# Patient Record
Sex: MALE | Race: WHITE | NOT HISPANIC OR LATINO | Employment: OTHER | URBAN - METROPOLITAN AREA
[De-identification: names, ages, dates, MRNs, and addresses within clinical notes are randomized per-mention and may not be internally consistent; named-entity substitution may affect disease eponyms.]

---

## 2017-06-02 ENCOUNTER — RECORDS - HEALTHEAST (OUTPATIENT)
Dept: LAB | Facility: CLINIC | Age: 82
End: 2017-06-02

## 2017-06-05 LAB
CHOLEST SERPL-MCNC: 136 MG/DL
FASTING STATUS PATIENT QL REPORTED: ABNORMAL
HDLC SERPL-MCNC: 36 MG/DL
LDLC SERPL CALC-MCNC: 63 MG/DL
TRIGL SERPL-MCNC: 185 MG/DL

## 2018-02-26 ENCOUNTER — TRANSFERRED RECORDS (OUTPATIENT)
Dept: HEALTH INFORMATION MANAGEMENT | Facility: CLINIC | Age: 83
End: 2018-02-26

## 2018-04-14 ENCOUNTER — RECORDS - HEALTHEAST (OUTPATIENT)
Dept: LAB | Facility: CLINIC | Age: 83
End: 2018-04-14

## 2018-04-14 LAB
ALBUMIN SERPL-MCNC: 3.5 G/DL (ref 3.5–5)
ALP SERPL-CCNC: 96 U/L (ref 45–120)
ALT SERPL W P-5'-P-CCNC: 10 U/L (ref 0–45)
ALT SERPL W P-5'-P-CCNC: 10 U/L (ref 0–45)
ANION GAP SERPL CALCULATED.3IONS-SCNC: 11 MMOL/L (ref 5–18)
AST SERPL W P-5'-P-CCNC: 17 U/L (ref 0–40)
BILIRUB DIRECT SERPL-MCNC: 0.2 MG/DL
BILIRUB SERPL-MCNC: 0.4 MG/DL (ref 0–1)
BUN SERPL-MCNC: 22 MG/DL (ref 8–28)
CALCIUM SERPL-MCNC: 9.5 MG/DL (ref 8.5–10.5)
CHLORIDE BLD-SCNC: 109 MMOL/L (ref 98–107)
CHOLEST SERPL-MCNC: 125 MG/DL
CO2 SERPL-SCNC: 22 MMOL/L (ref 22–31)
CREAT SERPL-MCNC: 0.78 MG/DL (ref 0.7–1.3)
FASTING STATUS PATIENT QL REPORTED: ABNORMAL
GFR SERPL CREATININE-BSD FRML MDRD: >60 ML/MIN/1.73M2
GLUCOSE BLD-MCNC: 104 MG/DL (ref 70–125)
HDLC SERPL-MCNC: 28 MG/DL
LDLC SERPL CALC-MCNC: 50 MG/DL
POTASSIUM BLD-SCNC: 4.3 MMOL/L (ref 3.5–5)
PROT SERPL-MCNC: 7.4 G/DL (ref 6–8)
SODIUM SERPL-SCNC: 142 MMOL/L (ref 136–145)
TRIGL SERPL-MCNC: 236 MG/DL

## 2018-04-16 ENCOUNTER — ALLIED HEALTH/NURSE VISIT (OUTPATIENT)
Dept: CARDIOLOGY | Facility: CLINIC | Age: 83
End: 2018-04-16
Attending: INTERNAL MEDICINE
Payer: MEDICAID

## 2018-04-16 ENCOUNTER — OFFICE VISIT (OUTPATIENT)
Dept: CARDIOLOGY | Facility: CLINIC | Age: 83
End: 2018-04-16
Payer: MEDICAID

## 2018-04-16 ENCOUNTER — DOCUMENTATION ONLY (OUTPATIENT)
Dept: CARDIOLOGY | Facility: CLINIC | Age: 83
End: 2018-04-16

## 2018-04-16 VITALS
SYSTOLIC BLOOD PRESSURE: 85 MMHG | HEART RATE: 71 BPM | DIASTOLIC BLOOD PRESSURE: 59 MMHG | WEIGHT: 146.1 LBS | HEIGHT: 73 IN | BODY MASS INDEX: 19.36 KG/M2

## 2018-04-16 DIAGNOSIS — I44.2 ATRIOVENTRICULAR BLOCK, COMPLETE (H): ICD-10-CM

## 2018-04-16 DIAGNOSIS — I20.89 OTHER FORMS OF ANGINA PECTORIS (H): ICD-10-CM

## 2018-04-16 DIAGNOSIS — I71.40 ABDOMINAL AORTIC ANEURYSM (AAA) WITHOUT RUPTURE (H): ICD-10-CM

## 2018-04-16 DIAGNOSIS — I25.5 ISCHEMIC CARDIOMYOPATHY: Primary | ICD-10-CM

## 2018-04-16 DIAGNOSIS — R09.89 BRUIT OF RIGHT CAROTID ARTERY: ICD-10-CM

## 2018-04-16 DIAGNOSIS — Z95.0 HISTORY OF PERMANENT CARDIAC PACEMAKER PLACEMENT: ICD-10-CM

## 2018-04-16 DIAGNOSIS — I10 BENIGN ESSENTIAL HYPERTENSION: ICD-10-CM

## 2018-04-16 DIAGNOSIS — I25.10 CORONARY ARTERY DISEASE INVOLVING NATIVE CORONARY ARTERY OF NATIVE HEART WITHOUT ANGINA PECTORIS: ICD-10-CM

## 2018-04-16 DIAGNOSIS — R06.02 SOB (SHORTNESS OF BREATH): ICD-10-CM

## 2018-04-16 DIAGNOSIS — Z91.199 NON-COMPLIANCE: ICD-10-CM

## 2018-04-16 PROCEDURE — 93280 PM DEVICE PROGR EVAL DUAL: CPT | Performed by: INTERNAL MEDICINE

## 2018-04-16 PROCEDURE — 99204 OFFICE O/P NEW MOD 45 MIN: CPT | Performed by: INTERNAL MEDICINE

## 2018-04-16 RX ORDER — LISINOPRIL 2.5 MG/1
2.5 TABLET ORAL DAILY
Qty: 90 TABLET | Refills: 3 | Status: ON HOLD | OUTPATIENT
Start: 2018-04-16 | End: 2018-04-21

## 2018-04-16 RX ORDER — METOPROLOL SUCCINATE 100 MG/1
100 TABLET, EXTENDED RELEASE ORAL DAILY
Qty: 90 TABLET | Refills: 3 | Status: ON HOLD | OUTPATIENT
Start: 2018-04-16 | End: 2018-04-21

## 2018-04-16 RX ORDER — LIDOCAINE 40 MG/G
CREAM TOPICAL
Status: CANCELLED | OUTPATIENT
Start: 2018-04-16

## 2018-04-16 RX ORDER — LISINOPRIL 10 MG/1
10 TABLET ORAL DAILY
COMMUNITY
End: 2018-04-16

## 2018-04-16 RX ORDER — POTASSIUM CHLORIDE 1500 MG/1
20 TABLET, EXTENDED RELEASE ORAL
Status: CANCELLED | OUTPATIENT
Start: 2018-04-16

## 2018-04-16 RX ORDER — ISOSORBIDE MONONITRATE 30 MG/1
30 TABLET, EXTENDED RELEASE ORAL DAILY
Qty: 90 TABLET | Refills: 3 | Status: ON HOLD | OUTPATIENT
Start: 2018-04-16 | End: 2018-05-18

## 2018-04-16 RX ORDER — SODIUM CHLORIDE 9 MG/ML
INJECTION, SOLUTION INTRAVENOUS CONTINUOUS
Status: CANCELLED | OUTPATIENT
Start: 2018-04-16

## 2018-04-16 NOTE — MR AVS SNAPSHOT
After Visit Summary   4/16/2018    Nacho Joseph    MRN: [id number]           Patient Information     Date Of Birth          1935        Visit Information        Provider Department      4/16/2018 11:00 AM [facility] DCR2 [facility]   [place]        Today's Diagnoses     History of permanent cardiac pacemaker placement           Follow-ups after your visit        Your next 10 appointments already scheduled     Apr 18, 2018 10:00 AM CDT   Ech Complete with [id number]   [facility] CV Echocardiography (Cardiovascular Imaging at [facility])    [address]  [address]  [facility] [address]   [phone]           1. Please bring or wear a comfortable two-piece outfit. 2. You may eat, drink and take your normal medicines. 3. For any questions that cannot be answered, please contact the ordering physician            Apr 19, 2018 10:00 AM CDT   Cath 90 Minute with SHCVR1   [facility] Cardiac Catheterization Lab ([facility])    [address]  [facility] [address]   [phone]            Apr 24, 2018  9:00 AM CDT   US AORTA/IVC/ILIAC DUPLEX COMPLETE with SHVUS4   [facility] MVI Ultrasound ([facility] at [facility])    [address]  [address]  [facility] [address]   [phone]           Please bring a list of your medicines (including vitamins, minerals and over-the-counter drugs). Also, tell your doctor about any allergies you may have. Wear comfortable clothes and leave your valuables at home.  Adults: No eating or drinking for 8 hours before the exam. You may take medicine with a small sip of water.  Children: - Children 6+ years: No food or drink for 6 hours before exam. - Children 1-5 years: No food or drink for 4 hours before exam. - Infants, breast-fed: may have breast milk up to 2 hours before exam. - Infants, formula: may have bottle until 4 hours before exam.  Please call  the Imaging Department at your exam site with any questions.            Apr 24, 2018 10:00 AM CDT   US CAROTID BILATERAL with SHVUS4   [facility] MVI Ultrasound ([facility])    [address]  [address]  [place] [address]   [phone]           Please bring a list of your medicines (including vitamins, minerals and over-the-counter drugs). Also, tell your doctor about any allergies you may have. Wear comfortable clothes and leave your valuables at home.  You do not need to do anything special to prepare for your exam.  Please call the Imaging Department at your exam site with any questions.            Apr 27, 2018  1:10 PM CDT   Return Visit with NICHOLAS Gutierrez CNP   [facility] ([facility])    [address]  [facility] [address] [phone] OPT 2            Jul 05, 2018  9:15 AM CDT   Return Visit with Ronaldo Son MD   [facility] ([facility])    [address]  [facility] [address] [phone] OPT 2            Jul 23, 2018  2:30 PM CDT   Remote PPM Check with WILCOX TECH1   [facility] ([facility])    [address]  [facility] [facility] [address] [phone] OPT 2           This appointment is for a remote check of your pacemaker.  This is not an appointment at the office.              Future tests that were ordered for you today     Open Future Orders        Priority Expected Expires Ordered    Follow-Up with Cardiologist Routine 7/15/2018 4/16/2019 4/16/2018    Follow-Up with Cardiac Advanced Practice Provider Routine 4/30/2018 4/16/2019 4/16/2018    Cardiac Cath: Coronary Angiography Adult Order Routine 4/18/2018 4/16/2019 4/16/2018    Echocardiogram Routine 4/18/2018 4/16/2019 4/16/2018    US Carotid Bilateral Routine 4/23/2018 4/16/2019 4/16/2018  "   US Aorta/Ivc/Iliac Duplex Complete Routine 2018            Who to contact     If you have questions or need follow up information about today's clinic visit or your schedule please contact [facility] directly at [phone].  Normal or non-critical lab and imaging results will be communicated to you by MyChart, letter or phone within 4 business days after the clinic has received the results. If you do not hear from us within 7 days, please contact the clinic through MyChart or phone. If you have a critical or abnormal lab result, we will notify you by phone as soon as possible.  Submit refill requests through [employer] or call your pharmacy and they will forward the refill request to us. Please allow 3 business days for your refill to be completed.          Additional Information About Your Visit        MyChart Information     [employer] lets you send messages to your doctor, view your test results, renew your prescriptions, schedule appointments and more. To sign up, go to [email or web address] . Click on \"Log in\" on the left side of the screen, which will take you to the Welcome page. Then click on \"Sign up Now\" on the right side of the page.     You will be asked to enter the access code listed below, as well as some personal information. Please follow the directions to create your username and password.     Your access code is: [email or web address]  Expires: 7/15/2018  9:37 AM     Your access code will  in 90 days. If you need help or a new code, please call your [facility] clinic or [phone].        Care EveryWhere ID     This is your Care EveryWhere ID. This could be used by other organizations to access your [facility] medical records  [id number]         Blood Pressure from Last 3 Encounters:   18 (!) 85/59   10/20/15 140/84   03/24/15 122/70    Weight from Last 3 Encounters:   18 66.3 kg (146 lb 1.6 oz)   10/20/15 71.2 kg (157 lb) "   03/24/15 65.3 kg (144 lb)              We Performed the Following     Follow-Up with Device Clinic     PM DEVICE PROGRAMMING EVAL, DUAL LEAD PACER (25426)          Today's Medication Changes          These changes are accurate as of 4/16/18 11:25 AM.  If you have any questions, ask your nurse or doctor.               Start taking these medicines.        Dose/Directions    isosorbide mononitrate 30 MG 24 hr tablet   Commonly known as:  IMDUR   Used for:  Ischemic cardiomyopathy   Started by:  Ronaldo Son MD        Dose:  30 mg   Take 1 tablet (30 mg) by mouth daily   Quantity:  90 tablet   Refills:  3         These medicines have changed or have updated prescriptions.        Dose/Directions    lisinopril 2.5 MG tablet   Commonly known as:  PRINIVIL/Zestril   This may have changed:    - medication strength  - how much to take  - Another medication with the same name was removed. Continue taking this medication, and follow the directions you see here.   Used for:  Ischemic cardiomyopathy   Changed by:  Ronaldo Son MD        Dose:  2.5 mg   Take 1 tablet (2.5 mg) by mouth daily   Quantity:  90 tablet   Refills:  3         Stop taking these medicines if you haven't already. Please contact your care team if you have questions.     isosorbide dinitrate 20 MG tablet   Commonly known as:  ISORDIL   Stopped by:  Ronaldo Son MD                Where to get your medicines      Some of these will need a paper prescription and others can be bought over the counter.  Ask your nurse if you have questions.     Bring a paper prescription for each of these medications     isosorbide mononitrate 30 MG 24 hr tablet    lisinopril 2.5 MG tablet    metoprolol succinate 100 MG 24 hr tablet                Primary Care Provider    Paulino Lynch       [id number]        Equal Access to Services     [facility] AH: Kaity Evangelista, xochilt jarrell, qaadams gilbert  cristina luis eduardohenri nnekaeverardo lalizettesofiya ah. So [facility] [phone].    ATENCIÓN: Si alyxla corrie, tiene a varela disposición servicios gratuitos de asistencia lingüística. Dorothy priest [phone].    We comply with applicable federal civil rights laws and [place] laws. We do not discriminate on the basis of race, color, national origin, age, disability, sex, sexual orientation, or gender identity.            Thank you!     Thank you for choosing [facility]  for your care. Our goal is always to provide you with excellent care. Hearing back from our patients is one way we can continue to improve our services. Please take a few minutes to complete the written survey that you may receive in the mail after your visit with us. Thank you!             Your Updated Medication List - Protect others around you: Learn how to safely use, store and throw away your medicines at www.disposemymeds.org.          This list is accurate as of 4/16/18 11:25 AM.  Always use your most recent med list.                   Brand Name Dispense Instructions for use Diagnosis    albuterol 1.25 MG/3ML nebulizer solution    ACCUNEB     Take 1 vial by nebulization every 6 hours as needed for shortness of breath / dyspnea or wheezing        amLODIPine 5 MG tablet    NORVASC    30 tablet    Take 1 tablet (5 mg) by mouth daily    HTN (hypertension)       aspirin 81 MG tablet      Take 1 tablet (81 mg) by mouth daily    Chest pain       atorvastatin 40 MG tablet    LIPITOR     Take 1 tablet (40 mg) by mouth daily    CAD (coronary artery disease), Hyperlipidemia       doxycycline 100 MG capsule    VIBRAMYCIN     Take 100 mg by mouth 2 times daily        isosorbide mononitrate 30 MG 24 hr tablet    IMDUR    90 tablet    Take 1 tablet (30 mg) by mouth daily    Ischemic cardiomyopathy       lisinopril 2.5 MG tablet    PRINIVIL/Zestril    90 tablet    Take 1 tablet (2.5 mg) by mouth daily    Ischemic cardiomyopathy       metoprolol  succinate 100 MG 24 hr tablet    TOPROL-XL    90 tablet    Take 1 tablet (100 mg) by mouth daily    Coronary artery disease involving native coronary artery of native heart without angina pectoris       MILK OF MAGNESIA 400 MG/5ML suspension   Generic drug:  magnesium hydroxide      Take 30 mLs by mouth daily as needed for constipation or heartburn        MUCINEX D MAX STRENGTH PO      Take 800 mg by mouth daily as needed        nitroGLYcerin 0.4 MG sublingual tablet    NITROSTAT    25 tablet    Place 1 tablet (0.4 mg) under the tongue every 5 minutes as needed for chest pain    Chest pain       TYLENOL PO      Take 1,000 mg by mouth 3 times daily

## 2018-04-16 NOTE — PROGRESS NOTES
HPI and Plan:   See dictation    Orders Placed This Encounter   Procedures     US Aorta/Ivc/Iliac Duplex Complete     US Carotid Bilateral     Follow-Up with Device Clinic     Follow-Up with Cardiac Advanced Practice Provider     Follow-Up with Cardiologist     Echocardiogram       Orders Placed This Encounter   Medications     DISCONTD: lisinopril (PRINIVIL/ZESTRIL) 10 MG tablet     Sig: Take 10 mg by mouth daily     Pseudoephedrine-Guaifenesin (MUCINEX D MAX STRENGTH PO)     Sig: Take 800 mg by mouth daily as needed     magnesium hydroxide (MILK OF MAGNESIA) 400 MG/5ML suspension     Sig: Take 30 mLs by mouth daily as needed for constipation or heartburn     lisinopril (PRINIVIL/ZESTRIL) 2.5 MG tablet     Sig: Take 1 tablet (2.5 mg) by mouth daily     Dispense:  90 tablet     Refill:  3     isosorbide mononitrate (IMDUR) 30 MG 24 hr tablet     Sig: Take 1 tablet (30 mg) by mouth daily     Dispense:  90 tablet     Refill:  3     metoprolol succinate (TOPROL-XL) 100 MG 24 hr tablet     Sig: Take 1 tablet (100 mg) by mouth daily     Dispense:  90 tablet     Refill:  3       Medications Discontinued During This Encounter   Medication Reason     lisinopril (PRINIVIL,ZESTRIL) 10 MG tablet      lisinopril (PRINIVIL/ZESTRIL) 10 MG tablet Reorder     isosorbide dinitrate (ISORDIL) 20 MG tablet      metoprolol (TOPROL-XL) 100 MG 24 hr tablet Reorder         Encounter Diagnoses   Name Primary?     Ischemic cardiomyopathy Yes     Atrioventricular block, complete (H)      History of permanent cardiac pacemaker placement      Other forms of angina pectoris (H)      Coronary artery disease involving native coronary artery of native heart without angina pectoris      Abdominal aortic aneurysm (AAA) without rupture (H)      Benign essential hypertension      Non-compliance      Bruit of right carotid artery      SOB (shortness of breath)        CURRENT MEDICATIONS:  Current Outpatient Prescriptions   Medication Sig Dispense Refill      Pseudoephedrine-Guaifenesin (MUCINEX D MAX STRENGTH PO) Take 800 mg by mouth daily as needed       magnesium hydroxide (MILK OF MAGNESIA) 400 MG/5ML suspension Take 30 mLs by mouth daily as needed for constipation or heartburn       lisinopril (PRINIVIL/ZESTRIL) 2.5 MG tablet Take 1 tablet (2.5 mg) by mouth daily 90 tablet 3     isosorbide mononitrate (IMDUR) 30 MG 24 hr tablet Take 1 tablet (30 mg) by mouth daily 90 tablet 3     metoprolol succinate (TOPROL-XL) 100 MG 24 hr tablet Take 1 tablet (100 mg) by mouth daily 90 tablet 3     doxycycline (VIBRAMYCIN) 100 MG capsule Take 100 mg by mouth 2 times daily       atorvastatin (LIPITOR) 40 MG tablet Take 1 tablet (40 mg) by mouth daily       albuterol (ACCUNEB) 1.25 MG/3ML nebulizer solution Take 1 vial by nebulization every 6 hours as needed for shortness of breath / dyspnea or wheezing       amLODIPine (NORVASC) 5 MG tablet Take 1 tablet (5 mg) by mouth daily 30 tablet 5     aspirin 81 MG tablet Take 1 tablet (81 mg) by mouth daily       nitroglycerin (NITROSTAT) 0.4 MG SL tablet Place 1 tablet (0.4 mg) under the tongue every 5 minutes as needed for chest pain 25 tablet 4     Acetaminophen (TYLENOL PO) Take 1,000 mg by mouth 3 times daily       [DISCONTINUED] lisinopril (PRINIVIL/ZESTRIL) 10 MG tablet Take 10 mg by mouth daily       [DISCONTINUED] metoprolol (TOPROL-XL) 100 MG 24 hr tablet Take 100 mg by mouth daily 30 tablet      [DISCONTINUED] lisinopril (PRINIVIL,ZESTRIL) 10 MG tablet Take 2 tablets (20 mg) by mouth 2 times daily (Patient not taking: Reported on 4/16/2018) 120 tablet 5       ALLERGIES   No Known Allergies    PAST MEDICAL HISTORY:  Past Medical History:   Diagnosis Date     AAA (abdominal aortic aneurysm) (H) 7/8/2014    4.1 cm     Anemia      Atrioventricular block, complete (HEART) 7/8/2014    S/p PPM 7/2014     Bradycardia      CAD (coronary artery disease) 2/9/2015     Chest pain 1/20/2015     COPD (chronic obstructive pulmonary disease)  "(H)      Depression 7/21/2014     Depressive disorder      HTN (hypertension) 7/8/2014     Hyperlipidemia 7/8/2014     Hypertension      Ischemic cardiomyopathy 2/9/2015     Lung abscess (H)      Syncope        PAST SURGICAL HISTORY:  Past Surgical History:   Procedure Laterality Date     CORONARY ANGIOGRAPHY ADULT ORDER  2/20/15    medical managed     IMPLANT PACEMAKER  7/7/14    dual chamber       FAMILY HISTORY:  Family History   Problem Relation Age of Onset     Other - See Comments Mother      old age     HEART DISEASE Father      unknown       SOCIAL HISTORY:  Social History     Social History     Marital status:      Spouse name: N/A     Number of children: N/A     Years of education: N/A     Social History Main Topics     Smoking status: Former Smoker     Quit date: 7/7/2012     Smokeless tobacco: Never Used     Alcohol use No     Drug use: No     Sexual activity: Not Asked     Other Topics Concern     Caffeine Concern No     2 cups of green tea      Special Diet No     Exercise Yes     walking     Seat Belt Yes     Social History Narrative       Review of Systems:  Skin:  Negative       Eyes:  Negative      ENT:  Negative      Respiratory:  Positive for cough;dyspnea on exertion (pt is getting over the flu) COPD   Cardiovascular:    Positive for;lightheadedness cp when coughing  Gastroenterology: Negative for      Genitourinary:  Negative      Musculoskeletal:  Negative      Neurologic:  Negative      Psychiatric:  Negative      Heme/Lymph/Imm:  Negative      Endocrine:  Negative        Physical Exam:  Vitals: BP (!) 85/59  Pulse 71  Ht 1.854 m (6' 1\")  Wt 66.3 kg (146 lb 1.6 oz)  BMI 19.28 kg/m2    Constitutional:  cooperative, alert and oriented, well developed, well nourished, in no acute distress thin      Skin:  warm and dry to the touch   pacemaker incision in the left infraclavicular area was well-healed  (actinic keratoses)    Head:  normocephalic        Eyes:  pupils equal and round    "     Lymph:      ENT:  no pallor or cyanosis        Neck:  JVP normal right carotid bruit      Respiratory:  normal symmetry prolonged expiration       Cardiac: regular rhythm;normal S1 and S2       grade 1;LLSB;systolic murmur grade 2;RUSB;systolic ejection murmur   distant heart sounds  pulses full and equal                               right femoral bruit (+) left femoral bruit (+)      GI:  abdomen soft        Extremities and Muscular Skeletal:  no deformities, clubbing, cyanosis, erythema observed;no edema              Neurological:  no gross motor deficits;affect appropriate        Psych:  Alert and Oriented x 3        CC  No referring provider defined for this encounter.

## 2018-04-16 NOTE — PROGRESS NOTES
Saw pt in person with  to do pre cath education.  Pt is scheduled for Coronary angiogram Thursday April 19, 2018  Pt to arrive @ 0800- facility where pt lives is notified and will arrange transportation here and back  Plan for coronary angio @ 1000 am -flexible if emergencies arise.  Pt needs to not eat or drink after midnight  Pt is not on Anticoagulation except ASA-  Will takes ASA and all meds as usual with a small sip of water that morning  Pt is not on diuretic, Pt is not diabetic.  No Dye Allergy-  Pt had coronary angio in 2015 without incident.  Pt will be back at facility overnight with wife.  Requesting son comes to procedure but pt uncertain if he will be able to come.  No further questions per pt through .  Orders placed in EPIC

## 2018-04-16 NOTE — LETTER
4/16/2018      Paulino Lynch  [id number]      RE: Nacho Joseph       Dear Colleague,    I had the pleasure of seeing Nacho Joseph in the [facility].    Service Date: 04/16/2018      REFERRING PHYSICIAN:  Dr. Paulino Lynch.        HISTORY OF PRESENT ILLNESS:  It is my pleasure to see your patient, Nacho Joseph, who is a pleasant 82-year-old gentleman who was seen approximately 3 years ago by Dr. Carlo Llanes.  This is a patient who was complaining of chest discomfort.  Coronary angiography which was performed in 2015 showed that he had a chronically occluded proximal right coronary artery.  He had a high-grade stenosis in his proximal circumflex and moderate though no flow-limiting disease in the left anterior descending artery, especially towards the distal end of the left anterior descending artery.  The right coronary artery is well collateralized from the left.  I reviewed the entire coronary angiogram myself this morning.  It was decided by Dr. Auguste that possibly medical treatment might be sufficient to take care of the patient.  The patient has continued to have chest discomfort.  It appears that he if walks maybe 500 yards that he will get chest pain, but he has also noticed increasing shortness of breath on exertion over the last few weeks.  He did have influenza just after Anthony and really has not recovered well from that.  Also, hypertension was a significant problem for the patient requiring 3 agents, but now his blood pressure is on the low side and this morning is at 85/59.  The patient in the past also had a history of complete heart block for which he had a permanent pacemaker implanted.  He has been seen by the electrophysiologist, Dr. Chavarria, in the past.  He is having no episodes of rest chest discomfort.  He has been having quite a lot of cough.      This patient also has a history of abdominal aortic aneurysm measuring 4.1 cm in 2015.   This has not been assessed since 2015.  I failed to mention that his pacemaker has not been interrogated since 2015 either.  The patient has not been complaining of increasing ankle edema.  He has not been complaining of orthopnea or PND.      IMPRESSION:   1.  Angina pectoris.  The patient has had a relatively stable pattern of angina pectoris for the last year, maybe, although he is somewhat of a difficult historian as there is a language barrier, but his shortness of breath has clearly worsened and this is limiting him.   2.  Hypotension.  His blood pressure is running on the low side ever since he had influenza at the beginning of this year.   3.  Right carotid bruit which is a new diagnosis.   4.  Infrarenal abdominal aortic aneurysm which has not been checked in 3 years.   5.  Complete heart block with permanent pacemaker in situ.  The pacemaker has not been interrogated in 3 years.   6.  Eleven pound weight loss since 2015.      PLAN:  Coronary angiography is indicated in this patient.  I explained the risks and benefits of the procedure to the patient including the risk of stroke, heart attack, death, bleeding, bruising, hematoma formation, vascular damage, limb loss, dye allergy, dye nephropathy, the risks associated with conscious sedation including aspiration, intubation, and the risks associated with blood transfusions.  I also explained the special risks associated with coronary interventions including increased risk of death, myocardial infarction and emergency bypass surgery.  The patient told me he understood how we did the procedure.  He told me he understood the risks associated with the procedure and he told me that he wished to proceed.      1.  I will obtain an abdominal ultrasound to check his infrarenal abdominal aortic aneurysm.     2.  I will obtain a carotid ultrasound to assess the right carotid bruit.   3.  I will have the patient enrolled in Device Clinic for interrogation of his  permanent pacemaker.   4.  We will obtain an echocardiogram to assess the shortness of breath to look at valvular function, left ventricular systolic function and intracardiac pressures.   5.  I will halve the dose of metoprolol succinate from 100 mg twice a day to 100 mg once a day.  Also, I will reduce the dose of lisinopril from 10 mg per day to 2.5 mg per day.   6.  I will stop the isosorbide dinitrate and start him on isosorbide mononitrate 30 mg per day.  I will continue the aspirin, the amlodipine 5 mg.     7.  I will have him follow up in a week to 2 weeks after the coronary angiogram is done with the nurse practitioner or physician assistant and will follow up with me in 3 months' time.      It has been a pleasure to be involved in the care of this very nice, but very complicated patient.      cc:   Paulino Lynch MD   [facility]    [address] [place] [address]         DANGELO GATICA MD, [facility]             D: 2018   T: 2018   MT: CYNTHIA      Name:     LATOSHA VIZCAINO   MRN:      [id number]        Account:      [id number]   :      1935           Service Date: 2018      Document: [id number]           No facility-administered encounter medications on file as of 2018.      Outpatient Encounter Prescriptions as of 2018   Medication Sig Dispense Refill     Acetaminophen (TYLENOL PO) Take 1,000 mg by mouth 3 times daily       amLODIPine (NORVASC) 5 MG tablet Take 1 tablet (5 mg) by mouth daily 30 tablet 5     aspirin 81 MG tablet Take 1 tablet (81 mg) by mouth daily       atorvastatin (LIPITOR) 40 MG tablet Take 1 tablet (40 mg) by mouth daily       doxycycline (VIBRAMYCIN) 100 MG capsule Take 100 mg by mouth 2 times daily       isosorbide mononitrate (IMDUR) 30 MG 24 hr tablet Take 1 tablet (30 mg) by mouth daily 90 tablet 3     lisinopril (PRINIVIL/ZESTRIL) 2.5 MG tablet Take 1 tablet (2.5 mg) by mouth daily 90  tablet 3     magnesium hydroxide (MILK OF MAGNESIA) 400 MG/5ML suspension Take 30 mLs by mouth daily as needed for constipation or heartburn       metoprolol succinate (TOPROL-XL) 100 MG 24 hr tablet Take 1 tablet (100 mg) by mouth daily 90 tablet 3     nitroglycerin (NITROSTAT) 0.4 MG SL tablet Place 1 tablet (0.4 mg) under the tongue every 5 minutes as needed for chest pain 25 tablet 4     Pseudoephedrine-Guaifenesin (MUCINEX D MAX STRENGTH PO) Take 800 mg by mouth daily        [DISCONTINUED] albuterol (ACCUNEB) 1.25 MG/3ML nebulizer solution Take 1 vial by nebulization every 6 hours as needed for shortness of breath / dyspnea or wheezing       [DISCONTINUED] isosorbide dinitrate (ISORDIL) 20 MG tablet Take 30 mg by mouth 2 times daily (before meals) Hold if Systolic Blood Pressure is less than 85.       [DISCONTINUED] lisinopril (PRINIVIL,ZESTRIL) 10 MG tablet Take 2 tablets (20 mg) by mouth 2 times daily (Patient not taking: Reported on 4/16/2018) 120 tablet 5     [DISCONTINUED] lisinopril (PRINIVIL/ZESTRIL) 10 MG tablet Take 10 mg by mouth daily       [DISCONTINUED] metoprolol (TOPROL-XL) 100 MG 24 hr tablet Take 100 mg by mouth daily 30 tablet        Again, thank you for allowing me to participate in the care of your patient.      Sincerely,    Ronaldo Son MD, MD     [facility]

## 2018-04-16 NOTE — LETTER
4/16/2018    Paulino MCCLELLANKendal Lynch  [id number]    RE: Nacho Joseph       Dear Colleague,    I had the pleasure of seeing Nacho Joseph in the [facility].    HPI and Plan:   See dictation    Orders Placed This Encounter   Procedures     US Aorta/Ivc/Iliac Duplex Complete     US Carotid Bilateral     Follow-Up with Device Clinic     Follow-Up with Cardiac Advanced Practice Provider     Follow-Up with Cardiologist     Echocardiogram       Orders Placed This Encounter   Medications     DISCONTD: lisinopril (PRINIVIL/ZESTRIL) 10 MG tablet     Sig: Take 10 mg by mouth daily     Pseudoephedrine-Guaifenesin (MUCINEX D MAX STRENGTH PO)     Sig: Take 800 mg by mouth daily as needed     magnesium hydroxide (MILK OF MAGNESIA) 400 MG/5ML suspension     Sig: Take 30 mLs by mouth daily as needed for constipation or heartburn     lisinopril (PRINIVIL/ZESTRIL) 2.5 MG tablet     Sig: Take 1 tablet (2.5 mg) by mouth daily     Dispense:  90 tablet     Refill:  3     isosorbide mononitrate (IMDUR) 30 MG 24 hr tablet     Sig: Take 1 tablet (30 mg) by mouth daily     Dispense:  90 tablet     Refill:  3     metoprolol succinate (TOPROL-XL) 100 MG 24 hr tablet     Sig: Take 1 tablet (100 mg) by mouth daily     Dispense:  90 tablet     Refill:  3       Medications Discontinued During This Encounter   Medication Reason     lisinopril (PRINIVIL,ZESTRIL) 10 MG tablet      lisinopril (PRINIVIL/ZESTRIL) 10 MG tablet Reorder     isosorbide dinitrate (ISORDIL) 20 MG tablet      metoprolol (TOPROL-XL) 100 MG 24 hr tablet Reorder         Encounter Diagnoses   Name Primary?     Ischemic cardiomyopathy Yes     Atrioventricular block, complete (H)      History of permanent cardiac pacemaker placement      Other forms of angina pectoris (H)      Coronary artery disease involving native coronary artery of native heart without angina pectoris      Abdominal aortic aneurysm (AAA) without rupture (H)      Benign  essential hypertension      Non-compliance      Bruit of right carotid artery      SOB (shortness of breath)        CURRENT MEDICATIONS:  Current Outpatient Prescriptions   Medication Sig Dispense Refill     Pseudoephedrine-Guaifenesin (MUCINEX D MAX STRENGTH PO) Take 800 mg by mouth daily as needed       magnesium hydroxide (MILK OF MAGNESIA) 400 MG/5ML suspension Take 30 mLs by mouth daily as needed for constipation or heartburn       lisinopril (PRINIVIL/ZESTRIL) 2.5 MG tablet Take 1 tablet (2.5 mg) by mouth daily 90 tablet 3     isosorbide mononitrate (IMDUR) 30 MG 24 hr tablet Take 1 tablet (30 mg) by mouth daily 90 tablet 3     metoprolol succinate (TOPROL-XL) 100 MG 24 hr tablet Take 1 tablet (100 mg) by mouth daily 90 tablet 3     doxycycline (VIBRAMYCIN) 100 MG capsule Take 100 mg by mouth 2 times daily       atorvastatin (LIPITOR) 40 MG tablet Take 1 tablet (40 mg) by mouth daily       albuterol (ACCUNEB) 1.25 MG/3ML nebulizer solution Take 1 vial by nebulization every 6 hours as needed for shortness of breath / dyspnea or wheezing       amLODIPine (NORVASC) 5 MG tablet Take 1 tablet (5 mg) by mouth daily 30 tablet 5     aspirin 81 MG tablet Take 1 tablet (81 mg) by mouth daily       nitroglycerin (NITROSTAT) 0.4 MG SL tablet Place 1 tablet (0.4 mg) under the tongue every 5 minutes as needed for chest pain 25 tablet 4     Acetaminophen (TYLENOL PO) Take 1,000 mg by mouth 3 times daily       [DISCONTINUED] lisinopril (PRINIVIL/ZESTRIL) 10 MG tablet Take 10 mg by mouth daily       [DISCONTINUED] metoprolol (TOPROL-XL) 100 MG 24 hr tablet Take 100 mg by mouth daily 30 tablet      [DISCONTINUED] lisinopril (PRINIVIL,ZESTRIL) 10 MG tablet Take 2 tablets (20 mg) by mouth 2 times daily (Patient not taking: Reported on 4/16/2018) 120 tablet 5       ALLERGIES   No Known Allergies    PAST MEDICAL HISTORY:  Past Medical History:   Diagnosis Date     AAA (abdominal aortic aneurysm) (H) 7/8/2014    4.1 cm     Anemia   "    Atrioventricular block, complete (HEART) 7/8/2014    S/p PPM 7/2014     Bradycardia      CAD (coronary artery disease) 2/9/2015     Chest pain 1/20/2015     COPD (chronic obstructive pulmonary disease) (H)      Depression 7/21/2014     Depressive disorder      HTN (hypertension) 7/8/2014     Hyperlipidemia 7/8/2014     Hypertension      Ischemic cardiomyopathy 2/9/2015     Lung abscess (H)      Syncope        PAST SURGICAL HISTORY:  Past Surgical History:   Procedure Laterality Date     CORONARY ANGIOGRAPHY ADULT ORDER  2/20/15    medical managed     IMPLANT PACEMAKER  7/7/14    dual chamber       FAMILY HISTORY:  Family History   Problem Relation Age of Onset     Other - See Comments Mother      old age     HEART DISEASE Father      unknown       SOCIAL HISTORY:  Social History     Social History     Marital status:      Spouse name: N/A     Number of children: N/A     Years of education: N/A     Social History Main Topics     Smoking status: Former Smoker     Quit date: 7/7/2012     Smokeless tobacco: Never Used     Alcohol use No     Drug use: No     Sexual activity: Not Asked     Other Topics Concern     Caffeine Concern No     2 cups of green tea      Special Diet No     Exercise Yes     walking     Seat Belt Yes     Social History Narrative       Review of Systems:  Skin:  Negative       Eyes:  Negative      ENT:  Negative      Respiratory:  Positive for cough;dyspnea on exertion (pt is getting over the flu) COPD   Cardiovascular:    Positive for;lightheadedness cp when coughing  Gastroenterology: Negative for      Genitourinary:  Negative      Musculoskeletal:  Negative      Neurologic:  Negative      Psychiatric:  Negative      Heme/Lymph/Imm:  Negative      Endocrine:  Negative        Physical Exam:  Vitals: BP (!) 85/59  Pulse 71  Ht 1.854 m (6' 1\")  Wt 66.3 kg (146 lb 1.6 oz)  BMI 19.28 kg/m2    Constitutional:  cooperative, alert and oriented, well developed, well nourished, in no acute " distress thin      Skin:  warm and dry to the touch   pacemaker incision in the left infraclavicular area was well-healed  (actinic keratoses)    Head:  normocephalic        Eyes:  pupils equal and round        Lymph:      ENT:  no pallor or cyanosis        Neck:  JVP normal right carotid bruit      Respiratory:  normal symmetry prolonged expiration       Cardiac: regular rhythm;normal S1 and S2       grade 1;LLSB;systolic murmur grade 2;RUSB;systolic ejection murmur   distant heart sounds  pulses full and equal                               right femoral bruit (+) left femoral bruit (+)      GI:  abdomen soft        Extremities and Muscular Skeletal:  no deformities, clubbing, cyanosis, erythema observed;no edema              Neurological:  no gross motor deficits;affect appropriate        Psych:  Alert and Oriented x 3        CC  No referring provider defined for this encounter.                Thank you for allowing me to participate in the care of your patient.      Sincerely,     Ronaldo Son MD, MD     [facility]    cc:   No referring provider defined for this encounter.

## 2018-04-16 NOTE — PROGRESS NOTES
[employer]io Pacemaker Device Check  AP: 87 % : 100 %  Mode: DDD        Underlying Rhythm: CHB with vent rate < 30  Heart Rate: Minimal variation per histogram but no c/o activity intolerance. Even without rate response. He walks with a walker.   Sensing: stable    Pacing Threshold: stable   Impedance: stable  Battery Status: 4 years  Device Site: Healed  Atrial Arrhythmia: 1 mode switch but no EGM to confirm afib  Ventricular Arrhythmia: 5 NSVT since 2015. 2 with EGMS appear VT lasting 5 beats to 5 sec at rates 160 and 165.   Setting Change: A outputs adj to conserve battery status.    Care Plan: f/u 3 months remote PPM check. New monitor was ordered along with adaptor. YONI

## 2018-04-16 NOTE — MR AVS SNAPSHOT
After Visit Summary   4/16/2018    Nacho Joseph    MRN: [id number]           Patient Information     Date Of Birth          1935        Visit Information        Provider Department      4/16/2018 8:30 AM Ronaldo Son MD; [employer] [facility]        Today's Diagnoses     Ischemic cardiomyopathy    -  1    Atrioventricular block, complete (H)        History of permanent cardiac pacemaker placement        Other forms of angina pectoris (H)        Coronary artery disease involving native coronary artery of native heart without angina pectoris        Abdominal aortic aneurysm (AAA) without rupture (H)        Benign essential hypertension        Non-compliance        Bruit of right carotid artery        SOB (shortness of breath)           Follow-ups after your visit        Additional Services     Follow-Up with Device Clinic           Follow-Up with Cardiac Advanced Practice Provider           Follow-Up with Cardiologist                 Future tests that were ordered for you today     Open Future Orders        Priority Expected Expires Ordered    Follow-Up with Cardiologist Routine 7/15/2018 4/16/2019 4/16/2018    Follow-Up with Cardiac Advanced Practice Provider Routine 4/30/2018 4/16/2019 4/16/2018    Cardiac Cath: Coronary Angiography Adult Order Routine 4/18/2018 4/16/2019 4/16/2018    Echocardiogram Routine 4/18/2018 4/16/2019 4/16/2018    US Carotid Bilateral Routine 4/23/2018 4/16/2019 4/16/2018    US Aorta/Ivc/Iliac Duplex Complete Routine 4/23/2018 4/16/2019 4/16/2018    Follow-Up with Device Clinic Routine 4/16/2018 4/16/2019 4/16/2018            Who to contact     If you have questions or need follow up information about today's clinic visit or your schedule please contact [facility] directly at [phone].  Normal or non-critical lab and imaging results will be  "communicated to you by [employer], letter or phone within 4 business days after the clinic has received the results. If you do not hear from us within 7 days, please contact the clinic through [employer] or phone. If you have a critical or abnormal lab result, we will notify you by phone as soon as possible.  Submit refill requests through [employer] or call your pharmacy and they will forward the refill request to us. Please allow 3 business days for your refill to be completed.          Additional Information About Your Visit        LiveRail Information     LiveRail lets you send messages to your doctor, view your test results, renew your prescriptions, schedule appointments and more. To sign up, go to [email or web address] . Click on \"Log in\" on the left side of the screen, which will take you to the Welcome page. Then click on \"Sign up Now\" on the right side of the page.     You will be asked to enter the access code listed below, as well as some personal information. Please follow the directions to create your username and password.     Your access code is: [email or web address]  Expires: 7/15/2018  9:37 AM     Your access code will  in 90 days. If you need help or a new code, please call your [place] clinic or [phone].        Care EveryWhere ID     This is your Care EveryWhere ID. This could be used by other organizations to access your [place] medical records  [id number]        Your Vitals Were     Pulse Height BMI (Body Mass Index)             71 1.854 m (6' 1\") 19.28 kg/m2          Blood Pressure from Last 3 Encounters:   18 (!) 85/59   10/20/15 140/84   03/24/15 122/70    Weight from Last 3 Encounters:   18 66.3 kg (146 lb 1.6 oz)   10/20/15 71.2 kg (157 lb)   03/24/15 65.3 kg (144 lb)                 Today's Medication Changes          These changes are accurate as of 18  9:37 AM.  If you have any questions, ask your nurse or doctor.               Start taking these medicines.        " Dose/Directions    isosorbide mononitrate 30 MG 24 hr tablet   Commonly known as:  IMDUR   Used for:  Ischemic cardiomyopathy   Started by:  Ronaldo Son MD        Dose:  30 mg   Take 1 tablet (30 mg) by mouth daily   Quantity:  90 tablet   Refills:  3         These medicines have changed or have updated prescriptions.        Dose/Directions    lisinopril 2.5 MG tablet   Commonly known as:  PRINIVIL/Zestril   This may have changed:    - medication strength  - how much to take  - Another medication with the same name was removed. Continue taking this medication, and follow the directions you see here.   Used for:  Ischemic cardiomyopathy   Changed by:  Ronaldo Son MD        Dose:  2.5 mg   Take 1 tablet (2.5 mg) by mouth daily   Quantity:  90 tablet   Refills:  3         Stop taking these medicines if you haven't already. Please contact your care team if you have questions.     isosorbide dinitrate 20 MG tablet   Commonly known as:  ISORDIL   Stopped by:  Ronaldo Son MD                Where to get your medicines      Some of these will need a paper prescription and others can be bought over the counter.  Ask your nurse if you have questions.     Bring a paper prescription for each of these medications     isosorbide mononitrate 30 MG 24 hr tablet    lisinopril 2.5 MG tablet    metoprolol succinate 100 MG 24 hr tablet                Primary Care Provider    Paulino Lynch       [id number]        Equal Access to Services     [facility]: Hadii pedro [place] hadasho Soomaali, waaxda luqadaha, qaybta kaalmada adeegyada, adams nielson . So [facility] [phone].    ATENCIÓN: Si habla español, tiene a varela disposición servicios gratuitos de asistencia lingüística. Llame al [phone].    We comply with applicable federal civil rights laws and [place] laws. We do not discriminate on the basis of race, color, national origin, age, disability, sex, sexual  orientation, or gender identity.            Thank you!     Thank you for choosing [facility]  for your care. Our goal is always to provide you with excellent care. Hearing back from our patients is one way we can continue to improve our services. Please take a few minutes to complete the written survey that you may receive in the mail after your visit with us. Thank you!             Your Updated Medication List - Protect others around you: Learn how to safely use, store and throw away your medicines at www.disposemymeds.org.          This list is accurate as of 4/16/18  9:37 AM.  Always use your most recent med list.                   Brand Name Dispense Instructions for use Diagnosis    albuterol 1.25 MG/3ML nebulizer solution    ACCUNEB     Take 1 vial by nebulization every 6 hours as needed for shortness of breath / dyspnea or wheezing        amLODIPine 5 MG tablet    NORVASC    30 tablet    Take 1 tablet (5 mg) by mouth daily    HTN (hypertension)       aspirin 81 MG tablet      Take 1 tablet (81 mg) by mouth daily    Chest pain       atorvastatin 40 MG tablet    LIPITOR     Take 1 tablet (40 mg) by mouth daily    CAD (coronary artery disease), Hyperlipidemia       doxycycline 100 MG capsule    VIBRAMYCIN     Take 100 mg by mouth 2 times daily        isosorbide mononitrate 30 MG 24 hr tablet    IMDUR    90 tablet    Take 1 tablet (30 mg) by mouth daily    Ischemic cardiomyopathy       lisinopril 2.5 MG tablet    PRINIVIL/Zestril    90 tablet    Take 1 tablet (2.5 mg) by mouth daily    Ischemic cardiomyopathy       metoprolol succinate 100 MG 24 hr tablet    TOPROL-XL    90 tablet    Take 1 tablet (100 mg) by mouth daily    Coronary artery disease involving native coronary artery of native heart without angina pectoris       MILK OF MAGNESIA 400 MG/5ML suspension   Generic drug:  magnesium hydroxide      Take 30 mLs by mouth daily as needed for constipation or heartburn         MUCINEX D MAX STRENGTH PO      Take 800 mg by mouth daily as needed        nitroGLYcerin 0.4 MG sublingual tablet    NITROSTAT    25 tablet    Place 1 tablet (0.4 mg) under the tongue every 5 minutes as needed for chest pain    Chest pain       TYLENOL PO      Take 1,000 mg by mouth 3 times daily

## 2018-04-16 NOTE — PROGRESS NOTES
Service Date: 04/16/2018      REFERRING PHYSICIAN:  Dr. Paulino Lynch.        HISTORY OF PRESENT ILLNESS:  It is my pleasure to see your patient, Nacho Joseph, who is a pleasant 82-year-old gentleman who was seen approximately 3 years ago by Dr. Carlo Llanes.  This is a patient who was complaining of chest discomfort.  Coronary angiography which was performed in 2015 showed that he had a chronically occluded proximal right coronary artery.  He had a high-grade stenosis in his proximal circumflex and moderate though no flow-limiting disease in the left anterior descending artery, especially towards the distal end of the left anterior descending artery.  The right coronary artery is well collateralized from the left.  I reviewed the entire coronary angiogram myself this morning.  It was decided by Dr. Auguste that possibly medical treatment might be sufficient to take care of the patient.  The patient has continued to have chest discomfort.  It appears that he if walks maybe 500 yards that he will get chest pain, but he has also noticed increasing shortness of breath on exertion over the last few weeks.  He did have influenza just after [place] and really has not recovered well from that.  Also, hypertension was a significant problem for the patient requiring 3 agents, but now his blood pressure is on the low side and this morning is at 85/59.  The patient in the past also had a history of complete heart block for which he had a permanent pacemaker implanted.  He has been seen by the electrophysiologist, Dr. Chavarria, in the past.  He is having no episodes of rest chest discomfort.  He has been having quite a lot of cough.      This patient also has a history of abdominal aortic aneurysm measuring 4.1 cm in 2015.  This has not been assessed since 2015.  I failed to mention that his pacemaker has not been interrogated since 2015 either.  The patient has not been complaining of increasing ankle edema.  He has not  been complaining of orthopnea or PND.      IMPRESSION:   1.  Angina pectoris.  The patient has had a relatively stable pattern of angina pectoris for the last year, maybe, although he is somewhat of a difficult historian as there is a language barrier, but his shortness of breath has clearly worsened and this is limiting him.   2.  Hypotension.  His blood pressure is running on the low side ever since he had influenza at the beginning of this year.   3.  Right carotid bruit which is a new diagnosis.   4.  Infrarenal abdominal aortic aneurysm which has not been checked in 3 years.   5.  Complete heart block with permanent pacemaker in situ.  The pacemaker has not been interrogated in 3 years.   6.  Eleven pound weight loss since 2015.      PLAN:  Coronary angiography is indicated in this patient.  I explained the risks and benefits of the procedure to the patient including the risk of stroke, heart attack, death, bleeding, bruising, hematoma formation, vascular damage, limb loss, dye allergy, dye nephropathy, the risks associated with conscious sedation including aspiration, intubation, and the risks associated with blood transfusions.  I also explained the special risks associated with coronary interventions including increased risk of death, myocardial infarction and emergency bypass surgery.  The patient told me he understood how we did the procedure.  He told me he understood the risks associated with the procedure and he told me that he wished to proceed.      1.  I will obtain an abdominal ultrasound to check his infrarenal abdominal aortic aneurysm.     2.  I will obtain a carotid ultrasound to assess the right carotid bruit.   3.  I will have the patient enrolled in [facility] for interrogation of his permanent pacemaker.   4.  We will obtain an echocardiogram to assess the shortness of breath to look at valvular function, left ventricular systolic function and intracardiac pressures.   5.  I will halve  the dose of metoprolol succinate from 100 mg twice a day to 100 mg once a day.  Also, I will reduce the dose of lisinopril from 10 mg per day to 2.5 mg per day.   6.  I will stop the isosorbide dinitrate and start him on isosorbide mononitrate 30 mg per day.  I will continue the aspirin, the amlodipine 5 mg.     7.  I will have him follow up in a week to 2 weeks after the coronary angiogram is done with the nurse practitioner or physician assistant and will follow up with me in 3 months' time.      It has been a pleasure to be involved in the care of this very nice, but very complicated patient.      cc:   Paulino Lynch MD   [facility]    [address] [place] [address]         DANGELO GATICA MD, [facility]             D: 2018   T: 2018   MT: CYNTHIA      Name:     LATOSHA VIZCAINO   MRN:      [id number]        Account:      [id number]   :      1935           Service Date: 2018      Document: [id number]

## 2018-04-18 ENCOUNTER — HOSPITAL ENCOUNTER (OUTPATIENT)
Dept: CARDIOLOGY | Facility: CLINIC | Age: 83
Discharge: HOME | End: 2018-04-18
Attending: INTERNAL MEDICINE | Admitting: INTERNAL MEDICINE
Payer: MEDICAID

## 2018-04-18 DIAGNOSIS — R06.02 SOB (SHORTNESS OF BREATH): ICD-10-CM

## 2018-04-18 PROCEDURE — 40000264 ECHO COMPLETE WITH OPTISON

## 2018-04-18 PROCEDURE — 25500064 ZZH RX 255 OP 636: Performed by: INTERNAL MEDICINE

## 2018-04-18 PROCEDURE — 93306 TTE W/DOPPLER COMPLETE: CPT | Mod: 26 | Performed by: INTERNAL MEDICINE

## 2018-04-18 RX ADMIN — HUMAN ALBUMIN MICROSPHERES AND PERFLUTREN 6 ML: 10; .22 INJECTION, SOLUTION INTRAVENOUS at 11:30

## 2018-04-19 ENCOUNTER — HOSPITAL ENCOUNTER (INPATIENT)
Facility: CLINIC | Age: 83
LOS: 2 days | Discharge: NURSING, REHAB OR LONG-TERM CARE | End: 2018-04-21
Attending: EMERGENCY MEDICINE | Admitting: INTERNAL MEDICINE
Payer: MEDICAID

## 2018-04-19 ENCOUNTER — HOSPITAL ENCOUNTER (OUTPATIENT)
Facility: CLINIC | Age: 83
Discharge: OTHER INSTITUTION | End: 2018-04-19
Attending: INTERNAL MEDICINE | Admitting: INTERNAL MEDICINE
Payer: MEDICAID

## 2018-04-19 ENCOUNTER — RECORDS - HEALTHEAST (OUTPATIENT)
Dept: LAB | Facility: CLINIC | Age: 83
End: 2018-04-19

## 2018-04-19 ENCOUNTER — APPOINTMENT (OUTPATIENT)
Dept: CT IMAGING | Facility: CLINIC | Age: 83
End: 2018-04-19
Attending: EMERGENCY MEDICINE
Payer: MEDICAID

## 2018-04-19 DIAGNOSIS — I25.10 CORONARY ARTERY DISEASE INVOLVING NATIVE CORONARY ARTERY OF NATIVE HEART WITHOUT ANGINA PECTORIS: ICD-10-CM

## 2018-04-19 DIAGNOSIS — N17.9 ACUTE RENAL FAILURE, UNSPECIFIED ACUTE RENAL FAILURE TYPE (H): ICD-10-CM

## 2018-04-19 DIAGNOSIS — I25.5 ISCHEMIC CARDIOMYOPATHY: ICD-10-CM

## 2018-04-19 DIAGNOSIS — N20.1 CALCULUS OF URETER: ICD-10-CM

## 2018-04-19 DIAGNOSIS — I20.89 OTHER FORMS OF ANGINA PECTORIS (H): ICD-10-CM

## 2018-04-19 DIAGNOSIS — I71.40 ABDOMINAL AORTIC ANEURYSM (AAA) WITHOUT RUPTURE (H): ICD-10-CM

## 2018-04-19 DIAGNOSIS — I95.9 HYPOTENSION, UNSPECIFIED HYPOTENSION TYPE: ICD-10-CM

## 2018-04-19 DIAGNOSIS — D72.829 LEUKOCYTOSIS, UNSPECIFIED TYPE: ICD-10-CM

## 2018-04-19 DIAGNOSIS — R06.02 SOB (SHORTNESS OF BREATH): ICD-10-CM

## 2018-04-19 PROBLEM — R10.9 RIGHT FLANK PAIN: Status: ACTIVE | Noted: 2018-04-19

## 2018-04-19 PROBLEM — N13.9 OBSTRUCTIVE UROPATHY: Status: ACTIVE | Noted: 2018-04-19

## 2018-04-19 LAB
ALBUMIN UR-MCNC: 30 MG/DL
ALBUMIN UR-MCNC: ABNORMAL MG/DL
ANION GAP SERPL CALCULATED.3IONS-SCNC: 10 MMOL/L (ref 3–14)
APPEARANCE UR: CLEAR
APPEARANCE UR: CLEAR
BACTERIA #/AREA URNS HPF: ABNORMAL HPF
BASOPHILS # BLD AUTO: 0 10E9/L (ref 0–0.2)
BASOPHILS NFR BLD AUTO: 0.1 %
BILIRUB UR QL STRIP: NEGATIVE
BILIRUB UR QL STRIP: NEGATIVE
BUN SERPL-MCNC: 31 MG/DL (ref 7–30)
CALCIUM SERPL-MCNC: 9.6 MG/DL (ref 8.5–10.1)
CHLORIDE SERPL-SCNC: 109 MMOL/L (ref 94–109)
CO2 SERPL-SCNC: 23 MMOL/L (ref 20–32)
COLOR UR AUTO: YELLOW
COLOR UR AUTO: YELLOW
CREAT SERPL-MCNC: 1.51 MG/DL (ref 0.66–1.25)
DIFFERENTIAL METHOD BLD: ABNORMAL
EOSINOPHIL # BLD AUTO: 0 10E9/L (ref 0–0.7)
EOSINOPHIL NFR BLD AUTO: 0 %
ERYTHROCYTE [DISTWIDTH] IN BLOOD BY AUTOMATED COUNT: 15.5 % (ref 10–15)
GFR SERPL CREATININE-BSD FRML MDRD: 44 ML/MIN/1.7M2
GLUCOSE SERPL-MCNC: 136 MG/DL (ref 70–99)
GLUCOSE UR STRIP-MCNC: NEGATIVE MG/DL
GLUCOSE UR STRIP-MCNC: NEGATIVE MG/DL
HCT VFR BLD AUTO: 39 % (ref 40–53)
HGB BLD-MCNC: 12.7 G/DL (ref 13.3–17.7)
HGB UR QL STRIP: ABNORMAL
HGB UR QL STRIP: NEGATIVE
IMM GRANULOCYTES # BLD: 0.1 10E9/L (ref 0–0.4)
IMM GRANULOCYTES NFR BLD: 0.4 %
KETONES UR STRIP-MCNC: 10 MG/DL
KETONES UR STRIP-MCNC: ABNORMAL MG/DL
LACTATE BLD-SCNC: 1.1 MMOL/L (ref 0.7–2)
LACTATE BLD-SCNC: 1.4 MMOL/L (ref 0.7–2)
LEUKOCYTE ESTERASE UR QL STRIP: NEGATIVE
LEUKOCYTE ESTERASE UR QL STRIP: NEGATIVE
LYMPHOCYTES # BLD AUTO: 1.5 10E9/L (ref 0.8–5.3)
LYMPHOCYTES NFR BLD AUTO: 8.8 %
MCH RBC QN AUTO: 31.1 PG (ref 26.5–33)
MCHC RBC AUTO-ENTMCNC: 32.6 G/DL (ref 31.5–36.5)
MCV RBC AUTO: 95 FL (ref 78–100)
MONOCYTES # BLD AUTO: 1.4 10E9/L (ref 0–1.3)
MONOCYTES NFR BLD AUTO: 8.1 %
NEUTROPHILS # BLD AUTO: 14.2 10E9/L (ref 1.6–8.3)
NEUTROPHILS NFR BLD AUTO: 82.6 %
NITRATE UR QL: NEGATIVE
NITRATE UR QL: NEGATIVE
NRBC # BLD AUTO: 0 10*3/UL
NRBC BLD AUTO-RTO: 0 /100
PH UR STRIP: 5 PH (ref 5–7)
PH UR STRIP: 6.5 [PH] (ref 4.5–8)
PLATELET # BLD AUTO: 221 10E9/L (ref 150–450)
POTASSIUM SERPL-SCNC: 4.2 MMOL/L (ref 3.4–5.3)
PROCALCITONIN SERPL-MCNC: 0.17 NG/ML
RBC # BLD AUTO: 4.09 10E12/L (ref 4.4–5.9)
RBC #/AREA URNS AUTO: ABNORMAL HPF
SODIUM SERPL-SCNC: 142 MMOL/L (ref 133–144)
SOURCE: ABNORMAL
SP GR UR STRIP: 1.02 (ref 1–1.03)
SP GR UR STRIP: 1.02 (ref 1–1.03)
SQUAMOUS #/AREA URNS AUTO: ABNORMAL LPF
UROBILINOGEN UR STRIP-ACNC: ABNORMAL
UROBILINOGEN UR STRIP-MCNC: NORMAL MG/DL (ref 0–2)
WBC # BLD AUTO: 17.2 10E9/L (ref 4–11)
WBC #/AREA URNS AUTO: ABNORMAL HPF

## 2018-04-19 PROCEDURE — 80048 BASIC METABOLIC PNL TOTAL CA: CPT | Performed by: EMERGENCY MEDICINE

## 2018-04-19 PROCEDURE — 40000852 ZZH STATISTIC HEART CATH LAB OR EP LAB

## 2018-04-19 PROCEDURE — 96375 TX/PRO/DX INJ NEW DRUG ADDON: CPT

## 2018-04-19 PROCEDURE — 25000128 H RX IP 250 OP 636: Performed by: INTERNAL MEDICINE

## 2018-04-19 PROCEDURE — 99285 EMERGENCY DEPT VISIT HI MDM: CPT | Mod: 25

## 2018-04-19 PROCEDURE — 84145 PROCALCITONIN (PCT): CPT | Performed by: EMERGENCY MEDICINE

## 2018-04-19 PROCEDURE — 25000132 ZZH RX MED GY IP 250 OP 250 PS 637

## 2018-04-19 PROCEDURE — 81003 URINALYSIS AUTO W/O SCOPE: CPT | Performed by: EMERGENCY MEDICINE

## 2018-04-19 PROCEDURE — 96361 HYDRATE IV INFUSION ADD-ON: CPT

## 2018-04-19 PROCEDURE — 40000235 ZZH STATISTIC TELEMETRY

## 2018-04-19 PROCEDURE — 25000132 ZZH RX MED GY IP 250 OP 250 PS 637: Performed by: INTERNAL MEDICINE

## 2018-04-19 PROCEDURE — 96365 THER/PROPH/DIAG IV INF INIT: CPT

## 2018-04-19 PROCEDURE — 25000128 H RX IP 250 OP 636: Performed by: EMERGENCY MEDICINE

## 2018-04-19 PROCEDURE — 83605 ASSAY OF LACTIC ACID: CPT | Performed by: INTERNAL MEDICINE

## 2018-04-19 PROCEDURE — 21000001 ZZH R&B HEART CARE

## 2018-04-19 PROCEDURE — 36415 COLL VENOUS BLD VENIPUNCTURE: CPT | Performed by: INTERNAL MEDICINE

## 2018-04-19 PROCEDURE — 85025 COMPLETE CBC W/AUTO DIFF WBC: CPT | Performed by: EMERGENCY MEDICINE

## 2018-04-19 PROCEDURE — 99223 1ST HOSP IP/OBS HIGH 75: CPT | Mod: AI | Performed by: INTERNAL MEDICINE

## 2018-04-19 PROCEDURE — 25000132 ZZH RX MED GY IP 250 OP 250 PS 637: Performed by: EMERGENCY MEDICINE

## 2018-04-19 PROCEDURE — 99222 1ST HOSP IP/OBS MODERATE 55: CPT | Performed by: INTERNAL MEDICINE

## 2018-04-19 PROCEDURE — 25000128 H RX IP 250 OP 636

## 2018-04-19 PROCEDURE — 83605 ASSAY OF LACTIC ACID: CPT | Performed by: EMERGENCY MEDICINE

## 2018-04-19 PROCEDURE — 74176 CT ABD & PELVIS W/O CONTRAST: CPT

## 2018-04-19 RX ORDER — LISINOPRIL 2.5 MG/1
2.5 TABLET ORAL DAILY
Status: DISCONTINUED | OUTPATIENT
Start: 2018-04-19 | End: 2018-04-21 | Stop reason: HOSPADM

## 2018-04-19 RX ORDER — ACETAMINOPHEN 325 MG/1
650 TABLET ORAL EVERY 4 HOURS PRN
Status: DISCONTINUED | OUTPATIENT
Start: 2018-04-19 | End: 2018-04-21 | Stop reason: HOSPADM

## 2018-04-19 RX ORDER — ISOSORBIDE MONONITRATE 30 MG/1
30 TABLET, EXTENDED RELEASE ORAL DAILY
Status: DISCONTINUED | OUTPATIENT
Start: 2018-04-19 | End: 2018-04-21 | Stop reason: HOSPADM

## 2018-04-19 RX ORDER — METOPROLOL SUCCINATE 100 MG/1
100 TABLET, EXTENDED RELEASE ORAL DAILY
Status: DISCONTINUED | OUTPATIENT
Start: 2018-04-19 | End: 2018-04-19

## 2018-04-19 RX ORDER — CEFTRIAXONE 1 G/1
1 INJECTION, POWDER, FOR SOLUTION INTRAMUSCULAR; INTRAVENOUS ONCE
Status: COMPLETED | OUTPATIENT
Start: 2018-04-19 | End: 2018-04-19

## 2018-04-19 RX ORDER — POTASSIUM CHLORIDE 7.45 MG/ML
10 INJECTION INTRAVENOUS
Status: DISCONTINUED | OUTPATIENT
Start: 2018-04-19 | End: 2018-04-21 | Stop reason: HOSPADM

## 2018-04-19 RX ORDER — AMLODIPINE BESYLATE 5 MG/1
5 TABLET ORAL DAILY
Status: DISCONTINUED | OUTPATIENT
Start: 2018-04-19 | End: 2018-04-20

## 2018-04-19 RX ORDER — HYDROMORPHONE HYDROCHLORIDE 1 MG/ML
0.2 INJECTION, SOLUTION INTRAMUSCULAR; INTRAVENOUS; SUBCUTANEOUS
Status: DISCONTINUED | OUTPATIENT
Start: 2018-04-19 | End: 2018-04-21 | Stop reason: HOSPADM

## 2018-04-19 RX ORDER — TAMSULOSIN HYDROCHLORIDE 0.4 MG/1
0.4 CAPSULE ORAL ONCE
Status: COMPLETED | OUTPATIENT
Start: 2018-04-19 | End: 2018-04-19

## 2018-04-19 RX ORDER — FENTANYL CITRATE 50 UG/ML
25 INJECTION, SOLUTION INTRAMUSCULAR; INTRAVENOUS ONCE
Status: COMPLETED | OUTPATIENT
Start: 2018-04-19 | End: 2018-04-19

## 2018-04-19 RX ORDER — POLYETHYLENE GLYCOL 3350 17 G/17G
17 POWDER, FOR SOLUTION ORAL DAILY PRN
Status: DISCONTINUED | OUTPATIENT
Start: 2018-04-19 | End: 2018-04-21 | Stop reason: HOSPADM

## 2018-04-19 RX ORDER — POTASSIUM CHLORIDE 1.5 G/1.58G
20-40 POWDER, FOR SOLUTION ORAL
Status: DISCONTINUED | OUTPATIENT
Start: 2018-04-19 | End: 2018-04-21 | Stop reason: HOSPADM

## 2018-04-19 RX ORDER — NALOXONE HYDROCHLORIDE 0.4 MG/ML
.1-.4 INJECTION, SOLUTION INTRAMUSCULAR; INTRAVENOUS; SUBCUTANEOUS
Status: DISCONTINUED | OUTPATIENT
Start: 2018-04-19 | End: 2018-04-21 | Stop reason: HOSPADM

## 2018-04-19 RX ORDER — ACETAMINOPHEN 500 MG
1000 TABLET ORAL ONCE
Status: COMPLETED | OUTPATIENT
Start: 2018-04-19 | End: 2018-04-19

## 2018-04-19 RX ORDER — SODIUM CHLORIDE 9 MG/ML
INJECTION, SOLUTION INTRAVENOUS ONCE
Status: DISCONTINUED | OUTPATIENT
Start: 2018-04-19 | End: 2018-04-21 | Stop reason: HOSPADM

## 2018-04-19 RX ORDER — MAGNESIUM SULFATE HEPTAHYDRATE 40 MG/ML
4 INJECTION, SOLUTION INTRAVENOUS EVERY 4 HOURS PRN
Status: DISCONTINUED | OUTPATIENT
Start: 2018-04-19 | End: 2018-04-21 | Stop reason: HOSPADM

## 2018-04-19 RX ORDER — BISACODYL 10 MG
10 SUPPOSITORY, RECTAL RECTAL DAILY PRN
Status: DISCONTINUED | OUTPATIENT
Start: 2018-04-19 | End: 2018-04-21 | Stop reason: HOSPADM

## 2018-04-19 RX ORDER — OXYCODONE HYDROCHLORIDE 5 MG/1
5 TABLET ORAL EVERY 4 HOURS PRN
Status: DISCONTINUED | OUTPATIENT
Start: 2018-04-19 | End: 2018-04-21 | Stop reason: HOSPADM

## 2018-04-19 RX ORDER — ONDANSETRON 4 MG/1
4 TABLET, ORALLY DISINTEGRATING ORAL EVERY 6 HOURS PRN
Status: DISCONTINUED | OUTPATIENT
Start: 2018-04-19 | End: 2018-04-21 | Stop reason: HOSPADM

## 2018-04-19 RX ORDER — CEFTRIAXONE 1 G/1
1 INJECTION, POWDER, FOR SOLUTION INTRAMUSCULAR; INTRAVENOUS EVERY 24 HOURS
Status: DISCONTINUED | OUTPATIENT
Start: 2018-04-20 | End: 2018-04-21

## 2018-04-19 RX ORDER — AMOXICILLIN 250 MG
1 CAPSULE ORAL 2 TIMES DAILY
Status: DISCONTINUED | OUTPATIENT
Start: 2018-04-19 | End: 2018-04-21 | Stop reason: HOSPADM

## 2018-04-19 RX ORDER — POTASSIUM CHLORIDE 29.8 MG/ML
20 INJECTION INTRAVENOUS
Status: DISCONTINUED | OUTPATIENT
Start: 2018-04-19 | End: 2018-04-21 | Stop reason: HOSPADM

## 2018-04-19 RX ORDER — AMOXICILLIN 250 MG
2 CAPSULE ORAL 2 TIMES DAILY
Status: DISCONTINUED | OUTPATIENT
Start: 2018-04-19 | End: 2018-04-21 | Stop reason: HOSPADM

## 2018-04-19 RX ORDER — SODIUM CHLORIDE 9 MG/ML
INJECTION, SOLUTION INTRAVENOUS CONTINUOUS
Status: ACTIVE | OUTPATIENT
Start: 2018-04-19 | End: 2018-04-20

## 2018-04-19 RX ORDER — NITROGLYCERIN 0.4 MG/1
0.4 TABLET SUBLINGUAL EVERY 5 MIN PRN
Status: DISCONTINUED | OUTPATIENT
Start: 2018-04-19 | End: 2018-04-21 | Stop reason: HOSPADM

## 2018-04-19 RX ORDER — POTASSIUM CHLORIDE 1500 MG/1
20-40 TABLET, EXTENDED RELEASE ORAL
Status: DISCONTINUED | OUTPATIENT
Start: 2018-04-19 | End: 2018-04-21 | Stop reason: HOSPADM

## 2018-04-19 RX ORDER — LIDOCAINE 40 MG/G
CREAM TOPICAL
Status: DISCONTINUED | OUTPATIENT
Start: 2018-04-19 | End: 2018-04-21 | Stop reason: HOSPADM

## 2018-04-19 RX ORDER — ATORVASTATIN CALCIUM 40 MG/1
40 TABLET, FILM COATED ORAL DAILY
Status: DISCONTINUED | OUTPATIENT
Start: 2018-04-19 | End: 2018-04-21 | Stop reason: HOSPADM

## 2018-04-19 RX ORDER — POTASSIUM CL/LIDO/0.9 % NACL 10MEQ/0.1L
10 INTRAVENOUS SOLUTION, PIGGYBACK (ML) INTRAVENOUS
Status: DISCONTINUED | OUTPATIENT
Start: 2018-04-19 | End: 2018-04-21 | Stop reason: HOSPADM

## 2018-04-19 RX ORDER — ACETAMINOPHEN 325 MG/1
TABLET ORAL
Status: COMPLETED
Start: 2018-04-19 | End: 2018-04-19

## 2018-04-19 RX ORDER — METOPROLOL SUCCINATE 25 MG/1
25 TABLET, EXTENDED RELEASE ORAL DAILY
Status: DISCONTINUED | OUTPATIENT
Start: 2018-04-19 | End: 2018-04-20

## 2018-04-19 RX ORDER — ACETAMINOPHEN 500 MG
1000 TABLET ORAL 3 TIMES DAILY
Status: DISCONTINUED | OUTPATIENT
Start: 2018-04-19 | End: 2018-04-21 | Stop reason: HOSPADM

## 2018-04-19 RX ORDER — ONDANSETRON 2 MG/ML
4 INJECTION INTRAMUSCULAR; INTRAVENOUS EVERY 6 HOURS PRN
Status: DISCONTINUED | OUTPATIENT
Start: 2018-04-19 | End: 2018-04-21 | Stop reason: HOSPADM

## 2018-04-19 RX ORDER — FENTANYL CITRATE 50 UG/ML
INJECTION, SOLUTION INTRAMUSCULAR; INTRAVENOUS
Status: COMPLETED
Start: 2018-04-19 | End: 2018-04-19

## 2018-04-19 RX ORDER — ASPIRIN 81 MG/1
81 TABLET ORAL DAILY
Status: DISCONTINUED | OUTPATIENT
Start: 2018-04-19 | End: 2018-04-21 | Stop reason: HOSPADM

## 2018-04-19 RX ADMIN — Medication 975 MG: at 10:52

## 2018-04-19 RX ADMIN — TAMSULOSIN HYDROCHLORIDE 0.4 MG: 0.4 CAPSULE ORAL at 13:21

## 2018-04-19 RX ADMIN — ACETAMINOPHEN 975 MG: 325 TABLET, FILM COATED ORAL at 10:52

## 2018-04-19 RX ADMIN — ASPIRIN 81 MG: 81 TABLET, COATED ORAL at 17:13

## 2018-04-19 RX ADMIN — CEFTRIAXONE 1 G: 1 INJECTION, POWDER, FOR SOLUTION INTRAMUSCULAR; INTRAVENOUS at 13:23

## 2018-04-19 RX ADMIN — ACETAMINOPHEN 650 MG: 325 TABLET, FILM COATED ORAL at 15:05

## 2018-04-19 RX ADMIN — ACETAMINOPHEN 500 MG: 500 TABLET, FILM COATED ORAL at 18:03

## 2018-04-19 RX ADMIN — SODIUM CHLORIDE 1000 ML: 9 INJECTION, SOLUTION INTRAVENOUS at 10:19

## 2018-04-19 RX ADMIN — FENTANYL CITRATE 25 MCG: 50 INJECTION INTRAMUSCULAR; INTRAVENOUS at 13:21

## 2018-04-19 RX ADMIN — FENTANYL CITRATE 25 MCG: 50 INJECTION, SOLUTION INTRAMUSCULAR; INTRAVENOUS at 13:21

## 2018-04-19 RX ADMIN — SODIUM CHLORIDE: 9 INJECTION, SOLUTION INTRAVENOUS at 17:05

## 2018-04-19 RX ADMIN — ISOSORBIDE MONONITRATE 30 MG: 30 TABLET, EXTENDED RELEASE ORAL at 17:13

## 2018-04-19 RX ADMIN — OXYCODONE HYDROCHLORIDE 5 MG: 5 TABLET ORAL at 20:58

## 2018-04-19 ASSESSMENT — ENCOUNTER SYMPTOMS
CONSTIPATION: 1
DYSURIA: 1
FEVER: 0
ABDOMINAL PAIN: 1
DIARRHEA: 0
NAUSEA: 1
HEMATURIA: 0
VOMITING: 0

## 2018-04-19 NOTE — H&P
[facility]    History and Physical  Hospitalist       Date of Admission:  4/19/2018    Cumulative Summary:  Nacho Joseph is a 82 year pleasant Malaysian-speaking gentleman with past medical history significant for essential hypertension, hyperlipidemia, complete AV block is status post pacemaker placement, COPD, coronary artery disease last angiography was in 2015 and was actually scheduled to get cardiac cath today who presented to the emergency room with sudden development of right-sided flank pain associated with nausea and possible fever since yesterday.  Patient was evaluated in the emergency room where he was found to have possible obstructing 2 x 3 mm stone at right ureterovesical junction associated with right hydronephrosis and hydroureter.  He also has known history of AAA but on the current CT scan it has progressed to 5.2 cm in maximal dimension as compared to 3.7 in 2014. patient was admitted for further evaluation and management.    Assessment & Plan     Active Problems:  Obstructive uropathy (4/19/2018): Possibly causing his right-sided flank pain and CT scan of the abdomen showed 2 x 3 mm obstructing stone at the right ureterovesical junction with mild to moderate associated right hydronephrosis and hydroureter.  Patient also have associated leukocytosis although his pro-calcitonin is negative at this point.  He also have associated increase in creatinine.  Acute renal failure: Most likely secondary to obstructive uropathy as above.    --Admit patient to general medical floor with telemetry.  --Diet and activity orders.  --We will start patient only on a very gentle hydration as patient has a very poor oral intake but would not worsen his hydronephrosis by giving him too much fluids  --At this time considering her obstructive uropathy and leukocytosis, will start patient on IV ceftriaxone until she is evaluated by urology.  --We will consult urology will start patient on diet  right now probably will make patient n.p.o. after midnight.  -- continue patient on his home dose of Flomax.    AAA (abdominal aortic aneurysm) (H) (7/8/2014): Patient has history of abdominal aortic aneurysm measuring about 4.1 cm in 2015 it has not been assessed since then as today patient presented with right-sided flank pain CT scan of the abdomen and pelvis was done which is showing infrarenal abdominal aortic aneurysm measuring 5.2 into 4.5 cm in diameter that has increased from 3.7 cm on ultrasound dated July 2014.  There are also seen, and iliac artery aneurysms measuring 2.1 cm on the right and 1.9 cm on the left.    -- will consult vascular surgery , considering patient was also planned to go for angiogram.      HTN (hypertension) (7/8/2014): Patient was initially hypotensive on presentation with blood pressure of 75/59 but now his blood pressure has improved to 121/88 he is on beta-blocker , Lisinopril and Norvasc at home.    Hyperlipidemia (7/8/2014): on Lipitor at home.    Atrioventricular block, complete (HEART) (7/8/2014): Status post pacemaker placement    CAD (coronary artery disease) (2/9/2015): Patient was  recently evaluated by Dr. Marco Burnett from cardiology on April 16 for his history of coronary artery disease, his last coronary angiography was in 2015 which showed chronically occluded proximal right coronary artery.  He also had high-grade stenosis and proximal circumflex and moderate though no flow-limiting disease in the left anterior descending artery.  As this patient continues to have chest discomfort on minimal exertion and also have associated shortness of breath on exertion over the last few weeks cardiology was planning patient to take for cardiac cath today.    Ischemic cardiomyopathy (2/9/2015): Improved, echo done yesterday showed left ventricular systolic function estimated at more than 70%.    -- continue to monitor patient on telemetry  --Continue patient on home dose of  aspirin 81 mg p.o. daily, Norvasc 5 mg p.o. daily, Imdur 30 mg p.o. daily, lisinopril 2.5 mg p.o. daily but will decrease his dose of Toprol-XL to 25 mg p.o. daily from 100 mg due to borderline blood pressure.  Will place holding parameters on the blood pressure medication.  --We will consult cardiology as patient was planned to go for cardiac angiogram, we might need to coordinate plans of angiogram with vascular surgery considering his AAA.      Depression (7/21/2014)  -- currently stable , not taking any medications       COPD (chronic obstructive pulmonary disease) (H) (7/21/2014)  -- ex smoker , quit 5 years ago, no active issues , not on any home INH or oxygen.    # Pain Assessment:  Current Pain Score 4/19/2018   Pain score (0-10) 7   Pain location -   - aNcho is experiencing pain due to renal stone. Pain management was discussed and the plan was created in a collaborative fashion.  Nacho's response to the current recommendations: engaged  - Opioid regimen: Roxicodone and low dose IV Dilaudid   - Response to opioid medications: Reduction of symptoms   - Bowel regimen: senna  - Pharmacologic adjuvants: Acetaminophen    DVT Prophylaxis: Pneumatic Compression Devices and Anti-embolisim stockings (TEDs)  Code Status: Full Code    Disposition: Expecting patient to stay for the next couple of days here for stabilization of his obstructive uropathy in the presence of active workup for his coronary artery disease and his AAA.  1 of my hospitalist colleague will assume the care from tomorrow morning.   is posted for discharge planning. Currently patient lives in [facility] with her wife.    Sylvia Carney MD,FACP    Primary Care Physician   Paulino Lynch    Chief Complaint   Right flank pain, he was scheduled to get cardiac catheterization today.    History is obtained from the patient    Patient Active Problem List   Diagnosis     Bradycardia     AAA (abdominal aortic  aneurysm) (H)     HTN (hypertension)     Hyperlipidemia     Atrioventricular block, complete (HEART)     Depression     COPD (chronic obstructive pulmonary disease) (H)     Lung abscess (H)     Chest pain     CAD (coronary artery disease)     Ischemic cardiomyopathy     Obstructive uropathy       History of Present Illness   Nacho Joseph is a 82 year old male With past medical history significant for essential hypertension, hyperlipidemia, coronary artery disease, COPD, ischemic cardiomyopathy, AV block completed status post pacemaker placement and previous history of AAA who was admitted from the emergency room where he presented with right flank pain.    Patient was  recently evaluated by Dr. Marco Burnett from cardiology on April 16 for his history of coronary artery disease, his last coronary angiography was in 2015 which showed chronically occluded proximal right coronary artery.  He also had high-grade stenosis and proximal circumflex and moderate though no flow-limiting disease in the left anterior descending artery.  As this patient continues to have chest discomfort on minimal exertion and also have associated shortness of breath on exertion over the last few weeks cardiology was planning patient to take for cardiac cath today.  Of note patient also has history of complete heart block in the past for which he has undergone permanent pacemaker placement.    Patient also have history of abdominal aortic aneurysm measuring about 4.1 cm in 2015 it has not been assessed since then as today patient presented with right-sided flank pain CT scan of the abdomen and pelvis was done which is showing infrarenal abdominal aortic aneurysm measuring 5.2 into 4.5 cm in diameter that has increased from 3.7 cm on ultrasound dated July 2014.  There are also seen, and iliac artery aneurysms measuring 2.1 cm on the right and 1.9 cm on the left.    CT scan of the abdomen and pelvis also showed 2 x 3 mm obstructing  stone at the right ureterovesical junction with mild to moderate associated right hydronephrosis and hydroureter.  Patient is complaining of significant right-sided lower abdominal pain radiating towards his right flank which is started yesterday and he is also have associated nausea and not feeling generalized well, he did not eat anything yesterday and this morning.  He felt that he has some chills but not too much he does not know if he had any fever.  Most of the history was obtained with the help of Mauritian .  Patient is denying any similar pains in the past or any similar history of renal or gallbladder stones in the past.  I did review his CT scan findings of his worsening AAA with the patient.  Patient was admitted for further evaluation and management.    Past Medical History    I have reviewed this patient's medical history and updated it with pertinent information if needed.   Past Medical History:   Diagnosis Date     AAA (abdominal aortic aneurysm) (H) 7/8/2014    4.1 cm     Anemia      Atrioventricular block, complete (HEART) 7/8/2014    S/p PPM 7/2014     Bradycardia      CAD (coronary artery disease) 2/9/2015     Chest pain 1/20/2015     COPD (chronic obstructive pulmonary disease) (H)      Depression 7/21/2014     Depressive disorder      HTN (hypertension) 7/8/2014     Hyperlipidemia 7/8/2014     Hypertension      Ischemic cardiomyopathy 2/9/2015     Lung abscess (H)      Syncope        Past Surgical History   I have reviewed this patient's surgical history and updated it with pertinent information if needed.  Past Surgical History:   Procedure Laterality Date     CORONARY ANGIOGRAPHY ADULT ORDER  2/20/15    medical managed     IMPLANT PACEMAKER  7/7/14    dual chamber       Prior to Admission Medications   Prior to Admission Medications   Prescriptions Last Dose Informant Patient Reported? Taking?   Acetaminophen (TYLENOL PO) 4/17/2018 Nursing Home Yes Yes   Sig: Take 1,000 mg by mouth  3 times daily   Pseudoephedrine-Guaifenesin (MUCINEX D MAX STRENGTH PO) 4/17/2018 Nursing Home Yes Yes   Sig: Take 800 mg by mouth daily    amLODIPine (NORVASC) 5 MG tablet 4/16/2018 senior living No Yes   Sig: Take 1 tablet (5 mg) by mouth daily   aspirin 81 MG tablet 4/17/2018 senior living No Yes   Sig: Take 1 tablet (81 mg) by mouth daily   atorvastatin (LIPITOR) 40 MG tablet 4/16/2018 senior living No Yes   Sig: Take 1 tablet (40 mg) by mouth daily   doxycycline (VIBRAMYCIN) 100 MG capsule 4/17/2018 senior living Yes Yes   Sig: Take 100 mg by mouth 2 times daily   isosorbide mononitrate (IMDUR) 30 MG 24 hr tablet 4/17/2018 senior living No Yes   Sig: Take 1 tablet (30 mg) by mouth daily   lisinopril (PRINIVIL/ZESTRIL) 2.5 MG tablet 4/17/2018 senior living No Yes   Sig: Take 1 tablet (2.5 mg) by mouth daily   magnesium hydroxide (MILK OF MAGNESIA) 400 MG/5ML suspension  Nursing Home Yes Yes   Sig: Take 30 mLs by mouth daily as needed for constipation or heartburn   metoprolol succinate (TOPROL-XL) 100 MG 24 hr tablet 4/17/2018 senior living No Yes   Sig: Take 1 tablet (100 mg) by mouth daily   nitroglycerin (NITROSTAT) 0.4 MG SL tablet  Nursing Home No Yes   Sig: Place 1 tablet (0.4 mg) under the tongue every 5 minutes as needed for chest pain      Facility-Administered Medications: None     Allergies   No Known Allergies    Social History   I have reviewed this patient's social history and updated it with pertinent information if needed. Nacho Joseph  reports that he quit smoking about 5 years ago. He has never used smokeless tobacco. He reports that he does not drink alcohol or use illicit drugs.    Family History   I have reviewed this patient's family history and updated it with pertinent information if needed.   Family History   Problem Relation Age of Onset     Other - See Comments Mother      old age     HEART DISEASE Father      unknown       Review of Systems   CONSTITUTIONAL:  positive for  fatigue  and generalized weakness.  EYES:  negative for blurred vision and visual disturbance  HEENT:  negative for hoarseness and voice change  RESPIRATORY:  negative for cough with sputum, dyspnea, wheezing and chest pain  CARDIOVASCULAR:  negative for chest pain, palpitations, orthopnea, exertional chest pressure/discomfort at this point but he has been   GASTROINTESTINAL: Negative for abd pain, nausea , vomiting ,constipation and abdominal pain  GENITOURINARY: Negative for burning /urgency and frequency.  HEMATOLOGIC/LYMPHATIC:  negative  ALLERGIC/IMMUNOLOGIC:  negative for drug reactions  ENDOCRINE:  negative for diabetic symptoms including polyuria, polydipsia and weight loss  MUSCULOSKELETAL:  positive for arthralgias  NEUROLOGICAL:  negative  BEHAVIOR/PSYCH: negative    Physical Exam   Temp: 98.6  F (37  C)   BP: 122/89 Pulse: 85 Heart Rate: 85 Resp: 16 SpO2: 97 % O2 Device: None (Room air)    Vital Signs with Ranges  Temp:  [98.6  F (37  C)] 98.6  F (37  C)  Pulse:  [85] 85  Heart Rate:  [85] 85  Resp:  [16] 16  BP: ()/(59-92) 122/89  SpO2:  [95 %-99 %] 97 %  138 lbs 14.24 oz    Constitutional: Awake, alert,oriented to time, place and person , cooperative, no apparent distress.Pleasent and cooperative , family present at the bedside   Eyes: Conjunctiva and pupils examined and normal.  HEENT: Moist mucous membranes, normal dentition.  Respiratory: Clear to auscultation bilaterally, no crackles or wheezing.  Cardiovascular: Regular rate and rhythm, normal S1 and S2, and no murmur noted.  GI: Soft, non-distended, non-tender, normal bowel sounds.  Lymph/Hematologic: No anterior cervical or supraclavicular adenopathy.  Skin: No rashes, no cyanosis, no edema.  Musculoskeletal: No joint swelling, erythema or tenderness.  Neurologic: Cranial nerves 2-12 intact, normal strength and sensation.  Psychiatric: Alert, oriented to person, place and time, no obvious anxiety or depression.    Data   Data reviewed today:  I  personally reviewed the EKG tracing showing NSR.    Recent Labs  Lab 04/19/18  0955   WBC 17.2*   HGB 12.7*   MCV 95         POTASSIUM 4.2   CHLORIDE 109   CO2 23   BUN 31*   CR 1.51*   ANIONGAP 10   ARELIS 9.6   *       Imaging:  Recent Results (from the past 24 hour(s))   Abd/pelvis CT no contrast - Stone Protocol    Narrative    Exam: CT ABDOMEN PELVIS W/O CONTRAST  4/19/2018 10:45 AM    History: Right flank pain.    Comparison: Aortic aneurysm ultrasound dated 7/8/2014.    Technique: Volumetric acquisition with reconstruction in the axial,  coronal planes through the abdomen and pelvis without contrast.  Radiation dose for this scan was reduced using automated exposure  control, adjustment of the mA and/or kV according to patient size, or  iterative reconstruction technique.    Contrast: None    Findings:   Lung Bases: Bronchiectasis involving the left lower lobe and  atelectasis in both bases. No pleural or pericardial effusion.    Abdomen: Unenhanced liver, spleen, adrenal glands and gallbladder are  normal. Atrophic pancreas. Multiple vascular calcifications in both  kidneys. Additionally, there is a small group of stones in the distal  right ureter, the largest measures 2 x 3 mm (series 2 image 72). There  is mild to moderate associated right hydronephrosis and hydroureter.  No definite left renal or ureteral calculi are seen.    Infrarenal abdominal aortic aneurysm measures 5.2 x 4.5 cm in diameter  (series 2 image 36), this is increased from 3.7 cm on ultrasound dated  7/8/2014. 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    No areas of bowel wall thickening or bowel dilatation. Normal  appendix. No free fluid. No abdominal or pelvic lymphadenopathy.    Bones: No concerning lytic or sclerotic lesions.      Impression    Impression:   1. 2 x 3 mm obstructing stone at the right ureterovesical  junction  with mild to moderate associated right hydronephrosis and hydroureter.  2. Infrarenal abdominal aortic aneurysm measures up to 5.2 cm in  maximal dimension, increased from 3.7 cm on 7/8/2014. Aneurysm extends  into both the right and left common iliac arteries.  3. Bronchiectasis in the left lower lobe.    JIMI FLANAGAN MD

## 2018-04-19 NOTE — ED NOTES
"[facility]  ED Nurse Handoff Report    ED Chief complaint: Flank Pain (right flank pain 9/10 since yesterday here today for heart cath and needs pain checked first, speaks Botswanan  to arrive at 0945)      ED Diagnosis:   Final diagnoses:   Calculus of ureter - 3 mm right UVJ   Acute renal failure, unspecified acute renal failure type (H)   Abdominal aortic aneurysm (AAA) without rupture (H) - 5.2 cm   Leukocytosis, unspecified type   Hypotension, unspecified hypotension type - chronic       Code Status: Full Code    Allergies: No Known Allergies    Activity level - Baseline/Home:  Stand with Assist    Activity Level - Current:   Stand with Assist     Needed?: Yes, Norwegian  herre until 2 p and another one ordered for 2 p    Isolation: No  Infection: Not Applicable    Bariatric?: No    Vital Signs:   Vitals:    04/19/18 1215 04/19/18 1230 04/19/18 1245 04/19/18 1300   BP: 120/82 109/72 (!) 109/92 122/89   Pulse:       Resp:       Temp:       SpO2: 98% 97% 95% 97%   Weight:       Height:           Cardiac Rhythm: ,        Pain level: 0-10 Pain Scale: 7    Is this patient confused?: No     Patient Report: Initial Complaint:Here for cardiac angio, told staff intense right side flank pain around 1300 yesterday . The pain is intermittent and usually resolves with Tylenol. However when the Tylenol wears off the pain returns. The pain was very bad this morning, which he rates as 8-9/10 and describes as \"stabbing\".  Sent to ER for eval  Focused Assessment: R flank pain  Tests Performed: labs, xray, ct  Abnormal Results: obstructing R sided stone, elevated wbc, bronchiolectasis, AAA (known), elevated creat  Treatments provided: 1 liter NS, tylenol 1 gm, fentanyl 25 mcg, flomax, rocephin    Family Comments: none, has family phone numbers with him, lives in assisted living    OBS brochure/video discussed/provided to patient: N/A    ED Medications:   Medications   sodium chloride " 0.9% infusion (not administered)   0.9% sodium chloride BOLUS (0 mLs Intravenous Stopped 4/19/18 1130)   acetaminophen (TYLENOL) tablet 1,000 mg (975 mg Oral Given 4/19/18 1052)   tamsulosin (FLOMAX) capsule 0.4 mg (0.4 mg Oral Given 4/19/18 1321)   cefTRIAXone (ROCEPHIN) 1 g vial to attach to  mL bag for ADULTS or NS 50 mL bag for PEDS (1 g Intravenous New Bag 4/19/18 1323)   fentaNYL (PF) (SUBLIMAZE) injection 25 mcg (25 mcg Intravenous Given 4/19/18 1321)       Drips infusing?:  No    For the majority of the shift this patient was Green.   Interventions performed were none  .    Severe Sepsis OR Septic Shock Diagnosis Present: No      ED NURSE PHONE NUMBER: *[phone]

## 2018-04-19 NOTE — ED PROVIDER NOTES
"  History     Chief Complaint:  Flank Pain (right flank pain 9/10 since yesterday here today for heart cath and needs pain checked first, speaks Nauruan  to arrive at 0945)      The history is limited by a language barrier. A  was used (Ghanaian).      Nacho Joseph is a 82 year old male who presents with flank pain. The patient developed intense right side flank pain around 1300 yesterday . The pain is intermittent and usually resolves with Tylenol. However when the Tylenol wears off the pain returns. The pain was very bad this morning, which he rates as 8-9/10 and describes as \"stabbing\". He does have nausea but no vomiting. The patient doesn't have diarrhea but hasn't had a bowel movement in 3 days. He has had dysuria but not hematuria, and no fever, testicular, or penile pain. The patient doesn't have a history of kidney stones.  History obtained through a .      Allergies:  No Known Allergies     Medications:    Acetaminophen  Albuterol  Amlodipine   Aspirin 81 MG   Atorvastatin   Doxycycline   Isosorbide mononitrate   Lisinopril   Metoprolol succinate  Nitroglycerin     Past Medical History:    AAA (abdominal aortic aneurysm) (H)  Anemia   Atrioventricular block, complete (HEART)  Bradycardia   CAD   Chest pain   COPD   Depression   Depressive disorder   HTN   Hyperlipidemia  Hypertension   Ischemic cardiomyopathy  Lung abscess (H)   Syncope     Past Surgical History:    Implant Pacemaker    Family History:    Heart Disease    Social History:  Marital Status:   [2]  Smoking status: Former Smoker  Alcohol use: No       Review of Systems   Constitutional: Negative for fever.   Gastrointestinal: Positive for abdominal pain, constipation and nausea. Negative for diarrhea and vomiting.   Genitourinary: Positive for dysuria. Negative for hematuria, penile pain and testicular pain.   All other systems reviewed and are negative.        Physical Exam   First " "Vitals:  BP: 99/77  Pulse: 85  Heart Rate: 85  Temp: 98.6  F (37  C)  Resp: 16  Height: 180 cm (5' 10.87\")  Weight: 63 kg (138 lb 14.2 oz)  SpO2: 96 %      Physical Exam  Nursing note and vitals reviewed.    Constitutional:  Appears well-developed and well-nourished, uncomfortable.    HENT:    No evidence of facial or scalp trauma.      Nose normal.  No discharge.      Oropharynx is clear and moist.  Eyes:    Conjunctivae are normal without injection. No lid droop.     Pupils are equal, round, and reactive to light.      Right eye exhibits no discharge. Left eye exhibits no discharge.      No scleral icterus.  Lymph:  No enlarged or tender cervical or submandibular lymph nodes.   Cardiovascular:  Pacemaker left chest.     Normal rate, regular rhythm with normal S1 and S2.      Normal heart sounds and peripheral pulses 2+ and equal.       No murmur or sahara.  Pulmonary:  Effort normal and breath sounds clear to auscultation bilaterally. No respiratory distress.  No stridor.     No wheezes. No rales. No chest wall tenderness.    GI:    Soft. No distension and no mass. Right flank tenderness.      No rebound and no guarding.  No HSM.  Musculoskeletal:  Normal range of motion. No extremity deformity.     No edema and no tenderness.  No cyanosis.   Neurological:   Alert and oriented. No cranial nerve deficit, no facial droop.     Exhibits good muscle tone. Coordination normal. Gait is steady.     GCS eye subscore is 4. GCS verbal subscore is 5.      GCS motor subscore is 6.   Skin:    Skin is warm and dry. No rash noted. No diaphoresis.      No erythema. No pallor.  No lesions.  Psychiatric:   Behavior is normal. Appropriate mood and affect.     Judgment and thought content normal.         Emergency Department Course     Imaging:  Radiographic findings were communicated with the patient who voiced understanding of the findings.  CT Abdomen/Pelvis w/o IV contrast-stone protocol:   1. 2 x 3 mm obstructing stone at the " right ureterovesical junction  with mild to moderate associated right hydronephrosis and hydroureter.  2. Infrarenal abdominal aortic aneurysm measures up to 5.2 cm in  maximal dimension, increased from 3.7 cm on 7/8/2014. Aneurysm extends  into both the right and left common iliac arteries.  3. Bronchiectasis in the left lower lobe As per radiology.       Laboratory:  BMP: Glucose 136, BUN 31, GFR Estimate 44, Creatinine: 1.15, o/w WNL    CBC: WBC: 17.2, HGB: 12.7, PLT: 221    UA reflex to microscopic: Protein Albumin 30, [place] 10, o/w negative    1120 Lactic Acid: 1.4    Procalcitonin: 0.17    Interventions:  1019 NS 1L IV Bolus   1052 Tylenol 975 mg PO  1321 Flomax 0.4 mg PO  1323 Rocephin 1 g in  mL IV infusion  1321 Fentanyl 25 mcg IV    Emergency Department Course:  Nursing notes and vitals reviewed.     0955 I performed an exam of the patient as documented above.     IV inserted. Medicine administered as documented above.     Blood drawn. This was sent to the lab for further testing, results above.    The patient provided a urine sample here in the emergency department. This was sent for laboratory testing, findings above.     The patient was sent for a CT of the abdomen and pelvis while in the emergency department, findings above.     1203 I rechecked the patient and discussed the results of his workup thus far.     1213 I consulted with Dr. Carney, Hospitalist, regarding the patient's history and presentation here in the emergency department.    1218 I rechecked the patient and discussed the results of his workup thus far.     Findings and plan explained to the patient who consents to admission. Discussed the patient with Dr. Carney, who will admit the patient to an inpatient [facility] bed for further monitoring, evaluation, and treatment.      Impression & Plan         Medical Decision Making:  Patient comes in with right flank pain. His blood pressure was noted to be in the upper 80s, but here was  in the low 90s. In looking at his chart, that has been his normal since he had influenza this winter. He was scheduled to have a cardiac catheterization today to evaluate his heart to find out why his blood pressure is running low. His urine came back essentially normal. He has acute new renal failure with a GFR reduced at 44 and his CBC shows have a white count of 17.2. Otherwise labs are normal. He has not had a fever. He has been nauseated and has vomited a few times with this pain. I was suspicious of a kidney stone. He was given IV fluids and a CT was obtained, which confirmed a obstructing right 3 mm distal UVJ stone. This may be the cause for his acute renal failure. The CT scan also showed a 5.2 cm known abdominal aneurysm, larger than the last CT when it was 3.7 cm in 2014. It is possible that the aneurysm could be affecting his renal function as well. His blood pressure has improved with IV fluids. Because of his low blood pressure, however, I could not give him narcotic pain medicine. Tylenol seemed to help him a lot and he was given 1000 mg of Tylenol, with good relief of his pain. The pain started coming back and now his blood pressure is 122. Flomax has been ordered, 0.4 mg orally, and 25 mcg of fentanyl. I talked with Dr. Carney who will be admitting him to a [facility] telemetry bed. Vascular surgery will be consulted along with cardiology. I did postpone his catheterization today so this stone can be managed. Urology can be consulted as well. We will have to follow his renal function closely and make sure it improves with fluids and with getting the stone out. Because of his elevated white count, which may be secondary to the pain and vomiting, he was given a gram of Rocephin IV.      Diagnosis:    ICD-10-CM   1. Calculus of ureter N20.1      2. Acute renal failure, unspecified acute renal failure type (H) N17.9   3. Abdominal aortic aneurysm (AAA) without rupture (H) I71.4      4. Leukocytosis, unspecified  type D72.829   5. Hypotension, unspecified hypotension type I95.9          Disposition:  Admitted to an inpatient [facility] bed.    Vascular surgery consultation, cardiology consultation, urology consultation. Monitor renal function and IV fluids. Caution with pain medication with his low blood pressures.    Discharge Medications:  Fernando MOSHER, am serving as a scribe on 4/19/2018 at 9:55 AM to personally document services performed by Alisson Lazar MD based on my observations and the provider's statements to me.       Fernando Gee  4/19/2018    EMERGENCY DEPARTMENT       Alisson Lazar MD  04/19/18 [id number]

## 2018-04-19 NOTE — IP AVS SNAPSHOT
` ` Patient Information     Patient Name Sex Nacho Thomas ([id number]) Male 1935       Room Bed    03 8803-01      Patient Demographics     Address Phone    [address] [place] [address] [phone] (Home)  none (Work)  [phone] (Mobile) *Preferred*      Patient Ethnicity & Race     Ethnic Group Patient Race    Somali White      Emergency Contact(s)     Name Relation Home Work Mobile    RAHEEL VIZCAINO Son [phone] none [phone]      Documents on File        Status Date Received Description       Documents for the Patient    Consent for Services - Hospital/Clinic  ()      Consent for EHR Access Sent 14     Privacy Notice - [place] Received 10/27/14     External Medication Information Consent Accepted 10/27/14     [facility] Specified Other       Consent for Services - Hospital/Clinic Received () 10/27/14     Consent for EHR Access Received 10/27/14     Insurance Card Received 18 MA-not on pt.     Patient ID Received 01/21/15     Business/Insurance/Care Coordination/Health Form - Patient  04/13/15 [facility] EMERGENCY MEDICAL ASSISTANCE CARE PLAN CERTIFICATION REQUEST    Consent for Services/Privacy Notice - Hospital/Clinic Received 18     Care Everywhere Prospective Auth Received 18     Consent for Services - Hospital and Clinic Received 18     HIE Auth Received 18     Insurance Card Received 18 ID/INSURANCE-NOTHING ON PT    Insurance Card Received (Deleted) 10/27/14     Patient ID Received (Deleted) 10/27/14     Patient ID  (Deleted)         Documents for the Encounter    CMS IM for Patient Signature       CE Point of Care Auth Received        Admission Information     Attending Provider Admitting Provider Admission Type Admission Date/Time    Sylvia Carney MD Iqbal, Saima, MD Emergency 18  0928    Discharge Date Hospital Service Auth/Cert Status Service Area     Hospitalist [facility]     Unit Room/Bed Admission Status       [facility] ONCOLOGY 8803/8803-01 Admission (Confirmed)       Admission     Complaint    Right flank pain, RIGHT URETRAL STONE, Pyelonephritis      Hospital Account     Name Acct ID Class Status Primary Coverage    Nacho Joseph [id number] [place]            Guarantor Account (for Hospital Account #[id number])     Name Relation to Pt Service Area Active? Acct Type    Nacho Joseph Self FCS Yes Personal/Family    Address Phone          [address] [place] [address] [phone](H)              Coverage Information (for Hospital Account #[id number])     F/O Payor/Plan Precert #    [place]     Subscriber Subscriber #    Nacho Joseph [id number]    Address Phone    [address] [address]  [place] [address] [phone]

## 2018-04-19 NOTE — IP AVS SNAPSHOT
"[place] [address] ONCOLOGY: [phone]                                              INTERAGENCY TRANSFER FORM - PHYSICIAN ORDERS   2018                    Hospital Admission Date: 2018  LATOSHA VIZCAINO   : 1935  Sex: Male        Attending Provider: Sylvia Carney MD     Allergies:  No Known Allergies    Infection:  None   Service:  HOSPITALIST    Ht:  1.8 m (5' 10.87\")   Wt:  63.3 kg (139 lb 9.6 oz)   Admission Wt:  63 kg (138 lb 14.2 oz)    BMI:  19.54 kg/m 2   BSA:  1.78 m 2            Patient PCP Information     Provider PCP Type    Paulino Lynch [facility]      ED Clinical Impression     Diagnosis Description Comment Added By Time Added    Calculus of ureter [N20.1] Calculus of ureter [N20.1] 3 mm right UVJ Alisson Lazar MD 2018 12:21 PM    Acute renal failure, unspecified acute renal failure type (H) [N17.9] Acute renal failure, unspecified acute renal failure type (H) [N17.9]  Alisson Lazar MD 2018 12:22 PM    Abdominal aortic aneurysm (AAA) without rupture (H) [I71.4] Abdominal aortic aneurysm (AAA) without rupture (H) [I71.4] 5.2 cm Alisson Lazar MD 2018 12:22 PM    Leukocytosis, unspecified type [D72.829] Leukocytosis, unspecified type [D72.829]  Alisson Lazar MD 2018 12:22 PM    Hypotension, unspecified hypotension type [I95.9] Hypotension, unspecified hypotension type [I95.9] chronic Alisson Lazar MD 2018 12:22 PM      Hospital Problems as of 2018              Priority Class Noted POA    AAA (abdominal aortic aneurysm) (H) Medium  2014 Yes    HTN (hypertension) Medium  2014 Yes    Hyperlipidemia Medium  2014 Yes    Atrioventricular block, complete (HEART) Medium  2014 Yes    Depression Medium  2014 Yes    COPD (chronic obstructive pulmonary disease) (H) Medium  2014 Yes    CAD (coronary artery disease) Medium  2015 Yes    Ischemic cardiomyopathy Medium  2015 Yes    Obstructive " uropathy High Acute 4/19/2018 Yes    Right flank pain Medium  4/19/2018 Yes    Pyelonephritis Medium  4/20/2018 Yes      Non-Hospital Problems as of 4/21/2018              Priority Class Noted    Bradycardia Medium  7/7/2014    Lung abscess (H) Medium  7/21/2014    Chest pain Medium  1/20/2015      Code Status History     Date Active Date Inactive Code Status Order ID Comments User Context    4/21/2018  8:59 AM  Full Code [id number]  Olivia Escobar MD Outpatient    4/21/2018  8:54 AM 4/21/2018  8:59 AM Full Code [id number]  Olivia Escobar MD Outpatient    4/20/2018  9:36 AM 4/21/2018  8:54 AM Full Code [id number]  Gurvinder Sanz MD Inpatient    4/19/2018  2:39 PM 4/20/2018  9:36 AM Full Code [id number]  Sylvia Carney MD Inpatient    7/7/2014  2:19 AM 7/8/2014  8:57 PM Full Code [id number]  Harinder Noyola MD Inpatient         Medication Review      CONTINUE these medications which may have CHANGED, or have new prescriptions. If we are uncertain of the size of tablets/capsules you have at home, strength may be listed as something that might have changed.        Dose / Directions Comments    metoprolol succinate 50 MG 24 hr tablet   Commonly known as:  TOPROL-XL   This may have changed:    - medication strength  - how much to take   Used for:  Abdominal aortic aneurysm (AAA) without rupture (H)        Dose:  50 mg   Take 1 tablet (50 mg) by mouth daily   Quantity:  30 tablet   Refills:  0          CONTINUE these medications which have NOT CHANGED        Dose / Directions Comments    aspirin 81 MG tablet   Used for:  Chest pain        Dose:  81 mg   Take 1 tablet (81 mg) by mouth daily   Refills:  0        atorvastatin 40 MG tablet   Commonly known as:  LIPITOR   Used for:  CAD (coronary artery disease), Hyperlipidemia        Dose:  40 mg   Take 1 tablet (40 mg) by mouth daily   Refills:  0        isosorbide mononitrate 30 MG 24 hr tablet   Commonly known as:  IMDUR   Used for:  Ischemic cardiomyopathy        Dose:   30 mg   Take 1 tablet (30 mg) by mouth daily   Quantity:  90 tablet   Refills:  3        MILK OF MAGNESIA 400 MG/5ML suspension   Generic drug:  magnesium hydroxide        Dose:  30 mL   Take 30 mLs by mouth daily as needed for constipation or heartburn   Refills:  0        MUCINEX D MAX STRENGTH PO        Dose:  800 mg   Take 800 mg by mouth daily   Refills:  0        nitroGLYcerin 0.4 MG sublingual tablet   Commonly known as:  NITROSTAT   Used for:  Chest pain        Dose:  0.4 mg   Place 1 tablet (0.4 mg) under the tongue every 5 minutes as needed for chest pain   Quantity:  25 tablet   Refills:  4        TYLENOL PO        Dose:  1000 mg   Take 1,000 mg by mouth 3 times daily   Refills:  0          STOP taking     amLODIPine 5 MG tablet   Commonly known as:  NORVASC           doxycycline 100 MG capsule   Commonly known as:  VIBRAMYCIN           lisinopril 2.5 MG tablet   Commonly known as:  PRINIVIL/Zestril                   Summary of Visit     Reason for your hospital stay       Kidney stone s/p right ureteral stent placement             After Care     Activity       Your activity upon discharge: activity as tolerated       Diet       Follow this diet upon discharge: regular       General info for SNF       Length of Stay Estimate: Long Term Care  Condition at Discharge: Stable  Level of care:board and care  Rehabilitation Potential: Good  Admission H&P remains valid and up-to-date: Yes  Recent Chemotherapy: N/A  Use Nursing Home Standing Orders: Yes       Mantoux instructions       Give two-step Mantoux (PPD) Per Facility Policy No . Pt is from care facility             Your next 10 appointments already scheduled     Apr 24, 2018  8:45 AM CDT   US AORTA/IVC/ILIAC DUPLEX COMPLETE with SHVUS4   [facility]    [address]  [address]  [place] [address]   [phone]           Please bring a list of your medicines (including vitamins,  minerals and over-the-counter drugs). Also, tell your doctor about any allergies you may have. Wear comfortable clothes and leave your valuables at home.  Adults: No eating or drinking for 8 hours before the exam. You may take medicine with a small sip of water.  Children: - Children 6+ years: No food or drink for 6 hours before exam. - Children 1-5 years: No food or drink for 4 hours before exam. - Infants, breast-fed: may have breast milk up to 2 hours before exam. - Infants, formula: may have bottle until 4 hours before exam.  Please call the Imaging Department at your exam site with any questions.            Apr 24, 2018 10:00 AM CDT   US CAROTID BILATERAL with SHVUS4   [facility]    [address]  [place] [address]   [phone]           Please bring a list of your medicines (including vitamins, minerals and over-the-counter drugs). Also, tell your doctor about any allergies you may have. Wear comfortable clothes and leave your valuables at home.  You do not need to do anything special to prepare for your exam.  Please call the Imaging Department at your exam site with any questions.            Apr 27, 2018  1:10 PM CDT   Return Visit with NICHOLAS Gutierrez CNP   [facility] ([facility])    [address] [place] [address] [phone] OPT 2            Jul 05, 2018  9:15 AM CDT   Return Visit with Ronaldo Son MD   [facility] ([facility])    [address]  [facility] [address] [phone] OPT 2            Jul 23, 2018  2:30 PM CDT   Remote PPM Check with WILCOX TECH1   [facility] ([facility])    [address] [address] [phone] OPT 2           This appointment is for a remote check of  your pacemaker.  This is not an appointment at the office.              Follow-Up Appointment Instructions     Future Labs/Procedures    Follow-up and recommended labs and tests      Comments:    F/u with cardiology in 1week on 4/27  F/u with urology in 2weeks  CT chest abdomen with contrast in 6months  F/u with vascular medicine in 6months for AAA      Follow-Up Appointment Instructions     Follow-up and recommended labs and tests        F/u with cardiology in 1week on 4/27  F/u with urology in 2weeks  CT chest abdomen with contrast in 6months  F/u with vascular medicine in 6months for AAA             Statement of Approval     Ordered          04/21/18 1256  I have reviewed and agree with all the recommendations and orders detailed in this document.  EFFECTIVE NOW     Approved and electronically signed by:  Olivia Escobar MD           04/21/18 0900  I have reviewed and agree with all the recommendations and orders detailed in this document.  EFFECTIVE NOW     Approved and electronically signed by:  Olivia Escobar MD

## 2018-04-19 NOTE — PHARMACY-ADMISSION MEDICATION HISTORY
Admission medication history interview status for the 4/19/2018  admission is complete. See EPIC admission navigator for prior to admission medications     Medication history source reliability:Good    Actions taken by pharmacist (provider contacted, etc):None     Additional medication history information not noted on PTA med list :None    Medication reconciliation/reorder completed by provider prior to medication history? No    Time spent in this activity: 30    Prior to Admission medications    Medication Sig Last Dose Taking? Auth Provider   Acetaminophen (TYLENOL PO) Take 1,000 mg by mouth 3 times daily 4/17/2018 Yes Reported, Patient   amLODIPine (NORVASC) 5 MG tablet Take 1 tablet (5 mg) by mouth daily 4/16/2018 Yes Iliana Ignacio PA-C   aspirin 81 MG tablet Take 1 tablet (81 mg) by mouth daily 4/17/2018 Yes Iliana Ignacio PA-C   atorvastatin (LIPITOR) 40 MG tablet Take 1 tablet (40 mg) by mouth daily 4/16/2018 Yes Iliana Ignacio PA-C   doxycycline (VIBRAMYCIN) 100 MG capsule Take 100 mg by mouth 2 times daily 4/17/2018 Yes Reported, Patient   isosorbide mononitrate (IMDUR) 30 MG 24 hr tablet Take 1 tablet (30 mg) by mouth daily 4/17/2018 Yes Ronaldo Son MD   lisinopril (PRINIVIL/ZESTRIL) 2.5 MG tablet Take 1 tablet (2.5 mg) by mouth daily 4/17/2018 Yes Ronaldo Son MD   magnesium hydroxide (MILK OF MAGNESIA) 400 MG/5ML suspension Take 30 mLs by mouth daily as needed for constipation or heartburn  Yes Reported, Patient   metoprolol succinate (TOPROL-XL) 100 MG 24 hr tablet Take 1 tablet (100 mg) by mouth daily 4/17/2018 Yes Ronaldo Son MD   nitroglycerin (NITROSTAT) 0.4 MG SL tablet Place 1 tablet (0.4 mg) under the tongue every 5 minutes as needed for chest pain  Yes Iliana Ignacio PA-C   Pseudoephedrine-Guaifenesin (MUCINEX D MAX STRENGTH PO) Take 800 mg by mouth daily  4/17/2018 Yes Reported, Patient

## 2018-04-19 NOTE — IP AVS SNAPSHOT
MRN:[id number]                      After Visit Summary   4/19/2018    Nacho Joseph    MRN: [id number]           Thank you!     Thank you for choosing [facility] for your care. Our goal is always to provide you with excellent care. Hearing back from our patients is one way we can continue to improve our services. Please take a few minutes to complete the written survey that you may receive in the mail after you visit with us. Thank you!        Patient Information     Date Of Birth          1935        Designated Caregiver       Most Recent Value    Caregiver    Will someone help with your care after discharge? yes    Name of designated caregiver [facility] staff     Phone number of caregiver [phone]    Caregiver address [address] [place] [address]       About your hospital stay     You were admitted on:  April 19, 2018 You last received care in the:  [facility] [address] [facility]    You were discharged on:  April 21, 2018        Reason for your hospital stay       Kidney stone s/p right ureteral stent placement                  Who to Call     For medical emergencies, please call 911.  For non-urgent questions about your medical care, please call your primary care provider or clinic, None  For questions related to your surgery, please call your surgery clinic        Attending Provider     Provider Specialty    Alisson Lazar MD Emergency Medicine    Sylvia Carney MD Internal Medicine       Primary Care Provider    Paulino Lynch      After Care Instructions     Activity       Your activity upon discharge: activity as tolerated            Diet       Follow this diet upon discharge: regular            General info for SNF       Length of Stay Estimate: Long Term Care  Condition at Discharge: Stable  Level of care:board and care  Rehabilitation Potential: Good  Admission H&P remains valid and up-to-date: Yes  Recent Chemotherapy: N/A  Use Nursing Home Standing  Orders: Yes            Mantoux instructions       Give two-step Mantoux (PPD) Per Facility Policy No . Pt is from care facility                  Follow-up Appointments     Follow-up and recommended labs and tests        F/u with cardiology in 1week on 4/27  F/u with urology in 2weeks  CT chest abdomen with contrast in 6months  F/u with vascular medicine in 6months for AAA                  Your next 10 appointments already scheduled     Apr 24, 2018  8:45 AM CDT   US AORTA/IVC/ILIAC DUPLEX COMPLETE with SHVUS4   [facility] ([facility])    [address]  [facility] [address]   [phone]           Please bring a list of your medicines (including vitamins, minerals and over-the-counter drugs). Also, tell your doctor about any allergies you may have. Wear comfortable clothes and leave your valuables at home.  Adults: No eating or drinking for 8 hours before the exam. You may take medicine with a small sip of water.  Children: - Children 6+ years: No food or drink for 6 hours before exam. - Children 1-5 years: No food or drink for 4 hours before exam. - Infants, breast-fed: may have breast milk up to 2 hours before exam. - Infants, formula: may have bottle until 4 hours before exam.  Please call the Imaging Department at your exam site with any questions.            Apr 24, 2018 10:00 AM CDT   US CAROTID BILATERAL with SHVUS4   [facility] ([facility])    [address]  [facility] [address]   [phone]           Please bring a list of your medicines (including vitamins, minerals and over-the-counter drugs). Also, tell your doctor about any allergies you may have. Wear comfortable clothes and leave your valuables at home.  You do not need to do anything special to prepare for your exam.  Please call the Imaging Department at your exam site with any questions.            Apr  "27, 2018  1:10 PM CDT   Return Visit with NICHOLAS Gutierrez CNP   [facility] ([facility])    [address]  [facility] [address] [phone] OPT 2            Jul 05, 2018  9:15 AM CDT   Return Visit with Ronaldo Son MD   [facility] ([facility])    [address]  [facility] [address] [phone] OPT 2            Jul 23, 2018  2:30 PM CDT   Remote PPM Check with JERI TECH1   [facility] ([facility])    [address]  [facility] [address] [phone] OPT 2           This appointment is for a remote check of your pacemaker.  This is not an appointment at the office.              Pending Results     Date and Time Order Name Status Description    4/20/2018 0631 EKG 12-lead, tracing only Preliminary             Statement of Approval     Ordered          04/21/18 1256  I have reviewed and agree with all the recommendations and orders detailed in this document.  EFFECTIVE NOW     Approved and electronically signed by:  Olivia Escobar MD           04/21/18 0900  I have reviewed and agree with all the recommendations and orders detailed in this document.  EFFECTIVE NOW     Approved and electronically signed by:  Olivia Escobar MD             Admission Information     Date & Time Provider Department Dept. Phone    4/19/2018 Sylvia Carney MD [address] [phone]      Your Vitals Were     Blood Pressure Pulse Temperature Respirations Height Weight    118/78 (BP Location: Right arm) 83 97.5  F (36.4  C) (Oral) 16 1.8 m (5' 10.87\") 63.3 kg (139 lb 9.6 oz)    Pulse Oximetry BMI (Body Mass Index)                97% 19.54 kg/m2          MyChart Information     [employer] lets you send messages to your doctor, view your test results, renew your prescriptions, schedule appointments and more. To " "sign up, go to [email or web address] . Click on \"Log in\" on the left side of the screen, which will take you to the Welcome page. Then click on \"Sign up Now\" on the right side of the page.     You will be asked to enter the access code listed below, as well as some personal information. Please follow the directions to create your username and password.     Your access code is: [email or web address]  Expires: 7/15/2018  9:37 AM     Your access code will  in 90 days. If you need help or a new code, please call your [place] clinic or [phone].        Care EveryWhere ID     This is your Care EveryWhere ID. This could be used by other organizations to access your [place] medical records  [id number]        Equal Access to Services     TERRY PENA : Kaity Evangelista, xochilt jarrell, eloisa mcintosh, adams flood. So [facility] [phone].    ATENCIÓN: Si habla español, tiene a varela disposición servicios gratuitos de asistencia lingüística. Dorothy al [phone].    We comply with applicable federal civil rights laws and [place] laws. We do not discriminate on the basis of race, color, national origin, age, disability, sex, sexual orientation, or gender identity.               Review of your medicines      CONTINUE these medicines which may have CHANGED, or have new prescriptions. If we are uncertain of the size of tablets/capsules you have at home, strength may be listed as something that might have changed.        Dose / Directions    metoprolol succinate 50 MG 24 hr tablet   Commonly known as:  TOPROL-XL   This may have changed:    - medication strength  - how much to take   Used for:  Abdominal aortic aneurysm (AAA) without rupture (H)        Dose:  50 mg   Take 1 tablet (50 mg) by mouth daily   Quantity:  30 tablet   Refills:  0         CONTINUE these medicines which have NOT CHANGED        Dose / Directions    aspirin 81 MG tablet   Used for:  Chest pain        " Dose:  81 mg   Take 1 tablet (81 mg) by mouth daily   Refills:  0       atorvastatin 40 MG tablet   Commonly known as:  LIPITOR   Used for:  CAD (coronary artery disease), Hyperlipidemia        Dose:  40 mg   Take 1 tablet (40 mg) by mouth daily   Refills:  0       isosorbide mononitrate 30 MG 24 hr tablet   Commonly known as:  IMDUR   Used for:  Ischemic cardiomyopathy        Dose:  30 mg   Take 1 tablet (30 mg) by mouth daily   Quantity:  90 tablet   Refills:  3       MILK OF MAGNESIA 400 MG/5ML suspension   Generic drug:  magnesium hydroxide        Dose:  30 mL   Take 30 mLs by mouth daily as needed for constipation or heartburn   Refills:  0       MUCINEX D MAX STRENGTH PO        Dose:  800 mg   Take 800 mg by mouth daily   Refills:  0       nitroGLYcerin 0.4 MG sublingual tablet   Commonly known as:  NITROSTAT   Used for:  Chest pain        Dose:  0.4 mg   Place 1 tablet (0.4 mg) under the tongue every 5 minutes as needed for chest pain   Quantity:  25 tablet   Refills:  4       TYLENOL PO        Dose:  1000 mg   Take 1,000 mg by mouth 3 times daily   Refills:  0         STOP taking     amLODIPine 5 MG tablet   Commonly known as:  NORVASC           doxycycline 100 MG capsule   Commonly known as:  VIBRAMYCIN           lisinopril 2.5 MG tablet   Commonly known as:  PRINIVIL/Zestril                Where to get your medicines      These medications were sent to [facility] - [address]  [address] [facility] [address], [place] [address]     Phone:  [phone]     metoprolol succinate 50 MG 24 hr tablet                Protect others around you: Learn how to safely use, store and throw away your medicines at www.disposemymeds.org.             Medication List: This is a list of all your medications and when to take them. Check marks below indicate your daily home schedule. Keep this list as a reference.      Medications           Morning Afternoon Evening Bedtime As Needed    aspirin 81 MG  tablet   Take 1 tablet (81 mg) by mouth daily                                atorvastatin 40 MG tablet   Commonly known as:  LIPITOR   Take 1 tablet (40 mg) by mouth daily   Last time this was given:  40 mg on 4/20/2018  9:03 PM                                isosorbide mononitrate 30 MG 24 hr tablet   Commonly known as:  IMDUR   Take 1 tablet (30 mg) by mouth daily   Last time this was given:  30 mg on 4/21/2018  9:12 AM                                metoprolol succinate 50 MG 24 hr tablet   Commonly known as:  TOPROL-XL   Take 1 tablet (50 mg) by mouth daily   Last time this was given:  50 mg on 4/21/2018  9:12 AM                                MILK OF MAGNESIA 400 MG/5ML suspension   Take 30 mLs by mouth daily as needed for constipation or heartburn   Generic drug:  magnesium hydroxide                                MUCINEX D MAX STRENGTH PO   Take 800 mg by mouth daily                                nitroGLYcerin 0.4 MG sublingual tablet   Commonly known as:  NITROSTAT   Place 1 tablet (0.4 mg) under the tongue every 5 minutes as needed for chest pain                                TYLENOL PO   Take 1,000 mg by mouth 3 times daily   Last time this was given:  1,000 mg on 4/21/2018  9:13 AM

## 2018-04-19 NOTE — CONSULTS
Patient is currently pain-free; do not anticipate any medical changes at present.  Will plan to reschedule outpatient coronary angiogram when current problem resolved.  Thank you.

## 2018-04-19 NOTE — PROGRESS NOTES
" called and stated they could not be here until 0945--had been requested for 0800.  Welcome desk registered pt by  phone and welcome desk called [facility] Suit[facility] to say pt c/o severe right back pain.  I took an  phone with interpterer #[id number].  Pt stated he has had right back pain at 9/10 since 1PM yesterday.  States he has not had pain like this before and it has been consistent since yesterday and only had brief minimal pain relief with Tylenol.  Asked pt if he thought it was something that should be taken care of before his heart cath and gave a strong \"Yes\"!  Pt taken to ED to address his pain at 0920.  Pt very appreciative.  "

## 2018-04-19 NOTE — IP AVS SNAPSHOT
"` `           [place] [address] [facility]: [phone]                                              INTERAGENCY TRANSFER FORM - NURSING   2018                    Hospital Admission Date: 2018  LATOSHA VIZCAINO   : 1935  Sex: Male        Attending Provider: Sylvia Carney MD     Allergies:  No Known Allergies    Infection:  None   Service:  HOSPITALIST    Ht:  1.8 m (5' 10.87\")   Wt:  63.3 kg (139 lb 9.6 oz)   Admission Wt:  63 kg (138 lb 14.2 oz)    BMI:  19.54 kg/m 2   BSA:  1.78 m 2            Patient PCP Information     Provider PCP Type    Paulino Lynch [facility]      Current Code Status     Date Active Code Status Order ID Comments User Context       Prior      Code Status History     Date Active Date Inactive Code Status Order ID Comments User Context    2018  8:59 AM  Full Code [id number]  Olivia Escobar MD Outpatient    2018  8:54 AM 2018  8:59 AM Full Code [id number]  Olivia Escobar MD Outpatient    2018  9:36 AM 2018  8:54 AM Full Code [id number]  Gurvinder Sanz MD Inpatient    2018  2:39 PM 2018  9:36 AM Full Code [id number]  Sylvia Carney MD Inpatient    2014  2:19 AM 2014  8:57 PM Full Code [id number]  Harinder Noyola MD Inpatient      Advance Directives        Scanned docmt in ACP Activity?           No scanned doc        Hospital Problems as of 2018              Priority Class Noted POA    AAA (abdominal aortic aneurysm) (H) Medium  2014 Yes    HTN (hypertension) Medium  2014 Yes    Hyperlipidemia Medium  2014 Yes    Atrioventricular block, complete (HEART) Medium  2014 Yes    Depression Medium  2014 Yes    COPD (chronic obstructive pulmonary disease) (H) Medium  2014 Yes    CAD (coronary artery disease) Medium  2015 Yes    Ischemic cardiomyopathy Medium  2015 Yes    Obstructive uropathy High Acute 2018 Yes    Right flank pain Medium  2018 Yes    Pyelonephritis Medium  2018 Yes " "     Non-Hospital Problems as of 4/21/2018              Priority Class Noted    Bradycardia Medium  7/7/2014    Lung abscess (H) Medium  7/21/2014    Chest pain Medium  1/20/2015      Immunizations     None         END      ASSESSMENT     Discharge Profile Flowsheet     EXPECTED DISCHARGE     Inspection of bony prominences  Full 04/21/18 1509    Expected Discharge Date  04/22/18 04/20/18 1255   Inspection under devices  Full 04/20/18 0423    GASTROINTESTINAL (ADULT,PEDIATRIC,OB)     Skin WDL  ex;characteristics 04/21/18 1509    GI WDL  WDL 04/21/18 1509   Skin Temperature  warm 04/21/18 1509    Last Bowel Movement  04/17/18 04/20/18 1729   Skin Moisture  dry 04/21/18 1509    Passing flatus  yes 04/21/18 1509   Skin Integrity  bruise(s);scab(s);scar(s) 04/21/18 1509    COMMUNICATION ASSESSMENT     Full except areas not inspected   Hip, left;Hip, right;Buttock, left;Buttock, right;Sacrum;Coccyx 04/21/18 0021    Patient's communication style  spoken language (non-English) 04/20/18 1009   SAFETY      Patient's primary language  Syrian 04/20/18 1035   Safety WDL  WDL 04/21/18 1509     services offered to the patient? (Install  services phone or TTY, if applicable)  yes 04/20/18 1035   Safety Factors  bed in low position;wheels locked;call light in reach;upper side rails raised x 2;ID band on 04/21/18 1509    Did the patient decline [place]  and sign paper waiver form? (Place signed waiver form on chart)  no 04/20/18 1035   All Alarms  alarm(s) activated and audible 04/21/18 1509    SKIN                        Assessment WDL (Within Defined Limits) Definitions           Safety WDL     Effective: 09/28/15    Row Information: <b>WDL Definition:</b> Bed in low position, wheels locked; call light in reach; upper side rails up x 2; ID band on<br> <font color=\"gray\"><i>Item=AS safety wdl>>List=AS safety wdl>>Version=F14</i></font>      Skin WDL     Effective: 09/28/15    Row Information: " "<b>WDL Definition:</b> Warm; dry; intact; elastic; without discoloration; pressure points without redness<br> <font color=\"gray\"><i>Item=AS skin wdl>>List=AS skin wdl>>Version=F14</i></font>      Vitals     Vital Signs Flowsheet     VITAL SIGNS     Pain Intervention(s)  Medication (See eMAR) 04/21/18 1503    Temp  97.5  F (36.4  C) 04/21/18 0756   Response to Interventions  Decrease in pain 04/21/18 1503    Temp src  Oral 04/21/18 0756   ANALGESIA SIDE EFFECTS MONITORING      Resp  16 04/21/18 0756   Side Effects Monitoring: Respiratory Quality  R 04/21/18 1503    Pulse  83 04/20/18 1426   Side Effects Monitoring: Respiratory Depth  N 04/21/18 1503    Heart Rate  74 04/21/18 0756   Side Effects Monitoring: Sedation Level  1 04/21/18 1503    Pulse/Heart Rate Source  Monitor 04/21/18 0756   HEIGHT AND WEIGHT      BP  118/78 04/21/18 0756   Height  1.8 m (5' 10.87\") 04/19/18 1029    BP Location  Right arm 04/21/18 0756   Weight  63.3 kg (139 lb 9.6 oz) (Standing scale.) 04/20/18 0601    Patient Position  Lying 04/20/18 0824   BSA (Calculated - sq m)  1.77 04/19/18 1029    OXYGEN THERAPY     BMI (Calculated)  19.49 04/19/18 1029    SpO2  97 % 04/21/18 0756   POSITIONING      O2 Device  None (Room air) 04/21/18 0756   Body Position  independently positioning 04/21/18 1509    Oxygen Delivery  6 LPM 04/20/18 0824   Head of Bed (HOB)  HOB at 45 degrees 04/21/18 1509    PACEMAKER     Positioning/Transfer Devices  pillows;in use 04/21/18 1509    Pacemaker  Permanent 04/21/18 1509   DAILY CARE      PAIN/COMFORT     Activity Management  activity adjusted per tolerance 04/21/18 1509    Patient Currently in Pain  yes 04/21/18 1503   Activity Assistance Provided  assistance, stand-by 04/21/18 1509    Preferred Pain Scale  number (Numeric Rating Pain Scale) 04/21/18 1503   SARA COMA SCALE      Patient's Stated Pain Goal  2 04/20/18 0633   Best Eye Response  4-->(E4) spontaneous 04/19/18 5971    0-10 Pain Scale  2 04/21/18 1503   " Best Motor Response  6-->(M6) obeys commands 04/19/18 1808    FACES Pain Rating  8-->hurts whole lot 04/19/18 2100   Best Verbal Response  5-->(V5) oriented 04/19/18 1808    Pain Location  Back 04/21/18 1503   [place] Coma Scale Score  15 04/19/18 1808    Pain Orientation  Right;Lower 04/21/18 1503   ECG      Pain Descriptors  Aching 04/21/18 1503   ECG Rhythm  Paced rhythm 04/20/18 0824    Pain Management Interventions  analgesia administered 04/21/18 1503                 Patient Lines/Drains/Airways Status    Active LINES/DRAINS/AIRWAYS     Name: Placement date: Placement time: Site: Days: Last dressing change:    Arterial Sheath  02/20/15   1053      1156             Patient Lines/Drains/Airways Status    Active PICC/CVC     None            Intake/Output Detail Report     Date Intake     Output Net    Shift P.O. I.V. IV Piggyback Total Urine Total       Day 04/20/18 0700 - 04/20/18 1459 720 400 -- 1120 495 495 625    Any 04/20/18 1500 - 04/20/18 2259 480 -- -- 480 475 475 5    Noc 04/20/18 2300 - 04/21/18 0659 -- -- -- -- 250 250 -250    Day 04/21/18 0700 - 04/21/18 1459 480 -- -- 480 600 600 -120    Any 04/21/18 1500 - 04/21/18 2259 -- -- -- -- -- -- 0      Last Void/BM       Most Recent Value    Urine Occurrence 1 at 04/21/2018 0353    Stool Occurrence       Case Management/Discharge Planning     Case Management/Discharge Planning Flowsheet     LIVING ENVIRONMENT     QUESTION TO PATIENT:  Has a member of your family or a partner(now or in the past) intimidated, hurt, manipulated, or controlled you in any way?  no 04/19/18 0943    Lives With  facility resident 04/20/18 1046   QUESTION TO PATIENT: Do you feel safe going back to the place where you are living?  yes 04/19/18 0943    Provides Primary Care For  no one, unable/limited ability to care for self 04/20/18 1046   OBSERVATION: Is there reason to believe there has been maltreatment of a vulnerable adult (ie. Physical/Sexual/Emotional abuse, self neglect,  "lack of adequate food, shelter, medical care, or financial exploitation)?  no 04/19/18 0943    COPING/STRESS     OTHER      Major Change/Loss/Stressor  none (except a little anxiety, but \"c'est la vie\" ) 04/20/18 1052   Are you depressed or being treated for depression?  No 04/20/18 1052    EXPECTED DISCHARGE     HOMICIDE RISK      Expected Discharge Date  04/22/18 04/20/18 1255   Feels Like Hurting Others  no 04/19/18 0943    ABUSE RISK SCREEN                   "

## 2018-04-19 NOTE — IP AVS SNAPSHOT
` [place] [address] ONCOLOGY: [phone]            Medication Administration Report for Nacho Joseph as of 04/21/18 1656   Legend:    Given Hold Not Given Due Canceled Entry Other Actions    Time Time (Time) Time  Time-Action       Inactive    Active    Linked        Medications 04/15/18 04/16/18 04/17/18 04/18/18 04/19/18 04/20/18 04/21/18    acetaminophen (TYLENOL) tablet 1,000 mg  Dose: 1,000 mg  Freq: 3 TIMES DAILY Route: PO  Start: 04/19/18 1600   Admin Instructions: Maximum acetaminophen dose from all sources = 75 mg/kg/day not to exceed 4 gram    Admin. Amount: 2 tablet (2 × 500 mg tablet)  Last Admin: 04/21/18 0913  Dispense Loc: [facility] 88B         1803 (500 mg)-Given [C]        (0103)-Not Given       0627-Auto Hold       0900-Automatically Held       0926-Unhold       1258 (1,000 mg)-Given       1720 (1,000 mg)-Given       2103 (1,000 mg)-Given        0913 (1,000 mg)-Given       [ ] 1600       [ ] 2200           acetaminophen (TYLENOL) tablet 650 mg  Dose: 650 mg  Freq: EVERY 4 HOURS PRN Route: PO  PRN Reason: mild pain  Start: 04/19/18 1439   Admin Instructions: Alternate ibuprofen (if ordered) with acetaminophen.  Maximum acetaminophen dose from all sources = 75 mg/kg/day not to exceed 4 grams/day.    Admin. Amount: 2 tablet (2 × 325 mg tablet)  Last Admin: 04/21/18 0354  Dispense Loc: [place] [id number]         1505 (650 mg)-Given        0627-Auto Hold       0926-Unhold        0354 (650 mg)-Given           aspirin EC EC tablet 81 mg  Dose: 81 mg  Freq: DAILY Route: PO  Start: 04/19/18 1445   Admin. Amount: 1 tablet (1 × 81 mg tablet)  Last Admin: 04/21/18 0912  Dispense Loc: [facility] 88B         1713 (81 mg)-Given        0627-Auto Hold       0900-Automatically Held       0926-Unhold       1259 (81 mg)-Given        0912 (81 mg)-Given           atorvastatin (LIPITOR) tablet 40 mg  Dose: 40 mg  Freq: DAILY Route: PO  Start: 04/19/18 2200   Admin. Amount: 1 tablet (1 × 40 mg tablet)  Last  "Admin: 04/20/18 2103  Dispense Loc: [address]          0112 (40 mg)-Given       0627-Auto Hold       0926-Unhold       2103 (40 mg)-Given        [ ] 2200           bisacodyl (DULCOLAX) Suppository 10 mg  Dose: 10 mg  Freq: DAILY PRN Route: RE  PRN Reason: constipation  Start: 04/19/18 1439   Admin Instructions: Hold for loose stools.  This is the third step of a three step constipation treatment.    Admin. Amount: 1 suppository (1 × 10 mg suppository)  Dispense Loc: [address]          0627-Auto Hold       0926-Unhold            HYDROmorphone (PF) (DILAUDID) injection 0.2 mg  Dose: 0.2 mg  Freq: EVERY 2 HOURS PRN Route: IV  PRN Reason: other  PRN Comment: pain control or improvement in physical function. Hold dose for analgesic side effects.  Start: 04/19/18 1439   Admin Instructions: Notify provider to assess for uncontrolled pain or analgesic side effects. Hold while on PCA or with regular IV opioid dosing  For ordered doses up to 4 mg give IV Push undiluted. Administer each 2mg over 2-5 minutes.    Admin. Amount: 0.2 mg  Dispense Loc: [address]          0627-Auto Hold       0926-Unhold            isosorbide mononitrate (IMDUR) 24 hr tablet 30 mg  Dose: 30 mg  Freq: DAILY Route: PO  Start: 04/19/18 1445   Admin Instructions: DO NOT CRUSH. Can split tablet in half along score al.    Admin. Amount: 1 tablet (1 × 30 mg tablet)  Last Admin: 04/21/18 0912  Dispense Loc: [address]         1713 (30 mg)-Given        0627-Auto Hold       0900-Automatically Held       0926-Unhold       1300 (30 mg)-Given        0912 (30 mg)-Given           lidocaine (LMX4) cream  Freq: EVERY 1 HOUR PRN Route: Top  PRN Reason: pain  PRN Comment: with VAD insertion or accessing implanted port.  Start: 04/20/18 0936   Admin Instructions: Do NOT give if patient has a history of allergy to any local anesthetic or any \"cody\" product.   Apply 30 minutes prior to VAD insertion or port access.  MAX Dose:  2.5 g (  of 5 g tube)    Dispense Loc: " "[facility] 88[facility]  POC: Post-procedure               lidocaine (LMX4) cream  Freq: EVERY 1 HOUR PRN Route: Top  PRN Reason: pain  PRN Comment: with VAD insertion or accessing implanted port.  Start: 04/19/18 1439   Admin Instructions: Do NOT give if patient has a history of allergy to any local anesthetic or any \"cody\" product.   Apply 30 minutes prior to VAD insertion or port access.  MAX Dose:  2.5 g (  of 5 g tube)    Dispense Loc: [place] [id number]          0627-Auto Hold       0926-Unhold            lidocaine 1 % 1 mL  Dose: 1 mL  Freq: EVERY 1 HOUR PRN Route: OTHER  PRN Comment: mild pain with VAD insertion or accessing implanted port  Start: 04/20/18 0936   Admin Instructions: Do NOT give if patient has a history of allergy to any local anesthetic or any \"cody\" product. MAX dose 1 mL subcutaneous OR intradermal in divided doses.    Admin. Amount: 1 mL  Dispense Loc: [facility] FLOOR STOCK  Volume: 2 mL  POC: Post-procedure               lidocaine 1 % 1 mL  Dose: 1 mL  Freq: EVERY 1 HOUR PRN Route: OTHER  PRN Comment: mild pain with VAD insertion or accessing implanted port  Start: 04/19/18 1439   Admin Instructions: Do NOT give if patient has a history of allergy to any local anesthetic or any \"cody\" product. MAX dose 1 mL subcutaneous OR intradermal in divided doses.    Admin. Amount: 1 mL  Dispense Loc: [facility] FLOOR STOCK  Volume: 2 mL          0627-Auto Hold       0926-Unhold            lisinopril (PRINIVIL/Zestril) tablet 2.5 mg  Dose: 2.5 mg  Freq: DAILY Route: PO  Start: 04/19/18 1445   Admin Instructions: Hold for systolic BP less than 105    Admin. Amount: 1 tablet (1 × 2.5 mg tablet)  Last Admin: 04/21/18 0912  Dispense Loc: [place] [address]         1658-Hold        0627-Auto Hold       0900-Automatically Held       0926-Unhold       1303 (2.5 mg)-Given        0912 (2.5 mg)-Given           magnesium sulfate 2 g in NS intermittent infusion ([employer] or [facility])  Dose: 2 g  Freq: DAILY PRN Route: IV  PRN Reason: " magnesium supplementation  Start: 04/19/18 1439   Admin Instructions: For Serum Mg++ 1.6 - 2 mg/dL  Give 2 g and recheck magnesium level next AM.    Admin. Amount: 2 g = 50 mL Conc: 2 g/50 mL  Dispense Loc: Contact Rx for dose  Infused Over: 60 Minutes  Volume: 50 mL          0627-Auto Hold       0926-Unhold            magnesium sulfate 4 g in 100 mL sterile water (premade)  Dose: 4 g  Freq: EVERY 4 HOURS PRN Route: IV  PRN Reason: magnesium supplementation  Start: 04/19/18 1439   Admin Instructions: For serum Mg++ less than 1.6 mg/dL  Give 4 g and recheck magnesium level 2 hours after dose, and next AM.    Admin. Amount: 4 g = 100 mL Conc: 4 g/100 mL  Dispense Loc: Contact Rx for dose  Infused Over: 120 Minutes  Volume: 100 mL          0627-Auto Hold       0926-Unhold            melatonin tablet 1 mg  Dose: 1 mg  Freq: AT BEDTIME PRN Route: PO  PRN Reason: sleep  Start: 04/19/18 1439   Admin Instructions: Do not give unless at least 6 hours of uninterrupted sleep is expected.    Admin. Amount: 1 tablet (1 × 1 mg tablet)  Dispense Loc: [facility] 88B          0627-Auto Hold       0926-Unhold            metoprolol succinate (TOPROL-XL) 24 hr tablet 50 mg  Dose: 50 mg  Freq: DAILY Route: PO  Start: 04/21/18 0900   Admin Instructions: DO NOT CRUSH. Tablet may be split in half along score line  Hold for systolic BP less than 100    Admin. Amount: 1 tablet (1 × 50 mg tablet)  Last Admin: 04/21/18 0912  Dispense Loc: [facility] 88B           0912 (50 mg)-Given           naloxone (NARCAN) injection 0.1-0.4 mg  Dose: 0.1-0.4 mg  Freq: EVERY 2 MIN PRN Route: IV  PRN Reason: opioid reversal  Start: 04/20/18 0936   Admin Instructions: For respiratory rate LESS than or EQUAL to 8.  Partial reversal dose:  0.1 mg titrated q 2 minutes for Analgesia Side Effects Monitoring Sedation Level of 3 (frequently drowsy, arousable, drifts to sleep during conversation).Full reversal dose:  0.4 mg bolus for Analgesia Side Effects Monitoring Sedation  Level of 4 (somnolent, minimal or no response to stimulation).  For ordered doses up to 2mg give IVP. Give each 0.4mg over 15 seconds in emergency situations. For non-emergent situations further dilute in 9mL of NS to facilitate titration of response.    Admin. Amount: 0.1-0.4 mg = 0.25-1 mL Conc: 0.4 mg/mL  Dispense Loc:  [facility]  Volume: 1 mL  POC: Post-procedure               naloxone (NARCAN) injection 0.1-0.4 mg  Dose: 0.1-0.4 mg  Freq: EVERY 2 MIN PRN Route: IV  PRN Reason: opioid reversal  Start: 04/19/18 1439   Admin Instructions: For respiratory rate LESS than or EQUAL to 8.  Partial reversal dose:  0.1 mg titrated q 2 minutes for Analgesia Side Effects Monitoring Sedation Level of 3 (frequently drowsy, arousable, drifts to sleep during conversation).Full reversal dose:  0.4 mg bolus for Analgesia Side Effects Monitoring Sedation Level of 4 (somnolent, minimal or no response to stimulation).  For ordered doses up to 2mg give IVP. Give each 0.4mg over 15 seconds in emergency situations. For non-emergent situations further dilute in 9mL of NS to facilitate titration of response.    Admin. Amount: 0.1-0.4 mg = 0.25-1 mL Conc: 0.4 mg/mL  Dispense Loc:  [facility]  Volume: 1 mL          0627-Auto Hold       0926-Unhold            nitroGLYcerin (NITROSTAT) sublingual tablet 0.4 mg  Dose: 0.4 mg  Freq: EVERY 5 MIN PRN Route: SL  PRN Reason: chest pain  Start: 04/19/18 1439   Admin. Amount: 1 tablet (1 × 0.4 mg tablet)  Dispense Loc:  [facility]          0627-Auto Hold       0926-Unhold            ondansetron (ZOFRAN-ODT) ODT tab 4 mg  Dose: 4 mg  Freq: EVERY 6 HOURS PRN Route: PO  PRN Reasons: nausea,vomiting  Start: 04/19/18 1439   Admin Instructions: This is Step 1 of nausea and vomiting management.  If nausea not resolved in 15 minutes, go to Step 2 prochlorperazine (COMPAZINE). Do not push through foil backing. Peel back foil and gently remove. Place on tongue immediately. Administration with liquid  unnecessary  With dry hands, peel back foil backing and gently remove tablet; do not push oral disintegrating tablet through foil backing; administer immediately on tongue and oral disintegrating tablet dissolves in seconds; then swallow with saliva; liquid not required.    Admin. Amount: 1 tablet (1 × 4 mg tablet)  Dispense Loc: [facility] 88B          0627-Auto Hold       0926-Unhold           Or  ondansetron (ZOFRAN) injection 4 mg  Dose: 4 mg  Freq: EVERY 6 HOURS PRN Route: IV  PRN Reasons: nausea,vomiting  Start: 04/19/18 1439   Admin Instructions: This is Step 1 of nausea and vomiting management.  If nausea not resolved in 15 minutes, go to Step 2 prochlorperazine (COMPAZINE).  Irritant. For ordered doses up to 4 mg, give IV Push undiluted over 2-5 minutes.    Admin. Amount: 4 mg = 2 mL Conc: 4 mg/2 mL  Dispense Loc:  [facility]  Infused Over: 2-5 Minutes  Volume: 2 mL          0627-Auto Hold       0926-Unhold            oxyCODONE IR (ROXICODONE) tablet 5 mg  Dose: 5 mg  Freq: EVERY 4 HOURS PRN Route: PO  PRN Reasons: moderate to severe pain,other  PRN Comment: pain control or improvement in physical function. Hold dose for analgesic side effects.  Start: 04/19/18 1439   Admin Instructions: Start with the lowest dose.  May adjust dose by 5 mg every 3 hours as needed. Notify provider to assess for uncontrolled pain or analgesic side effects. Hold while on PCA or with regular IV opioid dosing.    Admin. Amount: 1 tablet (1 × 5 mg tablet)  Last Admin: 04/20/18 0605  Dispense Loc: [facility] 88B         2058 (5 mg)-Given        0112 (5 mg)-Given       0605 (5 mg)-Given       0627-Auto Hold       0926-Unhold            polyethylene glycol (MIRALAX/GLYCOLAX) Packet 17 g  Dose: 17 g  Freq: DAILY PRN Route: PO  PRN Reason: constipation  Start: 04/19/18 1439   Admin Instructions: Give in 8oz of  water, juice, or soda. Hold for loose stools.  This is the second step of a three step constipation treatment.  1 Packet = 17 grams.  Mixed prescribed dose in 8 ounces of water. Follow with 8 oz. of water.    Admin. Amount: 17 g  Dispense Loc: [facility] 88B          0627-Auto Hold       0926-Unhold            potassium chloride (KLOR-CON) Packet 20-40 mEq  Dose: 20-40 mEq  Freq: EVERY 2 HOURS PRN Route: ORAL OR FEED  PRN Reason: potassium supplementation  Start: 04/19/18 1439   Admin Instructions: Use if unable to tolerate tablets.  If Serum K+ 3.0-3.3, dose = 60 mEq po total dose (40 mEq x1 followed in 2 hours by 20 mEq x1). Recheck K+ level 4 hours after dose and the next AM.  If Serum K+ 2.5-2.9, dose = 80 mEq po total dose (40 mEq Q2H x2). Recheck K+ level 4 hours after dose and the next AM.  If Serum K+ less than 2.5, See IV order.  Dissolve packet contents in 4-8 ounces of cold water or juice.    Admin. Amount: 20-40 mEq  Dispense Loc: [facility] 88B          0627-Auto Hold       0926-Unhold            potassium chloride 10 mEq in 100 mL intermittent infusion with 10 mg lidocaine  Dose: 10 mEq  Freq: EVERY 1 HOUR PRN Route: IV  PRN Reason: potassium supplementation  Start: 04/19/18 1439   Admin Instructions: Infuse via PERIPHERAL LINE. Use potassium with lidocaine for pain with peripheral administration.  If Serum K+ 3.0-3.3, dose = 10 mEq/hr x4 doses (40 mEq IV total dose). Recheck K+ level 2 hours after dose and the next AM.  If Serum K+ less than 3.0, dose = 10 mEq/hr x6 doses (60 mEq IV total dose). Recheck K+ level 2 hours after dose and the next AM.    Admin. Amount: 10 mEq = 100 mL Conc: 10 mEq/100 mL  Dispense Loc: Contact Rx for dose  Infused Over: 1 Hours  Volume: 100 mL          0627-Auto Hold       0926-Unhold            potassium chloride 10 mEq in 100 mL sterile water intermittent infusion (premix)  Dose: 10 mEq  Freq: EVERY 1 HOUR PRN Route: IV  PRN Reason: potassium supplementation  Start: 04/19/18 1439   Admin Instructions: Infuse via PERIPHERAL LINE or CENTRAL LINE. Use for central line replacement if patient weight less than 65  kg, if patient is on TPN with high potassium content or if unit does not stock 20 mEq bags.   If Serum K+ 3.0-3.3, dose = 10 mEq/hr x4 doses (40 mEq IV total dose). Recheck K+ level 2 hours after dose and the next AM.   If Serum K+ less than 3.0, dose = 10 mEq/hr x6 doses (60 mEq IV total dose). Recheck K+ level 2 hours after dose and the next AM.    Admin. Amount: 10 mEq = 100 mL Conc: 10 mEq/100 mL  Dispense Loc: Contact Rx for dose  Infused Over: 60 Minutes  Volume: 100 mL          0627-Auto Hold       0926-Unhold            potassium chloride 20 mEq in 50 mL intermittent infusion  Dose: 20 mEq  Freq: EVERY 1 HOUR PRN Route: IV  PRN Reason: potassium supplementation  Start: 04/19/18 1439   Admin Instructions: Infuse via CENTRAL LINE Only. May need EKG if less than 65 kg or on TPN - Max rate is 0.3 mEq/kg/hr for patients not on EKG monitoring.   If Serum K+ 3.0-3.3, dose = 20 mEq/hr x2 doses (40 mEq IV total dose). Recheck K+ level 2 hours after dose and the next AM.  If Serum K+ less than 3.0, dose = 20 mEq/hr x3 doses (60 mEq IV total dose). Recheck K+ level 2 hours after dose and the next AM.    Admin. Amount: 20 mEq = 50 mL Conc: 20 mEq/50 mL  Dispense Loc: Contact Rx for dose  Infused Over: 90 Minutes  Volume: 50 mL          0627-Auto Hold       0926-Unhold            potassium chloride SA (K-DUR/KLOR-CON M) CR tablet 20-40 mEq  Dose: 20-40 mEq  Freq: EVERY 2 HOURS PRN Route: PO  PRN Reason: potassium supplementation  Start: 04/19/18 1439   Admin Instructions: Use if able to take PO.   If Serum K+ 3.0-3.3, dose = 60 mEq po total dose (40 mEq x1 followed in 2 hours by 20 mEq x1). Recheck K+ level 4 hours after dose and the next AM.  If Serum K+ 2.5-2.9, dose = 80 mEq po total dose (40 mEq Q2H x2). Recheck K+ level 4 hours after dose and the next AM.  If Serum K+ less than 2.5, See IV order.  DO NOT CRUSH    Admin. Amount: 1-2 tablet (1-2 × 20 mEq tablet)  Dispense Loc:  [facility]          0627-Auto Hold        0926-Unhold            senna-docusate (SENOKOT-S;PERICOLACE) 8.6-50 MG per tablet 1 tablet  Dose: 1 tablet  Freq: 2 TIMES DAILY Route: PO  Start: 04/19/18 2100   Admin Instructions: If no bowel movement in 24 hours, increase to 2 tablets PO.  Hold for loose stools.    Admin. Amount: 1 tablet  Last Admin: 04/21/18 0912  Dispense Loc: [facility] 88B          (0232)-Not Given       0627-Auto Hold       0900-Automatically Held       0926-Unhold       1259 (1 tablet)-Given               0912 (1 tablet)-Given       [ ] 2100          Or  senna-docusate (SENOKOT-S;PERICOLACE) 8.6-50 MG per tablet 2 tablet  Dose: 2 tablet  Freq: 2 TIMES DAILY Route: PO  Start: 04/19/18 2100   Admin Instructions: Hold for loose stools.    Admin. Amount: 2 tablet  Last Admin: 04/20/18 2103  Dispense Loc: [facility] 88B                 0627-Auto Hold       0900-Automatically Held       0926-Unhold              2103 (2 tablet)-Given               [ ] 2100           sodium chloride (PF) 0.9% PF flush 3 mL  Dose: 3 mL  Freq: EVERY 8 HOURS Route: IK  Start: 04/20/18 1400   Admin Instructions: And Q1H PRN, to lock peripheral IV dormant line.    Admin. Amount: 3 mL  Last Admin: 04/21/18 0911  Dispense Loc: [facility] Floor Stock  Volume: 3 mL  POC: Post-procedure          (1559)-Not Given       2107 (3 mL)-Given        0911 (3 mL)-Given       (1535)-Not Given           sodium chloride (PF) 0.9% PF flush 3 mL  Dose: 3 mL  Freq: EVERY 1 HOUR PRN Route: IK  PRN Reason: line flush  PRN Comment: for peripheral IV flush post IV meds  Start: 04/20/18 0936   Admin. Amount: 3 mL  Dispense Loc: [facility] Floor Stock  Volume: 3 mL  POC: Post-procedure               sodium chloride (PF) 0.9% PF flush 3 mL  Dose: 3 mL  Freq: EVERY 8 HOURS Route: IK  Start: 04/19/18 1445   Admin Instructions: And Q1H PRN, to lock peripheral IV dormant line.    Admin. Amount: 3 mL  Last Admin: 04/19/18 1705  Dispense Loc: [facility] Floor Stock  Volume: 3 mL         1705 (3 mL)-Given        (0233)-Not  Given       (0551)-Not Given       627-Auto Hold       0926-Unhold       (1604)-Not Given               (0911)-Not Given [C]       (1535)-Not Given       [ ] 2245           sodium chloride (PF) 0.9% PF flush 3 mL  Dose: 3 mL  Freq: EVERY 1 HOUR PRN Route: IK  PRN Reason: line flush  PRN Comment: for peripheral IV flush post IV meds  Start: 18 1439   Admin. Amount: 3 mL  Dispense Loc: [facility] Floor Stock  Volume: 3 mL          627-Auto Hold       0926-Unhold            sodium chloride 0.9% infusion  Freq: ONCE Route: IV  Start: 18 1217   Dispense Loc: [facility] Floor Stock  Administrations Remainin  Volume: 1,000 mL                 627-Auto Hold       0926-Unhold           Completed Medications  Medications 04/15/18 04/16/18 04/17/18 04/18/18 04/19/18 04/20/18 04/21/18         Dose: 1,000 mL  Freq: ONCE Route: IV  Last Dose: Stopped (18 1130)  Start: 18 1010   End: 18 1130   Admin. Amount: 1,000 mL  Last Admin: 18 1019  Dispense Loc: [facility] Floor Stock  Administrations Remainin  Volume: 1,000 mL         1019 (1,000 mL)-New Bag       1130-Stopped               Dose: 1,000 mg  Freq: ONCE Route: PO  Start: 18 1051   End: 18 1052   Admin Instructions: Maximum acetaminophen dose from all sources = 75 mg/kg/day not to exceed 4 gram    Admin. Amount: 2 tablet (2 × 500 mg tablet)  Last Admin: 18 1052  Dispense Loc:  [facility] ED COR2  Administrations Remainin         1052 (975 mg)-Given               Dose: 2 g  Freq: PRE-OP/PRE-PROCEDURE Route: IV  Indications of Use: PERIOPERATIVE PHARMACOPROPHYLAXIS  Start: 18 0631   End: 18 0743   Admin Instructions: Give first dose within 1 hour PRIOR to incision. If patient weight is greater than or equal to 120 kg increase dose to 3 g.    Admin. Amount: 2 g = 100 mL Conc: 2 g/100 mL  Last Admin: 18 0743  Dispense Loc: [facility] PACU2  Infused Over: 30 Minutes  Administrations Remainin  Volume: 100 mL  POC:  Pre-procedure          0730 (2 g)-Handoff       0743 (2 g)-Given              Dose: 1 g  Freq: ONCE Route: IV  Indications Comment: elevated WBC and kidney stone  Last Dose: Stopped (18 142)  Start: 18 1216   End: 18 1425   Admin. Amount: 1 g  Last Admin: 18 1323  Dispense Loc: [facility] ED COR2  Infused Over: 15-30 Minutes  Administrations Remainin  Volume: 10 mL         1323 (1 g)-New Bag       1425-Stopped               Dose: 25 mcg  Freq: ONCE Route: IV  Start: 18 1317   End: 18 1321   Admin Instructions: For ordered doses up to 100 mcg give IV Push undiluted over a minimum of 3-5 minutes.    Admin. Amount: 25 mcg = 0.5 mL Conc: 50 mcg/mL  Last Admin: 18 1321  Dispense Loc: [facility] ED COR2  Administrations Remainin  Volume: 2 mL         1321 (25 mcg)-Given               Dose: 0.4 mg  Freq: ONCE Route: PO  Start: 18 1202   End: 18 1321   Admin Instructions: Administer 30 minutes after the same meal each day.  Capsules should be swallowed whole; do not crush chew or open.    Admin. Amount: 1 capsule (1 × 0.4 mg capsule)  Last Admin: 18 1321  Dispense Loc: [facility] ED COR2  Administrations Remainin         1321 (0.4 mg)-Given            Discontinued Medications  Medications 04/15/18 04/16/18 04/17/18 04/18/18 04/19/18 04/20/18 04/21/18         Dose: 5 mg  Freq: DAILY Route: PO  Start: 18 1445   End: 18 1324   Admin Instructions: Hold for systolic BP less than 120    Admin. Amount: 1 tablet (1 × 5 mg tablet)  Last Admin: 18 1300  Dispense Loc: [facility] 88B         1656-Hold        0627-Auto Hold       0900-Automatically Held       0926-Unhold       1300 (5 mg)-Given       1324-Med Discontinued          Dose: 1 g  Freq: SEE ADMIN INSTRUCTIONS Route: IV  Indications of Use: PERIOPERATIVE PHARMACOPROPHYLAXIS  Start: 18   End: 18   Admin Instructions: Intra-Op Dose.  Give every 2 hours while patient in surgery,  starting 2 hours after pre-op dose.  DO NOT GIVE intra-op dose if CrCl less than 10 mL/min (on dialysis).  If CrCL less than 50 mL/min, double the time interval between doses.    Admin. Amount: 1 g  Dispense Loc:  [facility]  Infused Over: 30 Minutes  POC: Pre-procedure          0809-Med Discontinued          Dose: 1 g  Freq: EVERY 24 HOURS Route: IV  Indications of Use: PYELONEPHRITIS,URINARY TRACT INFECTION  Indications Comment: obstructive uropathy  Start: 04/20/18 1400   End: 04/21/18 1457   Admin. Amount: 1 g  Last Admin: 04/20/18 1516  Dispense Loc: [facility] 88B  Infused Over: 15-30 Minutes  Volume: 10 mL          0627-Auto Hold       0926-Unhold       1516 (1 g)-New Bag        1457-Med Discontinued  (1521)-Not Given             Dose: 25-50 mcg  Freq: EVERY 2 MIN PRN Route: IV  PRN Reason: other  PRN Comment: acute pain  Start: 04/20/18 0847   End: 04/20/18 0926   Admin Instructions: MAX cumulative dose = 250 mcg.   Use fentaNYL (SUBLIMAZE) initially, as a short acting agent for acute pain control. If insufficient, or a longer acting agent is needed, begin morphine or HYDROmorphone (DILAUDID) if ordered.  For ordered doses up to 100 mcg give IV Push undiluted over a minimum of 3-5 minutes.    Admin. Amount: 25-50 mcg = 0.5-1 mL Conc: 50 mcg/mL  Dispense Loc: [facility] PACU2  Volume: 2 mL  POC: PACU          0926-Med Discontinued          Dose: 0.3-0.5 mg  Freq: EVERY 5 MIN PRN Route: IV  PRN Reason: other  PRN Comment: acute pain.  May administer if Respiratory Rate is greater than 10  Start: 04/20/18 0847   End: 04/20/18 0926   Admin Instructions: Max cumulative dose = 2 mg  If fentaNYL (SUBLIMAZE) is also ordered, use HYDROmorphone (DILAUDID) if pain control insufficient with fentaNYL (SUBLIMAZE) or a longer acting agent is needed.  For ordered doses up to 4 mg give IV Push undiluted. Administer each 2mg over 2-5 minutes.    Admin. Amount: 0.3-0.5 mg  Dispense Loc: [facility] PACU2  POC: PACU          09-Med  "Discontinued          Rate: 100 mL/hr   Freq: CONTINUOUS Route: IV  Start: 18 09   End: 18   Admin Instructions: Continue until IV catheter is weaned    Dispense Loc: [facility] Floor Stock  Volume: 1,000 mL  POC: PACU                 0926-Med Discontinued          Rate: 25 mL/hr   Freq: CONTINUOUS Route: IV  Start: 18 0645   End: 18   Admin Instructions: IF patient NOT on dialysis.    Last Admin: 18  Dispense Loc: [facility] Floor Stock  Volume: 1,000 mL  POC: Pre-procedure          07 ( )-New Bag       0809-Med Discontinued          Dose: 1 mL  Freq: EVERY 1 HOUR PRN Route: OTHER  PRN Comment: mild pain with VAD insertion or accessing implanted port  Start: 18   End: 18   Admin Instructions: Do NOT give if patient has a history of allergy to any local anesthetic or any \"cody\" product. MAX dose 1 mL subcutaneous OR intradermal in divided doses.    Admin. Amount: 1 mL  Dispense Loc:  [facility] PACU  Volume: 20 mL  POC: Pre-procedure          0809-Med Discontinued          Freq: PRN  Start: 18 08   End: 18   Last Admin: 18  POC: Intra-procedure          08 (10 mL)-Given       0926-Med Discontinued          Dose: 12.5 mg  Freq: EVERY 5 MIN PRN Route: IV  PRN Comment: post anesthesia shivering if Respiratory Rate greater than 10  Start: 18   End: 18   Admin Instructions: Give IV Push undiluted. 10-40 mg over 2-3 minutes, up to 125 mg over 3-15 minutes    Admin. Amount: 12.5 mg = 0.5 mL Conc: 25 mg/mL  Dispense Loc:  [facility] PACU2  Administrations Remainin  Volume: 1 mL  POC: PACU          0926-Med Discontinued          Dose: 100 mg  Freq: DAILY Route: PO  Start: 18 1500   End: 18   Admin Instructions: DO NOT CRUSH. Tablet may be split in half along score line.    Admin. Amount: 1 tablet (1 × 100 mg tablet)  Dispense Loc:  [facility] CSC A         1447-Med Discontinued           Dose: 25 mg  Freq: " DAILY Route: PO  Start: 18 1500   End: 18 140   Admin Instructions: DO NOT CRUSH. Tablet may be split in half along score line.hold for systolic BP less than 100    Admin. Amount: 1 tablet (1 × 25 mg tablet)  Last Admin: 18  Dispense Loc: [facility] 88B         1657-Hold        0605 (25 mg)-Given       0627-Auto Hold              0926-Unhold       1404-Med Discontinued          Dose: 4 mg  Freq: EVERY 30 MIN PRN Route: PO  PRN Reason: nausea  Start: 18   End: 18   Admin Instructions: MAX total dose = 8 mg, including OR dosing. If not resolved in 15 minutes, then go to step 2 [prochlorperazine (COMPAZINE), if ordered].  With dry hands, peel back foil backing and gently remove tablet; do not push oral disintegrating tablet through foil backing; administer immediately on tongue and oral disintegrating tablet dissolves in seconds; then swallow with saliva; liquid not required.    Admin. Amount: 1 tablet (1 × 4 mg tablet)  Dispense Loc: [facility] PACU  Administrations Remainin  POC: PACU          0926-Med Discontinued       Or    Dose: 4 mg  Freq: EVERY 30 MIN PRN Route: IV  PRN Reason: nausea  Start: 18   End: 18   Admin Instructions: MAX total dose = 8 mg, including OR dosing. If not resolved in 15 minutes, then go to step 2 [prochlorperazine (COMPAZINE), if ordered].  Irritant. For ordered doses up to 4 mg, give IV Push undiluted over 2-5 minutes.    Admin. Amount: 4 mg = 2 mL Conc: 4 mg/2 mL  Dispense Loc:  [facility] PACU2  Infused Over: 2-5 Minutes  Administrations Remainin  Volume: 2 mL  POC: PACU          0926-Med Discontinued          Dose: 5 mg  Freq: EVERY 6 HOURS PRN Route: IV  PRN Reasons: nausea,vomiting  Start: 18   End: 18   Admin Instructions: This is Step 2 of the nausea and vomiting protocol.   If nausea not resolved in 15 minutes, give metoclopramide (REGLAN) if ordered (step 3 of nausea and vomiting protocol)  For  ordered doses up to 10 mg, give IV Push undiluted. Each 5mg over 1 minute.    Admin. Amount: 5 mg = 1 mL Conc: 5 mg/mL  Dispense Loc: [facility] PACU  Infused Over: 1-2 Minutes  Volume: 1 mL  POC: PACU          0926-Med Discontinued          Rate: 50 mL/hr   Freq: CONTINUOUS Route: IV  Start: 04/19/18 1445   End: 04/20/18 1044   Last Admin: 04/19/18 1705  Dispense Loc: [facility] Stock  Volume: 1,000 mL         1705 ( )-New Bag        1044-Med Discontinued     Medications 04/15/18 04/16/18 04/17/18 04/18/18 04/19/18 04/20/18 04/21/18

## 2018-04-19 NOTE — PROGRESS NOTES
RECEIVING UNIT ED HANDOFF REVIEW    ED Nurse Handoff Report was reviewed by: Cinthya Wilson on April 19, 2018 at 1:54 PM

## 2018-04-19 NOTE — IP AVS SNAPSHOT
"    [place] [address] ONCOLOGY: [phone]                                              INTERAGENCY TRANSFER FORM - LAB / IMAGING / EKG / EMG RESULTS   2018                    Hospital Admission Date: 2018  LATOSHA VIZCAINO   : 1935  Sex: Male        Attending Provider: Sylvia Carney MD     Allergies:  No Known Allergies    Infection:  None   Service:  HOSPITALIST    Ht:  1.8 m (5' 10.87\")   Wt:  63.3 kg (139 lb 9.6 oz)   Admission Wt:  63 kg (138 lb 14.2 oz)    BMI:  19.54 kg/m 2   BSA:  1.78 m 2            Patient PCP Information     Provider PCP Type    [facility]         Lab Results - 3 Days      Basic metabolic panel [[id number]]  Resulted: 18, Result status: Final result    Ordering provider: Olivia Escobar MD  18 0001 Resulting lab: [facility]    Specimen Information    Type Source Collected On   Blood  18 0646          Components       Value Reference Range Flag Lab   Sodium 141 133 - 144 mmol/L  FrStHsLb   Potassium 3.8 3.4 - 5.3 mmol/L  FrStHsLb   Chloride 109 94 - 109 mmol/L  FrStHsLb   Carbon Dioxide 24 20 - 32 mmol/L  FrStHsLb   Anion Gap 8 3 - 14 mmol/L  FrStHsLb   Glucose 96 70 - 99 mg/dL  FrStHsLb   Urea Nitrogen 19 7 - 30 mg/dL  FrStHsLb   Creatinine 0.86 0.66 - 1.25 mg/dL  FrStHsLb   GFR Estimate 85 >60 mL/min/1.7m2  FrStHsLb   Comment:  Non  GFR Calc   GFR Estimate If Black >90 >60 mL/min/1.7m2  FrStHsLb   Comment:  African American GFR Calc   Calcium 8.7 8.5 - 10.1 mg/dL  FrStHsLb            Magnesium [[id number]]  Resulted: 18, Result status: Final result    Ordering provider: Sylvia Carney MD  18 0001 Resulting lab: [facility]    Specimen Information    Type Source Collected On   Blood  18 0646          Components       Value Reference Range Flag Lab   Magnesium 2.1 1.6 - 2.3 mg/dL  FrStHsLb            CBC with platelets [[id number]] (Abnormal)  Resulted: " 04/21/18 0704, Result status: Final result    Ordering provider: Olivia Escobar MD  04/21/18 0001 Resulting lab: [facility]    Specimen Information    Type Source Collected On   Blood  04/21/18 0646          Components       Value Reference Range Flag Lab   WBC 8.9 4.0 - 11.0 10e9/L  FrStHsLb   RBC Count 3.84 4.4 - 5.9 10e12/L L FrStHsLb   Hemoglobin 12.0 13.3 - 17.7 g/dL L FrStHsLb   Hematocrit 36.3 40.0 - 53.0 % L FrStHsLb   MCV 95 78 - 100 fl  FrStHsLb   MCH 31.3 26.5 - 33.0 pg  FrStHsLb   MCHC 33.1 31.5 - 36.5 g/dL  FrStHsLb   RDW 15.4 10.0 - 15.0 % H FrStHsLb   Platelet Count 164 150 - 450 10e9/L  FrStHsLb            Comprehensive metabolic panel [[id number]] (Abnormal)  Resulted: 04/20/18 0444, Result status: Final result    Ordering provider: Irma Lopez  04/20/18 0259 Resulting lab: [facility]    Specimen Information    Type Source Collected On   Blood  04/20/18 0415          Components       Value Reference Range Flag Lab   Sodium 145 133 - 144 mmol/L H FrStHsLb   Potassium 4.3 3.4 - 5.3 mmol/L  FrStHsLb   Chloride 115 94 - 109 mmol/L H FrStHsLb   Carbon Dioxide 25 20 - 32 mmol/L  FrStHsLb   Anion Gap 5 3 - 14 mmol/L  FrStHsLb   Glucose 103 70 - 99 mg/dL H FrStHsLb   Urea Nitrogen 32 7 - 30 mg/dL H FrStHsLb   Creatinine 1.40 0.66 - 1.25 mg/dL H FrStHsLb   GFR Estimate 48 >60 mL/min/1.7m2 L FrStHsLb   Comment:  Non  GFR Calc   GFR Estimate If Black 59 >60 mL/min/1.7m2 L FrStHsLb   Comment:  African American GFR Calc   Calcium 8.8 8.5 - 10.1 mg/dL  FrStHsLb   Bilirubin Total 0.5 0.2 - 1.3 mg/dL  FrStHsLb   Albumin 3.0 3.4 - 5.0 g/dL L FrStHsLb   Protein Total 6.5 6.8 - 8.8 g/dL L FrStHsLb   Alkaline Phosphatase 75 40 - 150 U/L  FrStHsLb   ALT 11 0 - 70 U/L  FrStHsLb   AST 12 0 - 45 U/L  FrStHsLb            CBC with platelets [[id number]] (Abnormal)  Resulted: 04/20/18 0426, Result status: Final result    Ordering provider: Irma Lopez  04/20/18 0259 Resulting  lab: [facility]    Specimen Information    Type Source Collected On   Blood  04/20/18 0415          Components       Value Reference Range Flag Lab   WBC 12.5 4.0 - 11.0 10e9/L H FrStHsLb   RBC Count 3.49 4.4 - 5.9 10e12/L L FrStHsLb   Hemoglobin 11.0 13.3 - 17.7 g/dL L FrStHsLb   Hematocrit 33.6 40.0 - 53.0 % L FrStHsLb   MCV 96 78 - 100 fl  FrStHsLb   MCH 31.5 26.5 - 33.0 pg  FrStHsLb   MCHC 32.7 31.5 - 36.5 g/dL  FrStHsLb   RDW 15.7 10.0 - 15.0 % H FrStHsLb   Platelet Count 167 150 - 450 10e9/L  FrStHsLb            Lactic acid level STAT [[id number]]  Resulted: 04/19/18 2140, Result status: Final result    Ordering provider: Lupe Lantigua MD  04/19/18 2053 Resulting lab: [facility]    Specimen Information    Type Source Collected On   Blood  04/19/18 2100          Components       Value Reference Range Flag Lab   Lactic Acid 1.1 0.7 - 2.0 mmol/L  FrStHsLb            Procalcitonin [[id number]]  Resulted: 04/19/18 1318, Result status: Final result    Ordering provider: Alisson Lazar MD  04/19/18 0955 Resulting lab: [facility]    Specimen Information    Type Source Collected On     04/19/18 0955          Components       Value Reference Range Flag Lab   Procalcitonin 0.17 ng/ml  FrStHsLb   Comment:         0.05-0.24 ng/ml Low risk of systemic bacterial infection. Local bacterial   infection possible.  Recommendation: Assess other clinical features of   infection. Discourage antibiotics unless strong clinical suspicion for serious   infection.              Lactic acid [[id number]]  Resulted: 04/19/18 1132, Result status: Final result    Ordering provider: Alisson Lazar MD  04/19/18 1109 Resulting lab: [facility]    Specimen Information    Type Source Collected On   Blood  04/19/18 1120          Components       Value Reference Range Flag Lab   Lactic Acid 1.4 0.7 - 2.0 mmol/L  FrStHsLb            UA reflex to Microscopic [[id number]]  (Abnormal)  Resulted: 04/19/18 1115, Result status: Final result    Ordering provider: Alisson Lazar MD  04/19/18 1054 Resulting lab: [facility]    Specimen Information    Type Source Collected On   Midstream Urine  04/19/18 1054          Components       Value Reference Range Flag Lab   Color Urine Yellow   FrStHsLb   Appearance Urine Clear   FrStHsLb   Glucose Urine Negative NEG^Negative mg/dL  FrStHsLb   Bilirubin Urine Negative NEG^Negative  FrStHsLb   Ketones Urine 10 NEG^Negative mg/dL A FrStHsLb   Specific [place] Urine 1.022 1.003 - 1.035  FrStHsLb   Blood Urine Negative NEG^Negative  FrStHsLb   pH Urine 5.0 5.0 - 7.0 pH  FrStHsLb   Protein Albumin Urine 30 NEG^Negative mg/dL A FrStHsLb   Urobilinogen mg/dL Normal 0.0 - 2.0 mg/dL  FrStHsLb   Nitrite Urine Negative NEG^Negative  FrStHsLb   Leukocyte Esterase Urine Negative NEG^Negative  FrStHsLb   Source Midstream Urine   FrStHsLb            Basic metabolic panel [[id number]] (Abnormal)  Resulted: 04/19/18 1028, Result status: Final result    Ordering provider: Alisson Lazar MD  04/19/18 0936 Resulting lab: [facility]    Specimen Information    Type Source Collected On   Blood  04/19/18 0955          Components       Value Reference Range Flag Lab   Sodium 142 133 - 144 mmol/L  FrStHsLb   Potassium 4.2 3.4 - 5.3 mmol/L  FrStHsLb   Chloride 109 94 - 109 mmol/L  FrStHsLb   Carbon Dioxide 23 20 - 32 mmol/L  FrStHsLb   Anion Gap 10 3 - 14 mmol/L  FrStHsLb   Glucose 136 70 - 99 mg/dL H FrStHsLb   Urea Nitrogen 31 7 - 30 mg/dL H FrStHsLb   Creatinine 1.51 0.66 - 1.25 mg/dL H FrStHsLb   GFR Estimate 44 >60 mL/min/1.7m2 L FrStHsLb   Comment:  Non  GFR Calc   GFR Estimate If Black 54 >60 mL/min/1.7m2 L FrStHsLb   Comment:  African American GFR Calc   Calcium 9.6 8.5 - 10.1 mg/dL  FrStHsLb            CBC with platelets + differential [[id number]] (Abnormal)  Resulted: 04/19/18 1009, Result status: Final result  "   Ordering provider: Alisson Lazar MD  04/19/18 0936 Resulting lab: [facility]    Specimen Information    Type Source Collected On   Blood  04/19/18 0955          Components       Value Reference Range Flag Lab   WBC 17.2 4.0 - 11.0 10e9/L H FrStHsLb   RBC Count 4.09 4.4 - 5.9 10e12/L L FrStHsLb   Hemoglobin 12.7 13.3 - 17.7 g/dL L FrStHsLb   Hematocrit 39.0 40.0 - 53.0 % L FrStHsLb   MCV 95 78 - 100 fl  FrStHsLb   MCH 31.1 26.5 - 33.0 pg  FrStHsLb   MCHC 32.6 31.5 - 36.5 g/dL  FrStHsLb   RDW 15.5 10.0 - 15.0 % H FrStHsLb   Platelet Count 221 150 - 450 10e9/L  FrStHsLb   Diff Method Automated Method   FrStHsLb   % Neutrophils 82.6 %  FrStHsLb   % Lymphocytes 8.8 %  FrStHsLb   % Monocytes 8.1 %  FrStHsLb   % Eosinophils 0.0 %  FrStHsLb   % Basophils 0.1 %  FrStHsLb   % Immature Granulocytes 0.4 %  FrStHsLb   Nucleated RBCs 0 0 /100  FrStHsLb   Absolute Neutrophil 14.2 1.6 - 8.3 10e9/L H FrStHsLb   Absolute Lymphocytes 1.5 0.8 - 5.3 10e9/L  FrStHsLb   Absolute Monocytes 1.4 0.0 - 1.3 10e9/L H FrStHsLb   Absolute Eosinophils 0.0 0.0 - 0.7 10e9/L  FrStHsLb   Absolute Basophils 0.0 0.0 - 0.2 10e9/L  FrStHsLb   Abs Immature Granulocytes 0.1 0 - 0.4 10e9/L  FrStHsLb   Absolute Nucleated RBC 0.0   FrStHsLb            Testing Performed By     Lab - Abbreviation Name Director Address Valid Date Range    14 - FrStHsLb [facility] Unknown [address] [place] [address] 05/08/15 1057 - Present            Unresulted Labs (24h ago through future)    Start       Ordered    Unscheduled  Potassium  (Potassium Replacement - \"Standard\" - For K levels less than 3.4 mmol/L - UU,UR,UA,RH,SH,PH,WY )  CONDITIONAL (SPECIFY),   Routine     Comments:  Obtain Potassium Level for these conditions:  *IF no potassium result within 24 hours before initiation of order set, draw potassium level with next lab collect.    *2 HOURS AFTER last IV potassium replacement dose and 4 hours after an oral replacement " "dose.  *Next morning after potassium dose.     Repeat Potassium Replacement if necessary.    04/19/18 1439    Unscheduled  Magnesium  (Magnesium Replacement - Adult - \"High\" - Replacement for all levels less than or equal to 2 mg/dL)  CONDITIONAL (SPECIFY),   Routine     Comments:  Obtain Magnesium Level for these conditions:  *IF no magnesium result within 24 hrs before initiation of order set, draw magnesium level with next lab collect.    *2 HOURS AFTER last magnesium replacement dose when magnesium replacement given for level less than 1.6  *Next morning after magnesium dose.     Repeat Magnesium Replacement if necessary.    04/19/18 1439         Imaging Results - 3 Days      XR Surgery VASQUEZ L/T 5 Min Fluoro w Stills [[id number]]  Resulted: 04/20/18 1306, Result status: Final result    Ordering provider: Sylvia Carney MD  04/20/18 0802 Resulted by: Sebastian Olson MD    Performed: 04/20/18 0740 - 04/20/18 0755 Resulting lab: RADI[facility]Y RESULTS    Narrative:       SURGERY C-ARM FLUOROSCOPY LESS THAN 5 MINUTES WITH STILLS  4/20/2018  7:55 AM     COMPARISON: None.    HISTORY: Cysto, right retrograde, right stent.    NUMBER OF IMAGES ACQUIRED: 3    VIEWS: 1    FLUOROSCOPY TIME: .3 minutes.      Impression:       IMPRESSION: Double-J stent was placed from the right renal pelvis to  the bladder. The catheter is somewhat coiled in the renal pelvis on  the final image.    SEBASTIAN OLSON MD      Abd/pelvis CT no contrast - Stone Protocol [[id number]]  Resulted: 04/19/18 1057, Result status: Final result    Ordering provider: Alisson Lazar MD  04/19/18 1009 Resulted by: Ronan Vincent MD    Performed: 04/19/18 1028 - 04/19/18 1045 Resulting lab: RADIOLOGY RESULTS    Narrative:       Exam: CT ABDOMEN PELVIS W/O CONTRAST  4/19/2018 10:45 AM    History: Right flank pain.    Comparison: Aortic aneurysm ultrasound dated 7/8/2014.    Technique: Volumetric acquisition with reconstruction in the axial,  coronal " planes through the abdomen and pelvis without contrast.  Radiation dose for this scan was reduced using automated exposure  control, adjustment of the mA and/or kV according to patient size, or  iterative reconstruction technique.    Contrast: None    Findings:   Lung Bases: Bronchiectasis involving the left lower lobe and  atelectasis in both bases. No pleural or pericardial effusion.    Abdomen: Unenhanced liver, spleen, adrenal glands and gallbladder are  normal. Atrophic pancreas. Multiple vascular calcifications in both  kidneys. Additionally, there is a small group of stones in the distal  right ureter, the largest measures 2 x 3 mm (series 2 image 72). There  is mild to moderate associated right hydronephrosis and hydroureter.  No definite left renal or ureteral calculi are seen.    Infrarenal abdominal aortic aneurysm measures 5.2 x 4.5 cm in diameter  (series 2 image 36), this is increased from 3.7 cm on ultrasound dated  7/8/2014. 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    No areas of bowel wall thickening or bowel dilatation. Normal  appendix. No free fluid. No abdominal or pelvic lymphadenopathy.    Bones: No concerning lytic or sclerotic lesions.      Impression:       Impression:   1. 2 x 3 mm obstructing stone at the right ureterovesical junction  with mild to moderate associated right hydronephrosis and hydroureter.  2. Infrarenal abdominal aortic aneurysm measures up to 5.2 cm in  maximal dimension, increased from 3.7 cm on 7/8/2014. Aneurysm extends  into both the right and left common iliac arteries.  3. Bronchiectasis in the left lower lobe.    JIMI FLANAGAN MD      Testing Performed By     Lab - Abbreviation Name Director Address Valid Date Range    104 - Rad Rslts RADIOLOGY RESULTS Unknown Unknown 02/16/05 1553 - Present               ECG/EMG Results      ECHO COMPLETE WITH OPTISON [[id number]]   Resulted: 18, Result status: Edited Result - FINAL    Ordering provider: Ronaldo Son MD  1824 Resulted by: Feliciano Auguste MD    Performed: 18 1122 - 18 1123 Resulting lab: RADIOLOGY RESULTS    Narrative:       [id number]  [id number]  [id number]  [id number]^PARRIS^RONALDO^JAE           [facility]  Echocardiography Laboratory  [address] [place] [address]        Name: LATOSHA VIZCAINO  MRN: [id number]  : 1935  Study Date: 2018 09:11 AM  Age: 82 yrs  Gender: Male  [place] [facility] [place]  Reason For Study: SOB (shortness of breath)  Ordering Physician: RONALDO SON  Referring Physician: LEA VALDES  Performed By: Jonas North RDCS     BSA: 1.9 m2  Height: 73 in  Weight: 146 lb  HR: 94  BP: 99/40 mmHg  _____________________________________________________________________________  __        Procedure  Complete Echo Adult. Contrast Optison.  _____________________________________________________________________________  __        Interpretation Summary     Techcnically difficult imaging  Left ventricular hypertrophy: asymmetric with no LVOT obstruction  The left ventricle is normal in size.  Hyperdynamic left ventricular function  The right ventricle is normal in structure, function and size.  Right ventricular systolic pressure is elevated, consistent with mild  pulmonary hypertension.  Moderate aortic root dilatation.  The ascending aorta is Mildly dilated.     Doppler interrogation does not demonstrate significant stenosis or  insufficiency involvng cardiac valsves. Although the patient's heart rate has  increased since the last study ( may be in atrial flutter), there has been no  other significant change.  _____________________________________________________________________________  __        Left Ventricle  The left ventricle is normal in size. Left ventricular hypertrophy: asymmetric  with no LVOT  obstruction. Hyperdynamic left ventricular function. The visual  ejection fraction is estimated at >70%. Left ventricular diastolic function is  indeterminate. No regional wall motion abnormalities noted. There is no  thrombus seen in the left ventricle.     Right Ventricle  The right ventricle is normal in structure, function and size. There is no  mass or thrombus in the right ventricle.     Atria  Normal left atrial size. Right atrial size is normal. There is no atrial shunt  seen.     Mitral Valve  The mitral valve leaflets appear normal. There is no evidence of stenosis,  fluttering, or prolapse. There is no mitral regurgitation noted. There is no  mitral valve stenosis.        Tricuspid Valve  Normal tricuspid valve. The right ventricular systolic pressure is  approximated at 31.3 mmHg plus the right atrial pressure. Right ventricular  systolic pressure is elevated, consistent with mild pulmonary hypertension.  There is no tricuspid stenosis.     Aortic Valve  The aortic valve is trileaflet. No aortic regurgitation is present. No aortic  stenosis is present.     Pulmonic Valve  Normal pulmonic valve. There is no pulmonic valvular regurgitation. There is  no pulmonic valvular stenosis.     Vessels  Moderate aortic root dilatation. The ascending aorta is Mildly dilated. The  IVC is normal in size and reactivity with respiration, suggesting normal  central venous pressure. The pulmonary artery is normal size.     Pericardium  The pericardium appears normal. There is no pleural effusion.        Rhythm  The rhythm was supraventricular tachycardia.  _____________________________________________________________________________  __  MMode/2D Measurements & Calculations  IVSd: 2.4 cm     LVIDd: 3.0 cm  LVIDs: 1.4 cm  LVPWd: 1.2 cm  IVC diam: 1.3 cm  FS: 53.6 %  LV mass(C)d: 214.3 grams  LV mass(C)dI: 113.9 grams/m2  Ao root diam: 4.6 cm  asc Aorta Diam: 4.1 cm  RWT: 0.77        Doppler Measurements & Calculations  TV  max P.3 mmHg  TR max catarino: 279.9 cm/sec  TR max P.3 mmHg  Peak E' Catarino: 5.3 cm/sec              _____________________________________________________________________________  __        Report approved by: Dr. Feliciano Marroquin 2018 04:18 PM       1    Type Source Collected On     18 0911          View Image (below)        Echocardiogram [[id number]]  Resulted: 18, Result status: In process    Performed: 18 - 18 Resulting lab: [facility]            Encounter-Level Documents:     There are no encounter-level documents.      Order-Level Documents:     There are no order-level documents.

## 2018-04-19 NOTE — CONSULTS
[facility]    Urology Consultation     Date of Admission:  4/19/2018    Assessment & Plan   Nacho Joseph is a 82 year old male who was admitted on 4/19/2018. I was asked to see the patient for right distal ureteral stones with questionable UTI.    Plan: Flomax and IV fluids, strain urine. Will have RN obtain a bladder scan.   NPO at midnight for cystoscopy, right ureteral stent placement tomorrow AM.   Continue pain control and abx for now.   Page urology asap if spikes fever or shaking chills     Katelin Escobar PA-C  [facility]  [address] [place] [address]  [phone]  [email or web address]  Text Page (7am to 5pm)    Code Status    Full Code    Reason for Consult   Reason for consult: I was asked by Dr. Carney to evaluate this patient for right distal obstructing stones with hydroureteronephrosis, questionable UTI. .    Primary Care Physician   Paulino Lynch    Chief Complaint   Right flank pain     History is obtained from the patient    History of Present Illness   Nacho Joseph is a 82 year old male who presents with worsening right renal colic since Monday. On CT he is found to have a collection of stones within his right distal ureter, the largest of which measuring 3mm, and associated left hydroureteronephrosis. The patient is somewhat of a poor historian and requires an  so it is difficult to obtain a through and comprehensive history.     Reports pain began earlier this week and has gradually become more severe. Denies hx of stones in the past, hematuria, hx of UTI, does not follow with a urologist. Most recent temp is 99.6F.     UA negative for signs of infection  WBC 17  LA 1.4  Creat 1.5  Hypotensive     Past Medical History   I have reviewed this patient's medical history and updated it with pertinent information if needed.   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  Past Surgical History:   Procedure Laterality Date     CORONARY ANGIOGRAPHY ADULT ORDER  2/20/15    medical managed     IMPLANT PACEMAKER  7/7/14    dual chamber       Prior to Admission Medications   Prior to Admission Medications   Prescriptions Last Dose Informant Patient Reported? Taking?   Acetaminophen (TYLENOL PO) 4/17/2018 Nursing Home Yes Yes   Sig: Take 1,000 mg by mouth 3 times daily   Pseudoephedrine-Guaifenesin (MUCINEX D MAX STRENGTH PO) 4/17/2018 Nursing Home Yes Yes   Sig: Take 800 mg by mouth daily    amLODIPine (NORVASC) 5 MG tablet 4/16/2018 halfway No Yes   Sig: Take 1 tablet (5 mg) by mouth daily   aspirin 81 MG tablet 4/17/2018 halfway No Yes   Sig: Take 1 tablet (81 mg) by mouth daily   atorvastatin (LIPITOR) 40 MG tablet 4/16/2018 halfway No Yes   Sig: Take 1 tablet (40 mg) by mouth daily   doxycycline (VIBRAMYCIN) 100 MG capsule 4/17/2018 halfway Yes Yes   Sig: Take 100 mg by mouth 2 times daily   isosorbide mononitrate (IMDUR) 30 MG 24 hr tablet 4/17/2018 halfway No Yes   Sig: Take 1 tablet (30 mg) by mouth daily   lisinopril (PRINIVIL/ZESTRIL) 2.5 MG tablet 4/17/2018 halfway No Yes   Sig: Take 1 tablet (2.5 mg) by mouth daily   magnesium hydroxide (MILK OF MAGNESIA) 400 MG/5ML suspension  Nursing Home Yes Yes   Sig: Take 30 mLs by mouth daily as needed for constipation or heartburn    metoprolol succinate (TOPROL-XL) 100 MG 24 hr tablet 4/17/2018 [facility] No Yes   Sig: Take 1 tablet (100 mg) by mouth daily   nitroglycerin (NITROSTAT) 0.4 MG SL tablet  Nursing Home No Yes   Sig: Place 1 tablet (0.4 mg) under the tongue every 5 minutes as needed for chest pain      Facility-Administered Medications: None     Allergies   No Known Allergies    Social History   I have reviewed this patient's social history and updated it with pertinent information if needed. Nacho Joseph  reports that he quit smoking about 5 years ago. He has never used smokeless tobacco. He reports that he does not drink alcohol or use illicit drugs.    Family History   I have reviewed this patient's family history and updated it with pertinent information if needed.   Family History   Problem Relation Age of Onset     Other - See Comments Mother      old age     HEART DISEASE Father      unknown       Review of Systems   The 10 point Review of Systems is negative other than noted in the HPI or here.     Physical Exam   Temp: 98  F (36.7  C) Temp src: Oral BP: 96/63 Pulse: 86 Heart Rate: 86 Resp: 16 SpO2: 93 % O2 Device: None (Room air)    Vital Signs with Ranges  Temp:  [98  F (36.7  C)-99.6  F (37.6  C)] 98  F (36.7  C)  Pulse:  [68-86] 86  Heart Rate:  [83-86] 86  Resp:  [16-17] 16  BP: ()/(59-92) 96/63  SpO2:  [93 %-99 %] 93 %  138 lbs 14.24 oz    Constitutional: Lying in bed, NAD.  at bedside  Eyes: no icterus  ENT: normocephalic, atraumatic   Respiratory: breathing unlabored   Cardiovascular: chest wall symmetric   GI: soft, ND, no tenderness over suprapubic region. Left CVAT   Lymph/Hematologic: no pedal edema   Genitourinary: circ penis. No penoscrotal edema.   Skin: well perfused   Neurologic: no focal deficits  Neuropsychiatric: A&Ox3    Data   Results for orders placed or performed during the hospital encounter of 04/19/18 (from the past 24 hour(s))   Basic metabolic panel   Result Value  Ref Range    Sodium 142 133 - 144 mmol/L    Potassium 4.2 3.4 - 5.3 mmol/L    Chloride 109 94 - 109 mmol/L    Carbon Dioxide 23 20 - 32 mmol/L    Anion Gap 10 3 - 14 mmol/L    Glucose 136 (H) 70 - 99 mg/dL    Urea Nitrogen 31 (H) 7 - 30 mg/dL    Creatinine 1.51 (H) 0.66 - 1.25 mg/dL    GFR Estimate 44 (L) >60 mL/min/1.7m2    GFR Estimate If Black 54 (L) >60 mL/min/1.7m2    Calcium 9.6 8.5 - 10.1 mg/dL   CBC with platelets + differential   Result Value Ref Range    WBC 17.2 (H) 4.0 - 11.0 10e9/L    RBC Count 4.09 (L) 4.4 - 5.9 10e12/L    Hemoglobin 12.7 (L) 13.3 - 17.7 g/dL    Hematocrit 39.0 (L) 40.0 - 53.0 %    MCV 95 78 - 100 fl    MCH 31.1 26.5 - 33.0 pg    MCHC 32.6 31.5 - 36.5 g/dL    RDW 15.5 (H) 10.0 - 15.0 %    Platelet Count 221 150 - 450 10e9/L    Diff Method Automated Method     % Neutrophils 82.6 %    % Lymphocytes 8.8 %    % Monocytes 8.1 %    % Eosinophils 0.0 %    % Basophils 0.1 %    % Immature Granulocytes 0.4 %    Nucleated RBCs 0 0 /100    Absolute Neutrophil 14.2 (H) 1.6 - 8.3 10e9/L    Absolute Lymphocytes 1.5 0.8 - 5.3 10e9/L    Absolute Monocytes 1.4 (H) 0.0 - 1.3 10e9/L    Absolute Eosinophils 0.0 0.0 - 0.7 10e9/L    Absolute Basophils 0.0 0.0 - 0.2 10e9/L    Abs Immature Granulocytes 0.1 0 - 0.4 10e9/L    Absolute Nucleated RBC 0.0    Procalcitonin   Result Value Ref Range    Procalcitonin 0.17 ng/ml   Abd/pelvis CT no contrast - Stone Protocol    Narrative    Exam: CT ABDOMEN PELVIS W/O CONTRAST  4/19/2018 10:45 AM    History: Right flank pain.    Comparison: Aortic aneurysm ultrasound dated 7/8/2014.    Technique: Volumetric acquisition with reconstruction in the axial,  coronal planes through the abdomen and pelvis without contrast.  Radiation dose for this scan was reduced using automated exposure  control, adjustment of the mA and/or kV according to patient size, or  iterative reconstruction technique.    Contrast: None    Findings:   Lung Bases: Bronchiectasis involving the left lower  lobe and  atelectasis in both bases. No pleural or pericardial effusion.    Abdomen: Unenhanced liver, spleen, adrenal glands and gallbladder are  normal. Atrophic pancreas. Multiple vascular calcifications in both  kidneys. Additionally, there is a small group of stones in the distal  right ureter, the largest measures 2 x 3 mm (series 2 image 72). There  is mild to moderate associated right hydronephrosis and hydroureter.  No definite left renal or ureteral calculi are seen.    Infrarenal abdominal aortic aneurysm measures 5.2 x 4.5 cm in diameter  (series 2 image 36), this is increased from 3.7 cm on ultrasound dated  7/8/2014. 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    No areas of bowel wall thickening or bowel dilatation. Normal  appendix. No free fluid. No abdominal or pelvic lymphadenopathy.    Bones: No concerning lytic or sclerotic lesions.      Impression    Impression:   1. 2 x 3 mm obstructing stone at the right ureterovesical junction  with mild to moderate associated right hydronephrosis and hydroureter.  2. Infrarenal abdominal aortic aneurysm measures up to 5.2 cm in  maximal dimension, increased from 3.7 cm on 7/8/2014. Aneurysm extends  into both the right and left common iliac arteries.  3. Bronchiectasis in the left lower lobe.    JIMI FLANAGAN MD   UA reflex to Microscopic   Result Value Ref Range    Color Urine Yellow     Appearance Urine Clear     Glucose Urine Negative NEG^Negative mg/dL    Bilirubin Urine Negative NEG^Negative    Ketones Urine 10 (A) NEG^Negative mg/dL    Specific Gravity Urine 1.022 1.003 - 1.035    Blood Urine Negative NEG^Negative    pH Urine 5.0 5.0 - 7.0 pH    Protein Albumin Urine 30 (A) NEG^Negative mg/dL    Urobilinogen mg/dL Normal 0.0 - 2.0 mg/dL    Nitrite Urine Negative NEG^Negative    Leukocyte Esterase Urine Negative NEG^Negative    Source Midstream Urine    Lactic  acid   Result Value Ref Range    Lactic Acid 1.4 0.7 - 2.0 mmol/L

## 2018-04-20 ENCOUNTER — APPOINTMENT (OUTPATIENT)
Dept: GENERAL RADIOLOGY | Facility: CLINIC | Age: 83
End: 2018-04-20
Attending: INTERNAL MEDICINE
Payer: MEDICAID

## 2018-04-20 ENCOUNTER — ANESTHESIA EVENT (OUTPATIENT)
Dept: SURGERY | Facility: CLINIC | Age: 83
End: 2018-04-20
Payer: MEDICAID

## 2018-04-20 ENCOUNTER — ANESTHESIA (OUTPATIENT)
Dept: SURGERY | Facility: CLINIC | Age: 83
End: 2018-04-20
Payer: MEDICAID

## 2018-04-20 PROBLEM — N12 PYELONEPHRITIS: Status: ACTIVE | Noted: 2018-04-20

## 2018-04-20 LAB
ALBUMIN SERPL-MCNC: 3 G/DL (ref 3.4–5)
ALP SERPL-CCNC: 75 U/L (ref 40–150)
ALT SERPL W P-5'-P-CCNC: 11 U/L (ref 0–70)
ANION GAP SERPL CALCULATED.3IONS-SCNC: 5 MMOL/L (ref 3–14)
AST SERPL W P-5'-P-CCNC: 12 U/L (ref 0–45)
BACTERIA SPEC CULT: NO GROWTH
BILIRUB SERPL-MCNC: 0.5 MG/DL (ref 0.2–1.3)
BUN SERPL-MCNC: 32 MG/DL (ref 7–30)
CALCIUM SERPL-MCNC: 8.8 MG/DL (ref 8.5–10.1)
CHLORIDE SERPL-SCNC: 115 MMOL/L (ref 94–109)
CO2 SERPL-SCNC: 25 MMOL/L (ref 20–32)
CREAT SERPL-MCNC: 1.4 MG/DL (ref 0.66–1.25)
ERYTHROCYTE [DISTWIDTH] IN BLOOD BY AUTOMATED COUNT: 15.7 % (ref 10–15)
GFR SERPL CREATININE-BSD FRML MDRD: 48 ML/MIN/1.7M2
GLUCOSE SERPL-MCNC: 103 MG/DL (ref 70–99)
HCT VFR BLD AUTO: 33.6 % (ref 40–53)
HGB BLD-MCNC: 11 G/DL (ref 13.3–17.7)
MCH RBC QN AUTO: 31.5 PG (ref 26.5–33)
MCHC RBC AUTO-ENTMCNC: 32.7 G/DL (ref 31.5–36.5)
MCV RBC AUTO: 96 FL (ref 78–100)
PLATELET # BLD AUTO: 167 10E9/L (ref 150–450)
POTASSIUM SERPL-SCNC: 4.3 MMOL/L (ref 3.4–5.3)
PROT SERPL-MCNC: 6.5 G/DL (ref 6.8–8.8)
RBC # BLD AUTO: 3.49 10E12/L (ref 4.4–5.9)
SODIUM SERPL-SCNC: 145 MMOL/L (ref 133–144)
WBC # BLD AUTO: 12.5 10E9/L (ref 4–11)

## 2018-04-20 PROCEDURE — 27211024 ZZHC OR SUPPLY OTHER OPNP: Performed by: UROLOGY

## 2018-04-20 PROCEDURE — 80053 COMPREHEN METABOLIC PANEL: CPT | Performed by: ANESTHESIOLOGY

## 2018-04-20 PROCEDURE — C1758 CATHETER, URETERAL: HCPCS | Performed by: UROLOGY

## 2018-04-20 PROCEDURE — 25000132 ZZH RX MED GY IP 250 OP 250 PS 637: Performed by: INTERNAL MEDICINE

## 2018-04-20 PROCEDURE — 25000128 H RX IP 250 OP 636: Performed by: PHYSICIAN ASSISTANT

## 2018-04-20 PROCEDURE — 25000128 H RX IP 250 OP 636: Performed by: INTERNAL MEDICINE

## 2018-04-20 PROCEDURE — 36415 COLL VENOUS BLD VENIPUNCTURE: CPT | Performed by: ANESTHESIOLOGY

## 2018-04-20 PROCEDURE — 40000170 ZZH STATISTIC PRE-PROCEDURE ASSESSMENT II: Performed by: UROLOGY

## 2018-04-20 PROCEDURE — 93005 ELECTROCARDIOGRAM TRACING: CPT

## 2018-04-20 PROCEDURE — 25000125 ZZHC RX 250: Performed by: UROLOGY

## 2018-04-20 PROCEDURE — 36000052 ZZH SURGERY LEVEL 2 EA 15 ADDTL MIN: Performed by: UROLOGY

## 2018-04-20 PROCEDURE — 93010 ELECTROCARDIOGRAM REPORT: CPT | Performed by: INTERNAL MEDICINE

## 2018-04-20 PROCEDURE — 25000128 H RX IP 250 OP 636: Performed by: NURSE ANESTHETIST, CERTIFIED REGISTERED

## 2018-04-20 PROCEDURE — C2617 STENT, NON-COR, TEM W/O DEL: HCPCS | Performed by: UROLOGY

## 2018-04-20 PROCEDURE — 37000009 ZZH ANESTHESIA TECHNICAL FEE, EACH ADDTL 15 MIN: Performed by: UROLOGY

## 2018-04-20 PROCEDURE — 25000125 ZZHC RX 250: Performed by: NURSE ANESTHETIST, CERTIFIED REGISTERED

## 2018-04-20 PROCEDURE — 85027 COMPLETE CBC AUTOMATED: CPT | Performed by: ANESTHESIOLOGY

## 2018-04-20 PROCEDURE — 27210794 ZZH OR GENERAL SUPPLY STERILE: Performed by: UROLOGY

## 2018-04-20 PROCEDURE — 0T768DZ DILATION OF RIGHT URETER WITH INTRALUMINAL DEVICE, VIA NATURAL OR ARTIFICIAL OPENING ENDOSCOPIC: ICD-10-PCS | Performed by: UROLOGY

## 2018-04-20 PROCEDURE — 71000014 ZZH RECOVERY PHASE 1 LEVEL 2 FIRST HR: Performed by: UROLOGY

## 2018-04-20 PROCEDURE — 99232 SBSQ HOSP IP/OBS MODERATE 35: CPT | Performed by: INTERNAL MEDICINE

## 2018-04-20 PROCEDURE — 40000277 XR SURGERY CARM FLUORO LESS THAN 5 MIN W STILLS

## 2018-04-20 PROCEDURE — 12000000 ZZH R&B MED SURG/OB

## 2018-04-20 PROCEDURE — 25000566 ZZH SEVOFLURANE, EA 15 MIN: Performed by: UROLOGY

## 2018-04-20 PROCEDURE — 99232 SBSQ HOSP IP/OBS MODERATE 35: CPT | Performed by: NURSE PRACTITIONER

## 2018-04-20 PROCEDURE — 37000008 ZZH ANESTHESIA TECHNICAL FEE, 1ST 30 MIN: Performed by: UROLOGY

## 2018-04-20 PROCEDURE — 36000050 ZZH SURGERY LEVEL 2 1ST 30 MIN: Performed by: UROLOGY

## 2018-04-20 PROCEDURE — 25000128 H RX IP 250 OP 636: Performed by: ANESTHESIOLOGY

## 2018-04-20 RX ORDER — CEFAZOLIN SODIUM 2 G/100ML
2 INJECTION, SOLUTION INTRAVENOUS
Status: COMPLETED | OUTPATIENT
Start: 2018-04-20 | End: 2018-04-20

## 2018-04-20 RX ORDER — ONDANSETRON 4 MG/1
4 TABLET, ORALLY DISINTEGRATING ORAL EVERY 30 MIN PRN
Status: DISCONTINUED | OUTPATIENT
Start: 2018-04-20 | End: 2018-04-20 | Stop reason: HOSPADM

## 2018-04-20 RX ORDER — LIDOCAINE HYDROCHLORIDE 20 MG/ML
INJECTION, SOLUTION INFILTRATION; PERINEURAL PRN
Status: DISCONTINUED | OUTPATIENT
Start: 2018-04-20 | End: 2018-04-20

## 2018-04-20 RX ORDER — SODIUM CHLORIDE, SODIUM LACTATE, POTASSIUM CHLORIDE, CALCIUM CHLORIDE 600; 310; 30; 20 MG/100ML; MG/100ML; MG/100ML; MG/100ML
INJECTION, SOLUTION INTRAVENOUS CONTINUOUS
Status: DISCONTINUED | OUTPATIENT
Start: 2018-04-20 | End: 2018-04-20 | Stop reason: HOSPADM

## 2018-04-20 RX ORDER — FENTANYL CITRATE 50 UG/ML
25-50 INJECTION, SOLUTION INTRAMUSCULAR; INTRAVENOUS
Status: DISCONTINUED | OUTPATIENT
Start: 2018-04-20 | End: 2018-04-20 | Stop reason: HOSPADM

## 2018-04-20 RX ORDER — PROPOFOL 10 MG/ML
INJECTION, EMULSION INTRAVENOUS PRN
Status: DISCONTINUED | OUTPATIENT
Start: 2018-04-20 | End: 2018-04-20

## 2018-04-20 RX ORDER — LIDOCAINE 40 MG/G
CREAM TOPICAL
Status: DISCONTINUED | OUTPATIENT
Start: 2018-04-20 | End: 2018-04-21 | Stop reason: HOSPADM

## 2018-04-20 RX ORDER — EPHEDRINE SULFATE 50 MG/ML
INJECTION, SOLUTION INTRAMUSCULAR; INTRAVENOUS; SUBCUTANEOUS PRN
Status: DISCONTINUED | OUTPATIENT
Start: 2018-04-20 | End: 2018-04-20

## 2018-04-20 RX ORDER — SODIUM CHLORIDE, SODIUM LACTATE, POTASSIUM CHLORIDE, CALCIUM CHLORIDE 600; 310; 30; 20 MG/100ML; MG/100ML; MG/100ML; MG/100ML
INJECTION, SOLUTION INTRAVENOUS CONTINUOUS PRN
Status: DISCONTINUED | OUTPATIENT
Start: 2018-04-20 | End: 2018-04-20

## 2018-04-20 RX ORDER — CEFAZOLIN SODIUM 1 G/3ML
1 INJECTION, POWDER, FOR SOLUTION INTRAMUSCULAR; INTRAVENOUS SEE ADMIN INSTRUCTIONS
Status: DISCONTINUED | OUTPATIENT
Start: 2018-04-20 | End: 2018-04-20 | Stop reason: HOSPADM

## 2018-04-20 RX ORDER — HYDROMORPHONE HYDROCHLORIDE 1 MG/ML
.3-.5 INJECTION, SOLUTION INTRAMUSCULAR; INTRAVENOUS; SUBCUTANEOUS EVERY 5 MIN PRN
Status: DISCONTINUED | OUTPATIENT
Start: 2018-04-20 | End: 2018-04-20 | Stop reason: HOSPADM

## 2018-04-20 RX ORDER — ONDANSETRON 2 MG/ML
4 INJECTION INTRAMUSCULAR; INTRAVENOUS EVERY 30 MIN PRN
Status: DISCONTINUED | OUTPATIENT
Start: 2018-04-20 | End: 2018-04-20 | Stop reason: HOSPADM

## 2018-04-20 RX ORDER — FENTANYL CITRATE 50 UG/ML
INJECTION, SOLUTION INTRAMUSCULAR; INTRAVENOUS PRN
Status: DISCONTINUED | OUTPATIENT
Start: 2018-04-20 | End: 2018-04-20

## 2018-04-20 RX ORDER — NALOXONE HYDROCHLORIDE 0.4 MG/ML
.1-.4 INJECTION, SOLUTION INTRAMUSCULAR; INTRAVENOUS; SUBCUTANEOUS
Status: CANCELLED | OUTPATIENT
Start: 2018-04-20 | End: 2018-04-21

## 2018-04-20 RX ORDER — METOPROLOL SUCCINATE 50 MG/1
50 TABLET, EXTENDED RELEASE ORAL DAILY
Status: DISCONTINUED | OUTPATIENT
Start: 2018-04-21 | End: 2018-04-21 | Stop reason: HOSPADM

## 2018-04-20 RX ORDER — NALOXONE HYDROCHLORIDE 0.4 MG/ML
.1-.4 INJECTION, SOLUTION INTRAMUSCULAR; INTRAVENOUS; SUBCUTANEOUS
Status: DISCONTINUED | OUTPATIENT
Start: 2018-04-20 | End: 2018-04-21 | Stop reason: HOSPADM

## 2018-04-20 RX ORDER — MEPERIDINE HYDROCHLORIDE 25 MG/ML
12.5 INJECTION INTRAMUSCULAR; INTRAVENOUS; SUBCUTANEOUS EVERY 5 MIN PRN
Status: DISCONTINUED | OUTPATIENT
Start: 2018-04-20 | End: 2018-04-20 | Stop reason: HOSPADM

## 2018-04-20 RX ADMIN — SODIUM CHLORIDE, POTASSIUM CHLORIDE, SODIUM LACTATE AND CALCIUM CHLORIDE: 600; 310; 30; 20 INJECTION, SOLUTION INTRAVENOUS at 07:29

## 2018-04-20 RX ADMIN — LISINOPRIL 2.5 MG: 2.5 TABLET ORAL at 13:03

## 2018-04-20 RX ADMIN — OXYCODONE HYDROCHLORIDE 5 MG: 5 TABLET ORAL at 06:05

## 2018-04-20 RX ADMIN — ACETAMINOPHEN 1000 MG: 500 TABLET, FILM COATED ORAL at 12:58

## 2018-04-20 RX ADMIN — Medication 10 MG: at 07:43

## 2018-04-20 RX ADMIN — FENTANYL CITRATE 50 MCG: 50 INJECTION, SOLUTION INTRAMUSCULAR; INTRAVENOUS at 07:38

## 2018-04-20 RX ADMIN — SENNOSIDES AND DOCUSATE SODIUM 2 TABLET: 8.6; 5 TABLET ORAL at 21:03

## 2018-04-20 RX ADMIN — ACETAMINOPHEN 1000 MG: 500 TABLET, FILM COATED ORAL at 21:03

## 2018-04-20 RX ADMIN — PHENYLEPHRINE HYDROCHLORIDE 100 MCG: 10 INJECTION, SOLUTION INTRAMUSCULAR; INTRAVENOUS; SUBCUTANEOUS at 07:38

## 2018-04-20 RX ADMIN — ACETAMINOPHEN 1000 MG: 500 TABLET, FILM COATED ORAL at 17:20

## 2018-04-20 RX ADMIN — SENNOSIDES AND DOCUSATE SODIUM 1 TABLET: 8.6; 5 TABLET ORAL at 12:59

## 2018-04-20 RX ADMIN — ATORVASTATIN CALCIUM 40 MG: 40 TABLET, FILM COATED ORAL at 21:03

## 2018-04-20 RX ADMIN — PROPOFOL 80 MG: 10 INJECTION, EMULSION INTRAVENOUS at 07:38

## 2018-04-20 RX ADMIN — CEFTRIAXONE 1 G: 1 INJECTION, POWDER, FOR SOLUTION INTRAMUSCULAR; INTRAVENOUS at 15:16

## 2018-04-20 RX ADMIN — PHENYLEPHRINE HYDROCHLORIDE 0.25 MCG/KG/MIN: 10 INJECTION, SOLUTION INTRAMUSCULAR; INTRAVENOUS; SUBCUTANEOUS at 07:38

## 2018-04-20 RX ADMIN — METOPROLOL SUCCINATE 25 MG: 25 TABLET, EXTENDED RELEASE ORAL at 06:05

## 2018-04-20 RX ADMIN — LIDOCAINE HYDROCHLORIDE 80 MG: 20 INJECTION, SOLUTION INFILTRATION; PERINEURAL at 07:38

## 2018-04-20 RX ADMIN — ASPIRIN 81 MG: 81 TABLET, COATED ORAL at 12:59

## 2018-04-20 RX ADMIN — OXYCODONE HYDROCHLORIDE 5 MG: 5 TABLET ORAL at 01:12

## 2018-04-20 RX ADMIN — CEFAZOLIN SODIUM 2 G: 2 INJECTION, SOLUTION INTRAVENOUS at 07:43

## 2018-04-20 RX ADMIN — PHENYLEPHRINE HYDROCHLORIDE 100 MCG: 10 INJECTION, SOLUTION INTRAMUSCULAR; INTRAVENOUS; SUBCUTANEOUS at 07:30

## 2018-04-20 RX ADMIN — ATORVASTATIN CALCIUM 40 MG: 40 TABLET, FILM COATED ORAL at 01:12

## 2018-04-20 RX ADMIN — PHENYLEPHRINE HYDROCHLORIDE 100 MCG: 10 INJECTION, SOLUTION INTRAMUSCULAR; INTRAVENOUS; SUBCUTANEOUS at 07:44

## 2018-04-20 RX ADMIN — Medication 10 MG: at 07:50

## 2018-04-20 RX ADMIN — PHENYLEPHRINE HYDROCHLORIDE 100 MCG: 10 INJECTION, SOLUTION INTRAMUSCULAR; INTRAVENOUS; SUBCUTANEOUS at 07:35

## 2018-04-20 RX ADMIN — AMLODIPINE BESYLATE 5 MG: 5 TABLET ORAL at 13:00

## 2018-04-20 RX ADMIN — ISOSORBIDE MONONITRATE 30 MG: 30 TABLET, EXTENDED RELEASE ORAL at 13:00

## 2018-04-20 RX ADMIN — PHENYLEPHRINE HYDROCHLORIDE 100 MCG: 10 INJECTION, SOLUTION INTRAMUSCULAR; INTRAVENOUS; SUBCUTANEOUS at 07:50

## 2018-04-20 RX ADMIN — PHENYLEPHRINE HYDROCHLORIDE 200 MCG: 10 INJECTION, SOLUTION INTRAMUSCULAR; INTRAVENOUS; SUBCUTANEOUS at 07:40

## 2018-04-20 RX ADMIN — SODIUM CHLORIDE, POTASSIUM CHLORIDE, SODIUM LACTATE AND CALCIUM CHLORIDE: 600; 310; 30; 20 INJECTION, SOLUTION INTRAVENOUS at 07:28

## 2018-04-20 ASSESSMENT — ENCOUNTER SYMPTOMS: DYSRHYTHMIAS: 1

## 2018-04-20 ASSESSMENT — LIFESTYLE VARIABLES: TOBACCO_USE: 1

## 2018-04-20 ASSESSMENT — COPD QUESTIONNAIRES: COPD: 1

## 2018-04-20 NOTE — OP NOTE
[facility] Urology Brief Operative Note    Pre-operative diagnosis: RIGHT URETERAL STONE/INFECTION   Post-operative diagnosis: Same   Procedure: Procedure(s):  COMBINED CYSTOSCOPY, INSERT STENT URETER(S)  COMBINED CYSTOSCOPY, RETROGRADES   Surgeon: Gurvinder Sanz MD, MD   Assistant(s): none   Anesthesia: LMA   Estimated blood loss: None   Total IV fluids: (See anesthesia record)   Blood transfusion: No transfusion was given during surgery   Total urine output: Not measured   Drains: Right ureteral stent   Specimens: None   Implants: None   Findings: See dictation.   Complications: None   Condition: Stable   Comments: See dictated operative report for full details.    Gurvinder Sanz MD

## 2018-04-20 NOTE — PLAN OF CARE
Problem: Patient Care Overview  Goal: Plan of Care/Patient Progress Review  Outcome: No Change  A&O. Citizen of [place] speaking. Soft BP but stable. Otherwise VSS on RA. Pain managed by oxy. Voiding adequately. NPO since midnight. Tele 100% Vpaced. Up with 1 assist.  Report given to PreOp. Pt transported by nursing assistant this am to PreOp with  at bedside.

## 2018-04-20 NOTE — ANESTHESIA PREPROCEDURE EVALUATION
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    S/p PPM 7/2014     Bradycardia      CAD (coronary artery disease) 2/9/2015     Chest pain 1/20/2015     COPD (chronic obstructive pulmonary disease) (H)      Depression 7/21/2014     Depressive disorder      HTN (hypertension) 7/8/2014     Hyperlipidemia 7/8/2014     Hypertension      Ischemic cardiomyopathy 2/9/2015     Lung abscess (H)      Syncope      Past Surgical History:   Procedure Laterality Date     CORONARY ANGIOGRAPHY ADULT ORDER  2/20/15    medical managed     IMPLANT PACEMAKER  7/7/14    dual chamber     Social History   Substance Use Topics     Smoking status: Former Smoker     Quit date: 7/7/2012     Smokeless tobacco: Never Used     Alcohol use No     Prior to Admission medications    Medication Sig Start Date End Date Taking? Authorizing Provider   Acetaminophen (TYLENOL PO) Take 1,000 mg by mouth 3 times daily   Yes Reported, Patient   amLODIPine (NORVASC) 5 MG tablet Take 1 tablet (5 mg) by mouth daily 1/21/15  Yes Iliana Ignacio PA-C   aspirin 81 MG tablet Take 1 tablet (81 mg) by mouth daily 1/21/15  Yes Iliana Ignacio PA-C   atorvastatin (LIPITOR) 40 MG tablet Take 1 tablet (40 mg) by mouth daily 2/10/15  Yes Iliana Ignacio PA-C   doxycycline (VIBRAMYCIN) 100 MG capsule Take 100 mg by mouth 2 times daily   Yes Reported, Patient   isosorbide mononitrate (IMDUR) 30 MG 24 hr tablet Take 1 tablet (30 mg) by mouth daily 4/16/18  Yes Ronaldo Son MD   lisinopril (PRINIVIL/ZESTRIL) 2.5 MG tablet Take 1 tablet (2.5 mg) by mouth daily 4/16/18  Yes Ronaldo Son MD   magnesium hydroxide (MILK OF MAGNESIA) 400 MG/5ML suspension Take 30 mLs by mouth  daily as needed for constipation or heartburn   Yes Reported, Patient   metoprolol succinate (TOPROL-XL) 100 MG 24 hr tablet Take 1 tablet (100 mg) by mouth daily 4/16/18  Yes Ronaldo Son MD   nitroglycerin (NITROSTAT) 0.4 MG SL tablet Place 1 tablet (0.4 mg) under the tongue every 5 minutes as needed for chest pain 1/21/15  Yes Iliana Ignacio PA-C   Pseudoephedrine-Guaifenesin (MUCINEX D MAX STRENGTH PO) Take 800 mg by mouth daily    Yes Reported, Patient     Current Facility-Administered Medications Ordered in Epic   Medication Dose Route Frequency Last Rate Last Dose     [Auto Hold] acetaminophen (TYLENOL) tablet 1,000 mg  1,000 mg Oral TID   500 mg at 04/19/18 1803     [Auto Hold] acetaminophen (TYLENOL) tablet 650 mg  650 mg Oral Q4H PRN   650 mg at 04/19/18 1505     [Auto Hold] amLODIPine (NORVASC) tablet 5 mg  5 mg Oral Daily   Stopped at 04/19/18 1656     [Auto Hold] aspirin EC EC tablet 81 mg  81 mg Oral Daily   81 mg at 04/19/18 1713     [Auto Hold] atorvastatin (LIPITOR) tablet 40 mg  40 mg Oral Daily   40 mg at 04/20/18 0112     [Auto Hold] bisacodyl (DULCOLAX) Suppository 10 mg  10 mg Rectal Daily PRN         ceFAZolin (ANCEF) 1 g vial to attach to  ml bag for ADULT or 50 ml bag for PEDS  1 g Intravenous See Admin Instructions         ceFAZolin (ANCEF) intermittent infusion 2 g in 100 mL dextrose PRE-MIX  2 g Intravenous Pre-Op/Pre-procedure x 1 dose         [Auto Hold] cefTRIAXone (ROCEPHIN) 1 g vial to attach to  mL bag for ADULTS or NS 50 mL bag for PEDS  1 g Intravenous Q24H         [Auto Hold] HYDROmorphone (PF) (DILAUDID) injection 0.2 mg  0.2 mg Intravenous Q2H PRN         [Auto Hold] isosorbide mononitrate (IMDUR) 24 hr tablet 30 mg  30 mg Oral Daily   30 mg at 04/19/18 1713     lactated ringers infusion   Intravenous Continuous         [Auto Hold] lidocaine (LMX4) cream   Topical Q1H PRN         [Auto Hold] lidocaine 1 % 1 mL  1 mL Other Q1H PRN          lidocaine 1 % 1 mL  1 mL Other Q1H PRN         [Auto Hold] lisinopril (PRINIVIL/Zestril) tablet 2.5 mg  2.5 mg Oral Daily   Stopped at 04/19/18 1658     [Auto Hold] magnesium sulfate 2 g in NS intermittent infusion ([facility] or [facility])  2 g Intravenous Daily PRN         [Auto Hold] magnesium sulfate 4 g in 100 mL sterile water (premade)  4 g Intravenous Q4H PRN         [Auto Hold] melatonin tablet 1 mg  1 mg Oral At Bedtime PRN         [Auto Hold] metoprolol succinate (TOPROL-XL) 24 hr tablet 25 mg  25 mg Oral Daily   25 mg at 04/20/18 0605     [Auto Hold] naloxone (NARCAN) injection 0.1-0.4 mg  0.1-0.4 mg Intravenous Q2 Min PRN         [Auto Hold] nitroGLYcerin (NITROSTAT) sublingual tablet 0.4 mg  0.4 mg Sublingual Q5 Min PRN         [Auto Hold] ondansetron (ZOFRAN-ODT) ODT tab 4 mg  4 mg Oral Q6H PRN        Or     [Auto Hold] ondansetron (ZOFRAN) injection 4 mg  4 mg Intravenous Q6H PRN         [Auto Hold] oxyCODONE IR (ROXICODONE) tablet 5 mg  5 mg Oral Q4H PRN   5 mg at 04/20/18 0605     [Auto Hold] polyethylene glycol (MIRALAX/GLYCOLAX) Packet 17 g  17 g Oral Daily PRN         [Auto Hold] potassium chloride (KLOR-CON) Packet 20-40 mEq  20-40 mEq Oral or Feeding Tube Q2H PRN         [Auto Hold] potassium chloride 10 mEq in 100 mL intermittent infusion with 10 mg lidocaine  10 mEq Intravenous Q1H PRN         [Auto Hold] potassium chloride 10 mEq in 100 mL sterile water intermittent infusion (premix)  10 mEq Intravenous Q1H PRN         [Auto Hold] potassium chloride 20 mEq in 50 mL intermittent infusion  20 mEq Intravenous Q1H PRN         [Auto Hold] potassium chloride SA (K-DUR/KLOR-CON M) CR tablet 20-40 mEq  20-40 mEq Oral Q2H PRN         [Auto Hold] senna-docusate (SENOKOT-S;PERICOLACE) 8.6-50 MG per tablet 1 tablet  1 tablet Oral BID        Or     [Auto Hold] senna-docusate (SENOKOT-S;PERICOLACE) 8.6-50 MG per tablet 2 tablet  2 tablet Oral BID         [Auto Hold] sodium chloride (PF) 0.9% PF flush 3 mL   3 mL Intracatheter Q1H PRN         [Auto Hold] sodium chloride (PF) 0.9% PF flush 3 mL  3 mL Intracatheter Q8H   3 mL at 04/19/18 1705     [Auto Hold] sodium chloride 0.9% infusion   Intravenous Once         sodium chloride 0.9% infusion   Intravenous Continuous 50 mL/hr at 04/19/18 1705       No current Epic-ordered outpatient prescriptions on file.       lactated ringers       sodium chloride 50 mL/hr at 04/19/18 1705     Wt Readings from Last 1 Encounters:   04/20/18 63.3 kg (139 lb 9.6 oz)     Temp Readings from Last 1 Encounters:   04/20/18 36.8  C (98.3  F) (Oral)     BP Readings from Last 6 Encounters:   04/20/18 105/78   04/16/18 (!) 85/59   10/20/15 140/84   03/24/15 122/70   02/25/15 118/76   02/20/15 159/90     Pulse Readings from Last 4 Encounters:   04/19/18 86   04/16/18 71   10/20/15 84   03/24/15 72     Resp Readings from Last 1 Encounters:   04/20/18 16     SpO2 Readings from Last 1 Encounters:   04/20/18 95%     Recent Labs   Lab Test  04/20/18   0415  04/19/18   0955   NA  145*  142   POTASSIUM  4.3  4.2   CHLORIDE  115*  109   CO2  25  23   ANIONGAP  5  10   GLC  103*  136*   BUN  32*  31*   CR  1.40*  1.51*   ARELIS  8.8  9.6     Recent Labs   Lab Test  04/20/18   0415  07/07/14   0015   AST  12  23   ALT  11  26   ALKPHOS  75  83   BILITOTAL  0.5  0.8     Recent Labs   Lab Test  04/20/18   0415  04/19/18   0955   WBC  12.5*  17.2*   HGB  11.0*  12.7*   PLT  167  221     No results for input(s): ABO, RH in the last 73516 hours.  Recent Labs   Lab Test  02/20/15   0900  07/07/14   0015   INR  0.93  1.00   PTT  30  28      Recent Labs   Lab Test  07/07/14   1040  07/07/14   0655  07/07/14   0240   TROPI  0.203*  0.182*  0.143*     No results for input(s): PH, PCO2, PO2, HCO3 in the last 22373 hours.  No results for input(s): HCG in the last 99574 hours.  Recent Results (from the past 744 hour(s))   Abd/pelvis CT no contrast - Stone Protocol    Narrative    Exam: CT ABDOMEN PELVIS W/O CONTRAST   4/19/2018 10:45 AM    History: Right flank pain.    Comparison: Aortic aneurysm ultrasound dated 7/8/2014.    Technique: Volumetric acquisition with reconstruction in the axial,  coronal planes through the abdomen and pelvis without contrast.  Radiation dose for this scan was reduced using automated exposure  control, adjustment of the mA and/or kV according to patient size, or  iterative reconstruction technique.    Contrast: None    Findings:   Lung Bases: Bronchiectasis involving the left lower lobe and  atelectasis in both bases. No pleural or pericardial effusion.    Abdomen: Unenhanced liver, spleen, adrenal glands and gallbladder are  normal. Atrophic pancreas. Multiple vascular calcifications in both  kidneys. Additionally, there is a small group of stones in the distal  right ureter, the largest measures 2 x 3 mm (series 2 image 72). There  is mild to moderate associated right hydronephrosis and hydroureter.  No definite left renal or ureteral calculi are seen.    Infrarenal abdominal aortic aneurysm measures 5.2 x 4.5 cm in diameter  (series 2 image 36), this is increased from 3.7 cm on ultrasound dated  7/8/2014. 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    No areas of bowel wall thickening or bowel dilatation. Normal  appendix. No free fluid. No abdominal or pelvic lymphadenopathy.    Bones: No concerning lytic or sclerotic lesions.      Impression    Impression:   1. 2 x 3 mm obstructing stone at the right ureterovesical junction  with mild to moderate associated right hydronephrosis and hydroureter.  2. Infrarenal abdominal aortic aneurysm measures up to 5.2 cm in  maximal dimension, increased from 3.7 cm on 7/8/2014. Aneurysm extends  into both the right and left common iliac arteries.  3. Bronchiectasis in the left lower lobe.    JIMI FLANAGAN MD       RECENT LABS:   ECG:   ECHO:   Procedure  Complete Echo  Adult. Contrast Optison.  _____________________________________________________________________________  __        Interpretation Summary     Techcnically difficult imaging  Left ventricular hypertrophy: asymmetric with no LVOT obstruction  The left ventricle is normal in size.  Hyperdynamic left ventricular function  The right ventricle is normal in structure, function and size.  Right ventricular systolic pressure is elevated, consistent with mild  pulmonary hypertension.  Moderate aortic root dilatation.  The ascending aorta is Mildly dilated.     Doppler interrogation does not demonstrate significant stenosis or  insufficiency involvng cardiac valsves. Although the patient's heart rate has  increased since the last study ( may be in atrial flutter), there has been no  other significant change.  _____________________________________________________________________________  __        Left Ventricle  The left ventricle is normal in size. Left ventricular hypertrophy: asymmetric  with no LVOT obstruction. Hyperdynamic left ventricular function. The visual  ejection fraction is estimated at >70%. Left ventricular diastolic function is  indeterminate. No regional wall motion abnormalities noted. There is no  thrombus seen in the left ventricle.     Right Ventricle  The right ventricle is normal in structure, function and size. There is no  mass or thrombus in the right ventricle.     Atria  Normal left atrial size. Right atrial size is normal. There is no atrial shunt  seen.     Mitral Valve  The mitral valve leaflets appear normal. There is no evidence of stenosis,  fluttering, or prolapse. There is no mitral regurgitation noted. There is no  mitral valve stenosis.        Tricuspid Valve  Normal tricuspid valve. The right ventricular systolic pressure is  approximated at 31.3 mmHg plus the right atrial pressure. Right ventricular  systolic pressure is elevated, consistent with mild pulmonary hypertension.  There is  no tricuspid stenosis.     Aortic Valve  The aortic valve is trileaflet. No aortic regurgitation is present. No aortic  stenosis is present.     Pulmonic Valve  Normal pulmonic valve. There is no pulmonic valvular regurgitation. There is  no pulmonic valvular stenosis.     Vessels  Moderate aortic root dilatation. The ascending aorta is Mildly dilated. The  IVC is normal in size and reactivity with respiration, suggesting normal  central venous pressure. The pulmonary artery is normal size.     Pericardium  The pericardium appears normal. There is no pleural effusion.        Rhythm  The rhythm was supraventricular tachycardia.      Anesthesia Evaluation     .             ROS/MED HX    ENT/Pulmonary:     (+)tobacco use, Past use COPD, , . .    Neurologic:       Cardiovascular: Comment: AAA now 5.2cm on CT from 4.1cm on 2014 US  Complete AVB - pacemaker dependent    (+) Dyslipidemia, hypertension-Peripheral Vascular Disease-CAD, angina-with excertion, -. : . . SCHWARZ, fainting (syncope). pacemaker :. dysrhythmias 3rd Deg Heart Block, .       METS/Exercise Tolerance:     Hematologic:     (+) Anemia, -      Musculoskeletal:   (+) arthritis, , , -       GI/Hepatic:         Renal/Genitourinary: Comment: Beginning of urosepsis on admission with low SBPs, not VSS    (+) chronic renal disease, type: ARF, Nephrolithiasis ,       Endo:  - neg endo ROS       Psychiatric:     (+) psychiatric history depression      Infectious Disease:         Malignancy:         Other:                     Physical Exam  Normal systems: cardiovascular    Airway   Mallampati: I  TM distance: >3 FB  Neck ROM: full    Dental   (+) upper dentures    Cardiovascular       Pulmonary    breath sounds clear to auscultation(+) decreased breath sounds                       Anesthesia Plan      History & Physical Review  History and physical reviewed and following examination; no interval change.    ASA Status:  3 .    NPO Status:  > 8 hours    Plan for General  and LMA with Intravenous and Propofol induction. Maintenance will be Balanced.    PONV prophylaxis:  Ondansetron (or other 5HT-3) and Dexamethasone or Solumedrol  Background propofol gtt as tolerated  No midazolam  Fentanyl and precedex bolus prn  Phenylephrine gtt available       Postoperative Care  Postoperative pain management:  IV analgesics.      Consents  Anesthetic plan, risks, benefits and alternatives discussed with:  Patient..                          .

## 2018-04-20 NOTE — PLAN OF CARE
Problem: Patient Care Overview  Goal: Interdisciplinary Rounds/Family Conf  Outcome: Improving  RN: pt alert, oriented.  Appreciates 's presence for communication.  Educated on kidney stone and stent, pt reports he does have pain with voiding but that when not voiding, he does not have pain which is an improvement.  He is voiding per urinal, 100-150mL q1-2hrs, tea color.  Pt was instructed to drink 240mL of fluid per hour and he is achieving this for adequate hydration.    Tele is 100% a-paced,

## 2018-04-20 NOTE — PROGRESS NOTES
"SPIRITUAL HEALTH SERVICES Progress Note  [facility]    Met with pt, per request in chart for spiritual support.  When I introduced myself pt said, \"I don't speak English.  I can't talk to you.\"  I will complete SH consult, but chaplains are available if needs arise.  If pt wishes to have a visit when an  is present, please feel free to page a .                                                                                                                                                 Faviola Akbar M.A.  Staff   Pager [phone]  Phone [phone]      "

## 2018-04-20 NOTE — PROGRESS NOTES
SPIRITUAL HEALTH SERVICES Progress Note  [facility]    , follow up visit. The patient talked about Syn[employer] danni and belief in reading the bible. The patient asked for prayer, for the  to pray at a private place and not in the room. The patient talked about [employer] and [employer] relationships in [place] both difficulties and hope for improved relations.       provided care in presence, non-judgmental listening, and prayed in  office.     Coping and appreciates Baptist danni conversation     remains available upon request.     Paulino Bender  Chaplain Resident

## 2018-04-20 NOTE — OR NURSING
Admitted to PACU on Phenelphrine drip at 0.5 mcg/kg/min.  Rate decreased to 0.25 mcg/kg/min at 0812.  Will continue to wean as SBP > 75.

## 2018-04-20 NOTE — PROGRESS NOTES
Pt states at discharge, someone should call [employer] for his ride: [phone]  His Address is: [facility], [address] [place] [address] (  [employer] phone [phone], Fax [phone]    He also states he was sent to the ED instead of his appointment with Dr. Son ([phone]), he would appreciate if this MD was contacted and appointment rescheduled.

## 2018-04-20 NOTE — OP NOTE
Procedure Date: 2018      DATE OF PROCEDURE:  2018      PROCEDURE:  Cystoscopy, right retrograde, right ureteral stent placement.      PREOPERATIVE DIAGNOSIS:  Infected right ureteral stone.      POSTOPERATIVE DIAGNOSIS:  Infected right ureteral stone.      OPERATIVE NOTE:  Informed consent was obtained via an .  He was brought to the operating room, given laryngeal mask anesthesia, placed in lithotomy position, sterile prep and drape were applied and surgical timeout was taken.  The cystoscope was introduced into the bladder and revealed a normal-appearing urethra.  Prostate was minimally obstructing.  The interior of the bladder revealed bullous edema around the right ureteral orifice.  The stone was not visible.  The Glidewire was used to advance up the ureter and once the wire had bypassed the stone a great deal of purulent urine emanated into the bladder.  Once the wire was in place, a 6 Omani open-ended ureteral catheter was advanced over the guidewire up the mid ureter and the wire was removed.  Contrast was injected to opacify the urinary collecting system and the wire was replaced.  Ureteral catheter was removed and a 6 Omani x 28 cm ureteral stent was placed over the guidewire under fluoroscopic and visual control.  Upper end coiled nicely in the right renal pelvis.  Distal end coiled nicely in the bladder after removal of the guidewire.  Bladder was drained and instruments were removed.  He tolerated the procedure well.  There were no complications and no blood loss.        PLAN FOR THE STENT:  Leave the stent in place 1-2 weeks and then return to the OR for right ureteroscopy, stone extraction, and stent removal.         MEENU BAKER MD             D: 2018   T: 2018   MT: KASHMIR      Name:     LATOSHA VIZCAINO   MRN:      [id number]        Account:        [id number]   :      1935           Procedure Date: 2018      Document: [id number]

## 2018-04-20 NOTE — ANESTHESIA POSTPROCEDURE EVALUATION
Patient: Nacho Joseph    Procedure(s):  CYSTOSCOPY,RIGHT URETERAL STENT PLACEMENT, RIGHT RETROGRADES(LANGUAGE:Irish) - Wound Class: II-Clean Contaminated   - Wound Class: II-Clean Contaminated    Diagnosis:RIGHT URETERAL STONE  Diagnosis Additional Information: No value filed.    Anesthesia Type:  General, LMA    Note:  Anesthesia Post Evaluation    Patient location during evaluation: PACU  Patient participation: Able to fully participate in evaluation  Level of consciousness: sleepy but conscious and responsive to verbal stimuli  Pain management: adequate  Airway patency: patent  Cardiovascular status: acceptable and hemodynamically stable  Respiratory status: acceptable and unassisted  Hydration status: acceptable  PONV: none     Anesthetic complications: None    Comments: preop SBP low 90s/50s, patient started on phenylephrine gtt intraop while under anesthesia, continued at 0.25 in recovery and titrated off while in PACU within 30 min, VSS.        Last vitals:  Vitals:    04/20/18 0605 04/20/18 0631 04/20/18 0642   BP: 105/78 (!) 89/73    Pulse:  96    Resp:  16    Temp:   36.3  C (97.4  F)   SpO2:  91%          Electronically Signed By: Raffy Burton MD  April 20, 2018  8:22 AM

## 2018-04-20 NOTE — PLAN OF CARE
Problem: Patient Care Overview  Goal: Plan of Care/Patient Progress Review  Outcome: No Change  A&O.  Kosovan speaking, blue telephone in room.  VSS, ex slight hypotensive.  C/O right lower abd pain, managed with tylenol and oxycodone.  Tolerating diet.  IV infusing @ 50 ml/hr.  Up with 1 to 2 to BSC.  Urinal at bedside, strainer in BR.  Tele 100% v-paced.  NPO after midnight for surgery tomorrow.  Continue to monitor.

## 2018-04-20 NOTE — ANESTHESIA CARE TRANSFER NOTE
Patient: Nacho Joseph    Procedure(s):  CYSTOSCOPY,RIGHT URETERAL STENT PLACEMENT, RIGHT RETROGRADES(LANGUAGE:Angolan) - Wound Class: II-Clean Contaminated   - Wound Class: II-Clean Contaminated    Diagnosis: RIGHT URETERAL STONE  Diagnosis Additional Information: No value filed.    Anesthesia Type:   General, LMA     Note:  Airway :Face Mask  Patient transferred to:PACU  Handoff Report: Identifed the Patient, Identified the Reponsible Provider, Reviewed the pertinent medical history, Discussed the surgical course, Reviewed Intra-OP anesthesia mangement and issues during anesthesia, Set expectations for post-procedure period and Allowed opportunity for questions and acknowledgement of understanding      Vitals: (Last set prior to Anesthesia Care Transfer)    CRNA VITALS  4/20/2018 0735 - 4/20/2018 0815      4/20/2018             NIBP: 91/69    Pulse: 81    NIBP Mean: 75    SpO2: 100 %    Resp Rate (observed): (!)  2                Electronically Signed By: NICHOLAS Isabel CRNA  April 20, 2018  8:15 AM

## 2018-04-20 NOTE — PROGRESS NOTES
[facility]    Hospitalist Progress Note    Date of Service (when I saw the patient): 04/20/2018    Assessment & Plan   Nacho Joseph is a 82 year old male who was admitted on 4/19/2018.  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  Patient was evaluated in the emergency room where he was found to have possible obstructing 2 x 3 mm stone at right ureterovesical junction associated with right hydronephrosis and hydroureter.  He also has known history of AAA but on the current CT scan it has progressed to 5.2 cm in maximal dimension as compared to 3.7 in 2014. patient was admitted for further evaluation and management.        Assessment & Plan        Active Problems:  Obstructive uropathy  WITH CLARITA DUE TO OBSTRUCTIVE UROPATHY HE IS S/P  Cystoscopy, right retrograde, right ureteral stent placement:   CT scan of the abdomen showed 2 x 3 mm obstructing stone at the right ureterovesical junction with mild to moderate associated right hydronephrosis and hydroureter ON ADMISSION   CR REMAINS HIGH AT 1.4 TODAY FROM 1.5 YESTERDAY  ON iv CEFTRIAXONE   ADMISSION UA was negative   Post op management per urology  Continue IVF today   AAA (abdominal aortic aneurysm) (H) (7/8/2014): Patient has history of abdominal aortic aneurysm measuring about 4.1 cm in 2015 it has not been assessed since then as today patient presented with right-sided flank pain CT scan of the abdomen and pelvis was done which is showing infrarenal abdominal aortic aneurysm measuring 5.2 into 4.5 cm in  diameter that has increased from 3.7 cm on ultrasound dated July 2014  Vascular surgery consulted and they reviewd the CT and thought the AAA is at 4.6 cm  Recommended CT chest abdomen to be repeated in 6months   HTN (hypertension) (7/8/2014): Patient was initially hypotensive on presentation with blood pressure of 75/59 but now his blood pressure has improved to 121/88 he is on beta-blocker , Lisinopril and Norvasc at home.    Hyperlipidemia (7/8/2014): on Lipitor at home.    Atrioventricular block, complete (HEART) (7/8/2014): Status post pacemaker placement    CAD (coronary artery disease) (2/9/2015): Patient was  recently evaluated by Dr. Marco Burnett from cardiology on April 16 for his history of coronary artery disease, his last coronary angiography was in 2015 which showed chronically occluded proximal right coronary artery.  He also had high-grade stenosis and proximal circumflex and moderate though no flow-limiting disease in the left anterior descending artery.  Cardiology awaiting improveemnt in cr and reschedule cardia cath  Pt is asymptomatic today   Ischemic cardiomyopathy (2/9/2015): Improved, echo done yesterday showed left ventricular systolic function estimated at more than 70%.     -- continue to monitor patient on telemetry  --Continue patient on home dose of aspirin 81 mg p.o. daily, Norvasc 5 mg p.o. daily, Imdur 30 mg p.o. daily, lisinopril 2.5 mg p.o. daily but will decrease his dose of Toprol-XL to 25 mg p.o. daily from 100 mg due to borderline blood pressure.  Will place holding parameters on the blood pressure medication.   Depression (7/21/2014)  -- currently stable , not taking any medications        COPD (chronic obstructive pulmonary disease) (H) (7/21/2014)  -- ex smoker , quit 5 years ago, no active issues , not on any home INH or oxygen.     DVT Prophylaxis: Pneumatic Compression Devices  Code Status: Full Code    Disposition: Expected discharge in 1-2days when OK with URology and  cardiology     Olivia Escobar MD  [phone] (P)      Interval History   Doing OK. C/o pain at the end of urination from stent placement today. No f/c    -Data reviewed today: I reviewed all new labs and imaging results over the last 24 hours. I personally reviewed no images or EKG's today.    Physical Exam   Temp: 98.5  F (36.9  C) Temp src: Temporal BP: 90/68 Pulse: 96 Heart Rate: 102 Resp: 13 SpO2: 95 % O2 Device: None (Room air) Oxygen Delivery: 6 LPM  Vitals:    04/19/18 0935 04/20/18 0600   Weight: 63 kg (138 lb 14.2 oz) 63.3 kg (139 lb 9.6 oz)     Vital Signs with Ranges  Temp:  [97.3  F (36.3  C)-99.6  F (37.6  C)] 98.5  F (36.9  C)  Pulse:  [68-96] 96  Heart Rate:  [] 102  Resp:  [13-22] 13  BP: ()/(62-89) 90/68  SpO2:  [91 %-100 %] 95 %  I/O last 3 completed shifts:  In: 1100 [I.V.:1100]  Out: 300 [Urine:300]    Constitutional: Awake, alert, cooperative, no apparent distress  Respiratory: Clear to auscultation bilaterally, no crackles or wheezing  Cardiovascular: Regular rate and rhythm, normal S1 and S2, and no murmur noted  GI: Normal bowel sounds, soft, non-distended, non-tender  Skin/Integumen: No rashes, no cyanosis, no edema  Other:     Medications       acetaminophen (TYLENOL) tablet 1,000 mg  1,000 mg Oral TID     amLODIPine  5 mg Oral Daily     aspirin EC  81 mg Oral Daily     atorvastatin  40 mg Oral Daily     cefTRIAXone  1 g Intravenous Q24H     isosorbide mononitrate  30 mg Oral Daily     lisinopril  2.5 mg Oral Daily     metoprolol succinate  25 mg Oral Daily     senna-docusate  1 tablet Oral BID    Or     senna-docusate  2 tablet Oral BID     sodium chloride (PF)  3 mL Intracatheter Q8H     sodium chloride (PF)  3 mL Intracatheter Q8H     sodium chloride   Intravenous Once       Data     Recent Labs  Lab 04/20/18  0415 04/19/18  0955   WBC 12.5* 17.2*   HGB 11.0* 12.7*   MCV 96 95    221   * 142   POTASSIUM 4.3 4.2   CHLORIDE 115* 109   CO2 25 23   BUN 32* 31*   CR  1.40* 1.51*   ANIONGAP 5 10   ARELIS 8.8 9.6   * 136*   ALBUMIN 3.0*  --    PROTTOTAL 6.5*  --    BILITOTAL 0.5  --    ALKPHOS 75  --    ALT 11  --    AST 12  --        Recent Results (from the past 24 hour(s))   XR Surgery VASQUEZ L/T 5 Min Fluoro w Stills    Narrative    SURGERY C-ARM FLUOROSCOPY LESS THAN 5 MINUTES WITH STILLS  4/20/2018  7:55 AM     COMPARISON: None.    HISTORY: Cysto, right retrograde, right stent.    NUMBER OF IMAGES ACQUIRED: 3    VIEWS: 1    FLUOROSCOPY TIME: .3 minutes.      Impression    IMPRESSION: Double-J stent was placed from the right renal pelvis to  the bladder. The catheter is somewhat coiled in the renal pelvis on  the final image.

## 2018-04-20 NOTE — CONSULTS
The patient is currently off the floor for placement of a right ureteral stent.  Full consult to be rendered later today.    IMPRESSION:  1.  Juxtarenal AAA read out as 5.2 cm in diameter on yesterday's noncontrasted CT scan of the abdomen and pelvis.  On true orthogonal view I believe the aneurysmal diameter is closer to 4.6 cm in transverse plane.  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  The iliac arteries are also heavily calcified.    2.  Possible ascending aortic aneurysm estimated to be 4 cm on aortography several years ago.  This has not been formally checked.    3.  Obstructive uropathy    4.  Coronary artery disease currently being evaluated for coronary angiography    PLAN:  Complete the urologic and cardiac workup.  No plans for vascular surgical intervention for the AAA this admission.  Once his renal function normalizes we will obtain a CTA of the chest, abdomen, and pelvis in 6 months.    Casey Jensen M.D.

## 2018-04-20 NOTE — CONSULTS
Consult Date:  04/19/2018      PATIENT HISTORY:  Mr. Nacho Joseph is 82 years old and has been admitted to [facility] for obstructive uropathy.  Elective coronary angiography had been planned and Dr. Carney of the Hospitalist Service has requested Cardiology consultation.      Mr. Joseph has a history of chest discomfort.  In 2015, coronary angiography demonstrated a chronic occlusion of the proximal RCA as well as a severe stenosis of the proximal circumflex coronary artery and moderate, but nonobstructive disease in the LAD distally.  The right coronary artery had left-to-right collaterals.      Medical therapy was pursued, but Mr. Joseph continued to have exertional chest discomfort.  That had been accompanied by shortness of breath.  He had previously been hypertensive, but his blood pressure has more recently been controlled.      Mr. Joseph has a history of peripheral vascular disease and he has a 4.1 cm infrarenal abdominal aortic aneurysm.  He has possible right carotid artery disease.  He also has a history of heart block and underwent permanent pacemaker placement in 2015.      Given Mr. Joseph's limiting exertional dyspnea and chest discomfort, Dr. Son recommended coronary angiography.  He noted that the pattern of symptoms had been relatively stable for the prior year, but was nonetheless impairing Mr. Joseph's quality of life.  At that time, he also decreased the metoprolol succinate dose from 200 to 100 mg daily and the dose of lisinopril from 10 to 2.5 mg daily.  He initiated isosorbide mononitrate.      Mr. Joseph developed right flank pain yesterday.  It has been intermittent and he has been using Tylenol, but the pain became quite severe in the morning.  As a result, he sought further attention in the Emergency Department.  It was noted from the laboratories that his GFR had fallen and his white blood cell count was elevated.  A CT scan  demonstrated an obstructive right-sided 3 mm ureterovesicular junction stone.  It was also noted that his abdominal aortic aneurysm had increased to 5.2 cm.  As a result, he was admitted to the hospital.      Mr. Joseph had been planned to undergo coronary angiography today.  That has been delayed.  He currently denies any chest discomfort.  He was asleep in his bed and I awakened him from sleep.  He noted to me that he did not understand English.      PAST MEDICAL HISTORY:   1. Nephrolithiasis with obstructive uropathy.   2. Enlarging abdominal aortic aneurysm.   3. Coronary artery disease.   4. Permanent pacemaker, placed for complete heart block.   5. History of depression.   6. Chronic obstructive pulmonary disease.   7. History of carotid bruit, ultrasound pending.   8. COPD.   9. History of tobacco use, none for 5 years.      SOCIAL HISTORY:  The patient is a prior smoker.  He is a [place] speaker.      FAMILY HISTORY:  Significant for heart disease in his father.      ALLERGIES:  None known.      MEDICATIONS:   1. Amlodipine 5 mg p.o. daily.   2. Aspirin 81 mg p.o. daily.   3. Atorvastatin 40 mg p.o. nightly.   4. Doxycycline 100 mg p.o. b.i.d.   5. Isosorbide mononitrate 30 mg p.o. daily.   6. Lisinopril 2.5 mg p.o. daily.   7. Magnesium hydroxide 30 mL p.o. daily for constipation or heartburn.   8. Metoprolol succinate 100 mg p.o. daily.   9. Nitroglycerin 0.4 mg SL p.r.n. chest pain.   10. Pseudoephedrine/guaifenesin 800 mg p.o. daily.      REVIEW OF SYSTEMS:  The review of systems was difficult to obtain and the 10-point review of systems is notable for the description in the HPI and also for infectious as the patient has developed a cough.      PHYSICAL EXAMINATION:   VITAL SIGNS:  The blood pressure was 100/70 mmHg, heart rate 86 beats per minute and regular, and respiratory rate was 16-20 per minute.     GENERAL:  Mr. Joseph was in no apparent distress and was alert and oriented once awakened.    SKIN:  Warm and dry without cyanosis or jaundice.   HEENT:  Head was normocephalic.  The eyes were free of scleral icterus.  The neck was free of thyromegaly.   CHEST:  Notable for clear lungs and unlabored breathing.   CARDIAC:  On cardiac auscultation, there was an S1 and an S2, without an S3 or S4.  There is a grade 1/6 systolic ejection murmur at the mid to upper sternal border.  There was no diastolic murmur.  The rhythm was regular.   EXTREMITIES:  On lower extremities, there is no peripheral pedal edema.   NEUROLOGIC:  Facies were symmetric and he moved all extremities without focal limitation.      LABORATORY DATA:  White blood cell count was 17.2, hemoglobin 12.7 and platelet count 221,000.  The sodium was 142, potassium 4.2, chloride 109, bicarbonate 23, BUN 31 and creatinine 1.51.      An EKG will be ordered but has not yet been performed.      ASSESSMENT AND RECOMMENDATIONS:  This is an individual who presented with a right urinary tract stone and apparent urinary obstruction resulting in a decrease in his GFR.  Clearly, he requires treatment of the obstructive uropathy and a period of time to allow his creatinine to fall.  Hopefully, that will happen relatively soon.      In the meantime, his hemodynamics are stable.  His blood pressure is well controlled.  He is asymptomatic.  It is possible that he could undergo coronary angiography before he is discharged, but that will depend upon the timing of the resolution of his pain if he passes the stone.  If he does, I would prefer to wait until his renal function is improving to proceed with coronary angiography.      He has normal left ventricular systolic performance based on his echocardiogram.  Overall, he is doing quite well despite the unfortunate development of symptomatic nephrolithiasis.  Further recommendations will depend upon his course in the hospital.         DEMARCO CROCKETT MD, FACC             D: 04/19/2018   T: 04/19/2018   MT: [id number]       Name:     LATOSHA VIZCAINO   MRN:      [id number]        Account:       [id number]   :      1935           Consult Date:  2018      Document: [id number]

## 2018-04-20 NOTE — PROGRESS NOTES
[facility]    Cardiology Progress Note    Date of Service (when I saw the patient): 04/20/2018     Assessment & Plan   Nacho Joseph is a 82 year old male who was admitted on 4/19/2018 for an elective coronary angiogram ordered by Dr. Hossein Burnett. He presented with right flank pain, 9/10 and was found by urology to have an infected right ureteral stone. He underwent right ureteral stent placement, the plan is to keep the stent in for 1-2 week, return to OR for right ureteroscopy, stone extraction and stent removal. He has a known infrarenal AAA and a right carotid bruit which is scheduled to be checked by ultrasound 4/24 along with an aortoiliac US.    Hypertension  -His blood pressures are soft.   D/C amlodipine.     CAD  -He will need a coronary evaluation and carotid US per Dr. Hossein Burnett. He continues to be hemodynamically stable, we will need to wait until stent removed and infection cleared and renal function allows.  -Echo shows EF >70%, LVH, mod aortic root dilation and mildly dilated ascending aorta.   ASA, statin      AAA  -Infrarenal AAA measures 5.2 x 4.5 on CT, but noted to be 4.6 on transverse plane. Calcified iliac arteries.  - echo shows mod aortic root dilation (4.6) and mildly dilated ascending aorta (4.1).   -Seen by vascular surgery, plan to reassess CTA chest/abd and pelvis in 6 months.    Complete heart block  S/p PPM. Device check 4/16 showed one mode switch.  5 NSVT since 2015. Battery life 4 years  -, EKG shows V paced rate of 102.   -Would recommend tele.  Ordered an increase from 25 to 50 in beta blocker tomorrow.     Keep follow up OV with [facility]RIS on 4/27, if he is still inpatient, this will need to be rescheduled. Cardiology will sign off.    Interval History   Ureter stent placed. Denies CP, appears comfortable in bed on RA    Physical Exam   Temp: 98.5  F (36.9  C) Temp src: Temporal BP: 90/68 Pulse: 96 Heart Rate: 102 Resp: 13 SpO2: 95 % O2 Device: None  (Room air) Oxygen Delivery: 6 LPM  Vitals:    04/19/18 0935 04/20/18 0600   Weight: 63 kg (138 lb 14.2 oz) 63.3 kg (139 lb 9.6 oz)     Vital Signs with Ranges  Temp:  [97.3  F (36.3  C)-99.6  F (37.6  C)] 98.5  F (36.9  C)  Pulse:  [68-96] 96  Heart Rate:  [] 102  Resp:  [13-22] 13  BP: ()/(62-89) 90/68  SpO2:  [91 %-100 %] 95 %  I/O last 3 completed shifts:  In: 1100 [I.V.:1100]  Out: 300 [Urine:300]    Constitutional     alert and oriented, in no acute distress.     Skin     warm and dry to touch    ENT     no pallor or cyanosis    Neck    Supple, JVP normal    Chest     no tenderness to palpation    Lungs  clear to auscultation     Cardiac  regular rhythm, S1 normal, S2 normal    Abdomen     abdomen soft    Extremities and Back     No edema observed.        Neurological     no gross motor deficits noted, affect appropriate, oriented to time, person and place.    Medications       acetaminophen (TYLENOL) tablet 1,000 mg  1,000 mg Oral TID     amLODIPine  5 mg Oral Daily     aspirin EC  81 mg Oral Daily     atorvastatin  40 mg Oral Daily     cefTRIAXone  1 g Intravenous Q24H     isosorbide mononitrate  30 mg Oral Daily     lisinopril  2.5 mg Oral Daily     metoprolol succinate  25 mg Oral Daily     senna-docusate  1 tablet Oral BID    Or     senna-docusate  2 tablet Oral BID     sodium chloride (PF)  3 mL Intracatheter Q8H     sodium chloride (PF)  3 mL Intracatheter Q8H     sodium chloride   Intravenous Once       Data   Results for orders placed or performed during the hospital encounter of 04/19/18 (from the past 24 hour(s))   Cardiology IP Consult: Patient to be seen: Routine - within 24 hours; Cad , was planned to get the cath today but got admitted with obstructive uropathy , also have 5.2 cm AAA; Consultant may enter orders: Yes    Narrative    Lupe Lantigua MD     4/19/2018  5:30 PM  Patient is currently pain-free; do not anticipate any medical   changes at present.  Will plan to reschedule  outpatient coronary angiogram when   current problem resolved.  Thank you.   Vascular Surgery IP Consult: Patient to be seen: Routine - within 24 hours; AAA 5.2 sm , was scheduled to get cath but has developed obstructive uropathy; Consultant may enter orders: Yes    Ravi Valentin MD     4/20/2018  8:04 AM  The patient is currently off the floor for placement of a right   ureteral stent.  Full consult to be rendered later today.    IMPRESSION:  1.  Juxtarenal AAA read out as 5.2 cm in diameter on yesterday's   noncontrasted CT scan of the abdomen and pelvis.  On true   orthogonal view I believe the aneurysmal diameter is closer to   4.6 cm in transverse plane.  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    The iliac arteries are also heavily calcified.    2.  Possible ascending aortic aneurysm estimated to be 4 cm on   aortography several years ago.  This has not been formally   checked.    3.  Obstructive uropathy    4.  Coronary artery disease currently being evaluated for   coronary angiography    PLAN:  Complete the urologic and cardiac workup.  No plans for vascular   surgical intervention for the AAA this admission.  Once his renal   function normalizes we will obtain a CTA of the chest, abdomen,   and pelvis in 6 months.    Casey Jensen M.D.   Urology IP Consult: onstructive uropathy; Consultant may enter orders: Yes; Patient to be seen: Routine - within 24 hours    Katelin Márquez PA-C     4/19/2018  4:21 PM  [facility]    Urology Consultation     Date of Admission:  4/19/2018    Assessment & Plan   Nacho Joseph is a 82 year old male who was admitted on   4/19/2018. I was asked to see the patient for right distal   ureteral stones with questionable UTI.    Plan: Flomax and IV fluids, strain urine. Will have RN obtain a   bladder  scan.   NPO at midnight for cystoscopy, right ureteral stent placement   tomorrow AM.   Continue pain control and abx for now.   Page urology asap if spikes fever or shaking chills     Katelin Escobar PA-C  [facility]  [address] [place] [address]  [phone]  [email or web address]  Text Page (7am to 5pm)    Code Status    Full Code    Reason for Consult   Reason for consult: I was asked by Dr. Carney to evaluate this   patient for right distal obstructing stones with   hydroureteronephrosis, questionable UTI. .    Primary Care Physician   Paulino Lynch    Chief Complaint   Right flank pain     History is obtained from the patient    History of Present Illness   Nacho Joseph is a 82 year old male who presents with   worsening right renal colic since Monday. On CT he is found to   have a collection of stones within his right distal ureter, the   largest of which measuring 3mm, and associated left   hydroureteronephrosis. The patient is somewhat of a poor   historian and requires an  so it is difficult to   obtain a through and comprehensive history.     Reports pain began earlier this week and has gradually become   more severe. Denies hx of stones in the past, hematuria, hx of   UTI, does not follow with a urologist. Most recent temp is 99.6F.       UA negative for signs of infection  WBC 17  LA 1.4  Creat 1.5  Hypotensive     Past Medical History   I have reviewed this patient's medical history and updated it   with pertinent information if needed.   Past Medical History:   Diagnosis Date     AAA (abdominal aortic aneurysm) (H) 7/8/2014    4.1 cm     Anemia      Atrioventricular block, complete (HEART) 7/8/2014    S/p PPM 7/2014     Bradycardia      CAD (coronary artery disease) 2/9/2015     Chest pain 1/20/2015     COPD (chronic obstructive pulmonary disease) (H)      Depression 7/21/2014     Depressive disorder      HTN (hypertension)  7/8/2014     Hyperlipidemia 7/8/2014     Hypertension      Ischemic cardiomyopathy 2/9/2015     Lung abscess (H)      Syncope        Past Surgical History   I have reviewed this patient's surgical history and updated it   with pertinent information if needed.  Past Surgical History:   Procedure Laterality Date     CORONARY ANGIOGRAPHY ADULT ORDER  2/20/15    medical managed     IMPLANT PACEMAKER  7/7/14    dual chamber       Prior to Admission Medications   Prior to Admission Medications   Prescriptions Last Dose Informant Patient Reported? Taking?   Acetaminophen (TYLENOL PO) 4/17/2018 Nursing Home Yes Yes   Sig: Take 1,000 mg by mouth 3 times daily   Pseudoephedrine-Guaifenesin (MUCINEX D MAX STRENGTH PO) 4/17/2018   Nursing Home Yes Yes   Sig: Take 800 mg by mouth daily    amLODIPine (NORVASC) 5 MG tablet 4/16/2018 [facility] No Yes   Sig: Take 1 tablet (5 mg) by mouth daily   aspirin 81 MG tablet 4/17/2018 [facility] No Yes   Sig: Take 1 tablet (81 mg) by mouth daily   atorvastatin (LIPITOR) 40 MG tablet 4/16/2018 [facility] No Yes     Sig: Take 1 tablet (40 mg) by mouth daily   doxycycline (VIBRAMYCIN) 100 MG capsule 4/17/2018 [facility]   Yes Yes   Sig: Take 100 mg by mouth 2 times daily   isosorbide mononitrate (IMDUR) 30 MG 24 hr tablet 4/17/2018   [facility] No Yes   Sig: Take 1 tablet (30 mg) by mouth daily   lisinopril (PRINIVIL/ZESTRIL) 2.5 MG tablet 4/17/2018 [facility] No Yes   Sig: Take 1 tablet (2.5 mg) by mouth daily   magnesium hydroxide (MILK OF MAGNESIA) 400 MG/5ML suspension    Nursing Home Yes Yes   Sig: Take 30 mLs by mouth daily as needed for constipation or   heartburn   metoprolol succinate (TOPROL-XL) 100 MG 24 hr tablet 4/17/2018   [facility] No Yes   Sig: Take 1 tablet (100 mg) by mouth daily   nitroglycerin (NITROSTAT) 0.4 MG SL tablet  Nursing Home No Yes   Sig: Place 1 tablet (0.4 mg) under the tongue every 5 minutes as   needed for chest pain       Facility-Administered Medications: None     Allergies   No Known Allergies    Social History   I have reviewed this patient's social history and updated it with   pertinent information if needed. Nacho Joseph  reports   that he quit smoking about 5 years ago. He has never used   smokeless tobacco. He reports that he does not drink alcohol or   use illicit drugs.    Family History   I have reviewed this patient's family history and updated it with   pertinent information if needed.   Family History   Problem Relation Age of Onset     Other - See Comments Mother      old age     HEART DISEASE Father      unknown       Review of Systems   The 10 point Review of Systems is negative other than noted in   the HPI or here.     Physical Exam   Temp: 98  F (36.7  C) Temp src: Oral BP: 96/63 Pulse: 86 Heart   Rate: 86 Resp: 16 SpO2: 93 % O2 Device: None (Room air)    Vital Signs with Ranges  Temp:  [98  F (36.7  C)-99.6  F (37.6  C)] 98  F (36.7  C)  Pulse:  [68-86] 86  Heart Rate:  [83-86] 86  Resp:  [16-17] 16  BP: ()/(59-92) 96/63  SpO2:  [93 %-99 %] 93 %  138 lbs 14.24 oz    Constitutional: Lying in bed, NAD.  at bedside  Eyes: no icterus  ENT: normocephalic, atraumatic   Respiratory: breathing unlabored   Cardiovascular: chest wall symmetric   GI: soft, ND, no tenderness over suprapubic region. Left CVAT   Lymph/Hematologic: no pedal edema   Genitourinary: circ penis. No penoscrotal edema.   Skin: well perfused   Neurologic: no focal deficits  Neuropsychiatric: A&Ox3    Data   Results for orders placed or performed during the hospital   encounter of 04/19/18 (from the past 24 hour(s))   Basic metabolic panel   Result Value Ref Range    Sodium 142 133 - 144 mmol/L    Potassium 4.2 3.4 - 5.3 mmol/L    Chloride 109 94 - 109 mmol/L    Carbon Dioxide 23 20 - 32 mmol/L    Anion Gap 10 3 - 14 mmol/L    Glucose 136 (H) 70 - 99 mg/dL    Urea Nitrogen 31 (H) 7 - 30 mg/dL    Creatinine 1.51 (H) 0.66 -  1.25 mg/dL    GFR Estimate 44 (L) >60 mL/min/1.7m2    GFR Estimate If Black 54 (L) >60 mL/min/1.7m2    Calcium 9.6 8.5 - 10.1 mg/dL   CBC with platelets + differential   Result Value Ref Range    WBC 17.2 (H) 4.0 - 11.0 10e9/L    RBC Count 4.09 (L) 4.4 - 5.9 10e12/L    Hemoglobin 12.7 (L) 13.3 - 17.7 g/dL    Hematocrit 39.0 (L) 40.0 - 53.0 %    MCV 95 78 - 100 fl    MCH 31.1 26.5 - 33.0 pg    MCHC 32.6 31.5 - 36.5 g/dL    RDW 15.5 (H) 10.0 - 15.0 %    Platelet Count 221 150 - 450 10e9/L    Diff Method Automated Method     % Neutrophils 82.6 %    % Lymphocytes 8.8 %    % Monocytes 8.1 %    % Eosinophils 0.0 %    % Basophils 0.1 %    % Immature Granulocytes 0.4 %    Nucleated RBCs 0 0 /100    Absolute Neutrophil 14.2 (H) 1.6 - 8.3 10e9/L    Absolute Lymphocytes 1.5 0.8 - 5.3 10e9/L    Absolute Monocytes 1.4 (H) 0.0 - 1.3 10e9/L    Absolute Eosinophils 0.0 0.0 - 0.7 10e9/L    Absolute Basophils 0.0 0.0 - 0.2 10e9/L    Abs Immature Granulocytes 0.1 0 - 0.4 10e9/L    Absolute Nucleated RBC 0.0    Procalcitonin   Result Value Ref Range    Procalcitonin 0.17 ng/ml   Abd/pelvis CT no contrast - Stone Protocol    Narrative    Exam: CT ABDOMEN PELVIS W/O CONTRAST  4/19/2018 10:45 AM    History: Right flank pain.    Comparison: Aortic aneurysm ultrasound dated 7/8/2014.    Technique: Volumetric acquisition with reconstruction in the   axial,  coronal planes through the abdomen and pelvis without contrast.  Radiation dose for this scan was reduced using automated exposure  control, adjustment of the mA and/or kV according to patient   size, or  iterative reconstruction technique.    Contrast: None    Findings:   Lung Bases: Bronchiectasis involving the left lower lobe and  atelectasis in both bases. No pleural or pericardial effusion.    Abdomen: Unenhanced liver, spleen, adrenal glands and gallbladder   are  normal. Atrophic pancreas. Multiple vascular calcifications in   both  kidneys. Additionally, there is a small group of  stones in the   distal  right ureter, the largest measures 2 x 3 mm (series 2 image 72).   There  is mild to moderate associated right hydronephrosis and   hydroureter.  No definite left renal or ureteral calculi are seen.    Infrarenal abdominal aortic aneurysm measures 5.2 x 4.5 cm in   diameter  (series 2 image 36), this is increased from 3.7 cm on ultrasound   dated  7/8/2014. Additionally, common iliac artery aneurysms are also   seen,  measuring 2.1 cm on the right and 1.9 cm on the left (series 2   image  49).    No areas of bowel wall thickening or bowel dilatation. Normal  appendix. No free fluid. No abdominal or pelvic lymphadenopathy.    Bones: No concerning lytic or sclerotic lesions.      Impression    Impression:   1. 2 x 3 mm obstructing stone at the right ureterovesical   junction  with mild to moderate associated right hydronephrosis and   hydroureter.  2. Infrarenal abdominal aortic aneurysm measures up to 5.2 cm in  maximal dimension, increased from 3.7 cm on 7/8/2014. Aneurysm   extends  into both the right and left common iliac arteries.  3. Bronchiectasis in the left lower lobe.    JIMI FLANAGAN MD   UA reflex to Microscopic   Result Value Ref Range    Color Urine Yellow     Appearance Urine Clear     Glucose Urine Negative NEG^Negative mg/dL    Bilirubin Urine Negative NEG^Negative    Ketones Urine 10 (A) NEG^Negative mg/dL    Specific Gravity Urine 1.022 1.003 - 1.035    Blood Urine Negative NEG^Negative    pH Urine 5.0 5.0 - 7.0 pH    Protein Albumin Urine 30 (A) NEG^Negative mg/dL    Urobilinogen mg/dL Normal 0.0 - 2.0 mg/dL    Nitrite Urine Negative NEG^Negative    Leukocyte Esterase Urine Negative NEG^Negative    Source Midstream Urine    Lactic acid   Result Value Ref Range    Lactic Acid 1.4 0.7 - 2.0 mmol/L              Lactic acid level STAT   Result Value Ref Range    Lactic Acid 1.1 0.7 - 2.0 mmol/L   Comprehensive metabolic panel   Result Value Ref Range    Sodium 145 (H) 133 - 144  mmol/L    Potassium 4.3 3.4 - 5.3 mmol/L    Chloride 115 (H) 94 - 109 mmol/L    Carbon Dioxide 25 20 - 32 mmol/L    Anion Gap 5 3 - 14 mmol/L    Glucose 103 (H) 70 - 99 mg/dL    Urea Nitrogen 32 (H) 7 - 30 mg/dL    Creatinine 1.40 (H) 0.66 - 1.25 mg/dL    GFR Estimate 48 (L) >60 mL/min/1.7m2    GFR Estimate If Black 59 (L) >60 mL/min/1.7m2    Calcium 8.8 8.5 - 10.1 mg/dL    Bilirubin Total 0.5 0.2 - 1.3 mg/dL    Albumin 3.0 (L) 3.4 - 5.0 g/dL    Protein Total 6.5 (L) 6.8 - 8.8 g/dL    Alkaline Phosphatase 75 40 - 150 U/L    ALT 11 0 - 70 U/L    AST 12 0 - 45 U/L   CBC with platelets   Result Value Ref Range    WBC 12.5 (H) 4.0 - 11.0 10e9/L    RBC Count 3.49 (L) 4.4 - 5.9 10e12/L    Hemoglobin 11.0 (L) 13.3 - 17.7 g/dL    Hematocrit 33.6 (L) 40.0 - 53.0 %    MCV 96 78 - 100 fl    MCH 31.5 26.5 - 33.0 pg    MCHC 32.7 31.5 - 36.5 g/dL    RDW 15.7 (H) 10.0 - 15.0 %    Platelet Count 167 150 - 450 10e9/L   EKG 12-lead, tracing only   Result Value Ref Range    Interpretation ECG Click View Image link to view waveform and result    XR Surgery VASQUEZ L/T 5 Min Fluoro w Stills    Narrative    SURGERY C-ARM FLUOROSCOPY LESS THAN 5 MINUTES WITH STILLS  4/20/2018  7:55 AM     COMPARISON: None.    HISTORY: Cysto, right retrograde, right stent.    NUMBER OF IMAGES ACQUIRED: 3    VIEWS: 1    FLUOROSCOPY TIME: .3 minutes.      Impression    IMPRESSION: Double-J stent was placed from the right renal pelvis to  the bladder. The catheter is somewhat coiled in the renal pelvis on  the final image.    MD Tresa SUH, APRN, CNP  Cardiology  Pager:  [phone]

## 2018-04-21 VITALS
WEIGHT: 139.6 LBS | HEIGHT: 71 IN | RESPIRATION RATE: 16 BRPM | TEMPERATURE: 97.5 F | DIASTOLIC BLOOD PRESSURE: 78 MMHG | OXYGEN SATURATION: 97 % | HEART RATE: 83 BPM | BODY MASS INDEX: 19.54 KG/M2 | SYSTOLIC BLOOD PRESSURE: 118 MMHG

## 2018-04-21 LAB
ANION GAP SERPL CALCULATED.3IONS-SCNC: 8 MMOL/L (ref 3–14)
BUN SERPL-MCNC: 19 MG/DL (ref 7–30)
CALCIUM SERPL-MCNC: 8.7 MG/DL (ref 8.5–10.1)
CHLORIDE SERPL-SCNC: 109 MMOL/L (ref 94–109)
CO2 SERPL-SCNC: 24 MMOL/L (ref 20–32)
CREAT SERPL-MCNC: 0.86 MG/DL (ref 0.66–1.25)
ERYTHROCYTE [DISTWIDTH] IN BLOOD BY AUTOMATED COUNT: 15.4 % (ref 10–15)
GFR SERPL CREATININE-BSD FRML MDRD: 85 ML/MIN/1.7M2
GLUCOSE SERPL-MCNC: 96 MG/DL (ref 70–99)
HCT VFR BLD AUTO: 36.3 % (ref 40–53)
HGB BLD-MCNC: 12 G/DL (ref 13.3–17.7)
MAGNESIUM SERPL-MCNC: 2.1 MG/DL (ref 1.6–2.3)
MCH RBC QN AUTO: 31.3 PG (ref 26.5–33)
MCHC RBC AUTO-ENTMCNC: 33.1 G/DL (ref 31.5–36.5)
MCV RBC AUTO: 95 FL (ref 78–100)
PLATELET # BLD AUTO: 164 10E9/L (ref 150–450)
POTASSIUM SERPL-SCNC: 3.8 MMOL/L (ref 3.4–5.3)
RBC # BLD AUTO: 3.84 10E12/L (ref 4.4–5.9)
SODIUM SERPL-SCNC: 141 MMOL/L (ref 133–144)
WBC # BLD AUTO: 8.9 10E9/L (ref 4–11)

## 2018-04-21 PROCEDURE — 83735 ASSAY OF MAGNESIUM: CPT | Performed by: INTERNAL MEDICINE

## 2018-04-21 PROCEDURE — 99239 HOSP IP/OBS DSCHRG MGMT >30: CPT | Performed by: INTERNAL MEDICINE

## 2018-04-21 PROCEDURE — 36415 COLL VENOUS BLD VENIPUNCTURE: CPT | Performed by: INTERNAL MEDICINE

## 2018-04-21 PROCEDURE — 25000132 ZZH RX MED GY IP 250 OP 250 PS 637: Performed by: INTERNAL MEDICINE

## 2018-04-21 PROCEDURE — 85027 COMPLETE CBC AUTOMATED: CPT | Performed by: INTERNAL MEDICINE

## 2018-04-21 PROCEDURE — 25000132 ZZH RX MED GY IP 250 OP 250 PS 637: Performed by: NURSE PRACTITIONER

## 2018-04-21 PROCEDURE — 80048 BASIC METABOLIC PNL TOTAL CA: CPT | Performed by: INTERNAL MEDICINE

## 2018-04-21 RX ORDER — METOPROLOL SUCCINATE 50 MG/1
50 TABLET, EXTENDED RELEASE ORAL DAILY
Qty: 30 TABLET | Refills: 0 | Status: SHIPPED | OUTPATIENT
Start: 2018-04-21 | End: 2018-05-10

## 2018-04-21 RX ADMIN — LISINOPRIL 2.5 MG: 2.5 TABLET ORAL at 09:12

## 2018-04-21 RX ADMIN — ACETAMINOPHEN 650 MG: 325 TABLET, FILM COATED ORAL at 03:54

## 2018-04-21 RX ADMIN — ISOSORBIDE MONONITRATE 30 MG: 30 TABLET, EXTENDED RELEASE ORAL at 09:12

## 2018-04-21 RX ADMIN — ACETAMINOPHEN 1000 MG: 500 TABLET, FILM COATED ORAL at 09:13

## 2018-04-21 RX ADMIN — ASPIRIN 81 MG: 81 TABLET, COATED ORAL at 09:12

## 2018-04-21 RX ADMIN — SENNOSIDES AND DOCUSATE SODIUM 1 TABLET: 8.6; 5 TABLET ORAL at 09:12

## 2018-04-21 RX ADMIN — METOPROLOL SUCCINATE 50 MG: 50 TABLET, EXTENDED RELEASE ORAL at 09:12

## 2018-04-21 NOTE — DISCHARGE SUMMARY
[facility]  Discharge Summary        Nacho Joseph MRN# [id number]   YOB: 1935 Age: 82 year old     Date of Admission:  4/19/2018  Date of Discharge:  4/21/2018  Admitting Physician:  Sylvia Carney MD  Discharge Physician: Olivia Escobar MD  Discharging Service: Hospitalist     Primary Provider: Paulino Lynch  Primary Care Physician Phone Number: None         Discharge Diagnoses/Problem Oriented Hospital Course (Providers):    Nacho Joseph was admitted on 4/19/2018 by Sylvia Carney MD and I would refer you to their history and physical.  The following problems were addressed during his hospitalization:  Nacho Joseph is a 82 year old male who was admitted on 4/19/2018.  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  Patient was evaluated in the emergency room where he was found to have possible obstructing 2 x 3 mm stone at right ureterovesical junction associated with right hydronephrosis and hydroureter.  He also has known history of AAA but on the current CT scan it has progressed to 5.2 cm in maximal dimension as compared to 3.7 in 2014. patient was admitted for further evaluation and management.          Assessment & Plan         Active Problems:  Obstructive uropathy  WITH CLARITA DUE TO OBSTRUCTIVE UROPATHY HE IS S/P  Cystoscopy, right retrograde, right ureteral stent placement:   CT scan of the abdomen showed 2 x 3 mm obstructing stone at the right ureterovesical junction with mild to moderate associated right  hydronephrosis and hydroureter ON ADMISSION   CR improved to 0.8 today from 1.5 2 days ago   ON iv CEFTRIAXONE. D/venita today. No need for antibiotics per UROLOGY   ADMISSION UA was negative   Post op management per urology  Continue IVF today   AAA (abdominal aortic aneurysm) (H) (7/8/2014): Patient has history of abdominal aortic aneurysm measuring about 4.1 cm in 2015 it has not been assessed since then as today patient presented with right-sided flank pain CT scan of the abdomen and pelvis was done which is showing infrarenal abdominal aortic aneurysm measuring 5.2 into 4.5 cm in diameter that has increased from 3.7 cm on ultrasound dated July 2014  Vascular surgery consulted and they reviewd the CT and thought the AAA is at 4.6 cm  Recommended CT chest abdomen to be repeated in 6months   HTN (hypertension) (7/8/2014): Patient was initially hypotensive on presentation with blood pressure of 75/59 but now his blood pressure has improved to 121/88 he is on beta-blocker , Lisinopril and Norvasc at home.    Hyperlipidemia (7/8/2014): on Lipitor at home.    Atrioventricular block, complete (HEART) (7/8/2014): Status post pacemaker placement    CAD (coronary artery disease) (2/9/2015): Patient was  recently evaluated by Dr. Marco Burnett from cardiology on April 16 for his history of coronary artery disease, his last coronary angiography was in 2015 which showed chronically occluded proximal right coronary artery.  He also had high-grade stenosis and proximal circumflex and moderate though no flow-limiting disease in the left anterior descending artery.  Cardiology wants to do angiogram out pt and see him in the clinic on 4/27  Pt is asymptomatic today   Ischemic cardiomyopathy (2/9/2015): Improved, echo done yesterday showed left ventricular systolic function estimated at more than 70%.      -- continue to monitor patient on telemetry  --Continue patient on home dose of aspirin 81 mg p.o. daily, Norvasc 5 mg p.o.  daily, Imdur 30 mg p.o. daily, lisinopril 2.5 mg p.o. daily but will decrease his dose of Toprol-XL to 25 mg p.o. daily from 100 mg due to borderline blood pressure.  Will place holding parameters on the blood pressure medication.  Only discharging with imdur and toprol XL 50mg PO daily    Depression (7/21/2014)  -- currently stable , not taking any medications         COPD (chronic obstructive pulmonary disease) (H) (7/21/2014)  -- ex smoker , quit 5 years ago, no active issues , not on any home INH or oxygen.     DVT Prophylaxis: Pneumatic Compression Devices  Code Status: Full Code     Disposition: back to [facility] today      Olivia Escobar MD  [phone] (P)           Code Status:      Full Code        Brief Hospital Stay Summary Sent Home With Patient in AVS:        Reason for your hospital stay       Kidney stone s/p right ureteral stent placement                        Important Results:      See below         Pending Results:        Unresulted Labs Ordered in the Past 30 Days of this Admission     No orders found from 2/18/2018 to 4/20/2018.            Discharge Instructions and Follow-Up:      Follow-up Appointments     Follow-up and recommended labs and tests        F/u with cardiology in 1week on 4/27  F/u with urology in 2weeks  CT chest abdomen with contrast in 6months  F/u with vascular medicine in 6months for AAA                      Discharge Disposition:      Discharged to home        Discharge Medications:        Current Discharge Medication List      CONTINUE these medications which have CHANGED    Details   metoprolol succinate (TOPROL-XL) 50 MG 24 hr tablet Take 1 tablet (50 mg) by mouth daily  Qty: 30 tablet, Refills: 0    Associated Diagnoses: Abdominal aortic aneurysm (AAA) without rupture (H)         CONTINUE these medications which have NOT CHANGED    Details   Acetaminophen (TYLENOL PO) Take 1,000 mg by mouth 3 times daily      aspirin 81 MG tablet Take 1 tablet (81 mg) by mouth daily     "Associated Diagnoses: Chest pain      atorvastatin (LIPITOR) 40 MG tablet Take 1 tablet (40 mg) by mouth daily    Associated Diagnoses: CAD (coronary artery disease); Hyperlipidemia      isosorbide mononitrate (IMDUR) 30 MG 24 hr tablet Take 1 tablet (30 mg) by mouth daily  Qty: 90 tablet, Refills: 3    Associated Diagnoses: Ischemic cardiomyopathy      magnesium hydroxide (MILK OF MAGNESIA) 400 MG/5ML suspension Take 30 mLs by mouth daily as needed for constipation or heartburn      nitroglycerin (NITROSTAT) 0.4 MG SL tablet Place 1 tablet (0.4 mg) under the tongue every 5 minutes as needed for chest pain  Qty: 25 tablet, Refills: 4    Associated Diagnoses: Chest pain      Pseudoephedrine-Guaifenesin (MUCINEX D MAX STRENGTH PO) Take 800 mg by mouth daily          STOP taking these medications       amLODIPine (NORVASC) 5 MG tablet Comments:   Reason for Stopping:         doxycycline (VIBRAMYCIN) 100 MG capsule Comments:   Reason for Stopping:         lisinopril (PRINIVIL/ZESTRIL) 2.5 MG tablet Comments:   Reason for Stopping:                 Allergies:       No Known Allergies        Consultations This Hospital Stay:      Consultation during this admission received from urology        Condition and Physical on Discharge:      Discharge condition: Stable   Vitals: Blood pressure 118/78, pulse 83, temperature 97.5  F (36.4  C), temperature source Oral, resp. rate 16, height 1.8 m (5' 10.87\"), weight 63.3 kg (139 lb 9.6 oz), SpO2 97 %.     Constitutional: Alert and awake    Lungs: CTA   Cardiovascular: RRR   Abdomen: Soft, NT, ND, BS+   Skin: Warm and dry    Other:          Discharge Time:      Greater than 30 minutes.        Image Results From This Hospital Stay (For Non-EPIC Providers):        Results for orders placed or performed during the hospital encounter of 04/19/18   Abd/pelvis CT no contrast - Stone Protocol    Narrative    Exam: CT ABDOMEN PELVIS W/O CONTRAST  4/19/2018 10:45 AM    History: Right flank " pain.    Comparison: Aortic aneurysm ultrasound dated 7/8/2014.    Technique: Volumetric acquisition with reconstruction in the axial,  coronal planes through the abdomen and pelvis without contrast.  Radiation dose for this scan was reduced using automated exposure  control, adjustment of the mA and/or kV according to patient size, or  iterative reconstruction technique.    Contrast: None    Findings:   Lung Bases: Bronchiectasis involving the left lower lobe and  atelectasis in both bases. No pleural or pericardial effusion.    Abdomen: Unenhanced liver, spleen, adrenal glands and gallbladder are  normal. Atrophic pancreas. Multiple vascular calcifications in both  kidneys. Additionally, there is a small group of stones in the distal  right ureter, the largest measures 2 x 3 mm (series 2 image 72). There  is mild to moderate associated right hydronephrosis and hydroureter.  No definite left renal or ureteral calculi are seen.    Infrarenal abdominal aortic aneurysm measures 5.2 x 4.5 cm in diameter  (series 2 image 36), this is increased from 3.7 cm on ultrasound dated  7/8/2014. 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    No areas of bowel wall thickening or bowel dilatation. Normal  appendix. No free fluid. No abdominal or pelvic lymphadenopathy.    Bones: No concerning lytic or sclerotic lesions.      Impression    Impression:   1. 2 x 3 mm obstructing stone at the right ureterovesical junction  with mild to moderate associated right hydronephrosis and hydroureter.  2. Infrarenal abdominal aortic aneurysm measures up to 5.2 cm in  maximal dimension, increased from 3.7 cm on 7/8/2014. Aneurysm extends  into both the right and left common iliac arteries.  3. Bronchiectasis in the left lower lobe.    JIMI FLANAGAN MD   XR Surgery VASQUEZ L/T 5 Min Fluoro w Stills    Narrative    SURGERY C-ARM FLUOROSCOPY LESS THAN 5 MINUTES  WITH STILLS  4/20/2018  7:55 AM     COMPARISON: None.    HISTORY: Cysto, right retrograde, right stent.    NUMBER OF IMAGES ACQUIRED: 3    VIEWS: 1    FLUOROSCOPY TIME: .3 minutes.      Impression    IMPRESSION: Double-J stent was placed from the right renal pelvis to  the bladder. The catheter is somewhat coiled in the renal pelvis on  the final image.    SEBASTIAN OLSON MD             Most Recent Lab Results In EPIC (For Non-EPIC Providers):    Most Recent 3 CBC's:  Recent Labs   Lab Test  04/21/18   0646  04/20/18   0415  04/19/18   0955   WBC  8.9  12.5*  17.2*   HGB  12.0*  11.0*  12.7*   MCV  95  96  95   PLT  164  167  221      Most Recent 3 BMP's:  Recent Labs   Lab Test  04/21/18   0646  04/20/18   0415  04/19/18   0955   NA  141  145*  142   POTASSIUM  3.8  4.3  4.2   CHLORIDE  109  115*  109   CO2  24  25  23   BUN  19  32*  31*   CR  0.86  1.40*  1.51*   ANIONGAP  8  5  10   ARELIS  8.7  8.8  9.6   GLC  96  103*  136*     Most Recent 3 Troponin's:  Recent Labs   Lab Test  07/07/14   1040  07/07/14   0655  07/07/14   0240  07/07/14   0020   TROPI  0.203*  0.182*  0.143*   --    TROPONIN   --    --    --   0.16*     Most Recent 3 INR's:  Recent Labs   Lab Test  02/20/15   0900  07/07/14   0015   INR  0.93  1.00     Most Recent 2 LFT's:  Recent Labs   Lab Test  04/20/18   0415  07/07/14   0015   AST  12  23   ALT  11  26   ALKPHOS  75  83   BILITOTAL  0.5  0.8     Most Recent Cholesterol Panel:No lab results found.  Most Recent 6 Bacteria Isolates From Any Culture (See EPIC Reports for Culture Details):No lab results found.  Most Recent TSH, T4 and HgbA1c:   Recent Labs   Lab Test  07/07/14   0015   TSH  2.07

## 2018-04-21 NOTE — PROGRESS NOTES
UROLOGY BRIEF NOTE  RESTING COMFORTABLY, V.S.S., WBC 8.9 CLINICALLY STABLE.  WILL FOLLOWUP WITH DR BAKER IN 3 WKS . CALL THE OFFICE FOR AN APPT [phone]. REST PER PRIMARY SERVICE. SIGN OFF

## 2018-04-21 NOTE — PLAN OF CARE
Problem: Patient Care Overview  Goal: Plan of Care/Patient Progress Review  Outcome: No Change  BPs remain soft, all other VS WDL.  A/O, ambulates assist 1.  Cymro speaking,  present for beginning of shift.  Voiding with bedside urinal, Pt c/o pain only during urination.  BS present, tolerating fulls.

## 2018-04-21 NOTE — PLAN OF CARE
Problem: Surgery Nonspecified (Adult)  Goal: Signs and Symptoms of Listed Potential Problems Will be Absent, Minimized or Managed (Surgery Nonspecified)  Signs and symptoms of listed potential problems will be absent, minimized or managed by discharge/transition of care (reference Surgery Nonspecified (Adult) CPG).   Outcome: Improving  A/o x 4.  C/o discomfort to R flank tylenol given with effectiveness.  Voiding adequately in urinal or bathroom with dark red urine.  Tolerating regular diet.  Up with SBA in room and hallway.  IV not flushing DC'd.  Plan to DC back to LTC facility today.

## 2018-04-21 NOTE — PLAN OF CARE
Problem: Patient Care Overview  Goal: Discharge Needs Assessment  Outcome: Improving  Alert and oriented, Salvadorean speaking. VSS. Reports right sided pain, managed with tylenol. Denies nausea/vomiting. Tolerating full liquid diet, advanced to regular. Voiding in urinal. Up with stand by assist.

## 2018-04-21 NOTE — PROGRESS NOTES
Vascular surgery plans per Dr. Jensen's previous note. CTA abdomen and pelvis in 6 months. Vascular service will arrange for follow up. Will sign off.     Saeed Irene M.D.

## 2018-04-21 NOTE — PROGRESS NOTES
SWS  D:  Per MD update, pt is ready to return to his facility today:  [facility].  I:  SW attempted to reach [facility] staff multiple times by phone, however, there is no answer [phone].  P:  SW will continue to attempt to reach [facility] staff to discuss pt return.    Update:  SW also attempted to reach [facility] staff by calling [phone]; still no answer.      Update:  At 1500, JENN reached [facility] on call liaison Irma [phone].  Irma will contact [facility] nursing staff to make sure they can accept pt back today and call SW back with an update asap.  Pt is in room very frustrated that he has not gotten back to his SNF today since SW has been unable to reach SNF staff.        1540 update:  [facility] can accept pt back today.  Since SW was unable to reach [facility], JENN arranged w/c transport thru [facility] for 1630 today.  Irma at [facility] updated with time.  Orders faxed.  Pt has MA, so transport should be covered.

## 2018-04-23 ENCOUNTER — HOSPITAL ENCOUNTER (OUTPATIENT)
Facility: CLINIC | Age: 83
End: 2018-04-23
Attending: UROLOGY | Admitting: UROLOGY
Payer: MEDICAID

## 2018-04-23 ENCOUNTER — CARE COORDINATION (OUTPATIENT)
Dept: CARDIOLOGY | Facility: CLINIC | Age: 83
End: 2018-04-23

## 2018-04-23 RX ORDER — CEFAZOLIN SODIUM 2 G/100ML
2 INJECTION, SOLUTION INTRAVENOUS
Status: CANCELLED | OUTPATIENT
Start: 2018-04-23

## 2018-04-23 NOTE — PROGRESS NOTES
Patient was evaluated by cardiology while inpatient for scheduled angiogram, but presented with flank pain and received ureteral stent.RN called [facility] to confirm the follow up plan for patient with Care Coordinator. RN confirmed with Care Coordinator that patient has a scheduled F/U appt on 4/24/18 for carotid u/s (845am) and aorta/iliac/IVC (10am) and on 4/27/18 with NORRIS Scar aHrris. RN confirmed with Care Coordinator that patient will bring list of daily weights/vitals, last progress note, MAR, active orders, and provider order sheet to appt.

## 2018-04-25 LAB — INTERPRETATION ECG - MUSE: NORMAL

## 2018-04-26 NOTE — H&P (VIEW-ONLY)
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  Patient was evaluated in the emergency room where he was found to have possible obstructing 2 x 3 mm stone at right ureterovesical junction associated with right hydronephrosis and hydroureter.  He also has known history of AAA but on the current CT scan it has progressed to 5.2 cm in maximal dimension as compared to 3.7 in 2014. patient was admitted for further evaluation and management.    Assessment & Plan     Active Problems:  Obstructive uropathy (4/19/2018): Possibly causing his right-sided flank pain and CT scan of the abdomen showed 2 x 3 mm obstructing stone at the right ureterovesical junction with mild to moderate associated right hydronephrosis and hydroureter.  Patient also have associated leukocytosis although his pro-calcitonin is negative at this point.  He also have associated increase in creatinine.  Acute renal failure: Most likely secondary to obstructive uropathy as above.    --Admit patient to general medical floor with telemetry.  --Diet and activity orders.  --We will start patient only on a very gentle hydration as patient has a very poor oral intake but would not worsen his hydronephrosis by giving him too much fluids  --At this time considering her obstructive uropathy and leukocytosis, will start patient on IV ceftriaxone until she is evaluated by urology.  --We will consult urology will start patient on diet  right now probably will make patient n.p.o. after midnight.  -- continue patient on his home dose of Flomax.    AAA (abdominal aortic aneurysm) (H) (7/8/2014): Patient has history of abdominal aortic aneurysm measuring about 4.1 cm in 2015 it has not been assessed since then as today patient presented with right-sided flank pain CT scan of the abdomen and pelvis was done which is showing infrarenal abdominal aortic aneurysm measuring 5.2 into 4.5 cm in diameter that has increased from 3.7 cm on ultrasound dated July 2014.  There are also seen, and iliac artery aneurysms measuring 2.1 cm on the right and 1.9 cm on the left.    -- will consult vascular surgery , considering patient was also planned to go for angiogram.      HTN (hypertension) (7/8/2014): Patient was initially hypotensive on presentation with blood pressure of 75/59 but now his blood pressure has improved to 121/88 he is on beta-blocker , Lisinopril and Norvasc at home.    Hyperlipidemia (7/8/2014): on Lipitor at home.    Atrioventricular block, complete (HEART) (7/8/2014): Status post pacemaker placement    CAD (coronary artery disease) (2/9/2015): Patient was  recently evaluated by Dr. Marco Burnett from cardiology on April 16 for his history of coronary artery disease, his last coronary angiography was in 2015 which showed chronically occluded proximal right coronary artery.  He also had high-grade stenosis and proximal circumflex and moderate though no flow-limiting disease in the left anterior descending artery.  As this patient continues to have chest discomfort on minimal exertion and also have associated shortness of breath on exertion over the last few weeks cardiology was planning patient to take for cardiac cath today.    Ischemic cardiomyopathy (2/9/2015): Improved, echo done yesterday showed left ventricular systolic function estimated at more than 70%.    -- continue to monitor patient on telemetry  --Continue patient on home dose of  aspirin 81 mg p.o. daily, Norvasc 5 mg p.o. daily, Imdur 30 mg p.o. daily, lisinopril 2.5 mg p.o. daily but will decrease his dose of Toprol-XL to 25 mg p.o. daily from 100 mg due to borderline blood pressure.  Will place holding parameters on the blood pressure medication.  --We will consult cardiology as patient was planned to go for cardiac angiogram, we might need to coordinate plans of angiogram with vascular surgery considering his AAA.      Depression (7/21/2014)  -- currently stable , not taking any medications       COPD (chronic obstructive pulmonary disease) (H) (7/21/2014)  -- ex smoker , quit 5 years ago, no active issues , not on any home INH or oxygen.    # Pain Assessment:  Current Pain Score 4/19/2018   Pain score (0-10) 7   Pain location -   - Nacho is experiencing pain due to renal stone. Pain management was discussed and the plan was created in a collaborative fashion.  Nacho's response to the current recommendations: engaged  - Opioid regimen: Roxicodone and low dose IV Dilaudid   - Response to opioid medications: Reduction of symptoms   - Bowel regimen: senna  - Pharmacologic adjuvants: Acetaminophen    DVT Prophylaxis: Pneumatic Compression Devices and Anti-embolisim stockings (TEDs)  Code Status: Full Code    Disposition: Expecting patient to stay for the next couple of days here for stabilization of his obstructive uropathy in the presence of active workup for his coronary artery disease and his AAA.  1 of my hospitalist colleague will assume the care from tomorrow morning.   is posted for discharge planning. Currently patient lives in [facility] with her wife.    Sylvia Carney MD,FACP    Primary Care Physician   Paulino Lynch    Chief Complaint   Right flank pain, he was scheduled to get cardiac catheterization today.    History is obtained from the patient    Patient Active Problem List   Diagnosis     Bradycardia     AAA (abdominal aortic  aneurysm) (H)     HTN (hypertension)     Hyperlipidemia     Atrioventricular block, complete (HEART)     Depression     COPD (chronic obstructive pulmonary disease) (H)     Lung abscess (H)     Chest pain     CAD (coronary artery disease)     Ischemic cardiomyopathy     Obstructive uropathy       History of Present Illness   Nacho Joseph is a 82 year old male With past medical history significant for essential hypertension, hyperlipidemia, coronary artery disease, COPD, ischemic cardiomyopathy, AV block completed status post pacemaker placement and previous history of AAA who was admitted from the emergency room where he presented with right flank pain.    Patient was  recently evaluated by Dr. Marco Burnett from cardiology on April 16 for his history of coronary artery disease, his last coronary angiography was in 2015 which showed chronically occluded proximal right coronary artery.  He also had high-grade stenosis and proximal circumflex and moderate though no flow-limiting disease in the left anterior descending artery.  As this patient continues to have chest discomfort on minimal exertion and also have associated shortness of breath on exertion over the last few weeks cardiology was planning patient to take for cardiac cath today.  Of note patient also has history of complete heart block in the past for which he has undergone permanent pacemaker placement.    Patient also have history of abdominal aortic aneurysm measuring about 4.1 cm in 2015 it has not been assessed since then as today patient presented with right-sided flank pain CT scan of the abdomen and pelvis was done which is showing infrarenal abdominal aortic aneurysm measuring 5.2 into 4.5 cm in diameter that has increased from 3.7 cm on ultrasound dated July 2014.  There are also seen, and iliac artery aneurysms measuring 2.1 cm on the right and 1.9 cm on the left.    CT scan of the abdomen and pelvis also showed 2 x 3 mm obstructing  stone at the right ureterovesical junction with mild to moderate associated right hydronephrosis and hydroureter.  Patient is complaining of significant right-sided lower abdominal pain radiating towards his right flank which is started yesterday and he is also have associated nausea and not feeling generalized well, he did not eat anything yesterday and this morning.  He felt that he has some chills but not too much he does not know if he had any fever.  Most of the history was obtained with the help of Irish .  Patient is denying any similar pains in the past or any similar history of renal or gallbladder stones in the past.  I did review his CT scan findings of his worsening AAA with the patient.  Patient was admitted for further evaluation and management.    Past Medical History    I have reviewed this patient's medical history and updated it with pertinent information if needed.   Past Medical History:   Diagnosis Date     AAA (abdominal aortic aneurysm) (H) 7/8/2014    4.1 cm     Anemia      Atrioventricular block, complete (HEART) 7/8/2014    S/p PPM 7/2014     Bradycardia      CAD (coronary artery disease) 2/9/2015     Chest pain 1/20/2015     COPD (chronic obstructive pulmonary disease) (H)      Depression 7/21/2014     Depressive disorder      HTN (hypertension) 7/8/2014     Hyperlipidemia 7/8/2014     Hypertension      Ischemic cardiomyopathy 2/9/2015     Lung abscess (H)      Syncope        Past Surgical History   I have reviewed this patient's surgical history and updated it with pertinent information if needed.  Past Surgical History:   Procedure Laterality Date     CORONARY ANGIOGRAPHY ADULT ORDER  2/20/15    medical managed     IMPLANT PACEMAKER  7/7/14    dual chamber       Prior to Admission Medications   Prior to Admission Medications   Prescriptions Last Dose Informant Patient Reported? Taking?   Acetaminophen (TYLENOL PO) 4/17/2018 Nursing Home Yes Yes   Sig: Take 1,000 mg by mouth  3 times daily   Pseudoephedrine-Guaifenesin (MUCINEX D MAX STRENGTH PO) 4/17/2018 Nursing Home Yes Yes   Sig: Take 800 mg by mouth daily    amLODIPine (NORVASC) 5 MG tablet 4/16/2018 prison No Yes   Sig: Take 1 tablet (5 mg) by mouth daily   aspirin 81 MG tablet 4/17/2018 prison No Yes   Sig: Take 1 tablet (81 mg) by mouth daily   atorvastatin (LIPITOR) 40 MG tablet 4/16/2018 prison No Yes   Sig: Take 1 tablet (40 mg) by mouth daily   doxycycline (VIBRAMYCIN) 100 MG capsule 4/17/2018 prison Yes Yes   Sig: Take 100 mg by mouth 2 times daily   isosorbide mononitrate (IMDUR) 30 MG 24 hr tablet 4/17/2018 prison No Yes   Sig: Take 1 tablet (30 mg) by mouth daily   lisinopril (PRINIVIL/ZESTRIL) 2.5 MG tablet 4/17/2018 prison No Yes   Sig: Take 1 tablet (2.5 mg) by mouth daily   magnesium hydroxide (MILK OF MAGNESIA) 400 MG/5ML suspension  Nursing Home Yes Yes   Sig: Take 30 mLs by mouth daily as needed for constipation or heartburn   metoprolol succinate (TOPROL-XL) 100 MG 24 hr tablet 4/17/2018 prison No Yes   Sig: Take 1 tablet (100 mg) by mouth daily   nitroglycerin (NITROSTAT) 0.4 MG SL tablet  Nursing Home No Yes   Sig: Place 1 tablet (0.4 mg) under the tongue every 5 minutes as needed for chest pain      Facility-Administered Medications: None     Allergies   No Known Allergies    Social History   I have reviewed this patient's social history and updated it with pertinent information if needed. Nacho Joseph  reports that he quit smoking about 5 years ago. He has never used smokeless tobacco. He reports that he does not drink alcohol or use illicit drugs.    Family History   I have reviewed this patient's family history and updated it with pertinent information if needed.   Family History   Problem Relation Age of Onset     Other - See Comments Mother      old age     HEART DISEASE Father      unknown       Review of Systems   CONSTITUTIONAL:  positive for  fatigue  and generalized weakness.  EYES:  negative for blurred vision and visual disturbance  HEENT:  negative for hoarseness and voice change  RESPIRATORY:  negative for cough with sputum, dyspnea, wheezing and chest pain  CARDIOVASCULAR:  negative for chest pain, palpitations, orthopnea, exertional chest pressure/discomfort at this point but he has been   GASTROINTESTINAL: Negative for abd pain, nausea , vomiting ,constipation and abdominal pain  GENITOURINARY: Negative for burning /urgency and frequency.  HEMATOLOGIC/LYMPHATIC:  negative  ALLERGIC/IMMUNOLOGIC:  negative for drug reactions  ENDOCRINE:  negative for diabetic symptoms including polyuria, polydipsia and weight loss  MUSCULOSKELETAL:  positive for arthralgias  NEUROLOGICAL:  negative  BEHAVIOR/PSYCH: negative    Physical Exam   Temp: 98.6  F (37  C)   BP: 122/89 Pulse: 85 Heart Rate: 85 Resp: 16 SpO2: 97 % O2 Device: None (Room air)    Vital Signs with Ranges  Temp:  [98.6  F (37  C)] 98.6  F (37  C)  Pulse:  [85] 85  Heart Rate:  [85] 85  Resp:  [16] 16  BP: ()/(59-92) 122/89  SpO2:  [95 %-99 %] 97 %  138 lbs 14.24 oz    Constitutional: Awake, alert,oriented to time, place and person , cooperative, no apparent distress.Pleasent and cooperative , family present at the bedside   Eyes: Conjunctiva and pupils examined and normal.  HEENT: Moist mucous membranes, normal dentition.  Respiratory: Clear to auscultation bilaterally, no crackles or wheezing.  Cardiovascular: Regular rate and rhythm, normal S1 and S2, and no murmur noted.  GI: Soft, non-distended, non-tender, normal bowel sounds.  Lymph/Hematologic: No anterior cervical or supraclavicular adenopathy.  Skin: No rashes, no cyanosis, no edema.  Musculoskeletal: No joint swelling, erythema or tenderness.  Neurologic: Cranial nerves 2-12 intact, normal strength and sensation.  Psychiatric: Alert, oriented to person, place and time, no obvious anxiety or depression.    Data   Data reviewed today:  I  personally reviewed the EKG tracing showing NSR.    Recent Labs  Lab 04/19/18  0955   WBC 17.2*   HGB 12.7*   MCV 95         POTASSIUM 4.2   CHLORIDE 109   CO2 23   BUN 31*   CR 1.51*   ANIONGAP 10   ARELIS 9.6   *       Imaging:  Recent Results (from the past 24 hour(s))   Abd/pelvis CT no contrast - Stone Protocol    Narrative    Exam: CT ABDOMEN PELVIS W/O CONTRAST  4/19/2018 10:45 AM    History: Right flank pain.    Comparison: Aortic aneurysm ultrasound dated 7/8/2014.    Technique: Volumetric acquisition with reconstruction in the axial,  coronal planes through the abdomen and pelvis without contrast.  Radiation dose for this scan was reduced using automated exposure  control, adjustment of the mA and/or kV according to patient size, or  iterative reconstruction technique.    Contrast: None    Findings:   Lung Bases: Bronchiectasis involving the left lower lobe and  atelectasis in both bases. No pleural or pericardial effusion.    Abdomen: Unenhanced liver, spleen, adrenal glands and gallbladder are  normal. Atrophic pancreas. Multiple vascular calcifications in both  kidneys. Additionally, there is a small group of stones in the distal  right ureter, the largest measures 2 x 3 mm (series 2 image 72). There  is mild to moderate associated right hydronephrosis and hydroureter.  No definite left renal or ureteral calculi are seen.    Infrarenal abdominal aortic aneurysm measures 5.2 x 4.5 cm in diameter  (series 2 image 36), this is increased from 3.7 cm on ultrasound dated  7/8/2014. 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    No areas of bowel wall thickening or bowel dilatation. Normal  appendix. No free fluid. No abdominal or pelvic lymphadenopathy.    Bones: No concerning lytic or sclerotic lesions.      Impression    Impression:   1. 2 x 3 mm obstructing stone at the right ureterovesical  junction  with mild to moderate associated right hydronephrosis and hydroureter.  2. Infrarenal abdominal aortic aneurysm measures up to 5.2 cm in  maximal dimension, increased from 3.7 cm on 7/8/2014. Aneurysm extends  into both the right and left common iliac arteries.  3. Bronchiectasis in the left lower lobe.    JIMI FLANAGAN MD

## 2018-04-30 ENCOUNTER — HOSPITAL ENCOUNTER (OUTPATIENT)
Dept: ULTRASOUND IMAGING | Facility: CLINIC | Age: 83
Discharge: HOME | End: 2018-04-30
Attending: INTERNAL MEDICINE | Admitting: INTERNAL MEDICINE
Payer: MEDICAID

## 2018-04-30 ENCOUNTER — HOSPITAL ENCOUNTER (OUTPATIENT)
Dept: ULTRASOUND IMAGING | Facility: CLINIC | Age: 83
End: 2018-04-30
Attending: INTERNAL MEDICINE
Payer: MEDICAID

## 2018-04-30 DIAGNOSIS — I71.40 ABDOMINAL AORTIC ANEURYSM (AAA) WITHOUT RUPTURE (H): ICD-10-CM

## 2018-04-30 DIAGNOSIS — R09.89 BRUIT OF RIGHT CAROTID ARTERY: ICD-10-CM

## 2018-04-30 PROCEDURE — 93978 VASCULAR STUDY: CPT

## 2018-04-30 PROCEDURE — 93880 EXTRACRANIAL BILAT STUDY: CPT | Performed by: INTERNAL MEDICINE

## 2018-04-30 PROCEDURE — 93880 EXTRACRANIAL BILAT STUDY: CPT | Mod: TC

## 2018-04-30 PROCEDURE — 93978 VASCULAR STUDY: CPT | Performed by: INTERNAL MEDICINE

## 2018-04-30 NOTE — OR NURSING
Spoke with nurse manager (Claribel) at [facility] in [place], she states that the patient has been having chest pain and is undergoing tests for cardiac workup.  He is currently out of the facility for a cardiac ultrasound, and he is scheduled for an angiogram on Wednesday.   calling surgeon's office with update.

## 2018-05-01 ENCOUNTER — TELEPHONE (OUTPATIENT)
Dept: CARDIOLOGY | Facility: CLINIC | Age: 83
End: 2018-05-01

## 2018-05-01 RX ORDER — LIDOCAINE 40 MG/G
CREAM TOPICAL
Status: CANCELLED | OUTPATIENT
Start: 2018-05-01

## 2018-05-01 RX ORDER — SODIUM CHLORIDE 9 MG/ML
INJECTION, SOLUTION INTRAVENOUS CONTINUOUS
Status: CANCELLED | OUTPATIENT
Start: 2018-05-01

## 2018-05-01 RX ORDER — POTASSIUM CHLORIDE 1500 MG/1
20 TABLET, EXTENDED RELEASE ORAL
Status: CANCELLED | OUTPATIENT
Start: 2018-05-01

## 2018-05-02 ENCOUNTER — HOSPITAL ENCOUNTER (OUTPATIENT)
Dept: CARDIOLOGY | Facility: CLINIC | Age: 83
End: 2018-05-02
Attending: INTERNAL MEDICINE
Payer: MEDICAID

## 2018-05-02 ENCOUNTER — HOSPITAL ENCOUNTER (OUTPATIENT)
Facility: CLINIC | Age: 83
Discharge: NURSING, REHAB OR LONG-TERM CARE | End: 2018-05-02
Attending: INTERNAL MEDICINE | Admitting: INTERNAL MEDICINE
Payer: MEDICAID

## 2018-05-02 VITALS
SYSTOLIC BLOOD PRESSURE: 130 MMHG | DIASTOLIC BLOOD PRESSURE: 84 MMHG | OXYGEN SATURATION: 96 % | BODY MASS INDEX: 20.17 KG/M2 | TEMPERATURE: 98.5 F | RESPIRATION RATE: 16 BRPM | WEIGHT: 140.9 LBS | HEIGHT: 70 IN

## 2018-05-02 DIAGNOSIS — I25.5 ISCHEMIC CARDIOMYOPATHY: ICD-10-CM

## 2018-05-02 DIAGNOSIS — I25.10 CORONARY ARTERY DISEASE INVOLVING NATIVE CORONARY ARTERY OF NATIVE HEART WITHOUT ANGINA PECTORIS: ICD-10-CM

## 2018-05-02 DIAGNOSIS — R06.02 SOB (SHORTNESS OF BREATH): ICD-10-CM

## 2018-05-02 DIAGNOSIS — Z98.890 STATUS POST CORONARY ANGIOGRAM: ICD-10-CM

## 2018-05-02 DIAGNOSIS — I25.118 ATHEROSCLEROSIS OF NATIVE CORONARY ARTERY OF NATIVE HEART WITH STABLE ANGINA PECTORIS (H): ICD-10-CM

## 2018-05-02 DIAGNOSIS — I20.89 OTHER FORMS OF ANGINA PECTORIS (H): ICD-10-CM

## 2018-05-02 LAB
ALBUMIN UR-MCNC: 100 MG/DL
ANION GAP SERPL CALCULATED.3IONS-SCNC: 7 MMOL/L (ref 3–14)
APPEARANCE UR: ABNORMAL
APTT PPP: 27 SEC (ref 22–37)
BILIRUB UR QL STRIP: NEGATIVE
BUN SERPL-MCNC: 25 MG/DL (ref 7–30)
CALCIUM SERPL-MCNC: 9.3 MG/DL (ref 8.5–10.1)
CHLORIDE SERPL-SCNC: 112 MMOL/L (ref 94–109)
CO2 SERPL-SCNC: 26 MMOL/L (ref 20–32)
COLOR UR AUTO: ABNORMAL
CREAT SERPL-MCNC: 0.85 MG/DL (ref 0.66–1.25)
ERYTHROCYTE [DISTWIDTH] IN BLOOD BY AUTOMATED COUNT: 15.3 % (ref 10–15)
GFR SERPL CREATININE-BSD FRML MDRD: 86 ML/MIN/1.7M2
GLUCOSE SERPL-MCNC: 122 MG/DL (ref 70–99)
GLUCOSE UR STRIP-MCNC: NEGATIVE MG/DL
HCT VFR BLD AUTO: 39.5 % (ref 40–53)
HGB BLD-MCNC: 12.7 G/DL (ref 13.3–17.7)
HGB UR QL STRIP: ABNORMAL
INR PPP: 1.09 (ref 0.86–1.14)
KETONES UR STRIP-MCNC: 5 MG/DL
LEUKOCYTE ESTERASE UR QL STRIP: ABNORMAL
MCH RBC QN AUTO: 30.8 PG (ref 26.5–33)
MCHC RBC AUTO-ENTMCNC: 32.2 G/DL (ref 31.5–36.5)
MCV RBC AUTO: 96 FL (ref 78–100)
NITRATE UR QL: NEGATIVE
PH UR STRIP: 5.5 PH (ref 5–7)
PLATELET # BLD AUTO: 234 10E9/L (ref 150–450)
POTASSIUM SERPL-SCNC: 4 MMOL/L (ref 3.4–5.3)
RBC # BLD AUTO: 4.13 10E12/L (ref 4.4–5.9)
RBC #/AREA URNS AUTO: 91 /HPF (ref 0–2)
SODIUM SERPL-SCNC: 145 MMOL/L (ref 133–144)
SOURCE: ABNORMAL
SP GR UR STRIP: 1.03 (ref 1–1.03)
UROBILINOGEN UR STRIP-MCNC: NORMAL MG/DL (ref 0–2)
WBC # BLD AUTO: 9.2 10E9/L (ref 4–11)
WBC #/AREA URNS AUTO: 6 /HPF (ref 0–5)

## 2018-05-02 PROCEDURE — 25000132 ZZH RX MED GY IP 250 OP 250 PS 637: Performed by: INTERNAL MEDICINE

## 2018-05-02 PROCEDURE — 80048 BASIC METABOLIC PNL TOTAL CA: CPT | Performed by: INTERNAL MEDICINE

## 2018-05-02 PROCEDURE — 93458 L HRT ARTERY/VENTRICLE ANGIO: CPT | Mod: 26 | Performed by: INTERNAL MEDICINE

## 2018-05-02 PROCEDURE — 99152 MOD SED SAME PHYS/QHP 5/>YRS: CPT | Performed by: INTERNAL MEDICINE

## 2018-05-02 PROCEDURE — 40000065 ZZH STATISTIC EKG NON-CHARGEABLE

## 2018-05-02 PROCEDURE — 27210856 ZZH ACCESS HEART CATH CR2

## 2018-05-02 PROCEDURE — 93005 ELECTROCARDIOGRAM TRACING: CPT

## 2018-05-02 PROCEDURE — 85610 PROTHROMBIN TIME: CPT | Performed by: INTERNAL MEDICINE

## 2018-05-02 PROCEDURE — 25000128 H RX IP 250 OP 636: Performed by: INTERNAL MEDICINE

## 2018-05-02 PROCEDURE — 40000852 ZZH STATISTIC HEART CATH LAB OR EP LAB

## 2018-05-02 PROCEDURE — 27210787 ZZH MANIFOLD CR2

## 2018-05-02 PROCEDURE — 40000235 ZZH STATISTIC TELEMETRY

## 2018-05-02 PROCEDURE — 85027 COMPLETE CBC AUTOMATED: CPT | Performed by: INTERNAL MEDICINE

## 2018-05-02 PROCEDURE — 99152 MOD SED SAME PHYS/QHP 5/>YRS: CPT

## 2018-05-02 PROCEDURE — 27210946 ZZH KIT HC TOTES DISP CR8

## 2018-05-02 PROCEDURE — 81001 URINALYSIS AUTO W/SCOPE: CPT | Performed by: INTERNAL MEDICINE

## 2018-05-02 PROCEDURE — 99153 MOD SED SAME PHYS/QHP EA: CPT

## 2018-05-02 PROCEDURE — 85730 THROMBOPLASTIN TIME PARTIAL: CPT | Performed by: INTERNAL MEDICINE

## 2018-05-02 PROCEDURE — 93010 ELECTROCARDIOGRAM REPORT: CPT | Performed by: INTERNAL MEDICINE

## 2018-05-02 PROCEDURE — 27210795 ZZH PAD DEFIB QUICK CR4

## 2018-05-02 PROCEDURE — 87086 URINE CULTURE/COLONY COUNT: CPT | Performed by: INTERNAL MEDICINE

## 2018-05-02 PROCEDURE — 27210758 ZZH DEVICE COMPRESSION CR8

## 2018-05-02 PROCEDURE — 27211089 ZZH KIT ACIST INJECTOR CR3

## 2018-05-02 PROCEDURE — C1769 GUIDE WIRE: HCPCS

## 2018-05-02 PROCEDURE — 93458 L HRT ARTERY/VENTRICLE ANGIO: CPT

## 2018-05-02 PROCEDURE — 25000125 ZZHC RX 250: Performed by: INTERNAL MEDICINE

## 2018-05-02 RX ORDER — ASPIRIN 325 MG
325 TABLET ORAL
Status: DISCONTINUED | OUTPATIENT
Start: 2018-05-02 | End: 2018-05-02 | Stop reason: HOSPADM

## 2018-05-02 RX ORDER — ACETAMINOPHEN 325 MG/1
325-650 TABLET ORAL EVERY 4 HOURS PRN
Status: DISCONTINUED | OUTPATIENT
Start: 2018-05-02 | End: 2018-05-02 | Stop reason: HOSPADM

## 2018-05-02 RX ORDER — HEPARIN SODIUM 1000 [USP'U]/ML
1000-10000 INJECTION, SOLUTION INTRAVENOUS; SUBCUTANEOUS EVERY 5 MIN PRN
Status: DISCONTINUED | OUTPATIENT
Start: 2018-05-02 | End: 2018-05-02 | Stop reason: HOSPADM

## 2018-05-02 RX ORDER — FENTANYL CITRATE 50 UG/ML
25-50 INJECTION, SOLUTION INTRAMUSCULAR; INTRAVENOUS
Status: DISCONTINUED | OUTPATIENT
Start: 2018-05-02 | End: 2018-05-02 | Stop reason: HOSPADM

## 2018-05-02 RX ORDER — HYDROCODONE BITARTRATE AND ACETAMINOPHEN 5; 325 MG/1; MG/1
1-2 TABLET ORAL EVERY 4 HOURS PRN
Status: DISCONTINUED | OUTPATIENT
Start: 2018-05-02 | End: 2018-05-02 | Stop reason: HOSPADM

## 2018-05-02 RX ORDER — NIFEDIPINE 10 MG/1
10 CAPSULE ORAL
Status: DISCONTINUED | OUTPATIENT
Start: 2018-05-02 | End: 2018-05-02 | Stop reason: HOSPADM

## 2018-05-02 RX ORDER — SODIUM CHLORIDE 9 MG/ML
INJECTION, SOLUTION INTRAVENOUS CONTINUOUS
Status: DISCONTINUED | OUTPATIENT
Start: 2018-05-02 | End: 2018-05-02 | Stop reason: HOSPADM

## 2018-05-02 RX ORDER — FLUMAZENIL 0.1 MG/ML
0.2 INJECTION, SOLUTION INTRAVENOUS
Status: DISCONTINUED | OUTPATIENT
Start: 2018-05-02 | End: 2018-05-02 | Stop reason: HOSPADM

## 2018-05-02 RX ORDER — NALOXONE HYDROCHLORIDE 0.4 MG/ML
.2-.4 INJECTION, SOLUTION INTRAMUSCULAR; INTRAVENOUS; SUBCUTANEOUS
Status: DISCONTINUED | OUTPATIENT
Start: 2018-05-02 | End: 2018-05-02 | Stop reason: HOSPADM

## 2018-05-02 RX ORDER — POTASSIUM CHLORIDE 1500 MG/1
20 TABLET, EXTENDED RELEASE ORAL
Status: DISCONTINUED | OUTPATIENT
Start: 2018-05-02 | End: 2018-05-02 | Stop reason: HOSPADM

## 2018-05-02 RX ORDER — POTASSIUM CHLORIDE 7.45 MG/ML
10 INJECTION INTRAVENOUS
Status: DISCONTINUED | OUTPATIENT
Start: 2018-05-02 | End: 2018-05-02 | Stop reason: HOSPADM

## 2018-05-02 RX ORDER — LIDOCAINE HYDROCHLORIDE 10 MG/ML
1-10 INJECTION, SOLUTION EPIDURAL; INFILTRATION; INTRACAUDAL; PERINEURAL
Status: COMPLETED | OUTPATIENT
Start: 2018-05-02 | End: 2018-05-02

## 2018-05-02 RX ORDER — ONDANSETRON 2 MG/ML
4 INJECTION INTRAMUSCULAR; INTRAVENOUS EVERY 4 HOURS PRN
Status: DISCONTINUED | OUTPATIENT
Start: 2018-05-02 | End: 2018-05-02 | Stop reason: HOSPADM

## 2018-05-02 RX ORDER — PROMETHAZINE HYDROCHLORIDE 25 MG/ML
6.25-25 INJECTION, SOLUTION INTRAMUSCULAR; INTRAVENOUS EVERY 4 HOURS PRN
Status: DISCONTINUED | OUTPATIENT
Start: 2018-05-02 | End: 2018-05-02 | Stop reason: HOSPADM

## 2018-05-02 RX ORDER — NITROGLYCERIN 20 MG/100ML
.07-2 INJECTION INTRAVENOUS CONTINUOUS PRN
Status: DISCONTINUED | OUTPATIENT
Start: 2018-05-02 | End: 2018-05-02 | Stop reason: HOSPADM

## 2018-05-02 RX ORDER — PHENYLEPHRINE HCL IN 0.9% NACL 1 MG/10 ML
20-100 SYRINGE (ML) INTRAVENOUS
Status: DISCONTINUED | OUTPATIENT
Start: 2018-05-02 | End: 2018-05-02 | Stop reason: HOSPADM

## 2018-05-02 RX ORDER — LIDOCAINE 40 MG/G
CREAM TOPICAL
Status: DISCONTINUED | OUTPATIENT
Start: 2018-05-02 | End: 2018-05-02 | Stop reason: HOSPADM

## 2018-05-02 RX ORDER — LORAZEPAM 2 MG/ML
.5-2 INJECTION INTRAMUSCULAR EVERY 4 HOURS PRN
Status: DISCONTINUED | OUTPATIENT
Start: 2018-05-02 | End: 2018-05-02 | Stop reason: HOSPADM

## 2018-05-02 RX ORDER — NITROGLYCERIN 5 MG/ML
100-200 VIAL (ML) INTRAVENOUS
Status: DISCONTINUED | OUTPATIENT
Start: 2018-05-02 | End: 2018-05-02 | Stop reason: HOSPADM

## 2018-05-02 RX ORDER — HYDRALAZINE HYDROCHLORIDE 20 MG/ML
10-20 INJECTION INTRAMUSCULAR; INTRAVENOUS
Status: DISCONTINUED | OUTPATIENT
Start: 2018-05-02 | End: 2018-05-02 | Stop reason: HOSPADM

## 2018-05-02 RX ORDER — DOBUTAMINE HYDROCHLORIDE 200 MG/100ML
2-20 INJECTION INTRAVENOUS CONTINUOUS PRN
Status: DISCONTINUED | OUTPATIENT
Start: 2018-05-02 | End: 2018-05-02 | Stop reason: HOSPADM

## 2018-05-02 RX ORDER — NALOXONE HYDROCHLORIDE 0.4 MG/ML
0.4 INJECTION, SOLUTION INTRAMUSCULAR; INTRAVENOUS; SUBCUTANEOUS EVERY 5 MIN PRN
Status: DISCONTINUED | OUTPATIENT
Start: 2018-05-02 | End: 2018-05-02 | Stop reason: HOSPADM

## 2018-05-02 RX ORDER — NALOXONE HYDROCHLORIDE 0.4 MG/ML
.1-.4 INJECTION, SOLUTION INTRAMUSCULAR; INTRAVENOUS; SUBCUTANEOUS
Status: DISCONTINUED | OUTPATIENT
Start: 2018-05-02 | End: 2018-05-02 | Stop reason: HOSPADM

## 2018-05-02 RX ORDER — METHYLPREDNISOLONE SODIUM SUCCINATE 125 MG/2ML
125 INJECTION, POWDER, LYOPHILIZED, FOR SOLUTION INTRAMUSCULAR; INTRAVENOUS
Status: DISCONTINUED | OUTPATIENT
Start: 2018-05-02 | End: 2018-05-02 | Stop reason: HOSPADM

## 2018-05-02 RX ORDER — METOPROLOL TARTRATE 1 MG/ML
5 INJECTION, SOLUTION INTRAVENOUS EVERY 5 MIN PRN
Status: DISCONTINUED | OUTPATIENT
Start: 2018-05-02 | End: 2018-05-02 | Stop reason: HOSPADM

## 2018-05-02 RX ORDER — CLOPIDOGREL 300 MG/1
300-600 TABLET, FILM COATED ORAL
Status: DISCONTINUED | OUTPATIENT
Start: 2018-05-02 | End: 2018-05-02 | Stop reason: HOSPADM

## 2018-05-02 RX ORDER — CLOPIDOGREL BISULFATE 75 MG/1
75 TABLET ORAL
Status: DISCONTINUED | OUTPATIENT
Start: 2018-05-02 | End: 2018-05-02 | Stop reason: HOSPADM

## 2018-05-02 RX ORDER — VERAPAMIL HYDROCHLORIDE 2.5 MG/ML
1-2.5 INJECTION, SOLUTION INTRAVENOUS
Status: DISCONTINUED | OUTPATIENT
Start: 2018-05-02 | End: 2018-05-02 | Stop reason: HOSPADM

## 2018-05-02 RX ORDER — PRASUGREL 10 MG/1
10-60 TABLET, FILM COATED ORAL
Status: DISCONTINUED | OUTPATIENT
Start: 2018-05-02 | End: 2018-05-02 | Stop reason: HOSPADM

## 2018-05-02 RX ORDER — ATROPINE SULFATE 0.1 MG/ML
0.5 INJECTION INTRAVENOUS EVERY 5 MIN PRN
Status: DISCONTINUED | OUTPATIENT
Start: 2018-05-02 | End: 2018-05-02 | Stop reason: HOSPADM

## 2018-05-02 RX ORDER — FUROSEMIDE 10 MG/ML
20-100 INJECTION INTRAMUSCULAR; INTRAVENOUS
Status: DISCONTINUED | OUTPATIENT
Start: 2018-05-02 | End: 2018-05-02 | Stop reason: HOSPADM

## 2018-05-02 RX ORDER — DOPAMINE HYDROCHLORIDE 160 MG/100ML
2-20 INJECTION, SOLUTION INTRAVENOUS CONTINUOUS PRN
Status: DISCONTINUED | OUTPATIENT
Start: 2018-05-02 | End: 2018-05-02 | Stop reason: HOSPADM

## 2018-05-02 RX ORDER — LIDOCAINE HYDROCHLORIDE 10 MG/ML
30 INJECTION, SOLUTION EPIDURAL; INFILTRATION; INTRACAUDAL; PERINEURAL
Status: DISCONTINUED | OUTPATIENT
Start: 2018-05-02 | End: 2018-05-02 | Stop reason: HOSPADM

## 2018-05-02 RX ORDER — ASPIRIN 81 MG/1
81-324 TABLET, CHEWABLE ORAL
Status: DISCONTINUED | OUTPATIENT
Start: 2018-05-02 | End: 2018-05-02 | Stop reason: HOSPADM

## 2018-05-02 RX ORDER — MORPHINE SULFATE 2 MG/ML
1-2 INJECTION, SOLUTION INTRAMUSCULAR; INTRAVENOUS EVERY 5 MIN PRN
Status: DISCONTINUED | OUTPATIENT
Start: 2018-05-02 | End: 2018-05-02 | Stop reason: HOSPADM

## 2018-05-02 RX ORDER — NICARDIPINE HYDROCHLORIDE 2.5 MG/ML
100 INJECTION INTRAVENOUS
Status: DISCONTINUED | OUTPATIENT
Start: 2018-05-02 | End: 2018-05-02 | Stop reason: HOSPADM

## 2018-05-02 RX ORDER — DIPHENHYDRAMINE HYDROCHLORIDE 50 MG/ML
25-50 INJECTION INTRAMUSCULAR; INTRAVENOUS
Status: DISCONTINUED | OUTPATIENT
Start: 2018-05-02 | End: 2018-05-02 | Stop reason: HOSPADM

## 2018-05-02 RX ORDER — IOPAMIDOL 755 MG/ML
160 INJECTION, SOLUTION INTRAVASCULAR ONCE
Status: COMPLETED | OUTPATIENT
Start: 2018-05-02 | End: 2018-05-02

## 2018-05-02 RX ORDER — NITROGLYCERIN 5 MG/ML
100-500 VIAL (ML) INTRAVENOUS
Status: DISCONTINUED | OUTPATIENT
Start: 2018-05-02 | End: 2018-05-02 | Stop reason: HOSPADM

## 2018-05-02 RX ORDER — SODIUM NITROPRUSSIDE 25 MG/ML
100-200 INJECTION INTRAVENOUS
Status: DISCONTINUED | OUTPATIENT
Start: 2018-05-02 | End: 2018-05-02 | Stop reason: HOSPADM

## 2018-05-02 RX ORDER — ENALAPRILAT 1.25 MG/ML
1.25-2.5 INJECTION INTRAVENOUS
Status: DISCONTINUED | OUTPATIENT
Start: 2018-05-02 | End: 2018-05-02 | Stop reason: HOSPADM

## 2018-05-02 RX ORDER — POTASSIUM CHLORIDE 29.8 MG/ML
20 INJECTION INTRAVENOUS
Status: DISCONTINUED | OUTPATIENT
Start: 2018-05-02 | End: 2018-05-02 | Stop reason: HOSPADM

## 2018-05-02 RX ORDER — BUPIVACAINE HYDROCHLORIDE 2.5 MG/ML
1-10 INJECTION, SOLUTION EPIDURAL; INFILTRATION; INTRACAUDAL
Status: DISCONTINUED | OUTPATIENT
Start: 2018-05-02 | End: 2018-05-02 | Stop reason: HOSPADM

## 2018-05-02 RX ORDER — ATROPINE SULFATE 0.1 MG/ML
.5-1 INJECTION INTRAVENOUS
Status: DISCONTINUED | OUTPATIENT
Start: 2018-05-02 | End: 2018-05-02 | Stop reason: HOSPADM

## 2018-05-02 RX ORDER — DEXTROSE MONOHYDRATE 25 G/50ML
12.5-5 INJECTION, SOLUTION INTRAVENOUS EVERY 30 MIN PRN
Status: DISCONTINUED | OUTPATIENT
Start: 2018-05-02 | End: 2018-05-02 | Stop reason: HOSPADM

## 2018-05-02 RX ORDER — PROTAMINE SULFATE 10 MG/ML
1-5 INJECTION, SOLUTION INTRAVENOUS
Status: DISCONTINUED | OUTPATIENT
Start: 2018-05-02 | End: 2018-05-02 | Stop reason: HOSPADM

## 2018-05-02 RX ORDER — EPINEPHRINE 1 MG/ML
0.3 INJECTION, SOLUTION, CONCENTRATE INTRAVENOUS
Status: DISCONTINUED | OUTPATIENT
Start: 2018-05-02 | End: 2018-05-02 | Stop reason: HOSPADM

## 2018-05-02 RX ORDER — PROTAMINE SULFATE 10 MG/ML
25-100 INJECTION, SOLUTION INTRAVENOUS EVERY 5 MIN PRN
Status: DISCONTINUED | OUTPATIENT
Start: 2018-05-02 | End: 2018-05-02 | Stop reason: HOSPADM

## 2018-05-02 RX ORDER — ADENOSINE 3 MG/ML
12-12000 INJECTION, SOLUTION INTRAVENOUS
Status: DISCONTINUED | OUTPATIENT
Start: 2018-05-02 | End: 2018-05-02 | Stop reason: HOSPADM

## 2018-05-02 RX ORDER — NITROGLYCERIN 0.4 MG/1
0.4 TABLET SUBLINGUAL EVERY 5 MIN PRN
Status: DISCONTINUED | OUTPATIENT
Start: 2018-05-02 | End: 2018-05-02 | Stop reason: HOSPADM

## 2018-05-02 RX ADMIN — FENTANYL CITRATE 50 MCG: 50 INJECTION, SOLUTION INTRAMUSCULAR; INTRAVENOUS at 11:42

## 2018-05-02 RX ADMIN — FENTANYL CITRATE 25 MCG: 50 INJECTION, SOLUTION INTRAMUSCULAR; INTRAVENOUS at 11:59

## 2018-05-02 RX ADMIN — MIDAZOLAM 1 MG: 1 INJECTION INTRAMUSCULAR; INTRAVENOUS at 11:42

## 2018-05-02 RX ADMIN — IOPAMIDOL 160 ML: 755 INJECTION, SOLUTION INTRAVASCULAR at 12:15

## 2018-05-02 RX ADMIN — MIDAZOLAM 1 MG: 1 INJECTION INTRAMUSCULAR; INTRAVENOUS at 11:46

## 2018-05-02 RX ADMIN — LIDOCAINE HYDROCHLORIDE 10 ML: 10 INJECTION, SOLUTION EPIDURAL; INFILTRATION; INTRACAUDAL; PERINEURAL at 11:44

## 2018-05-02 RX ADMIN — FENTANYL CITRATE 25 MCG: 50 INJECTION, SOLUTION INTRAMUSCULAR; INTRAVENOUS at 11:47

## 2018-05-02 RX ADMIN — ASPIRIN 325 MG: 325 TABLET, DELAYED RELEASE ORAL at 09:25

## 2018-05-02 RX ADMIN — MIDAZOLAM 0.5 MG: 1 INJECTION INTRAMUSCULAR; INTRAVENOUS at 12:07

## 2018-05-02 RX ADMIN — SODIUM CHLORIDE: 9 INJECTION, SOLUTION INTRAVENOUS at 08:58

## 2018-05-02 RX ADMIN — MIDAZOLAM 0.5 MG: 1 INJECTION INTRAMUSCULAR; INTRAVENOUS at 11:59

## 2018-05-02 NOTE — PROCEDURES
LHC prelim  RCA  at ostium  LCx subtotal/totalprox heavy Ca  Lad mild diffuse  LMA sep ostial lcx, rca  LVG appears severe MR but not seen on echo  May want JAYLEN to check for IHSS, MR    EF approx 40's

## 2018-05-02 NOTE — DISCHARGE INSTRUCTIONS
Cardiac Angiogram Discharge Instructions - Femoral    After you go home:      Have an adult stay with you until tomorrow.    Drink extra fluids for 2 days.    You may resume your normal diet.    No smoking       For 24 hours - due to the sedation you received:    Relax and take it easy.    Do NOT make any important or legal decisions.    Do NOT drive or operate machines at home or at work.    Do NOT drink alcohol.    Care of Groin Puncture Site:      For the first 24 hrs - check the puncture site every 1-2 hours while awake.    For 2 days, when you cough, sneeze, laugh or move your bowels, hold your hand over the puncture site and press firmly.    Remove the bandaid after 24 hours. If there is minor oozing, apply another bandaid and remove it after 12 hours.    It is normal to have a small bruise or pea size lump at the site.    You may shower tomorrow. Do NOT take a bath, or use a hot tub or pool for at least 3 days. Do NOT scrub the site. Do not use lotion or powder near the puncture site.    Activity:            For 2 days:    No stooping or squatting    Do NOT do any heavy activity such as exercise, lifting, or straining.     No housework, yard work or any activity that make you sweat    Do NOT lift more than 10 pounds    Bleeding:      If you start bleeding from the site in your groin, lie down flat and press firmly on/above the site for 10 minutes.     Once bleeding stops, lay flat for 2 hours.     Call [facility] as soon as you can.       Call 911 right away if you have heavy bleeding or bleeding that does not stop.      Medicines:      If you are taking antiplatelet medications such as Plavix, Brilinta or Effient, do not stop taking it until you talk to your cardiologist.      If you are on Metformin (Glucophage), do not restart it until you have blood tests (within 2 to 3 days after discharge).  After you have your blood drawn, you may restart the Metformin.     Take your medications, including blood  thinners, unless your provider tells you not to.  If you take Coumadin (Warfarin), have your INR checked by your provider in  3-5 days. Call your clinic to schedule this.    If you have stopped any medicines, check with your provider about when to restart them.    Follow Up Appointments:      Follow up with [facility] Nurse Practitioner at [facility] of patient preference in 7-10 days.    Call the clinic if:      You have increased pain or a large or growing hard lump around the site.    The site is red, swollen, hot or tender.    Blood or fluid is draining from the site.    You have chills or a fever greater than 101 F (38 C).    Your leg feels numb, cool or changes color.    You have hives, a rash or unusual itching.    New pain in the back or belly that you cannot control with Tylenol.    Any questions or concerns.          [facility] [place]:    [phone] [facility] (7 days a week)

## 2018-05-02 NOTE — PROGRESS NOTES
Pt admitted for heart angiogram, Bahamian speaking only,  present, pre procedure instructions reviewed with pt and , questions answered and both state understanding. Pt reports dark vargas urine after having urinary stent placed a few weeks ago. Urine sample obtained and send to lab.

## 2018-05-02 NOTE — PROGRESS NOTES
VSS, right groin CDI no hematoma no bleeding, pt denies pain, discharge instructions given with  present, questions answered and pt states understanding, copy of discharge instructions sent with pt to [facility].

## 2018-05-02 NOTE — IP AVS SNAPSHOT
MRN:[id number]                      After Visit Summary   5/2/2018    Nacho Joseph    MRN: [id number]           Visit Information        Department      5/2/2018  7:57 AM [facility]          Review of your medicines      UNREVIEWED medicines. Ask your doctor about these medicines        Dose / Directions    aspirin 81 MG tablet   Used for:  Chest pain        Dose:  81 mg   Take 1 tablet (81 mg) by mouth daily   Refills:  0       atorvastatin 40 MG tablet   Commonly known as:  LIPITOR   Used for:  CAD (coronary artery disease), Hyperlipidemia        Dose:  40 mg   Take 1 tablet (40 mg) by mouth daily   Refills:  0       isosorbide mononitrate 30 MG 24 hr tablet   Commonly known as:  IMDUR   Used for:  Ischemic cardiomyopathy        Dose:  30 mg   Take 1 tablet (30 mg) by mouth daily   Quantity:  90 tablet   Refills:  3       metoprolol succinate 50 MG 24 hr tablet   Commonly known as:  TOPROL-XL   Used for:  Abdominal aortic aneurysm (AAA) without rupture (H)        Dose:  50 mg   Take 1 tablet (50 mg) by mouth daily   Quantity:  30 tablet   Refills:  0       MILK OF MAGNESIA 400 MG/5ML suspension   Generic drug:  magnesium hydroxide        Dose:  30 mL   Take 30 mLs by mouth daily as needed for constipation or heartburn   Refills:  0       MUCINEX D MAX STRENGTH PO        Dose:  800 mg   Take 800 mg by mouth daily   Refills:  0       nitroGLYcerin 0.4 MG sublingual tablet   Commonly known as:  NITROSTAT   Used for:  Chest pain        Dose:  0.4 mg   Place 1 tablet (0.4 mg) under the tongue every 5 minutes as needed for chest pain   Quantity:  25 tablet   Refills:  4       TYLENOL PO        Dose:  1000 mg   Take 1,000 mg by mouth 3 times daily   Refills:  0                Protect others around you: Learn how to safely use, store and throw away your medicines at www.disposemymeds.org.         Follow-ups after your visit        Your next 10 appointments already scheduled      May 10, 2018  3:10 PM CDT   Return Visit with NICHOLAS Gutierrez CNP   [facility] ([facility])    [address]  [place] [facility] [address] [phone] OPT 2            Jul 05, 2018  9:15 AM CDT   Return Visit with Ronaldo Son MD   [facility] ([facility])    [address] [address] [phone] OPT 2            Jul 23, 2018  2:30 PM CDT   Remote PPM Check with [employer] TECH1   [facility] ([facility])    [id number]  [facility] [address] [phone] OPT 2           This appointment is for a remote check of your pacemaker.  This is not an appointment at the office.               Care Instructions        After Care Instructions     Discharge Instructions - IF on Metformin (Glucophage or Glucovance) or Metformin containing medications       IF on Metformin (Glucophage or Glucovance) or Metformin containing medications , schedule a Basic Metabolic Panel at [facility] Primary Clinic in 48 - 72 hours post procedure and PRIOR TO resuming the Metformin or Metformin containing medications.  Hold Metformin (Glucophage or Glucovance) or Metformin containing medications until after the Basic Metabolic Panel on the 2nd or 3rd day following the procedure.  May resume after blood draw is complete.                  Further instructions from your care team       Cardiac Angiogram Discharge Instructions - Femoral    After you go home:      Have an adult stay with you until tomorrow.    Drink extra fluids for 2 days.    You may resume your normal diet.    No smoking       For 24 hours - due to the sedation you received:    Relax and take it easy.    Do NOT make any important or legal decisions.    Do NOT drive or operate machines at home or at work.    Do NOT drink alcohol.    Care of Groin Puncture  Site:      For the first 24 hrs - check the puncture site every 1-2 hours while awake.    For 2 days, when you cough, sneeze, laugh or move your bowels, hold your hand over the puncture site and press firmly.    Remove the bandaid after 24 hours. If there is minor oozing, apply another bandaid and remove it after 12 hours.    It is normal to have a small bruise or pea size lump at the site.    You may shower tomorrow. Do NOT take a bath, or use a hot tub or pool for at least 3 days. Do NOT scrub the site. Do not use lotion or powder near the puncture site.    Activity:            For 2 days:    No stooping or squatting    Do NOT do any heavy activity such as exercise, lifting, or straining.     No housework, yard work or any activity that make you sweat    Do NOT lift more than 10 pounds    Bleeding:      If you start bleeding from the site in your groin, lie down flat and press firmly on/above the site for 10 minutes.     Once bleeding stops, lay flat for 2 hours.     Call [facility] as soon as you can.       Call 911 right away if you have heavy bleeding or bleeding that does not stop.      Medicines:      If you are taking antiplatelet medications such as Plavix, Brilinta or Effient, do not stop taking it until you talk to your cardiologist.      If you are on Metformin (Glucophage), do not restart it until you have blood tests (within 2 to 3 days after discharge).  After you have your blood drawn, you may restart the Metformin.     Take your medications, including blood thinners, unless your provider tells you not to.  If you take Coumadin (Warfarin), have your INR checked by your provider in  3-5 days. Call your clinic to schedule this.    If you have stopped any medicines, check with your provider about when to restart them.    Follow Up Appointments:      Follow up with [facility] Nurse Practitioner at [facility] of patient preference in 7-10 days.    Call the clinic if:      You have increased pain or a  "large or growing hard lump around the site.    The site is red, swollen, hot or tender.    Blood or fluid is draining from the site.    You have chills or a fever greater than 101 F (38 C).    Your leg feels numb, cool or changes color.    You have hives, a rash or unusual itching.    New pain in the back or belly that you cannot control with Tylenol.    Any questions or concerns.          [facility] [place]:    [phone] UMP (7 days a week)           Additional Information About Your Visit        [employer] Information     [employer] lets you send messages to your doctor, view your test results, renew your prescriptions, schedule appointments and more. To sign up, go to [email or web address] . Click on \"Log in\" on the left side of the screen, which will take you to the Welcome page. Then click on \"Sign up Now\" on the right side of the page.     You will be asked to enter the access code listed below, as well as some personal information. Please follow the directions to create your username and password.     Your access code is: [email or web address]  Expires: 7/15/2018  9:37 AM     Your access code will  in 90 days. If you need help or a new code, please call your [facility] clinic or [phone].        Care EveryWhere ID     This is your Care EveryWhere ID. This could be used by other organizations to access your [facility] medical records  [id number]        Your Vitals Were     Blood Pressure Temperature Respirations Height Weight Pulse Oximetry    137/80 98.5  F (36.9  C) (Oral) 16 1.79 m (5' 10.47\") 63.9 kg (140 lb 14.4 oz) 94%    BMI (Body Mass Index)                   19.95 kg/m2            Primary Care Provider    Paulino Lynch      Equal Access to Services     NURIS PENA : Hadii pedro Evangelista, waaxda luqadaha, qaybta kaalmada solangeyamarilou, adams flood. [facility] [phone].    ATENCIÓN: Si habla español, tiene a varela disposición servicios " rafael de asistencia lingüística. Dorothy priest [phone].    We comply with applicable federal civil rights laws and [place] laws. We do not discriminate on the basis of race, color, national origin, age, disability, sex, sexual orientation, or gender identity.            Thank you!     Thank you for choosing [facility] for your care. Our goal is always to provide you with excellent care. Hearing back from our patients is one way we can continue to improve our services. Please take a few minutes to complete the written survey that you may receive in the mail after you visit with us. Thank you!             Medication List: This is a list of all your medications and when to take them. Check marks below indicate your daily home schedule. Keep this list as a reference.      Medications           Morning Afternoon Evening Bedtime As Needed    aspirin 81 MG tablet   Take 1 tablet (81 mg) by mouth daily                                atorvastatin 40 MG tablet   Commonly known as:  LIPITOR   Take 1 tablet (40 mg) by mouth daily                                isosorbide mononitrate 30 MG 24 hr tablet   Commonly known as:  IMDUR   Take 1 tablet (30 mg) by mouth daily                                metoprolol succinate 50 MG 24 hr tablet   Commonly known as:  TOPROL-XL   Take 1 tablet (50 mg) by mouth daily                                MILK OF MAGNESIA 400 MG/5ML suspension   Take 30 mLs by mouth daily as needed for constipation or heartburn   Generic drug:  magnesium hydroxide                                MUCINEX D MAX STRENGTH PO   Take 800 mg by mouth daily                                nitroGLYcerin 0.4 MG sublingual tablet   Commonly known as:  NITROSTAT   Place 1 tablet (0.4 mg) under the tongue every 5 minutes as needed for chest pain                                TYLENOL PO   Take 1,000 mg by mouth 3 times daily

## 2018-05-02 NOTE — IP AVS SNAPSHOT
[place] [address]    [place] [address]    Phone:  [phone]                                       After Visit Summary   5/2/2018    Nacho Joseph    MRN: [id number]           After Visit Summary Signature Page     I have received my discharge instructions, and my questions have been answered. I have discussed any challenges I see with this plan with the nurse or doctor.    ..........................................................................................................................................  Patient/Patient Representative Signature      ..........................................................................................................................................  Patient Representative Print Name and Relationship to Patient    ..................................................               ................................................  Date                                            Time    ..........................................................................................................................................  Reviewed by Signature/Title    ...................................................              ..............................................  Date                                                            Time

## 2018-05-03 ENCOUNTER — DOCUMENTATION ONLY (OUTPATIENT)
Dept: LAB | Facility: CLINIC | Age: 83
End: 2018-05-03

## 2018-05-03 LAB
BACTERIA SPEC CULT: NORMAL
SPECIMEN SOURCE: NORMAL

## 2018-05-07 LAB — INTERPRETATION ECG - MUSE: NORMAL

## 2018-05-07 NOTE — PROGRESS NOTES
Have attempted to contact Pt's long term care facility numerous times. No one answers phone besides , and unable to contact Nursing on any level including nurse manager. Did speak with DON  Ann, and faxed them lab results. HI Adam RN

## 2018-05-10 ENCOUNTER — DOCUMENTATION ONLY (OUTPATIENT)
Dept: CARDIOLOGY | Facility: CLINIC | Age: 83
End: 2018-05-10

## 2018-05-10 ENCOUNTER — OFFICE VISIT (OUTPATIENT)
Dept: CARDIOLOGY | Facility: CLINIC | Age: 83
End: 2018-05-10
Attending: INTERNAL MEDICINE
Payer: MEDICAID

## 2018-05-10 VITALS — HEART RATE: 120 BPM | SYSTOLIC BLOOD PRESSURE: 88 MMHG | HEIGHT: 73 IN

## 2018-05-10 DIAGNOSIS — R09.89 BRUIT OF RIGHT CAROTID ARTERY: ICD-10-CM

## 2018-05-10 DIAGNOSIS — R01.1 MURMUR: ICD-10-CM

## 2018-05-10 DIAGNOSIS — I20.89 OTHER FORMS OF ANGINA PECTORIS (H): ICD-10-CM

## 2018-05-10 DIAGNOSIS — I25.10 CORONARY ARTERY DISEASE INVOLVING NATIVE CORONARY ARTERY OF NATIVE HEART WITHOUT ANGINA PECTORIS: Primary | ICD-10-CM

## 2018-05-10 DIAGNOSIS — Z95.0 HISTORY OF PERMANENT CARDIAC PACEMAKER PLACEMENT: ICD-10-CM

## 2018-05-10 DIAGNOSIS — Z91.199 NON-COMPLIANCE: ICD-10-CM

## 2018-05-10 DIAGNOSIS — R06.02 SOB (SHORTNESS OF BREATH): ICD-10-CM

## 2018-05-10 DIAGNOSIS — R00.0 TACHYCARDIA: ICD-10-CM

## 2018-05-10 DIAGNOSIS — I10 BENIGN ESSENTIAL HYPERTENSION: ICD-10-CM

## 2018-05-10 DIAGNOSIS — I71.40 ABDOMINAL AORTIC ANEURYSM (AAA) WITHOUT RUPTURE (H): ICD-10-CM

## 2018-05-10 DIAGNOSIS — I44.2 ATRIOVENTRICULAR BLOCK, COMPLETE (H): ICD-10-CM

## 2018-05-10 PROCEDURE — 93000 ELECTROCARDIOGRAM COMPLETE: CPT | Performed by: NURSE PRACTITIONER

## 2018-05-10 PROCEDURE — 99215 OFFICE O/P EST HI 40 MIN: CPT | Mod: 25 | Performed by: NURSE PRACTITIONER

## 2018-05-10 RX ORDER — METOPROLOL SUCCINATE 50 MG/1
50 TABLET, EXTENDED RELEASE ORAL
Qty: 180 TABLET | Refills: 3 | Status: SHIPPED | OUTPATIENT
Start: 2018-05-10

## 2018-05-10 RX ORDER — DOXYCYCLINE HYCLATE 100 MG
100 TABLET ORAL 2 TIMES DAILY
Status: ON HOLD | COMMUNITY
End: 2018-05-18

## 2018-05-10 RX ORDER — ECHINACEA PURPUREA EXTRACT 125 MG
1 TABLET ORAL DAILY PRN
COMMUNITY

## 2018-05-10 NOTE — PROGRESS NOTES
[employer] PPM- Courtesy check  Celia seeing patient today. ECG shows P waves with  at 120 BPM. She requested a device check. I showed the same- AS/. Underlying rhythm is CHB. We asked Dr Chavarria to review ECG. Patient in a new Atrial tach. Jose will increase BB and bring patient back next week to re assess rhythm and HR. On the same day he will be having an echo. We will need to reprogram him VVIR for the echo if he remains in Atrial tach. JAY Little

## 2018-05-10 NOTE — PROGRESS NOTES
History of Present Illness:    Nacho Joseph is a 82 year old [place]raini-speaking male I am meeting for the first time today with his .  He returns today for post hospital follow-up and post angiogram follow-up.  He hs followed in the past witha Dr. Carlo Llanes.  The patient wishes to reestablish care with him.    Recently he was referred back to us for complaints of chest discomfort and saw Dr. Son.  He has a previously known occluded right coronary artery with a high-grade circumflex stenosis without flow-limiting disease in the LAD.  The right coronary received collateral flow from the left.    When he saw Dr. Hossein Burnett he was complaining of chest discomfort after walking 500 yards and increasing shortness of breath for a few weeks.  He had influenza over [place] and has not recovered well from this.  He was also found to have a blood pressure of 85/59 at the office visit.  He has a prior history of complete heart block and has undergone a permanent pacemaker inserted by his electrophysiologist Dr. Chavarria.    He has an abdominal aortic aneurysm that measured 4.1 cm in 2015.    He was referred for an outpatient coronary angiogram, however before he could be admitted for this he presented to the emergency room with significant right flank pain and was found to have an infected obstructive ureteral calculi also causing hydronephrosis with increase in his creatinine from a baseline of 0.79-0.89-1.5.  He was seen by urology and a right ureteral stent was placed with improvement in his creatinine to baseline prior to discharge.    He had intermittent hypotension during that hospital stay.  He was kept off of lisinopril and amlodipine and sent home on metoprolol 50 mg per day Imdur 30 mg per day.  In the past he has taken as much as 100 mg twice per day of metoprolol which was reduced to 100 mg a day at our office visit here and reduce further to 50 mg per day after his hospital admission to  to low bp.    He returned for his outpatient coronary angiography done by Dr. Dewitt on 5-2-2018.  Again the right coronary artery was chronically occluded.  The left circumflex is now also chronically occluded and the LAD continues to have non-flow-limiting disease.  He has a separate circumflex and right coronary artery ostia in lieu of a traditional left main.  During LV gram he had variable levels of mitral regurgitation.  On previous echocardiogram he has not had mitral regurgitation.  Dr. Dewitt felt at some point he may be a candidate for chronic total occlusion procedure of the circumflex but this would be long and complicated due to heavy calcification.  It would require minimum Rotablator or CSI if a wire would be able to be passed on the OM. In addition, he wanted to be sure he did not have significant MR. Dr. Son felt the MR may be catheter induced.    This patient continues have a right ureteral stent in place and certainly this would need to be re-addressed and removed prior to any consideration for intervention requiring dual antiplatelet therapy.      During his recent hospital admission he was seen by Dr. Jensen for his infrarenal abdominal aortic aneurysm which measured 5.2 X 4.4cm which would have been an interval increase from 2015, however Dr. Jensen felt this measured closer to 4.6 upon his review.  He suggested a CT scan in approximately 6 months time    Dr. Hossein Burnett noted a right carotid bruit.  Ultrasound was performed indicating less than 50% stenosis in the right internal carotid artery and 50-69% stenosis in the left internal carotid artery.  Today I appreciate at least a 20-25 mm difference in his blood pressure between his right and left arms, higher on the left.  His brachial and radial pulses are weaker in the right arm as well.  He has a loud bruit over the right subclavian and I suspect he potentially could have subclavian steal.  I suggest all of his blood pressures be  checked on his left arm and treated accordingly.    Today on his left arm his blood pressures in the mid 120s over 70s when checked by me.    Cardiac exam today revealed tachycardia at a rate of 120 bpm.  I obtained an EKG which looks suspicious for atrial tachycardia.  The device RN interrogated and we spoke to Dr. Chavarria who confirmed this.  Further exam today shows bilateral carotid bruits,  right subclavian bruit,  soft systolic murmur with clear lungs  and edema.  Right groin site is healed well with minimal ecchymosis.        Impression/Plan:     1.  Coronary artery disease with anatomy outlined above  -Preserved, if not hyperdynamic LV function on echo in April 2018  -Chest pain has improved  -Troponins were negative during his hospital admission  -Continue beta-blockers and isosorbide  -Consider chronic total occlusion procedure in the future, however his ureteral stent needs to be removed first for consideration of dual antiplatelet therapy and the patient would appreciate Dr. Llanes's input as well.    2.  Markedly different blood pressures between left and right arm, higher on the left.  -Subclavian bruits on the right-->suspicious for subclavian steal  -check blood pressure on left arm only and treat accordingly  -Consider ultrasound of the subclavian area in the future    3.  History of complete heart block with pacemaker in place    4.  New atrial tachycardia today heart rate of 120 at rest which he is tolerating well so far  -Increase metoprolol to 100 mg per day in divided doses (50 mg in the morning, 50 mg in the evening)  -He will return in a few days for a pacemaker check and his heart rate is still high, his heart rate will be reduced by switching to VVIR mode temporarily in order to complete an echocardiogram when he is not in tachycardia and switch back to DDDR with consideration of increasing Toprol further or referral back to EP service.    5.  Question of mitral regurgitation during his LV gram  which may have been catheter induced  Dr. Son suggests repeating an echocardiogram which has been scheduled next week    6.  Recent ureteral stent which was infective and caused obstructive hydronephrosis  -Sees urology tomorrow  -Basic metabolic panel will be repeated today if the lab is able, if it is too late in the day, we will recheck early next week    7.  Peripheral Arterial disease with carotid stenosis as outlined above  -Suspicious for subclavian steal as outlined above  -Abdominal aortic aneurysm measuring 5.2 x 4.4, however by Dr. Jensen's to review slightly smaller  -Dr. Jensen suggested a CT scan in 6 months  -repeat carotid ultrasound in one year    8.  Dyslipidemia-currently on Lipitor 40 mg per day  -Check lipid panel at some point as there are none available that I can find on file    This patient lives at [facility] and we have had difficulty communicating with him and the staff there.  The phone number he brought in today on his paperwork is [phone]    I have ordered a pacemaker check with an echocardiogram and a basic metabolic panel in a few days.  He will then reestablish his care with Dr. Llanes per his request.     Rater than 50% of this one-hour and 15 minute visit today was spent in counseling and collaboration-this plan was reviewed with Dr. Son and Aura Harris, MSN, APRN-BC, CNP  Cardiology    Orders Placed This Encounter   Procedures     US Carotid Bilateral     EKG 12-lead complete w/read - Clinics     Orders Placed This Encounter   Medications     doxycycline (VIBRA-TABS) 100 MG tablet     Sig: Take 100 mg by mouth 2 times daily     sodium chloride (DEEP SEA NASAL SPRAY) 0.65 % nasal spray     Sig: Spray 1 spray into both nostrils daily as needed for congestion     There are no discontinued medications.      Encounter Diagnoses   Name Primary?     Ischemic cardiomyopathy      Atrioventricular block, complete (H)      History of  permanent cardiac pacemaker placement      Other forms of angina pectoris (H)      Coronary artery disease involving native coronary artery of native heart without angina pectoris      Abdominal aortic aneurysm (AAA) without rupture (H)      Benign essential hypertension      Non-compliance      Bruit of right carotid artery      SOB (shortness of breath)      Tachycardia Yes       CURRENT MEDICATIONS:  Current Outpatient Prescriptions   Medication Sig Dispense Refill     Acetaminophen (TYLENOL PO) Take 1,000 mg by mouth 3 times daily       aspirin 81 MG tablet Take 1 tablet (81 mg) by mouth daily       atorvastatin (LIPITOR) 40 MG tablet Take 1 tablet (40 mg) by mouth daily       doxycycline (VIBRA-TABS) 100 MG tablet Take 100 mg by mouth 2 times daily       isosorbide mononitrate (IMDUR) 30 MG 24 hr tablet Take 1 tablet (30 mg) by mouth daily 90 tablet 3     magnesium hydroxide (MILK OF MAGNESIA) 400 MG/5ML suspension Take 30 mLs by mouth daily as needed for constipation or heartburn       metoprolol succinate (TOPROL-XL) 50 MG 24 hr tablet Take 1 tablet (50 mg) by mouth daily 30 tablet 0     nitroglycerin (NITROSTAT) 0.4 MG SL tablet Place 1 tablet (0.4 mg) under the tongue every 5 minutes as needed for chest pain 25 tablet 4     Pseudoephedrine-Guaifenesin (MUCINEX D MAX STRENGTH PO) Take 800 mg by mouth daily        sodium chloride (DEEP SEA NASAL SPRAY) 0.65 % nasal spray Spray 1 spray into both nostrils daily as needed for congestion         ALLERGIES   No Known Allergies    PAST MEDICAL HISTORY:  Past Medical History:   Diagnosis Date     AAA (abdominal aortic aneurysm) (H) 7/8/2014    4.1 cm     Anemia      Atrioventricular block, complete (HEART) 7/8/2014    S/p PPM 7/2014     Bradycardia      CAD (coronary artery disease) 2/9/2015     Carotid stenosis      Chest pain 1/20/2015     COPD (chronic obstructive pulmonary disease) (H)      Depression 7/21/2014     Depressive disorder      HTN (hypertension)  7/8/2014     Hyperlipidemia 7/8/2014     Hypertension      Ischemic cardiomyopathy 2/9/2015     Lung abscess (H)      Syncope        PAST SURGICAL HISTORY:  Past Surgical History:   Procedure Laterality Date     COMBINED CYSTOSCOPY, INSERT STENT URETER(S) Right 4/20/2018    Procedure: COMBINED CYSTOSCOPY, INSERT STENT URETER(S);  CYSTOSCOPY,RIGHT URETERAL STENT PLACEMENT, RIGHT RETROGRADES(LANGUAGE:[place]izen of [place]);  Surgeon: Gurvinder Sanz MD;  Location:  OR     CORONARY ANGIOGRAPHY ADULT ORDER  2/20/15    medical managed     CYSTOSCOPY, RETROGRADES, COMBINED  4/20/2018    Procedure: COMBINED CYSTOSCOPY, RETROGRADES;;  Surgeon: Gurvinder Sanz MD;  Location:  OR     IMPLANT PACEMAKER  7/7/14    dual chamber       FAMILY HISTORY:  Family History   Problem Relation Age of Onset     Other - See Comments Mother      old age     HEART DISEASE Father      unknown       SOCIAL HISTORY:  Social History     Social History     Marital status:      Spouse name: N/A     Number of children: N/A     Years of education: N/A     Social History Main Topics     Smoking status: Former Smoker     Start date: 1/10/1949     Quit date: 7/7/2012     Smokeless tobacco: Never Used     Alcohol use No     Drug use: No     Sexual activity: Not Asked     Other Topics Concern     Caffeine Concern No     2 cups of green tea      Special Diet No     Exercise Yes     walking     Seat Belt Yes     Social History Narrative       Review of Systems:  Skin:  Negative       Eyes:  Negative      ENT:  Negative      Respiratory:  Positive for cough;dyspnea on exertion (pt is getting over the flu) COPD   Cardiovascular:    Positive for;lightheadedness cp when coughing  Gastroenterology: Negative for      Genitourinary:  Negative      Musculoskeletal:  Positive for muscular weakness    Neurologic:  Negative      Psychiatric:  Negative      Heme/Lymph/Imm:  Negative      Endocrine:  Negative        Physical Exam:  Vitals: BP (!) 88/0  Pulse  "120  Ht 1.854 m (6' 0.99\")    Constitutional:  cooperative, alert and oriented, well developed, well nourished, in no acute distress thin      Skin:  warm and dry to the touch   pacemaker incision in the left infraclavicular area was well-healed  (actinic keratoses)    Head:  normocephalic        Eyes:  pupils equal and round        Lymph:      ENT:  no pallor or cyanosis        Neck:  JVP normal right carotid bruit      Respiratory:  normal symmetry prolonged expiration       Cardiac: regular rhythm, normal S1/S2, no S3 or S4, apical impulse not displaced, no murmurs, gallops or rubs tachycardic     grade 1;LLSB;systolic murmur grade 2;RUSB;systolic ejection murmur   distant heart sounds  pulses full and equal         right subclavian artery                     right femoral bruit (+) left femoral bruit (+) right subclavian bruit; right groin site clean without hum or bruit    GI:  abdomen soft        Extremities and Muscular Skeletal:  no deformities, clubbing, cyanosis, erythema observed;no edema              Neurological:  no gross motor deficits;affect appropriate        Psych:  Alert and Oriented x 3      Recent Lab Results:  LIPID RESULTS:  No results found for: CHOL, HDL, LDL, TRIG, CHOLHDLRATIO    LIVER ENZYME RESULTS:  Lab Results   Component Value Date    AST 12 04/20/2018    ALT 11 04/20/2018       CBC RESULTS:  Lab Results   Component Value Date    WBC 9.2 05/02/2018    RBC 4.13 (L) 05/02/2018    HGB 12.7 (L) 05/02/2018    HCT 39.5 (L) 05/02/2018    MCV 96 05/02/2018    MCH 30.8 05/02/2018    MCHC 32.2 05/02/2018    RDW 15.3 (H) 05/02/2018     05/02/2018       BMP RESULTS:  Lab Results   Component Value Date     (H) 05/02/2018    POTASSIUM 4.0 05/02/2018    CHLORIDE 112 (H) 05/02/2018    CO2 26 05/02/2018    ANIONGAP 7 05/02/2018     (H) 05/02/2018    BUN 25 05/02/2018    CR 0.85 05/02/2018    GFRESTIMATED 86 05/02/2018    GFRESTBLACK >90 05/02/2018    ARELIS 9.3 05/02/2018        A1C " RESULTS:  No results found for: A1C    INR RESULTS:  Lab Results   Component Value Date    INR 1.09 05/02/2018    INR 0.93 02/20/2015           CC  Ronaldo Son MD  [address]  [place] [address]

## 2018-05-10 NOTE — MR AVS SNAPSHOT
After Visit Summary   5/10/2018    Nacho Joseph    MRN: [id number]           Patient Information     Date Of Birth          1935        Visit Information        Provider Department      5/10/2018 2:55 PM Scar Harris, APRN CNP; [employer] [facility]        Today's Diagnoses     Tachycardia    -  1    Ischemic cardiomyopathy        Atrioventricular block, complete (H)        History of permanent cardiac pacemaker placement        Other forms of angina pectoris (H)        Coronary artery disease involving native coronary artery of native heart without angina pectoris        Abdominal aortic aneurysm (AAA) without rupture (H)        Benign essential hypertension        Non-compliance        Bruit of right carotid artery        SOB (shortness of breath)        Murmur        Abdominal aortic aneurysm (AAA) without rupture (H)          Care Instructions    BMP--labs today    Ultrasound of the heart and pacer check next week and see Dr. Llanes back 2 days after the tests    Increase Toprol to 50mg twice daily--see enclosed rx              Follow-ups after your visit        Additional Services     Follow-Up with Device Clinic           Follow-Up with Cardiologist                 Your next 10 appointments already scheduled     May 17, 2018  3:00 PM CDT   LAB with [facility]   [facility] HEART AT [facility] ([facility])    [address] [place] [address] [phone]           Please do not eat 10-12 hours before your appointment if you are coming in fasting for labs on lipids, cholesterol, or glucose (sugar). This does not apply to pregnant women. Water, hot tea and black coffee (with nothing added) are okay. Do not drink other fluids, diet soda or chew gum.            May 17, 2018  3:15 PM CDT   Pacemaker Check with [facility]   [facility] ([facility])    [address]  [place] [address]   [phone] OPT 2            May 17, 2018  4:00 PM CDT   Ech Complete with [email or web address]   [facility] CV Echocardiography (Cardiovascular [facility] at [facility])    [address]  [place] [address] [phone]           1. Please bring or wear a comfortable two-piece outfit. 2. You may eat, drink and take your normal medicines. 3. For any questions that cannot be answered, please contact the ordering physician            May 22, 2018  8:45 AM CDT   Return Visit with Carlo Llanes MD   [facility] ([facility])    [address]  [place] [address] [phone] OPT 2            Jul 05, 2018  9:15 AM CDT   Return Visit with Ronaldo Son MD   [facility] ([facility])    [address]  [place] [facility] [place] [address] [phone] OPT 2            Jul 23, 2018  2:30 PM CDT   Remote PPM Check with [facility]   [facility] ([facility])    [address]  [place] [address] [phone] OPT 2           This appointment is for a remote check of your pacemaker.  This is not an appointment at the office.              Future tests that were ordered for you today     Open Future Orders        Priority Expected Expires Ordered    Basic metabolic panel Routine 5/10/2018 5/10/2019 5/10/2018    Follow-Up with Device Clinic Routine 5/15/2018 5/10/2019 5/10/2018    Echocardiogram Routine 5/15/2018 5/10/2019 5/10/2018    Follow-Up with Cardiologist Routine 5/17/2018 5/10/2019 5/10/2018    US Carotid Bilateral Routine 5/2/2019 5/10/2019 5/10/2018            Who to contact     If you have questions or need follow up information about today's clinic visit or your schedule please contact [facility]  "HEART CARE   [place] directly at [phone].  Normal or non-critical lab and imaging results will be communicated to you by MyChart, letter or phone within 4 business days after the clinic has received the results. If you do not hear from us within 7 days, please contact the clinic through [employer] or phone. If you have a critical or abnormal lab result, we will notify you by phone as soon as possible.  Submit refill requests through [employer] or call your pharmacy and they will forward the refill request to us. Please allow 3 business days for your refill to be completed.          Additional Information About Your Visit        [employer] Information     [employer] lets you send messages to your doctor, view your test results, renew your prescriptions, schedule appointments and more. To sign up, go to [email or web address] . Click on \"Log in\" on the left side of the screen, which will take you to the Welcome page. Then click on \"Sign up Now\" on the right side of the page.     You will be asked to enter the access code listed below, as well as some personal information. Please follow the directions to create your username and password.     Your access code is: [email or web address]  Expires: 7/15/2018  9:37 AM     Your access code will  in 90 days. If you need help or a new code, please call your [facility] clinic or [phone].        Care EveryWhere ID     This is your Care EveryWhere ID. This could be used by other organizations to access your [facility] medical records  [id number]        Your Vitals Were     Pulse Height                120 1.854 m (6' 0.99\")           Blood Pressure from Last 3 Encounters:   05/10/18 (!) 88/0   18 130/84   18 118/78    Weight from Last 3 Encounters:   18 63.9 kg (140 lb 14.4 oz)   18 63.3 kg (139 lb 9.6 oz)   18 66.3 kg (146 lb 1.6 oz)              We Performed the Following     EKG 12-lead complete w/read - Clinics     Follow-Up with Cardiac Advanced Practice " Provider          Today's Medication Changes          These changes are accurate as of 5/10/18  4:41 PM.  If you have any questions, ask your nurse or doctor.               These medicines have changed or have updated prescriptions.        Dose/Directions    metoprolol succinate 50 MG 24 hr tablet   Commonly known as:  TOPROL-XL   This may have changed:  when to take this   Used for:  Abdominal aortic aneurysm (AAA) without rupture (H)   Changed by:  Scar Harris APRN CNP        Dose:  50 mg   Take 1 tablet (50 mg) by mouth two times daily   Quantity:  180 tablet   Refills:  3            Where to get your medicines      Some of these will need a paper prescription and others can be bought over the counter.  Ask your nurse if you have questions.     Bring a paper prescription for each of these medications     metoprolol succinate 50 MG 24 hr tablet                Primary Care Provider    Paulino Lynch       [id number]        Equal Access to Services     [facility]: Hadii aad ku hadasho Sojustin, waaxda luqadaha, qaybta kaalmada adecy, adams nielson . So [facility] [phone].    ATENCIÓN: Si habla español, tiene a varela disposición servicios gratuitos de asistencia lingüística. [facility] [phone].    We comply with applicable federal civil rights laws and [place] laws. We do not discriminate on the basis of race, color, national origin, age, disability, sex, sexual orientation, or gender identity.            Thank you!     Thank you for choosing [facility] HEART [facility]  for your care. Our goal is always to provide you with excellent care. Hearing back from our patients is one way we can continue to improve our services. Please take a few minutes to complete the written survey that you may receive in the mail after your visit with us. Thank you!             Your Updated Medication List - Protect others around you: Learn how to safely use, store and  throw away your medicines at www.disposemymeds.org.          This list is accurate as of 5/10/18  4:41 PM.  Always use your most recent med list.                   Brand Name Dispense Instructions for use Diagnosis    aspirin 81 MG tablet      Take 1 tablet (81 mg) by mouth daily    Chest pain       atorvastatin 40 MG tablet    LIPITOR     Take 1 tablet (40 mg) by mouth daily    CAD (coronary artery disease), Hyperlipidemia       DEEP SEA NASAL SPRAY 0.65 % nasal spray   Generic drug:  sodium chloride      Spray 1 spray into both nostrils daily as needed for congestion        doxycycline 100 MG tablet    VIBRA-TABS     Take 100 mg by mouth 2 times daily        isosorbide mononitrate 30 MG 24 hr tablet    IMDUR    90 tablet    Take 1 tablet (30 mg) by mouth daily    Ischemic cardiomyopathy       metoprolol succinate 50 MG 24 hr tablet    TOPROL-XL    180 tablet    Take 1 tablet (50 mg) by mouth two times daily    Abdominal aortic aneurysm (AAA) without rupture (H)       MILK OF MAGNESIA 400 MG/5ML suspension   Generic drug:  magnesium hydroxide      Take 30 mLs by mouth daily as needed for constipation or heartburn        MUCINEX D MAX STRENGTH PO      Take 800 mg by mouth daily        nitroGLYcerin 0.4 MG sublingual tablet    NITROSTAT    25 tablet    Place 1 tablet (0.4 mg) under the tongue every 5 minutes as needed for chest pain    Chest pain       TYLENOL PO      Take 1,000 mg by mouth 3 times daily

## 2018-05-10 NOTE — LETTER
5/10/2018    Paulino SOTO Lynch  [id number]    RE: Nacho Joseph       Dear Colleague,    I had the pleasure of seeing Nacho Joseph in the [facility].    History of Present Illness:    Nacho Joseph is a 82 year old Chadian-speaking male I am meeting for the first time today with his .  He returns today for post hospital follow-up and post angiogram follow-up.  He hs followed in the past witha Dr. Carlo Llanes.  The patient wishes to reestablish care with him.    Recently he was referred back to us for complaints of chest discomfort and saw Dr. Son.  He has a previously known occluded right coronary artery with a high-grade circumflex stenosis without flow-limiting disease in the LAD.  The right coronary received collateral flow from the left.    When he saw Dr. Hossein Burnett he was complaining of chest discomfort after walking 500 yards and increasing shortness of breath for a few weeks.  He had influenza over [place] and has not recovered well from this.  He was also found to have a blood pressure of 85/59 at the office visit.  He has a prior history of complete heart block and has undergone a permanent pacemaker inserted by his electrophysiologist Dr. Chavarria.    He has an abdominal aortic aneurysm that measured 4.1 cm in 2015.    He was referred for an outpatient coronary angiogram, however before he could be admitted for this he presented to the emergency room with significant right flank pain and was found to have an infected obstructive ureteral calculi also causing hydronephrosis with increase in his creatinine from a baseline of 0.79-0.89-1.5.  He was seen by urology and a right ureteral stent was placed with improvement in his creatinine to baseline prior to discharge.    He had intermittent hypotension during that hospital stay.  He was kept off of lisinopril and amlodipine and sent home on metoprolol 50 mg per day Imdur 30 mg per day.   In the past he has taken as much as 100 mg twice per day of metoprolol which was reduced to 100 mg a day at our office visit here and reduce further to 50 mg per day after his hospital admission to to low bp.    He returned for his outpatient coronary angiography done by Dr. Dewitt on 5-2-2018.  Again the right coronary artery was chronically occluded.  The left circumflex is now also chronically occluded and the LAD continues to have non-flow-limiting disease.  He has a separate circumflex and right coronary artery ostia in lieu of a traditional left main.  During LV gram he had variable levels of mitral regurgitation.  On previous echocardiogram he has not had mitral regurgitation.  Dr. Dewitt felt at some point he may be a candidate for chronic total occlusion procedure of the circumflex but this would be long and complicated due to heavy calcification.  It would require minimum Rotablator or CSI if a wire would be able to be passed on the OM. In addition, he wanted to be sure he did not have significant MR. Dr. Son felt the MR may be catheter induced.    This patient continues have a right ureteral stent in place and certainly this would need to be re-addressed and removed prior to any consideration for intervention requiring dual antiplatelet therapy.      During his recent hospital admission he was seen by Dr. Jensen for his infrarenal abdominal aortic aneurysm which measured 5.2 X 4.4cm which would have been an interval increase from 2015, however Dr. Jensen felt this measured closer to 4.6 upon his review.  He suggested a CT scan in approximately 6 months time    Dr. Hossein Burnett noted a right carotid bruit.  Ultrasound was performed indicating less than 50% stenosis in the right internal carotid artery and 50-69% stenosis in the left internal carotid artery.  Today I appreciate at least a 20-25 mm difference in his blood pressure between his right and left arms, higher on the left.  His brachial  and radial pulses are weaker in the right arm as well.  He has a loud bruit over the right subclavian and I suspect he potentially could have subclavian steal.  I suggest all of his blood pressures be checked on his left arm and treated accordingly.    Today on his left arm his blood pressures in the mid 120s over 70s when checked by me.    Cardiac exam today revealed tachycardia at a rate of 120 bpm.  I obtained an EKG which looks suspicious for atrial tachycardia.  The device RN interrogated and we spoke to Dr. Chavarria who confirmed this.  Further exam today shows bilateral carotid bruits,  right subclavian bruit,  soft systolic murmur with clear lungs  and edema.  Right groin site is healed well with minimal ecchymosis.        Impression/Plan:     1.  Coronary artery disease with anatomy outlined above  -Preserved, if not hyperdynamic LV function on echo in April 2018  -Chest pain has improved  -Troponins were negative during his hospital admission  -Continue beta-blockers and isosorbide  -Consider chronic total occlusion procedure in the future, however his ureteral stent needs to be removed first for consideration of dual antiplatelet therapy and the patient would appreciate Dr. Llanes's input as well.    2.  Markedly different blood pressures between left and right arm, higher on the left.  -Subclavian bruits on the right-->suspicious for subclavian steal  -check blood pressure on left arm only and treat accordingly  -Consider ultrasound of the subclavian area in the future    3.  History of complete heart block with pacemaker in place    4.  New atrial tachycardia today heart rate of 120 at rest which he is tolerating well so far  -Increase metoprolol to 100 mg per day in divided doses (50 mg in the morning, 50 mg in the evening)  -He will return in a few days for a pacemaker check and his heart rate is still high, his heart rate will be reduced by switching to VVIR mode temporarily in order to complete an  echocardiogram when he is not in tachycardia and switch back to DDDR with consideration of increasing Toprol further or referral back to EP service.    5.  Question of mitral regurgitation during his LV gram which may have been catheter induced  Dr. Son suggests repeating an echocardiogram which has been scheduled next week    6.  Recent ureteral stent which was infective and caused obstructive hydronephrosis  -Sees urology tomorrow  -Basic metabolic panel will be repeated today if the lab is able, if it is too late in the day, we will recheck early next week    7.  Peripheral Arterial disease with carotid stenosis as outlined above  -Suspicious for subclavian steal as outlined above  -Abdominal aortic aneurysm measuring 5.2 x 4.4, however by Dr. Jensen's to review slightly smaller  -Dr. Jensen suggested a CT scan in 6 months  -repeat carotid ultrasound in one year    8.  Dyslipidemia-currently on Lipitor 40 mg per day  -Check lipid panel at some point as there are none available that I can find on file    This patient lives at [facility] and we have had difficulty communicating with him and the staff there.  The phone number he brought in today on his paperwork is [phone]    I have ordered a pacemaker check with an echocardiogram and a basic metabolic panel in a few days.  He will then reestablish his care with Dr. Llanes per his request.     Rater than 50% of this one-hour and 15 minute visit today was spent in counseling and collaboration-this plan was reviewed with Dr. Son and Aura Rollins-Jyotsna, MSN, APRN-BC, CNP  Cardiology    Orders Placed This Encounter   Procedures     US Carotid Bilateral     EKG 12-lead complete w/read - Clinics     Orders Placed This Encounter   Medications     doxycycline (VIBRA-TABS) 100 MG tablet     Sig: Take 100 mg by mouth 2 times daily     sodium chloride (DEEP SEA NASAL SPRAY) 0.65 % nasal spray     Sig: Spray 1 spray into both  nostrils daily as needed for congestion     There are no discontinued medications.      Encounter Diagnoses   Name Primary?     Ischemic cardiomyopathy      Atrioventricular block, complete (H)      History of permanent cardiac pacemaker placement      Other forms of angina pectoris (H)      Coronary artery disease involving native coronary artery of native heart without angina pectoris      Abdominal aortic aneurysm (AAA) without rupture (H)      Benign essential hypertension      Non-compliance      Bruit of right carotid artery      SOB (shortness of breath)      Tachycardia Yes       CURRENT MEDICATIONS:  Current Outpatient Prescriptions   Medication Sig Dispense Refill     Acetaminophen (TYLENOL PO) Take 1,000 mg by mouth 3 times daily       aspirin 81 MG tablet Take 1 tablet (81 mg) by mouth daily       atorvastatin (LIPITOR) 40 MG tablet Take 1 tablet (40 mg) by mouth daily       doxycycline (VIBRA-TABS) 100 MG tablet Take 100 mg by mouth 2 times daily       isosorbide mononitrate (IMDUR) 30 MG 24 hr tablet Take 1 tablet (30 mg) by mouth daily 90 tablet 3     magnesium hydroxide (MILK OF MAGNESIA) 400 MG/5ML suspension Take 30 mLs by mouth daily as needed for constipation or heartburn       metoprolol succinate (TOPROL-XL) 50 MG 24 hr tablet Take 1 tablet (50 mg) by mouth daily 30 tablet 0     nitroglycerin (NITROSTAT) 0.4 MG SL tablet Place 1 tablet (0.4 mg) under the tongue every 5 minutes as needed for chest pain 25 tablet 4     Pseudoephedrine-Guaifenesin (MUCINEX D MAX STRENGTH PO) Take 800 mg by mouth daily        sodium chloride (DEEP SEA NASAL SPRAY) 0.65 % nasal spray Spray 1 spray into both nostrils daily as needed for congestion         ALLERGIES   No Known Allergies    PAST MEDICAL HISTORY:  Past Medical History:   Diagnosis Date     AAA (abdominal aortic aneurysm) (H) 7/8/2014    4.1 cm     Anemia      Atrioventricular block, complete (HEART) 7/8/2014    S/p PPM 7/2014     Bradycardia      CAD  (coronary artery disease) 2/9/2015     Carotid stenosis      Chest pain 1/20/2015     COPD (chronic obstructive pulmonary disease) (H)      Depression 7/21/2014     Depressive disorder      HTN (hypertension) 7/8/2014     Hyperlipidemia 7/8/2014     Hypertension      Ischemic cardiomyopathy 2/9/2015     Lung abscess (H)      Syncope        PAST SURGICAL HISTORY:  Past Surgical History:   Procedure Laterality Date     COMBINED CYSTOSCOPY, INSERT STENT URETER(S) Right 4/20/2018    Procedure: COMBINED CYSTOSCOPY, INSERT STENT URETER(S);  CYSTOSCOPY,RIGHT URETERAL STENT PLACEMENT, RIGHT RETROGRADES(LANGUAGE:Scottish);  Surgeon: Gurvinder Sanz MD;  Location:  OR     CORONARY ANGIOGRAPHY ADULT ORDER  2/20/15    medical managed     CYSTOSCOPY, RETROGRADES, COMBINED  4/20/2018    Procedure: COMBINED CYSTOSCOPY, RETROGRADES;;  Surgeon: Gurvinder Sanz MD;  Location:  OR     IMPLANT PACEMAKER  7/7/14    dual chamber       FAMILY HISTORY:  Family History   Problem Relation Age of Onset     Other - See Comments Mother      old age     HEART DISEASE Father      unknown       SOCIAL HISTORY:  Social History     Social History     Marital status:      Spouse name: N/A     Number of children: N/A     Years of education: N/A     Social History Main Topics     Smoking status: Former Smoker     Start date: 1/10/1949     Quit date: 7/7/2012     Smokeless tobacco: Never Used     Alcohol use No     Drug use: No     Sexual activity: Not Asked     Other Topics Concern     Caffeine Concern No     2 cups of green tea      Special Diet No     Exercise Yes     walking     Seat Belt Yes     Social History Narrative       Review of Systems:  Skin:  Negative       Eyes:  Negative      ENT:  Negative      Respiratory:  Positive for cough;dyspnea on exertion (pt is getting over the flu) COPD   Cardiovascular:    Positive for;lightheadedness cp when coughing  Gastroenterology: Negative for      Genitourinary:  Negative     "  Musculoskeletal:  Positive for muscular weakness    Neurologic:  Negative      Psychiatric:  Negative      Heme/Lymph/Imm:  Negative      Endocrine:  Negative        Physical Exam:  Vitals: BP (!) 88/0  Pulse 120  Ht 1.854 m (6' 0.99\")    Constitutional:  cooperative, alert and oriented, well developed, well nourished, in no acute distress thin      Skin:  warm and dry to the touch   pacemaker incision in the left infraclavicular area was well-healed  (actinic keratoses)    Head:  normocephalic        Eyes:  pupils equal and round        Lymph:      ENT:  no pallor or cyanosis        Neck:  JVP normal right carotid bruit      Respiratory:  normal symmetry prolonged expiration       Cardiac: regular rhythm, normal S1/S2, no S3 or S4, apical impulse not displaced, no murmurs, gallops or rubs tachycardic     grade 1;LLSB;systolic murmur grade 2;RUSB;systolic ejection murmur   distant heart sounds  pulses full and equal         right subclavian artery                     right femoral bruit (+) left femoral bruit (+) right subclavian bruit; right groin site clean without hum or bruit    GI:  abdomen soft        Extremities and Muscular Skeletal:  no deformities, clubbing, cyanosis, erythema observed;no edema              Neurological:  no gross motor deficits;affect appropriate        Psych:  Alert and Oriented x 3      Recent Lab Results:  LIPID RESULTS:  No results found for: CHOL, HDL, LDL, TRIG, CHOLHDLRATIO    LIVER ENZYME RESULTS:  Lab Results   Component Value Date    AST 12 04/20/2018    ALT 11 04/20/2018       CBC RESULTS:  Lab Results   Component Value Date    WBC 9.2 05/02/2018    RBC 4.13 (L) 05/02/2018    HGB 12.7 (L) 05/02/2018    HCT 39.5 (L) 05/02/2018    MCV 96 05/02/2018    MCH 30.8 05/02/2018    MCHC 32.2 05/02/2018    RDW 15.3 (H) 05/02/2018     05/02/2018       BMP RESULTS:  Lab Results   Component Value Date     (H) 05/02/2018    POTASSIUM 4.0 05/02/2018    CHLORIDE 112 (H) " 05/02/2018    CO2 26 05/02/2018    ANIONGAP 7 05/02/2018     (H) 05/02/2018    BUN 25 05/02/2018    CR 0.85 05/02/2018    GFRESTIMATED 86 05/02/2018    GFRESTBLACK >90 05/02/2018    ARELIS 9.3 05/02/2018        A1C RESULTS:  No results found for: A1C    INR RESULTS:  Lab Results   Component Value Date    INR 1.09 05/02/2018    INR 0.93 02/20/2015           CC  Ronaldo Son MD  [address]  [place] [id number]                  Thank you for allowing me to participate in the care of your patient.      Sincerely,     NICHOLAS Gutierrez [facility]    cc:   Ronaldo Son MD  [address]  [place] [address]

## 2018-05-17 ENCOUNTER — ANESTHESIA EVENT (OUTPATIENT)
Dept: SURGERY | Facility: CLINIC | Age: 83
End: 2018-05-17
Payer: MEDICAID

## 2018-05-17 ENCOUNTER — HOSPITAL ENCOUNTER (OUTPATIENT)
Dept: CARDIOLOGY | Facility: CLINIC | Age: 83
Discharge: HOME | End: 2018-05-17
Attending: NURSE PRACTITIONER | Admitting: NURSE PRACTITIONER
Payer: MEDICAID

## 2018-05-17 ENCOUNTER — ALLIED HEALTH/NURSE VISIT (OUTPATIENT)
Dept: CARDIOLOGY | Facility: CLINIC | Age: 83
End: 2018-05-17
Attending: NURSE PRACTITIONER
Payer: MEDICAID

## 2018-05-17 DIAGNOSIS — I44.2 ATRIOVENTRICULAR BLOCK, COMPLETE (H): Primary | ICD-10-CM

## 2018-05-17 DIAGNOSIS — R01.1 MURMUR: ICD-10-CM

## 2018-05-17 DIAGNOSIS — Z95.0 HISTORY OF PERMANENT CARDIAC PACEMAKER PLACEMENT: ICD-10-CM

## 2018-05-17 DIAGNOSIS — R00.0 TACHYCARDIA: ICD-10-CM

## 2018-05-17 LAB
ANION GAP SERPL CALCULATED.3IONS-SCNC: 15.1 MMOL/L (ref 6–17)
BUN SERPL-MCNC: 23 MG/DL (ref 7–30)
CALCIUM SERPL-MCNC: 9.7 MG/DL (ref 8.5–10.5)
CHLORIDE SERPL-SCNC: 106 MMOL/L (ref 98–107)
CO2 SERPL-SCNC: 24 MMOL/L (ref 23–29)
CREAT SERPL-MCNC: 1.01 MG/DL (ref 0.7–1.3)
GFR SERPL CREATININE-BSD FRML MDRD: 71 ML/MIN/1.7M2
GLUCOSE SERPL-MCNC: 117 MG/DL (ref 70–105)
POTASSIUM SERPL-SCNC: 4.1 MMOL/L (ref 3.5–5.1)
SODIUM SERPL-SCNC: 141 MMOL/L (ref 136–145)

## 2018-05-17 PROCEDURE — 93308 TTE F-UP OR LMTD: CPT

## 2018-05-17 PROCEDURE — 93321 DOPPLER ECHO F-UP/LMTD STD: CPT | Mod: 26 | Performed by: INTERNAL MEDICINE

## 2018-05-17 PROCEDURE — 93325 DOPPLER ECHO COLOR FLOW MAPG: CPT | Mod: 26 | Performed by: INTERNAL MEDICINE

## 2018-05-17 PROCEDURE — 25500064 ZZH RX 255 OP 636: Performed by: NURSE PRACTITIONER

## 2018-05-17 PROCEDURE — 93308 TTE F-UP OR LMTD: CPT | Mod: 26 | Performed by: INTERNAL MEDICINE

## 2018-05-17 PROCEDURE — 80048 BASIC METABOLIC PNL TOTAL CA: CPT | Performed by: NURSE PRACTITIONER

## 2018-05-17 PROCEDURE — 36415 COLL VENOUS BLD VENIPUNCTURE: CPT | Performed by: NURSE PRACTITIONER

## 2018-05-17 RX ADMIN — HUMAN ALBUMIN MICROSPHERES AND PERFLUTREN 3 ML: 10; .22 INJECTION, SOLUTION INTRAVENOUS at 16:44

## 2018-05-17 NOTE — H&P (VIEW-ONLY)
History of Present Illness:    Nacho Joseph is a 82 year old Hong Konger-speaking male I am meeting for the first time today with his .  He returns today for post hospital follow-up and post angiogram follow-up.  He hs followed in the past witha Dr. Carlo Llanes.  The patient wishes to reestablish care with him.    Recently he was referred back to us for complaints of chest discomfort and saw Dr. Son.  He has a previously known occluded right coronary artery with a high-grade circumflex stenosis without flow-limiting disease in the LAD.  The right coronary received collateral flow from the left.    When he saw Dr. Hossein Burnett he was complaining of chest discomfort after walking 500 yards and increasing shortness of breath for a few weeks.  He had influenza over [place] and has not recovered well from this.  He was also found to have a blood pressure of 85/59 at the office visit.  He has a prior history of complete heart block and has undergone a permanent pacemaker inserted by his electrophysiologist Dr. Chavarria.    He has an abdominal aortic aneurysm that measured 4.1 cm in 2015.    He was referred for an outpatient coronary angiogram, however before he could be admitted for this he presented to the emergency room with significant right flank pain and was found to have an infected obstructive ureteral calculi also causing hydronephrosis with increase in his creatinine from a baseline of 0.79-0.89-1.5.  He was seen by urology and a right ureteral stent was placed with improvement in his creatinine to baseline prior to discharge.    He had intermittent hypotension during that hospital stay.  He was kept off of lisinopril and amlodipine and sent home on metoprolol 50 mg per day Imdur 30 mg per day.  In the past he has taken as much as 100 mg twice per day of metoprolol which was reduced to 100 mg a day at our office visit here and reduce further to 50 mg per day after his hospital admission to  to low bp.    He returned for his outpatient coronary angiography done by Dr. Dewitt on 5-2-2018.  Again the right coronary artery was chronically occluded.  The left circumflex is now also chronically occluded and the LAD continues to have non-flow-limiting disease.  He has a separate circumflex and right coronary artery ostia in lieu of a traditional left main.  During LV gram he had variable levels of mitral regurgitation.  On previous echocardiogram he has not had mitral regurgitation.  Dr. Dewitt felt at some point he may be a candidate for chronic total occlusion procedure of the circumflex but this would be long and complicated due to heavy calcification.  It would require minimum Rotablator or CSI if a wire would be able to be passed on the OM. In addition, he wanted to be sure he did not have significant MR. Dr. Son felt the MR may be catheter induced.    This patient continues have a right ureteral stent in place and certainly this would need to be re-addressed and removed prior to any consideration for intervention requiring dual antiplatelet therapy.      During his recent hospital admission he was seen by Dr. Jensen for his infrarenal abdominal aortic aneurysm which measured 5.2 X 4.4cm which would have been an interval increase from 2015, however Dr. Jensen felt this measured closer to 4.6 upon his review.  He suggested a CT scan in approximately 6 months time    Dr. Hossein Burnett noted a right carotid bruit.  Ultrasound was performed indicating less than 50% stenosis in the right internal carotid artery and 50-69% stenosis in the left internal carotid artery.  Today I appreciate at least a 20-25 mm difference in his blood pressure between his right and left arms, higher on the left.  His brachial and radial pulses are weaker in the right arm as well.  He has a loud bruit over the right subclavian and I suspect he potentially could have subclavian steal.  I suggest all of his blood pressures be  checked on his left arm and treated accordingly.    Today on his left arm his blood pressures in the mid 120s over 70s when checked by me.    Cardiac exam today revealed tachycardia at a rate of 120 bpm.  I obtained an EKG which looks suspicious for atrial tachycardia.  The device RN interrogated and we spoke to Dr. Chavarria who confirmed this.  Further exam today shows bilateral carotid bruits,  right subclavian bruit,  soft systolic murmur with clear lungs  and edema.  Right groin site is healed well with minimal ecchymosis.        Impression/Plan:     1.  Coronary artery disease with anatomy outlined above  -Preserved, if not hyperdynamic LV function on echo in April 2018  -Chest pain has improved  -Troponins were negative during his hospital admission  -Continue beta-blockers and isosorbide  -Consider chronic total occlusion procedure in the future, however his ureteral stent needs to be removed first for consideration of dual antiplatelet therapy and the patient would appreciate Dr. Llanes's input as well.    2.  Markedly different blood pressures between left and right arm, higher on the left.  -Subclavian bruits on the right-->suspicious for subclavian steal  -check blood pressure on left arm only and treat accordingly  -Consider ultrasound of the subclavian area in the future    3.  History of complete heart block with pacemaker in place    4.  New atrial tachycardia today heart rate of 120 at rest which he is tolerating well so far  -Increase metoprolol to 100 mg per day in divided doses (50 mg in the morning, 50 mg in the evening)  -He will return in a few days for a pacemaker check and his heart rate is still high, his heart rate will be reduced by switching to VVIR mode temporarily in order to complete an echocardiogram when he is not in tachycardia and switch back to DDDR with consideration of increasing Toprol further or referral back to EP service.    5.  Question of mitral regurgitation during his LV gram  which may have been catheter induced  Dr. Son suggests repeating an echocardiogram which has been scheduled next week    6.  Recent ureteral stent which was infective and caused obstructive hydronephrosis  -Sees urology tomorrow  -Basic metabolic panel will be repeated today if the lab is able, if it is too late in the day, we will recheck early next week    7.  Peripheral Arterial disease with carotid stenosis as outlined above  -Suspicious for subclavian steal as outlined above  -Abdominal aortic aneurysm measuring 5.2 x 4.4, however by Dr. Jensen's to review slightly smaller  -Dr. Jensen suggested a CT scan in 6 months  -repeat carotid ultrasound in one year    8.  Dyslipidemia-currently on Lipitor 40 mg per day  -Check lipid panel at some point as there are none available that I can find on file    This patient lives at [facility] and we have had difficulty communicating with him and the staff there.  The phone number he brought in today on his paperwork is [phone]    I have ordered a pacemaker check with an echocardiogram and a basic metabolic panel in a few days.  He will then reestablish his care with Dr. Llanes per his request.     Rater than 50% of this one-hour and 15 minute visit today was spent in counseling and collaboration-this plan was reviewed with Dr. Son and Aura Harris, MSN, APRN-BC, CNP  Cardiology    Orders Placed This Encounter   Procedures     US Carotid Bilateral     EKG 12-lead complete w/read - Clinics     Orders Placed This Encounter   Medications     doxycycline (VIBRA-TABS) 100 MG tablet     Sig: Take 100 mg by mouth 2 times daily     sodium chloride (DEEP SEA NASAL SPRAY) 0.65 % nasal spray     Sig: Spray 1 spray into both nostrils daily as needed for congestion     There are no discontinued medications.      Encounter Diagnoses   Name Primary?     Ischemic cardiomyopathy      Atrioventricular block, complete (H)      History of  permanent cardiac pacemaker placement      Other forms of angina pectoris (H)      Coronary artery disease involving native coronary artery of native heart without angina pectoris      Abdominal aortic aneurysm (AAA) without rupture (H)      Benign essential hypertension      Non-compliance      Bruit of right carotid artery      SOB (shortness of breath)      Tachycardia Yes       CURRENT MEDICATIONS:  Current Outpatient Prescriptions   Medication Sig Dispense Refill     Acetaminophen (TYLENOL PO) Take 1,000 mg by mouth 3 times daily       aspirin 81 MG tablet Take 1 tablet (81 mg) by mouth daily       atorvastatin (LIPITOR) 40 MG tablet Take 1 tablet (40 mg) by mouth daily       doxycycline (VIBRA-TABS) 100 MG tablet Take 100 mg by mouth 2 times daily       isosorbide mononitrate (IMDUR) 30 MG 24 hr tablet Take 1 tablet (30 mg) by mouth daily 90 tablet 3     magnesium hydroxide (MILK OF MAGNESIA) 400 MG/5ML suspension Take 30 mLs by mouth daily as needed for constipation or heartburn       metoprolol succinate (TOPROL-XL) 50 MG 24 hr tablet Take 1 tablet (50 mg) by mouth daily 30 tablet 0     nitroglycerin (NITROSTAT) 0.4 MG SL tablet Place 1 tablet (0.4 mg) under the tongue every 5 minutes as needed for chest pain 25 tablet 4     Pseudoephedrine-Guaifenesin (MUCINEX D MAX STRENGTH PO) Take 800 mg by mouth daily        sodium chloride (DEEP SEA NASAL SPRAY) 0.65 % nasal spray Spray 1 spray into both nostrils daily as needed for congestion         ALLERGIES   No Known Allergies    PAST MEDICAL HISTORY:  Past Medical History:   Diagnosis Date     AAA (abdominal aortic aneurysm) (H) 7/8/2014    4.1 cm     Anemia      Atrioventricular block, complete (HEART) 7/8/2014    S/p PPM 7/2014     Bradycardia      CAD (coronary artery disease) 2/9/2015     Carotid stenosis      Chest pain 1/20/2015     COPD (chronic obstructive pulmonary disease) (H)      Depression 7/21/2014     Depressive disorder      HTN (hypertension)  7/8/2014     Hyperlipidemia 7/8/2014     Hypertension      Ischemic cardiomyopathy 2/9/2015     Lung abscess (H)      Syncope        PAST SURGICAL HISTORY:  Past Surgical History:   Procedure Laterality Date     COMBINED CYSTOSCOPY, INSERT STENT URETER(S) Right 4/20/2018    Procedure: COMBINED CYSTOSCOPY, INSERT STENT URETER(S);  CYSTOSCOPY,RIGHT URETERAL STENT PLACEMENT, RIGHT RETROGRADES(LANGUAGE:Vincentian);  Surgeon: Gurvinder Sanz MD;  Location:  OR     CORONARY ANGIOGRAPHY ADULT ORDER  2/20/15    medical managed     CYSTOSCOPY, RETROGRADES, COMBINED  4/20/2018    Procedure: COMBINED CYSTOSCOPY, RETROGRADES;;  Surgeon: Gurvinder Sanz MD;  Location:  OR     IMPLANT PACEMAKER  7/7/14    dual chamber       FAMILY HISTORY:  Family History   Problem Relation Age of Onset     Other - See Comments Mother      old age     HEART DISEASE Father      unknown       SOCIAL HISTORY:  Social History     Social History     Marital status:      Spouse name: N/A     Number of children: N/A     Years of education: N/A     Social History Main Topics     Smoking status: Former Smoker     Start date: 1/10/1949     Quit date: 7/7/2012     Smokeless tobacco: Never Used     Alcohol use No     Drug use: No     Sexual activity: Not Asked     Other Topics Concern     Caffeine Concern No     2 cups of green tea      Special Diet No     Exercise Yes     walking     Seat Belt Yes     Social History Narrative       Review of Systems:  Skin:  Negative       Eyes:  Negative      ENT:  Negative      Respiratory:  Positive for cough;dyspnea on exertion (pt is getting over the flu) COPD   Cardiovascular:    Positive for;lightheadedness cp when coughing  Gastroenterology: Negative for      Genitourinary:  Negative      Musculoskeletal:  Positive for muscular weakness    Neurologic:  Negative      Psychiatric:  Negative      Heme/Lymph/Imm:  Negative      Endocrine:  Negative        Physical Exam:  Vitals: BP (!) 88/0  Pulse  "120  Ht 1.854 m (6' 0.99\")    Constitutional:  cooperative, alert and oriented, well developed, well nourished, in no acute distress thin      Skin:  warm and dry to the touch   pacemaker incision in the left infraclavicular area was well-healed  (actinic keratoses)    Head:  normocephalic        Eyes:  pupils equal and round        Lymph:      ENT:  no pallor or cyanosis        Neck:  JVP normal right carotid bruit      Respiratory:  normal symmetry prolonged expiration       Cardiac: regular rhythm, normal S1/S2, no S3 or S4, apical impulse not displaced, no murmurs, gallops or rubs tachycardic     grade 1;LLSB;systolic murmur grade 2;RUSB;systolic ejection murmur   distant heart sounds  pulses full and equal         right subclavian artery                     right femoral bruit (+) left femoral bruit (+) right subclavian bruit; right groin site clean without hum or bruit    GI:  abdomen soft        Extremities and Muscular Skeletal:  no deformities, clubbing, cyanosis, erythema observed;no edema              Neurological:  no gross motor deficits;affect appropriate        Psych:  Alert and Oriented x 3      Recent Lab Results:  LIPID RESULTS:  No results found for: CHOL, HDL, LDL, TRIG, CHOLHDLRATIO    LIVER ENZYME RESULTS:  Lab Results   Component Value Date    AST 12 04/20/2018    ALT 11 04/20/2018       CBC RESULTS:  Lab Results   Component Value Date    WBC 9.2 05/02/2018    RBC 4.13 (L) 05/02/2018    HGB 12.7 (L) 05/02/2018    HCT 39.5 (L) 05/02/2018    MCV 96 05/02/2018    MCH 30.8 05/02/2018    MCHC 32.2 05/02/2018    RDW 15.3 (H) 05/02/2018     05/02/2018       BMP RESULTS:  Lab Results   Component Value Date     (H) 05/02/2018    POTASSIUM 4.0 05/02/2018    CHLORIDE 112 (H) 05/02/2018    CO2 26 05/02/2018    ANIONGAP 7 05/02/2018     (H) 05/02/2018    BUN 25 05/02/2018    CR 0.85 05/02/2018    GFRESTIMATED 86 05/02/2018    GFRESTBLACK >90 05/02/2018    ARELIS 9.3 05/02/2018        A1C " RESULTS:  No results found for: A1C    INR RESULTS:  Lab Results   Component Value Date    INR 1.09 05/02/2018    INR 0.93 02/20/2015           CC  Ronaldo Son MD  [address]  [place] [address]

## 2018-05-17 NOTE — OR NURSING
preop call was with floor , ; pt signs own consent, NPO status given+ direction to take heart meds , send MAR, transportation etc

## 2018-05-17 NOTE — MR AVS SNAPSHOT
After Visit Summary   5/17/2018    Nacho Joseph    MRN: [id number]           Patient Information     Date Of Birth          1935        Visit Information        Provider Department      5/17/2018 3:00 PM [place] [employer] [facility]; JERI QUEZADAN [facility]        Today's Diagnoses     Atrioventricular block, complete (H)    -  1    Tachycardia        History of permanent cardiac pacemaker placement           Follow-ups after your visit        Your next 10 appointments already scheduled     May 17, 2018  4:00 PM CDT   Ech Complete with [id number]   [facility] CV Echocardiography (Cardiovascular Imaging at [facility])    [address]  [facility] [address] [phone]           1. Please bring or wear a comfortable two-piece outfit. 2. You may eat, drink and take your normal medicines. 3. For any questions that cannot be answered, please contact the ordering physician            May 18, 2018   Procedure with Gurvinder Sanz MD   [facility] PeriOP Services (--)    [address]  [facility] [address]   [phone]            May 22, 2018  8:30 AM CDT   Return Visit with Carlo Llanes MD   [facility] ([facility])    [address]  [facility] [address] [phone] OPT 2            Jul 05, 2018  9:15 AM CDT   Return Visit with Ronaldo Son MD   [facility] ([facility])    [address]  [facility] [address] [phone] OPT 2            Jul 23, 2018  2:30 PM CDT   Remote PPM Check with WILCOX TECH1   [facility] ([facility])    [address]  [facility] [address] [phone] OPT 2           This appointment is for a remote check of your pacemaker.  This is not an appointment at  "the office.              Who to contact     If you have questions or need follow up information about today's clinic visit or your schedule please contact [facility] directly at [phone].  Normal or non-critical lab and imaging results will be communicated to you by MyChart, letter or phone within 4 business days after the clinic has received the results. If you do not hear from us within 7 days, please contact the clinic through MyChart or phone. If you have a critical or abnormal lab result, we will notify you by phone as soon as possible.  Submit refill requests through [employer] or call your pharmacy and they will forward the refill request to us. Please allow 3 business days for your refill to be completed.          Additional Information About Your Visit        kwiryY[facility] Children's HospitalItsGoinOn Information     [employer] lets you send messages to your doctor, view your test results, renew your prescriptions, schedule appointments and more. To sign up, go to [email or web address] . Click on \"Log in\" on the left side of the screen, which will take you to the Welcome page. Then click on \"Sign up Now\" on the right side of the page.     You will be asked to enter the access code listed below, as well as some personal information. Please follow the directions to create your username and password.     Your access code is: [email or web address]  Expires: 7/15/2018  9:37 AM     Your access code will  in 90 days. If you need help or a new code, please call your [place] clinic or [phone].        Care EveryWhere ID     This is your Care EveryWhere ID. This could be used by other organizations to access your [place] medical records  [id number]         Blood Pressure from Last 3 Encounters:   05/10/18 (!) 88/0   18 130/84   18 118/78    Weight from Last 3 Encounters:   18 63.9 kg (140 lb 14.4 oz)   18 63.3 kg (139 lb 9.6 oz)   18 66.3 kg (146 lb 1.6 oz)              We " Performed the Following     Follow-Up with Device Clinic        Primary Care Provider    Paulino Lynch       [id number]        Equal Access to Services     [facility]: Hadii aad ku hadnirujuani Evangelista, watjda vandanacristopherha, qaybta jannetjuniormarilou narvaezlashaemarilou, adams vidal sallysofiya earlyloreharry nielson . So [facility] [phone].    ATENCIÓN: Si habla español, tiene a varela disposición servicios gratuitos de asistencia lingüística. Llame al [phone].    We comply with applicable federal civil rights laws and [place] laws. We do not discriminate on the basis of race, color, national origin, age, disability, sex, sexual orientation, or gender identity.            Thank you!     Thank you for choosing [facility]  for your care. Our goal is always to provide you with excellent care. Hearing back from our patients is one way we can continue to improve our services. Please take a few minutes to complete the written survey that you may receive in the mail after your visit with us. Thank you!             Your Updated Medication List - Protect others around you: Learn how to safely use, store and throw away your medicines at www.disposemymeds.org.          This list is accurate as of 5/17/18  3:29 PM.  Always use your most recent med list.                   Brand Name Dispense Instructions for use Diagnosis    aspirin 81 MG tablet      Take 1 tablet (81 mg) by mouth daily    Chest pain       atorvastatin 40 MG tablet    LIPITOR     Take 1 tablet (40 mg) by mouth daily    CAD (coronary artery disease), Hyperlipidemia       DEEP SEA NASAL SPRAY 0.65 % nasal spray   Generic drug:  sodium chloride      Spray 1 spray into both nostrils daily as needed for congestion        doxycycline 100 MG tablet    VIBRA-TABS     Take 100 mg by mouth 2 times daily        isosorbide mononitrate 30 MG 24 hr tablet    IMDUR    90 tablet    Take 1 tablet (30 mg) by mouth daily    Ischemic cardiomyopathy       metoprolol succinate  50 MG 24 hr tablet    TOPROL-XL    180 tablet    Take 1 tablet (50 mg) by mouth two times daily    Abdominal aortic aneurysm (AAA) without rupture (H)       MILK OF MAGNESIA 400 MG/5ML suspension   Generic drug:  magnesium hydroxide      Take 30 mLs by mouth daily as needed for constipation or heartburn        MUCINEX D MAX STRENGTH PO      Take 800 mg by mouth daily        nitroGLYcerin 0.4 MG sublingual tablet    NITROSTAT    25 tablet    Place 1 tablet (0.4 mg) under the tongue every 5 minutes as needed for chest pain    Chest pain       TYLENOL PO      Take 1,000 mg by mouth 3 times daily

## 2018-05-17 NOTE — PROGRESS NOTES
Courtesy check today to assess rate and rhythm. Pt was in atrial tachycardia when last seen on 5/10/2018, his metoprolol was increased, and he's back today for an echo. If pt is still in atrial tachycardia, will change his programming to so his heart rate is not fast for the echo.     Presenting rhythm today AS/ in the 80's. No need to change programming to VVIR.     Since last check on 5/10/2018 there have been no A or V arrhythmias. There were 5 episodes recorded as PMT on 5/14/2018, 3 available EGMs show ST at max tracking rate of 130 bpm, no true PMT.     Pt also had labs and echo today. F/u OV with Dr. Llanes next week on 5/22/2018. Next remote already scheduled for 7/23/2018. Pt says his care facility is having a hard time getting his Latitude monitor set up, attempted to call them to assist, had to leave a voicemail.

## 2018-05-18 ENCOUNTER — APPOINTMENT (OUTPATIENT)
Dept: GENERAL RADIOLOGY | Facility: CLINIC | Age: 83
End: 2018-05-18
Attending: UROLOGY
Payer: MEDICAID

## 2018-05-18 ENCOUNTER — SURGERY (OUTPATIENT)
Age: 83
End: 2018-05-18

## 2018-05-18 ENCOUNTER — HOSPITAL ENCOUNTER (OUTPATIENT)
Facility: CLINIC | Age: 83
Discharge: NURSING, REHAB OR LONG-TERM CARE | End: 2018-05-19
Attending: UROLOGY | Admitting: UROLOGY
Payer: MEDICAID

## 2018-05-18 ENCOUNTER — ANESTHESIA (OUTPATIENT)
Dept: SURGERY | Facility: CLINIC | Age: 83
End: 2018-05-18
Payer: MEDICAID

## 2018-05-18 DIAGNOSIS — Z98.890 POST-OPERATIVE STATE: Primary | ICD-10-CM

## 2018-05-18 LAB — POTASSIUM SERPL-SCNC: 3.7 MMOL/L (ref 3.4–5.3)

## 2018-05-18 PROCEDURE — 25000128 H RX IP 250 OP 636: Performed by: UROLOGY

## 2018-05-18 PROCEDURE — 25000132 ZZH RX MED GY IP 250 OP 250 PS 637: Performed by: UROLOGY

## 2018-05-18 PROCEDURE — 36000050 ZZH SURGERY LEVEL 2 1ST 30 MIN: Performed by: UROLOGY

## 2018-05-18 PROCEDURE — 37000009 ZZH ANESTHESIA TECHNICAL FEE, EACH ADDTL 15 MIN: Performed by: UROLOGY

## 2018-05-18 PROCEDURE — 36415 COLL VENOUS BLD VENIPUNCTURE: CPT | Performed by: ANESTHESIOLOGY

## 2018-05-18 PROCEDURE — 40000170 ZZH STATISTIC PRE-PROCEDURE ASSESSMENT II: Performed by: UROLOGY

## 2018-05-18 PROCEDURE — 25000125 ZZHC RX 250: Performed by: NURSE ANESTHETIST, CERTIFIED REGISTERED

## 2018-05-18 PROCEDURE — 25000566 ZZH SEVOFLURANE, EA 15 MIN: Performed by: UROLOGY

## 2018-05-18 PROCEDURE — 25000128 H RX IP 250 OP 636: Performed by: NURSE ANESTHETIST, CERTIFIED REGISTERED

## 2018-05-18 PROCEDURE — 37000008 ZZH ANESTHESIA TECHNICAL FEE, 1ST 30 MIN: Performed by: UROLOGY

## 2018-05-18 PROCEDURE — 25000125 ZZHC RX 250: Performed by: UROLOGY

## 2018-05-18 PROCEDURE — 71000012 ZZH RECOVERY PHASE 1 LEVEL 1 FIRST HR: Performed by: UROLOGY

## 2018-05-18 PROCEDURE — 36000052 ZZH SURGERY LEVEL 2 EA 15 ADDTL MIN: Performed by: UROLOGY

## 2018-05-18 PROCEDURE — C1769 GUIDE WIRE: HCPCS | Performed by: UROLOGY

## 2018-05-18 PROCEDURE — 71000013 ZZH RECOVERY PHASE 1 LEVEL 1 EA ADDTL HR: Performed by: UROLOGY

## 2018-05-18 PROCEDURE — 25000128 H RX IP 250 OP 636: Performed by: ANESTHESIOLOGY

## 2018-05-18 PROCEDURE — 27210995 ZZH RX 272: Performed by: UROLOGY

## 2018-05-18 PROCEDURE — 84132 ASSAY OF SERUM POTASSIUM: CPT | Performed by: ANESTHESIOLOGY

## 2018-05-18 PROCEDURE — 27210794 ZZH OR GENERAL SUPPLY STERILE: Performed by: UROLOGY

## 2018-05-18 PROCEDURE — 40000277 XR SURGERY CARM FLUORO LESS THAN 5 MIN W STILLS

## 2018-05-18 PROCEDURE — C1758 CATHETER, URETERAL: HCPCS | Performed by: UROLOGY

## 2018-05-18 RX ORDER — ONDANSETRON 2 MG/ML
4 INJECTION INTRAMUSCULAR; INTRAVENOUS EVERY 6 HOURS PRN
Status: DISCONTINUED | OUTPATIENT
Start: 2018-05-18 | End: 2018-05-19 | Stop reason: HOSPADM

## 2018-05-18 RX ORDER — ONDANSETRON 2 MG/ML
INJECTION INTRAMUSCULAR; INTRAVENOUS PRN
Status: DISCONTINUED | OUTPATIENT
Start: 2018-05-18 | End: 2018-05-18

## 2018-05-18 RX ORDER — ATORVASTATIN CALCIUM 40 MG/1
40 TABLET, FILM COATED ORAL AT BEDTIME
Status: DISCONTINUED | OUTPATIENT
Start: 2018-05-18 | End: 2018-05-19 | Stop reason: HOSPADM

## 2018-05-18 RX ORDER — NALOXONE HYDROCHLORIDE 0.4 MG/ML
.1-.4 INJECTION, SOLUTION INTRAMUSCULAR; INTRAVENOUS; SUBCUTANEOUS
Status: DISCONTINUED | OUTPATIENT
Start: 2018-05-18 | End: 2018-05-19 | Stop reason: HOSPADM

## 2018-05-18 RX ORDER — NITROGLYCERIN 0.4 MG/1
0.4 TABLET SUBLINGUAL EVERY 5 MIN PRN
Status: DISCONTINUED | OUTPATIENT
Start: 2018-05-18 | End: 2018-05-19 | Stop reason: HOSPADM

## 2018-05-18 RX ORDER — ONDANSETRON 4 MG/1
4 TABLET, ORALLY DISINTEGRATING ORAL EVERY 6 HOURS PRN
Status: DISCONTINUED | OUTPATIENT
Start: 2018-05-18 | End: 2018-05-19 | Stop reason: HOSPADM

## 2018-05-18 RX ORDER — METOPROLOL SUCCINATE 50 MG/1
50 TABLET, EXTENDED RELEASE ORAL
Status: DISCONTINUED | OUTPATIENT
Start: 2018-05-18 | End: 2018-05-19 | Stop reason: HOSPADM

## 2018-05-18 RX ORDER — OXYCODONE HYDROCHLORIDE 5 MG/1
5-10 TABLET ORAL
Status: DISCONTINUED | OUTPATIENT
Start: 2018-05-18 | End: 2018-05-19 | Stop reason: HOSPADM

## 2018-05-18 RX ORDER — SODIUM CHLORIDE, SODIUM LACTATE, POTASSIUM CHLORIDE, CALCIUM CHLORIDE 600; 310; 30; 20 MG/100ML; MG/100ML; MG/100ML; MG/100ML
INJECTION, SOLUTION INTRAVENOUS CONTINUOUS
Status: DISCONTINUED | OUTPATIENT
Start: 2018-05-18 | End: 2018-05-18

## 2018-05-18 RX ORDER — ASPIRIN 81 MG/1
81 TABLET ORAL DAILY
Status: DISCONTINUED | OUTPATIENT
Start: 2018-05-18 | End: 2018-05-19 | Stop reason: HOSPADM

## 2018-05-18 RX ORDER — FENTANYL CITRATE 50 UG/ML
INJECTION, SOLUTION INTRAMUSCULAR; INTRAVENOUS PRN
Status: DISCONTINUED | OUTPATIENT
Start: 2018-05-18 | End: 2018-05-18

## 2018-05-18 RX ORDER — LIDOCAINE HYDROCHLORIDE 20 MG/ML
INJECTION, SOLUTION INFILTRATION; PERINEURAL PRN
Status: DISCONTINUED | OUTPATIENT
Start: 2018-05-18 | End: 2018-05-18

## 2018-05-18 RX ORDER — ACETAMINOPHEN 500 MG
1000 TABLET ORAL 3 TIMES DAILY
Status: DISCONTINUED | OUTPATIENT
Start: 2018-05-18 | End: 2018-05-19 | Stop reason: HOSPADM

## 2018-05-18 RX ORDER — SODIUM CHLORIDE, SODIUM LACTATE, POTASSIUM CHLORIDE, CALCIUM CHLORIDE 600; 310; 30; 20 MG/100ML; MG/100ML; MG/100ML; MG/100ML
INJECTION, SOLUTION INTRAVENOUS CONTINUOUS
Status: DISCONTINUED | OUTPATIENT
Start: 2018-05-18 | End: 2018-05-19 | Stop reason: HOSPADM

## 2018-05-18 RX ORDER — DEXAMETHASONE SODIUM PHOSPHATE 4 MG/ML
INJECTION, SOLUTION INTRA-ARTICULAR; INTRALESIONAL; INTRAMUSCULAR; INTRAVENOUS; SOFT TISSUE PRN
Status: DISCONTINUED | OUTPATIENT
Start: 2018-05-18 | End: 2018-05-18

## 2018-05-18 RX ORDER — LIDOCAINE 40 MG/G
CREAM TOPICAL
Status: DISCONTINUED | OUTPATIENT
Start: 2018-05-18 | End: 2018-05-19 | Stop reason: HOSPADM

## 2018-05-18 RX ORDER — PROPOFOL 10 MG/ML
INJECTION, EMULSION INTRAVENOUS PRN
Status: DISCONTINUED | OUTPATIENT
Start: 2018-05-18 | End: 2018-05-18

## 2018-05-18 RX ORDER — CEFAZOLIN SODIUM 2 G/100ML
2 INJECTION, SOLUTION INTRAVENOUS
Status: COMPLETED | OUTPATIENT
Start: 2018-05-18 | End: 2018-05-18

## 2018-05-18 RX ADMIN — METOPROLOL SUCCINATE 50 MG: 50 TABLET, EXTENDED RELEASE ORAL at 17:52

## 2018-05-18 RX ADMIN — PHENYLEPHRINE HYDROCHLORIDE 100 MCG: 10 INJECTION, SOLUTION INTRAMUSCULAR; INTRAVENOUS; SUBCUTANEOUS at 12:34

## 2018-05-18 RX ADMIN — WATER 3000 ML: 100 IRRIGANT IRRIGATION at 12:09

## 2018-05-18 RX ADMIN — FENTANYL CITRATE 50 MCG: 50 INJECTION, SOLUTION INTRAMUSCULAR; INTRAVENOUS at 11:53

## 2018-05-18 RX ADMIN — CEFAZOLIN SODIUM 2 G: 2 INJECTION, SOLUTION INTRAVENOUS at 12:00

## 2018-05-18 RX ADMIN — DEXMEDETOMIDINE HYDROCHLORIDE 4 MCG: 100 INJECTION, SOLUTION INTRAVENOUS at 12:21

## 2018-05-18 RX ADMIN — LIDOCAINE HYDROCHLORIDE 60 MG: 20 INJECTION, SOLUTION INFILTRATION; PERINEURAL at 11:53

## 2018-05-18 RX ADMIN — ONDANSETRON 4 MG: 2 INJECTION INTRAMUSCULAR; INTRAVENOUS at 12:08

## 2018-05-18 RX ADMIN — ACETAMINOPHEN 1000 MG: 500 TABLET, FILM COATED ORAL at 21:04

## 2018-05-18 RX ADMIN — LIDOCAINE HYDROCHLORIDE 10 ML: 20 JELLY TOPICAL at 12:26

## 2018-05-18 RX ADMIN — ACETAMINOPHEN 1000 MG: 500 TABLET, FILM COATED ORAL at 15:54

## 2018-05-18 RX ADMIN — PHENYLEPHRINE HYDROCHLORIDE 150 MCG: 10 INJECTION, SOLUTION INTRAMUSCULAR; INTRAVENOUS; SUBCUTANEOUS at 12:03

## 2018-05-18 RX ADMIN — ATORVASTATIN CALCIUM 40 MG: 40 TABLET, FILM COATED ORAL at 21:04

## 2018-05-18 RX ADMIN — OXYCODONE HYDROCHLORIDE 5 MG: 5 TABLET ORAL at 23:16

## 2018-05-18 RX ADMIN — DEXMEDETOMIDINE HYDROCHLORIDE 4 MCG: 100 INJECTION, SOLUTION INTRAVENOUS at 12:25

## 2018-05-18 RX ADMIN — DEXAMETHASONE SODIUM PHOSPHATE 4 MG: 4 INJECTION, SOLUTION INTRA-ARTICULAR; INTRALESIONAL; INTRAMUSCULAR; INTRAVENOUS; SOFT TISSUE at 12:08

## 2018-05-18 RX ADMIN — PROPOFOL 70 MG: 10 INJECTION, EMULSION INTRAVENOUS at 11:53

## 2018-05-18 RX ADMIN — FENTANYL CITRATE 50 MCG: 50 INJECTION, SOLUTION INTRAMUSCULAR; INTRAVENOUS at 12:08

## 2018-05-18 RX ADMIN — PROPOFOL 20 MG: 10 INJECTION, EMULSION INTRAVENOUS at 12:09

## 2018-05-18 RX ADMIN — ASPIRIN 81 MG: 81 TABLET, COATED ORAL at 21:04

## 2018-05-18 RX ADMIN — PHENYLEPHRINE HYDROCHLORIDE 100 MCG: 10 INJECTION, SOLUTION INTRAMUSCULAR; INTRAVENOUS; SUBCUTANEOUS at 12:16

## 2018-05-18 RX ADMIN — SODIUM CHLORIDE, POTASSIUM CHLORIDE, SODIUM LACTATE AND CALCIUM CHLORIDE: 600; 310; 30; 20 INJECTION, SOLUTION INTRAVENOUS at 11:06

## 2018-05-18 RX ADMIN — PHENYLEPHRINE HYDROCHLORIDE 100 MCG: 10 INJECTION, SOLUTION INTRAMUSCULAR; INTRAVENOUS; SUBCUTANEOUS at 12:20

## 2018-05-18 RX ADMIN — OXYCODONE HYDROCHLORIDE 5 MG: 5 TABLET ORAL at 13:53

## 2018-05-18 RX ADMIN — PHENYLEPHRINE HYDROCHLORIDE 150 MCG: 10 INJECTION, SOLUTION INTRAMUSCULAR; INTRAVENOUS; SUBCUTANEOUS at 12:00

## 2018-05-18 RX ADMIN — PHENYLEPHRINE HYDROCHLORIDE 50 MCG: 10 INJECTION, SOLUTION INTRAMUSCULAR; INTRAVENOUS; SUBCUTANEOUS at 12:30

## 2018-05-18 ASSESSMENT — COPD QUESTIONNAIRES: COPD: 1

## 2018-05-18 ASSESSMENT — ENCOUNTER SYMPTOMS: DYSRHYTHMIAS: 1

## 2018-05-18 ASSESSMENT — LIFESTYLE VARIABLES: TOBACCO_USE: 1

## 2018-05-18 NOTE — ANESTHESIA CARE TRANSFER NOTE
Patient: Nacho Joseph    Procedure(s):  CYSTOSCOPY, RIGHT URETEROSCOPY, STONE REMOVAL AND SRENT REMOVAL (LANGUAGE: Macedonian) - Wound Class: II-Clean Contaminated   - Wound Class: II-Clean Contaminated    Diagnosis: RIGHT URETERAL STONE  Diagnosis Additional Information: No value filed.    Anesthesia Type:   General, LMA     Note:  Airway :Face Mask  Patient transferred to:PACU  Comments: To pacu. Denies pain. Report given to RN assuming care of ptHandoff Report: Identifed the Patient, Identified the Reponsible Provider, Reviewed the pertinent medical history, Discussed the surgical course, Reviewed Intra-OP anesthesia mangement and issues during anesthesia, Set expectations for post-procedure period and Allowed opportunity for questions and acknowledgement of understanding      Vitals: (Last set prior to Anesthesia Care Transfer)    CRNA VITALS  5/18/2018 1215 - 5/18/2018 1251      5/18/2018             Resp Rate (observed): 20    Resp Rate (set): 10                Electronically Signed By: NICHOLAS Hartley CRNA  May 18, 2018  12:51 PM

## 2018-05-18 NOTE — OP NOTE
Procedure Date: 2018      PROCEDURE:  Cystoscopy, right ureteral stent removal, right ureteroscopy.      PREOPERATIVE DIAGNOSIS:  Right distal ureteral stone.      POSTOPERATIVE DIAGNOSIS:  Right distal ureteral stone, stone passed in the interim since the stent was placed.      OPERATIVE NOTE:  Informed consent was obtained from the patient via a Guyanese .  He was brought to the operating room, given laryngeal mask anesthesia, placed in lithotomy position.  Sterile prep and drape were applied, and surgical timeout was taken.  Cystoscope was introduced into the bladder and the stent was grasped and removed partially out the urethra.  I attempted to pass the guidewire through the stent.  However, the upper end would not allow the wire to pass, so then I just removed it.  I then did a cystoscopy again using an open-ended ureteral catheter.  I was able to advance the guidewire up to the kidney without difficulty.        Cystoscope was removed and the short rigid ureteroscope was then passed alongside the guidewire.  No large stone was encountered.  His stone on the CT scan was about 4 mm.  Nothing of this size was seen.  There was a little bit of granular material, probably encrustation from the stent.  I was able to advance the scope all the way up to the renal pelvis level.  The scope was withdrawn and the bladder was drained.  Instruments were removed and the procedure was terminated at that point.  He was given 10 mL viscous lidocaine for postoperative analgesia.  He tolerated the procedure well and there were no complications.        We will bring him in as an outpatient in a bed overnight per the anesthesiologist.  Because of his cardiac history we will have him on telemetry until the morning.         MEENU BAKER MD             D: 2018   T: 2018   MT: KASHMIR      Name:     LATOSHA VIZCAINO   MRN:      [id number]        Account:        [id number]   :      1935            Procedure Date: 05/18/2018      Document: [id number]       cc: Gurvinder Sanz MD

## 2018-05-18 NOTE — ANESTHESIA POSTPROCEDURE EVALUATION
Patient: Nacho Joseph    Procedure(s):  CYSTOSCOPY, RIGHT URETEROSCOPY, STONE REMOVAL AND SRENT REMOVAL (LANGUAGE: Jamaican) - Wound Class: II-Clean Contaminated   - Wound Class: II-Clean Contaminated    Diagnosis:RIGHT URETERAL STONE  Diagnosis Additional Information: No value filed.    Anesthesia Type:  General, LMA    Note:  Anesthesia Post Evaluation    Patient location during evaluation: PACU  Patient participation: Able to fully participate in evaluation  Level of consciousness: awake, awake and alert and responsive to verbal stimuli  Pain management: adequate  Airway patency: patent  Cardiovascular status: acceptable  Respiratory status: acceptable  Hydration status: acceptable  PONV: none     Anesthetic complications: None          Last vitals:  Vitals:    05/18/18 1441 05/18/18 1459 05/18/18 1524   BP: (!) 146/96     Resp: 18 17 (P) 18   Temp: 35.8  C (96.4  F)     SpO2: 96%           Electronically Signed By: Radha Allred  May 18, 2018  4:03 PM

## 2018-05-18 NOTE — PLAN OF CARE
Problem: Patient Care Overview  Goal: Plan of Care/Patient Progress Review  Up to unit at 1440, Welsh speaking,  at bedside, blue phone available.  VSS on room air, denies pain. Taking fluids orally well -left PIV saline locked.  Full liquid diet. Bowel sounds are active.

## 2018-05-18 NOTE — IP AVS SNAPSHOT
` ` Patient Information     Patient Name Sex Nacho Thomas ([id number]) Male 1935       Room Bed    17 8817-01      Patient Demographics     Address Phone    [address] [place] [address] [phone] (Home)  none (Work)  [phone] (Mobile) *Preferred*      Patient Ethnicity & Race     Ethnic Group Patient Race    Afghan White      Emergency Contact(s)     Name Relation Home Work Mobile    CHESTER VIZCAINO Son [phone] none [phone]      Documents on File        Status Date Received Description       Documents for the Patient    Consent for Services - Hospital/Clinic  ()      Consent for EHR Access Sent 14     Privacy Notice - [place] Received 10/27/14     External Medication Information Consent Accepted 10/27/14     [facility] Specified Other       Consent for Services - Hospital/Clinic Received () 10/27/14     Consent for EHR Access Received 10/27/14     Insurance Card Received 18 MA-not on pt.     Patient ID Received 01/21/15 Perm Res card    Business/Insurance/Care Coordination/Health Form - Patient  [facility]IFICATION REQUEST    Consent for Services/Privacy Notice - Hospital/Clinic Received 18     Care Everywhere Prospective Auth Received 18     Consent for Services - Hospital and Clinic Received 18     HIE Auth Received 18     Insurance Card Received 18 ID/INSURANCE-NOTHING ON PT    Insurance Card Received 18 MA Ins not available DOS 18    Patient ID Received 18     HIM KRISTINA Authorization  05/15/18     HIM KRISTINA Authorization  18 [place]    Insurance Card Received (Deleted) 10/27/14     Patient ID Received (Deleted) 10/27/14     Patient ID  (Deleted)         Documents for the Encounter    CMS IM for Patient Signature         Admission Information     Attending Provider Admitting Provider Admission Type Admission Date/Time    Yvon  MD Yvon Sow Todd Daniel, MD Elective 05/18/18  0950    Discharge Date Hospital Service Auth/Cert Status Service Area     Surgery Incomplete [facility]    Unit Room/Bed Admission Status       [facility] ONCOLOGY 8817/8817-01 Admission (Confirmed)       Admission     Complaint    RIGHT URETERAL STONE, Post-operative state      Hospital Account     Name Acct ID Class Status Primary Coverage    Nacho Joseph [id number] [place]            Guarantor Account (for Hospital Account #[id number])     Name Relation to Pt Service Area Active? Acct Type    Nacho Joseph Self FCS Yes Personal/Family    Address Phone          [address] [place] [address] [phone](H)              Coverage Information (for Hospital Account #[id number])     F/O Payor/Plan Precert #    [place]     Subscriber Subscriber #    Nacho Joseph [id number]    Address Phone    [address] [address]  [place] [address] [phone]

## 2018-05-18 NOTE — PROGRESS NOTES
Admission medication history interview status for the 5/18/2018  admission is complete. See EPIC admission navigator for prior to admission medications     Medication history source reliability:Good    Medication history interview source(s):Caregiver    Medication history resources (including written lists, pill bottles, clinic record):Recieved last doses from [facility]    Primary pharmacy.[facility]    Additional medication history information not noted on PTA med list :None    Time spent in this activity: 60 minutes    Prior to Admission medications    Medication Sig Last Dose Taking? Auth Provider   Acetaminophen (TYLENOL PO) Take 1,000 mg by mouth 3 times daily 5/17/2018 at PM Yes Reported, Patient   aspirin 81 MG tablet Take 1 tablet (81 mg) by mouth daily 5/14/2018 at 0800 Yes Iliana Ignacio PA-C   atorvastatin (LIPITOR) 40 MG tablet Take 1 tablet (40 mg) by mouth daily 5/17/2018 at PM Yes Iliana Ignacio PA-C   DOXYCYCLINE HYCLATE PO Take 100 mg by mouth 2 times daily 5/18/2018 at 0730 Yes Reported, Patient   Fexofenadine HCl (MUCINEX ALLERGY PO) Take 800 mg by mouth 2 times daily (2 x 400 mg = 800 mg dose) 5/17/2018 at PM Yes Reported, Patient   magnesium hydroxide (MILK OF MAGNESIA) 400 MG/5ML suspension Take 30 mLs by mouth daily as needed for constipation or heartburn More than a Month at PRN Yes Reported, Patient   metoprolol succinate (TOPROL-XL) 50 MG 24 hr tablet Take 1 tablet (50 mg) by mouth two times daily 5/18/2018 at 0730 Yes Scar Harris, APRN CNP   nitroglycerin (NITROSTAT) 0.4 MG SL tablet Place 1 tablet (0.4 mg) under the tongue every 5 minutes as needed for chest pain More than a Month at PRN Yes Iliana Ignacio PA-C   sodium chloride (DEEP SEA NASAL SPRAY) 0.65 % nasal spray Spray 1 spray into both nostrils daily as needed for congestion More than a month at PRN Yes Reported, Patient

## 2018-05-18 NOTE — IP AVS SNAPSHOT
"` `     [place] [address] ONCOLOGY: [phone]            Medication Administration Report for Nacho Joseph as of 05/19/18 0844   Legend:    Given Hold Not Given Due Canceled Entry Other Actions    Time Time (Time) Time  Time-Action       Inactive    Active    Linked        Medications 05/13/18 05/14/18 05/15/18 05/16/18 05/17/18 05/18/18 05/19/18    acetaminophen (TYLENOL) tablet 1,000 mg  Dose: 1,000 mg  Freq: 3 TIMES DAILY Route: PO  Start: 05/18/18 1600   Admin Instructions: Maximum acetaminophen dose from all sources = 75 mg/kg/day not to exceed 4 gram    Admin. Amount: 2 tablet (2 × 500 mg tablet)  Last Admin: 05/19/18 0830  Dispense Loc: [facility] 88B  POC: Post-procedure          1554 (1,000 mg)-Given       2104 (1,000 mg)-Given        0830 (1,000 mg)-Given       [ ] 1600       [ ] 2200           aspirin EC tablet 81 mg  Dose: 81 mg  Freq: DAILY Route: PO  Start: 05/18/18 2100   Admin. Amount: 1 tablet (1 × 81 mg tablet)  Last Admin: 05/19/18 0830  Dispense Loc: [place] [address]B  POC: Post-procedure          2104 (81 mg)-Given        0830 (81 mg)-Given           atorvastatin (LIPITOR) tablet 40 mg  Dose: 40 mg  Freq: AT BEDTIME Route: PO  Start: 05/18/18 2200   Admin. Amount: 1 tablet (1 × 40 mg tablet)  Last Admin: 05/18/18 2104  Dispense Loc: [place] [address]  POC: Post-procedure          2104 (40 mg)-Given        [ ] 2200           lactated ringers infusion  Rate: 100 mL/hr   Freq: CONTINUOUS Route: IV  Last Dose: Stopped (05/18/18 1502)  Start: 05/18/18 1445   Dispense Loc: [facility] Stock  Volume: 1,000 mL  POC: Post-procedure          1502-Stopped [C]            lidocaine (LMX4) cream  Freq: EVERY 1 HOUR PRN Route: Top  PRN Reason: pain  PRN Comment: with VAD insertion or accessing implanted port.  Start: 05/18/18 1432   Admin Instructions: Do NOT give if patient has a history of allergy to any local anesthetic or any \"cody\" product.   Apply 30 minutes prior to VAD insertion or port access.  MAX " "Dose:  2.5 g (  of 5 g tube)    Dispense Loc:  [facility] 88B  POC: Post-procedure               lidocaine 1 % 1 mL  Dose: 1 mL  Freq: EVERY 1 HOUR PRN Route: OTHER  PRN Comment: mild pain with VAD insertion or accessing implanted port  Start: 05/18/18 1432   Admin Instructions: Do NOT give if patient has a history of allergy to any local anesthetic or any \"cody\" product. MAX dose 1 mL subcutaneous OR intradermal in divided doses.    Admin. Amount: 1 mL  Dispense Loc: [facility] FLOOR STOCK  Volume: 2 mL  POC: Post-procedure               metoprolol succinate (TOPROL-XL) 24 hr tablet 50 mg  Dose: 50 mg  Freq: TWICE DAILY AT 8AM AND 6PM Route: PO  Start: 05/18/18 1800   Admin Instructions: DO NOT CRUSH. Tablet may be split in half along score line.    Admin. Amount: 1 tablet (1 × 50 mg tablet)  Last Admin: 05/19/18 0830  Dispense Loc: [facility] 88B  POC: Post-procedure          1752 (50 mg)-Given        0830 (50 mg)-Given       [ ] 1800           naloxone (NARCAN) injection 0.1-0.4 mg  Dose: 0.1-0.4 mg  Freq: EVERY 2 MIN PRN Route: IV  PRN Reason: opioid reversal  Start: 05/18/18 1432   Admin Instructions: For respiratory rate LESS than or EQUAL to 8.  Partial reversal dose:  0.1 mg titrated q 2 minutes for Analgesia Side Effects Monitoring Sedation Level of 3 (frequently drowsy, arousable, drifts to sleep during conversation).Full reversal dose:  0.4 mg bolus for Analgesia Side Effects Monitoring Sedation Level of 4 (somnolent, minimal or no response to stimulation).  For ordered doses up to 2mg give IVP. Give each 0.4mg over 15 seconds in emergency situations. For non-emergent situations further dilute in 9mL of NS to facilitate titration of response.    Admin. Amount: 0.1-0.4 mg = 0.25-1 mL Conc: 0.4 mg/mL  Dispense Loc:  [facility] 88B  Volume: 1 mL  POC: Post-procedure               nitroGLYcerin (NITROSTAT) sublingual tablet 0.4 mg  Dose: 0.4 mg  Freq: EVERY 5 MIN PRN Route: SL  PRN Reason: chest pain  Start: 05/18/18 1432 "   Admin. Amount: 1 tablet (1 × 0.4 mg tablet)  Dispense Loc:  [facility] 88B  POC: Post-procedure               ondansetron (ZOFRAN-ODT) ODT tab 4 mg  Dose: 4 mg  Freq: EVERY 6 HOURS PRN Route: PO  PRN Reasons: nausea,vomiting  Start: 05/18/18 1432   Admin Instructions: This is Step 1 of nausea and vomiting management.  If nausea not resolved in 15 minutes, go to Step 2 prochlorperazine (COMPAZINE). Do not push through foil backing. Peel back foil and gently remove. Place on tongue immediately. Administration with liquid unnecessary  With dry hands, peel back foil backing and gently remove tablet; do not push oral disintegrating tablet through foil backing; administer immediately on tongue and oral disintegrating tablet dissolves in seconds; then swallow with saliva; liquid not required.    Admin. Amount: 1 tablet (1 × 4 mg tablet)  Dispense Loc:  [facility] 88B  POC: Post-procedure              Or  ondansetron (ZOFRAN) injection 4 mg  Dose: 4 mg  Freq: EVERY 6 HOURS PRN Route: IV  PRN Reasons: nausea,vomiting  Start: 05/18/18 1432   Admin Instructions: This is Step 1 of nausea and vomiting management.  If nausea not resolved in 15 minutes, go to Step 2 prochlorperazine (COMPAZINE).  Irritant. For ordered doses up to 4 mg, give IV Push undiluted over 2-5 minutes.    Admin. Amount: 4 mg = 2 mL Conc: 4 mg/2 mL  Dispense Loc:  [facility] 88B  Infused Over: 2-5 Minutes  Volume: 2 mL  POC: Post-procedure               oxyCODONE IR (ROXICODONE) tablet 5-10 mg  Dose: 5-10 mg  Freq: EVERY 3 HOURS PRN Route: PO  PRN Reason: other  PRN Comment: pain control or improvement in physical function. Hold dose for analgesic side effects.  Start: 05/18/18 1351   Admin Instructions: Start with the lowest dose.  May adjust dose by  5 mg every 3 hours as needed.  Notify the provider to assess for uncontrolled pain or analgesic side effects.  Hold while on IV PCA or with regular IV opioid dosing.    Admin. Amount: 1-2 tablet (1-2 × 5 mg tablet)  Last  Admin: 18 2316  Dispense Loc:  [facility] 88B  POC: Post-procedure          1353 (5 mg)-Given       2316 (5 mg)-Given            sodium chloride (PF) 0.9% PF flush 3 mL  Dose: 3 mL  Freq: EVERY 8 HOURS Route: IK  Start: 18 1445   Admin Instructions: And Q1H PRN, to lock peripheral IV dormant line.    Admin. Amount: 3 mL  Last Admin: 18 2348  Dispense Loc: [facility] Floor Stock  Volume: 3 mL  POC: Post-procedure          2104 (3 mL)-Given              2348 (3 mL)-Given        (0835)-Not Given       [ ] 1600           sodium chloride (PF) 0.9% PF flush 3 mL  Dose: 3 mL  Freq: EVERY 1 HOUR PRN Route: IK  PRN Reason: line flush  PRN Comment: for peripheral IV flush post IV meds  Start: 18 1432   Admin. Amount: 3 mL  Dispense Loc: [facility] Floor Stock  Volume: 3 mL  POC: Post-procedure              Completed Medications  Medications 05/13/18 05/14/18 05/15/18 05/16/18 05/17/18 05/18/18 05/19/18         Dose: 2 g  Freq: PRE-OP/PRE-PROCEDURE Route: IV  Indications of Use: PERIOPERATIVE PHARMACOPROPHYLAXIS  Start: 18 1018   End: 18 1200   Admin Instructions: Give first dose within 1 hour PRIOR to incision. If patient weight is greater than or equal to 120 kg increase dose to 3 g.    Admin. Amount: 2 g = 100 mL Conc: 2 g/100 mL  Last Admin: 18 1200  Dispense Loc:  [facility] PACU2  Infused Over: 30 Minutes  Administrations Remainin  Volume: 100 mL  POC: Pre-procedure          1107 (2 g)-Handoff       1200 (2 g)-Given           Discontinued Medications  Medications 05/13/18 05/14/18 05/15/18 05/16/18 05/17/18 05/18/18 05/19/18         Rate: 25 mL/hr   Freq: CONTINUOUS Route: IV  Start: 18 1030   End: 18 1503   Admin Instructions: IF patient NOT on dialysis.    Last Admin: 18 1106  Dispense Loc: [facility] Floor Stock  Volume: 1,000 mL          1106 ( )-New Bag       1252 ( )-Anesthesia Volume Adjustment       1503-Med Discontinued          Dose: 1 mL  Freq: EVERY 1 HOUR PRN Route:  "OTHER  PRN Comment: mild pain with VAD insertion or accessing implanted port  Start: 05/18/18 1018   End: 05/18/18 1503   Admin Instructions: Do NOT give if patient has a history of allergy to any local anesthetic or any \"cody\" product. MAX dose 1 mL subcutaneous OR intradermal in divided doses.    Admin. Amount: 1 mL  Dispense Loc: [facility] STOCK  Volume: 2 mL          1503-Med Discontinued          Freq: PRN  Start: 05/18/18 1226   End: 05/18/18 1430   Last Admin: 05/18/18 1226  POC: Intra-procedure          1226 (10 mL)-Given       1430-Med Discontinued          Freq: PRN  Start: 05/18/18 1209   End: 05/18/18 1430   Last Admin: 05/18/18 1209  POC: Intra-procedure          1209 (3,000 mL)-Given       1430-Med Discontinued          "

## 2018-05-18 NOTE — IP AVS SNAPSHOT
[place] [address] ONCOLOGY: [phone]                                              INTERAGENCY TRANSFER FORM - LAB / IMAGING / EKG / EMG RESULTS   2018                    Hospital Admission Date: 2018  LATOSHA VIZCAINO   : 1935  Sex: Male        Attending Provider: Gurvinder Sanz MD     Allergies:  No Known Allergies    Infection:  None   Service:  SURGERY    Ht:  1.829 m (6')   Wt:  63.5 kg (140 lb)   Admission Wt:  63.5 kg (140 lb)    BMI:  18.99 kg/m 2   BSA:  1.8 m 2            Patient PCP Information     Provider PCP Type    [facility]         Lab Results - 3 Days      Potassium [[id number]]  Resulted: 18 1056, Result status: Final result    Ordering provider: Feliciano Yañez MD  18 1018 Resulting lab: [facility]    Specimen Information    Type Source Collected On   Blood  18 1030          Components       Value Reference Range Flag Lab   Potassium 3.7 3.4 - 5.3 mmol/L  [facility]            Basic metabolic panel [[id number]] (Abnormal)  Resulted: 18 1540, Result status: Final result    Resulting lab: [facility] HEART AT [facility]     Specimen Information    Type Source Collected On   Blood  18 1454          Components       Value Reference Range Flag Lab   Sodium 141 136 - 145 mmol/L  [facility]   Potassium 4.1 3.5 - 5.1 mmol/L  [facility]   Chloride 106 98 - 107 mmol/L  [facility]   Carbon Dioxide 24 23 - 29 mmol/L  [facility]   Anion Gap 15.1 6 - 17 mmol/L  [facility]   Glucose 117 70 - 105 mg/dL H [facility]   Urea Nitrogen 23 7 - 30 mg/dL  [facility]   Creatinine 1.01 0.70 - 1.30 mg/dL  [facility]   GFR Estimate 71 >60 mL/min/1.7m2  [facility]   GFR Estimate If Black 86 >60 mL/min/1.7m2  [facility]   Calcium 9.7 8.5 - 10.5 mg/dL  [facility]            Testing Performed By     Lab - Abbreviation Name Director Address Valid Date Range    [facility] Unknown [address] [place] [address] 05/08/15 1057 - Present     217 - SUUM[facility] HEART AT [place] Unknown [address]  [address]  [place] [address] 01/15/15 1053 - Present            Unresulted Labs     None         Imaging Results - 3 Days      XR Surgery VASQUEZ L/T 5 Min Fluoro w Stills [[id number]]  Resulted: 18 2228, Result status: Final result    Ordering provider: Gurvinder Sanz MD  18 1231 Resulted by: Sherrie Dejesus MD    Performed: 18 1205 - 18 1230 Resulting lab: RADIOLOGY RESULTS    Narrative:       SURGERY C-ARM FLUOROSCOPY LESS THAN FIVE MINUTES WITH STILLS 2018  12:30 PM     HISTORY: Cysto, right ureteroscopy, right stent removal, OR 18, 2  images, 7 seconds fluoroscopy.     COMPARISON: 2018      Impression:       IMPRESSION: Two spot fluoroscopic images obtained during right  ureteroscopy. Stent is noted in the distal ureter on one image and  guide placement over a wire on the subsequent image. Total  fluoroscopic time 0.2 minutes.    SHERRIE DEJESUS MD      Testing Performed By     Lab - Abbreviation Name Director Address Valid Date Range    104 - Rad [facility] RADIOLOGY RESULTS Unknown Unknown 05 1553 - Present               ECG/EMG Results      ECHO LIMITED WITH OPTISON [[id number]]  Resulted: 18 1604, Result status: Edited Result - FINAL    Ordering provider: Irene Harris APRN CNP  18 1518 Resulted by: Arash Ventura MD    Performed: 18 1643 - 18 1643 Resulting lab: RADIOLOGY RESULTS    Narrative:       [id number]  [facility]  [id number]  [id number]^JUAN DANIEL^IRENE^[facility]  [facility]  Echocardiography Laboratory  [address]  [address]  [place] [address]  Phone [phone]  Fax [phone]        Name: LATOSHA VIZCAINO  MRN: [id number]  : 1935  Study Date: 2018 04:04 PM  Age: 82 yrs  Gender: Male  Patient Location: [facility]  Reason For Study: Murmur  Ordering Physician:  IRENE FRANCES  Referring Physician: IRENE FRANCES  Performed By: Crescencio Morales JULIO     BSA: 1.8 m2  Height: 72 in  Weight: 140 lb  HR: 92  BP: 171/89 mmHg  _____________________________________________________________________________  __     Procedure  Limited Echo Adult. Contrast Definity.     _____________________________________________________________________________  __        Interpretation Summary     Left ventricular hypertrophy: asymmetric with no LVOT obstruction  Left ventricular systolic function is normal.  The visual ejection fraction is estimated at 55-60%.  Septal wall motion abnormality may reflect pacemaker activation.  The right ventricle is normal in structure, function and size.  There is a catheter/pacemaker lead seen in the right ventricle.  Moderate aortic root dilatation.  Could not visualize the ascending aorta due to reduced acoustic window.  The rhythm was atrial fibrillation with controlled ventricular rate at rest.  The study was technically limited. Definity contrast was used without apparent  complications. Compared to the prior study dated 4/18/2018, there are changes  as noted. A pacemaker wire has been placed since the last study. The septum  and apex are mildly paradoxical, possibly due to the paced rhythm.  _____________________________________________________________________________  __        Left Ventricle  The left ventricle is normal in size. Left ventricular hypertrophy: asymmetric  with no LVOT obstruction. Left ventricular systolic function is normal. The  visual ejection fraction is estimated at 55-60%. Septal wall motion  abnormality may reflect pacemaker activation.     Right Ventricle  The right ventricle is normal in structure, function and size. There is a  catheter/pacemaker lead seen in the right ventricle.     Atria  Normal left atrial size. Right atrial size is normal.     Mitral Valve  The mitral valve chordae are thickened and/or  calcified. There is mild mitral  annular calcification. There is no evidence of mitral valve prolapse. There is  trace mitral regurgitation.     Tricuspid Valve  The tricuspid valve is not well visualized.        Vessels  Moderate aortic root dilatation. Could not visualize the ascending aorta due  to reduced acoustic window. The IVC is normal in size and reactivity with  respiration, suggesting normal central venous pressure.     Pericardium  There is no pericardial effusion.     Rhythm  The rhythm was atrial fibrillation with controlled ventricular rate at rest.  _____________________________________________________________________________  __     MMode/2D Measurements & Calculations  Ao root diam: 4.6 cm                 _____________________________________________________________________________  __           Report approved by: Belkys Pina 05/17/2018 05:44 PM       1    Type Source Collected On     05/17/18 1604          View Image (below)        Echocardiogram [[id number]]  Resulted: 05/17/18 1643, Result status: In process    Performed: 05/17/18 1643 - 05/17/18 1643 Resulting lab: [facility]            Encounter-Level Documents:     There are no encounter-level documents.      Order-Level Documents:     There are no order-level documents.

## 2018-05-18 NOTE — IP AVS SNAPSHOT
` [place] [address] ONCOLOGY: [phone]                 INTERAGENCY TRANSFER FORM - NOTES (H&P, Discharge Summary, Consults, Procedures, Therapies)   2018                    Hospital Admission Date: 2018  NACHO VIZCAINO   : 1935  Sex: Male        Patient PCP Information     Provider PCP Type    Paulino [facility]         History & Physicals      Interval H&P Note by Madelyn Ortega at 2018  8:41 AM     Author:  Madelyn Ortega Service:  (none) Author Type:  YANET    Filed:  2018  8:41 AM Date of Service:  2018  8:41 AM Creation Time:  2018  8:41 AM    Status:  Signed :  Madelyn Ortega (YANET)         This note is for the purpose of making the H&P performed clinic within the last 30 days available in the hospital encounter.    [SR1.1]          Revision History        User Key Date/Time User Provider Type Action    > SR1.1 2018  8:41 AM Madelyn Ortega [facility] Sign          Source Note     Author:  Scar Harris APRN CNP Service:  (none) Author Type:  Nurse Practitioner    Filed:  5/10/2018  9:05 PM Date of Service:  5/10/2018  2:55 PM Creation Time:  2018  8:41 AM    Status:  Signed :  Scar Harris APRN CNP (Nurse Practitioner)            History of Present Illness:    Nacho Vizcaino is a 82 year old[DT1.1] Canadian-speaking[DT1.2] male[DT1.1] I am meeting for the first time today with his .  He returns today for post hospital follow-up and post angiogram follow-up.  He[DT1.2] h[DT1.3]s followed in the past with[DT1.2]a[DT1.3] Dr. Carlo Llanes.  The patient wishes to reestablish care with him.    Recently he was referred back to us for complaints of chest discomfort[DT1.2] and[DT1.3] saw Dr. Catalan[DT1.2]-[DT1.3]Lex.  He has a previously known occluded right coronary artery with a high-grade circumflex stenosis without flow-limiting disease in the LAD.  The right coronary received collateral  flow from the left.    When he saw Dr. Hossein Burnett he was complaining of chest discomfort a[DT1.2]fter walking[DT1.3] 500 yards[DT1.2] and[DT1.3] increasing shortness of breath for a few weeks.  He had influenza over Anthony and has not recovered well from this.  He was also found to have a blood pressure of 85/59 at the office visit.  He has a prior history of complete heart block and has undergone a permanent pacemaker inserted by his electrophysiologist Dr. Chavarria.    He has an abdominal aortic aneurysm that measured 4.1 cm in 2015.    He was referred for an outpatient coronary angiogram, however before he could be admitted for this he presented to the emergency room with significant right flank pain and was found to have an infected obstructive ureteral calculi also causing hydronephrosis with increase in his creatinine from a baseline of 0.79-0.89-1.5.  He was seen by urology and[DT1.2] a right[DT1.3] ureteral stent was placed with improvement in his creatinine to baseline prior to discharge.    He had intermittent hypotension during that hospital stay.  He was kept off of lisinopril and amlodipine and sent home on metoprolol 50 mg per day Imdur 30 mg per day.  In the past he[DT1.2] has[DT1.3] hermes[DT1.2]en[DT1.3] as much as 100 mg twice per day of metoprolol which was reduced to 100 mg a day at our office visit here and reduce further to 50 mg per day after his hospital admission[DT1.2] to to low bp.[DT1.3]    He return[DT1.2]ed for his[DT1.3] outpatient coronary angiography done by Dr. Dewitt on 5-2-2018.  Again the right coronary artery was chronically occluded.  The left circumflex is now also chronically occluded[DT1.2] and[DT1.3] the LAD continues to have non-flow-limiting disease.  He has a separate circumflex and right coronary artery ostia in lieu of a traditional left main.  During LV gram he had variable levels of mitral regurgitation.  On previous echocardiogram he[DT1.2] has not had[DT1.3] mitral  regurgitation.  Dr. Dewitt felt at some point he may be a candidate for chronic total occlusion procedure of the circumflex but this would be long and complicated due to heavy calcification.  It would require minimum Rotablator or CSI if a wire would be able to be passed on the OM.[DT1.2] In addition, he wanted to be sure he did not have significant MR. Dr. Son felt the MR may be catheter induced.[DT1.3]    This patient continues have a[DT1.2] right[DT1.3] ureteral stent in place and certainly this would need to be re-addressed and removed prior to any consideration for intervention[DT1.2] requiring[DT1.3] dual antiplatelet therapy.      During his recent hospital admission he was seen by Dr. Jensen for his infrarenal[DT1.2] abdominal[DT1.3] aortic aneurysm which measured 5.2 X 4.4cm which would have been an interval increase from 2015, however Dr. Jnesen felt this measured closer to 4.6 upon his review.  He suggested a CT scan in approximately 6 months time    Dr. Hossein Burnett noted a right carotid bruit.  Ultrasound was performed indicating less than 50% stenosis in the right internal carotid artery and 50-69% stenosis in the left internal carotid artery.  Today I appreciate at least a 20-25 mm difference in his blood pressure between his right and left arms, higher on the left.  His brachial and radial pulses are weaker in the right arm as well.  He has a loud bruit over the right subclavian and I suspect he potentially could have subclavian steal.  I suggest all of his blood pressures be checked on his left arm and treated accordingly.    Today on his left arm his blood pressures in the mid 120s over 70s when checked by me.[DT1.2]    Cardiac[DT1.3] exam today revealed tachycardia at a rate of 120 bpm.  I obtained an EKG which looks suspicious for atrial tachycardia.  The device[DT1.2] RN[DT1.3] interrogated and we spoke to Dr. Chavarria who confirmed this.  Further exam today shows bilateral carotid  bruits[DT1.2],[DT1.3]  right subclavian bruit[DT1.2],[DT1.3]  soft systolic murmur with clear lungs[DT1.2]  and[DT1.3] edema.  Right groin site is healed well with minimal ecchymosis.[DT1.2]        Impression/Plan:[DT1.1]     1.  Coronary artery disease with anatomy outlined above  -Preserved[DT1.2],[DT1.3] if not hyperdynamic LV function on echo in April 2018  -Chest pain has improved  -Troponins were negative during his hospital admission  -Continue beta-blockers and isosorbide  -Consider chronic total occlusion procedure in the future, however his ureteral stent needs to be removed first for consideration of dual antiplatelet therapy and the patient would appreciate Dr. Llanes's input as well.    2.  Markedly different blood pressures between left and right arm, higher on the left.  -Subclavian bruits on the right[DT1.2]-->[DT1.3]suspicious for subclavian steal  -[DT1.2]check b[DT1.3]lood pressure on left arm only and treat accordingly  -Consider ultrasound of th[DT1.2]e subclavian area[DT1.3] in the future    3.  History of complete heart block with pacemaker in place    4.  New atrial tachycardia today heart rate of 120[DT1.2] at rest[DT1.3] which[DT1.2] he[DT1.3] is tolerating well so far  -Increase metoprolol to 100 mg per day in divided doses (50 mg in the morning, 50 mg in the evening)  -He will return in a few days for a pacemaker check and his heart rate is still high, his heart rate will be reduced by switching to VVIR mode temporarily[DT1.2] in order to[DT1.3] complete an echocardiogram[DT1.2] when he is not in tachycardia[DT1.3] and switch back to DDDR with consideration of increasing Toprol further[DT1.2] or referral back to EP service.[DT1.3]    5.  Question of mitral regurgitation during his LV gram which may have been catheter induced  Dr. Son suggests repeating an echocardiogram which has been scheduled next week    6.  Recent ureteral stent which was infective and caused obstructive  hydronephrosis  -Sees urology tomorrow  -Basic metabolic panel will be repeated today if the lab is able, if it is too late[DT1.2] in the day, we w[DT1.3]ill recheck early next week    7.  Peripheral Arterial disease with carotid stenosis as outlined above  -Suspicious for subclavian steal as outlined above  -Abdominal aortic aneurysm measuring 5.2 x 4.4, however by Dr. Jensen's to review slightly smaller  -Dr. Jensen suggested a CT scan in 6 months[DT1.2]  -repeat carotid ultrasound in one year[DT1.3]    8.  Dyslipidemia-currently on Lipitor 40 mg per day  -Check lipid panel at some point as there are none available that I can find on file    This patient lives at [facility] and we have had difficulty communicating with him[DT1.2] and the staff there.[DT1.3]  The phone number he brought in today on his paperwork is [phone]    I have ordered a pacemaker check with an echocardiogram and a basic metabolic panel in a few days.  He will then reestablish his care with Dr. Llanes per his request.     Rater than 50% of this one-hour and 15 minute visit today was spent in counseling and collaboration[DT1.2]-this plan was reviewed with Dr. Son and Aura[DT1.3]      Scar Harris, MSN, APRN-BC, CNP  Cardiology[DT1.1]    Orders Placed This Encounter   Procedures     US Carotid Bilateral     EKG 12-lead complete w/read - Clinics     Orders Placed This Encounter   Medications     doxycycline (VIBRA-TABS) 100 MG tablet     Sig: Take 100 mg by mouth 2 times daily     sodium chloride (DEEP SEA NASAL SPRAY) 0.65 % nasal spray     Sig: Spray 1 spray into both nostrils daily as needed for congestion     There are no discontinued medications.      Encounter Diagnoses   Name Primary?     Ischemic cardiomyopathy      Atrioventricular block, complete (H)      History of permanent cardiac pacemaker placement      Other forms of angina pectoris (H)      Coronary artery disease involving native coronary artery  of native heart without angina pectoris      Abdominal aortic aneurysm (AAA) without rupture (H)      Benign essential hypertension      Non-compliance      Bruit of right carotid artery      SOB (shortness of breath)      Tachycardia Yes[DT1.4]       CURRENT MEDICATIONS:[DT1.1]  Current Outpatient Prescriptions   Medication Sig Dispense Refill     Acetaminophen (TYLENOL PO) Take 1,000 mg by mouth 3 times daily       aspirin 81 MG tablet Take 1 tablet (81 mg) by mouth daily       atorvastatin (LIPITOR) 40 MG tablet Take 1 tablet (40 mg) by mouth daily       doxycycline (VIBRA-TABS) 100 MG tablet Take 100 mg by mouth 2 times daily       isosorbide mononitrate (IMDUR) 30 MG 24 hr tablet Take 1 tablet (30 mg) by mouth daily 90 tablet 3     magnesium hydroxide (MILK OF MAGNESIA) 400 MG/5ML suspension Take 30 mLs by mouth daily as needed for constipation or heartburn       metoprolol succinate (TOPROL-XL) 50 MG 24 hr tablet Take 1 tablet (50 mg) by mouth daily 30 tablet 0     nitroglycerin (NITROSTAT) 0.4 MG SL tablet Place 1 tablet (0.4 mg) under the tongue every 5 minutes as needed for chest pain 25 tablet 4     Pseudoephedrine-Guaifenesin (MUCINEX D MAX STRENGTH PO) Take 800 mg by mouth daily        sodium chloride (DEEP SEA NASAL SPRAY) 0.65 % nasal spray Spray 1 spray into both nostrils daily as needed for congestion[DT1.4]         ALLERGIES[DT1.1]   No Known Allergies[DT1.4]    PAST MEDICAL HISTORY:[DT1.1]  Past Medical History:   Diagnosis Date     AAA (abdominal aortic aneurysm) (H) 7/8/2014    4.1 cm     Anemia      Atrioventricular block, complete (HEART) 7/8/2014    S/p PPM 7/2014     Bradycardia      CAD (coronary artery disease) 2/9/2015     Carotid stenosis      Chest pain 1/20/2015     COPD (chronic obstructive pulmonary disease) (H)      Depression 7/21/2014     Depressive disorder      HTN (hypertension) 7/8/2014     Hyperlipidemia 7/8/2014     Hypertension      Ischemic cardiomyopathy 2/9/2015     Lung  abscess (H)      Syncope[DT1.4]        PAST SURGICAL HISTORY:[DT1.1]  Past Surgical History:   Procedure Laterality Date     COMBINED CYSTOSCOPY, INSERT STENT URETER(S) Right 4/20/2018    Procedure: COMBINED CYSTOSCOPY, INSERT STENT URETER(S);  CYSTOSCOPY,RIGHT URETERAL STENT PLACEMENT, RIGHT RETROGRADES(LANGUAGE:Cook Islander);  Surgeon: Gurvinder Sanz MD;  Location:  OR     CORONARY ANGIOGRAPHY ADULT ORDER  2/20/15    medical managed     CYSTOSCOPY, RETROGRADES, COMBINED  4/20/2018    Procedure: COMBINED CYSTOSCOPY, RETROGRADES;;  Surgeon: Gurvinder Sanz MD;  Location:  OR     IMPLANT PACEMAKER  7/7/14    dual chamber[DT1.4]       FAMILY HISTORY:[DT1.1]  Family History   Problem Relation Age of Onset     Other - See Comments Mother      old age     HEART DISEASE Father      unknown[DT1.4]       SOCIAL HISTORY:[DT1.1]  Social History     Social History     Marital status:      Spouse name: N/A     Number of children: N/A     Years of education: N/A     Social History Main Topics     Smoking status: Former Smoker     Start date: 1/10/1949     Quit date: 7/7/2012     Smokeless tobacco: Never Used     Alcohol use No     Drug use: No     Sexual activity: Not Asked     Other Topics Concern     Caffeine Concern No     2 cups of green tea      Special Diet No     Exercise Yes     walking     Seat Belt Yes     Social History Narrative[DT1.4]       Review of Systems:  Skin:[DT1.1]  Negative[DT1.4]       Eyes:[DT1.1]  Negative[DT1.4]      ENT:[DT1.1]  Negative[DT1.4]      Respiratory:[DT1.1]  Positive for cough;dyspnea on exertion (pt is getting over the flu) COPD[DT1.4]   Cardiovascular:[DT1.1]    Positive for;lightheadedness cp when coughing[DT1.4]  Gastroenterology:[DT1.1] Negative for[DT1.4]      Genitourinary:[DT1.1]  Negative[DT1.4]      Musculoskeletal:[DT1.1]  Positive for muscular weakness[DT1.4]    Neurologic:[DT1.1]  Negative[DT1.4]      Psychiatric:[DT1.1]  Negative[DT1.4]     "  Heme/Lymph/Imm:[DT1.1]  Negative[DT1.4]      Endocrine:[DT1.1]  Negative[DT1.4]        Physical Exam:  Vitals:[DT1.1] BP (!) 88/0  Pulse 120  Ht 1.854 m (6' 0.99\")[DT1.4]    Constitutional:[DT1.1]  cooperative, alert and oriented, well developed, well nourished, in no acute distress thin[DT1.4]      Skin:[DT1.1]  warm and dry to the touch   pacemaker incision in the left infraclavicular area was well-healed  (actinic keratoses)[DT1.4]    Head:[DT1.1]  normocephalic[DT1.4]        Eyes:[DT1.1]  pupils equal and round[DT1.4]        Lymph:      ENT:[DT1.1]  no pallor or cyanosis[DT1.4]        Neck:[DT1.1]  JVP normal right carotid bruit[DT1.4]      Respiratory:[DT1.1]  normal symmetry prolonged expiration[DT1.4]       Cardiac:[DT1.1] regular rhythm, normal S1/S2, no S3 or S4, apical impulse not displaced, no murmurs, gallops or rubs tachycardic     grade 1;LLSB;systolic murmur grade 2;RUSB;systolic ejection murmur   distant heart sounds  pulses full and equal         right subclavian artery                     right femoral bruit (+) left femoral bruit (+) right subclavian bruit; right groin site clean without hum or bruit[DT1.4]    GI:[DT1.1]  abdomen soft[DT1.4]        Extremities and Muscular Skeletal:[DT1.1]  no deformities, clubbing, cyanosis, erythema observed;no edema[DT1.4]              Neurological:[DT1.1]  no gross motor deficits;affect appropriate[DT1.4]        Psych:[DT1.1]  Alert and Oriented x 3[DT1.4]      Recent Lab Results:  LIPID RESULTS:[DT1.1]  No results found for: CHOL, HDL, LDL, TRIG, CHOLHDLRATIO[DT1.4]    LIVER ENZYME RESULTS:[DT1.1]  Lab Results   Component Value Date    AST 12 04/20/2018    ALT 11 04/20/2018[DT1.4]       CBC RESULTS:[DT1.1]  Lab Results   Component Value Date    WBC 9.2 05/02/2018    RBC 4.13 (L) 05/02/2018    HGB 12.7 (L) 05/02/2018    HCT 39.5 (L) 05/02/2018    MCV 96 05/02/2018    MCH 30.8 05/02/2018    MCHC 32.2 05/02/2018    RDW 15.3 (H) 05/02/2018     " 05/02/2018[DT1.4]       BMP RESULTS:[DT1.1]  Lab Results   Component Value Date     (H) 05/02/2018    POTASSIUM 4.0 05/02/2018    CHLORIDE 112 (H) 05/02/2018    CO2 26 05/02/2018    ANIONGAP 7 05/02/2018     (H) 05/02/2018    BUN 25 05/02/2018    CR 0.85 05/02/2018    GFRESTIMATED 86 05/02/2018    GFRESTBLACK >90 05/02/2018    ARELIS 9.3 05/02/2018[DT1.4]        A1C RESULTS:[DT1.1]  No results found for: A1C[DT1.4]    INR RESULTS:[DT1.1]  Lab Results   Component Value Date    INR 1.09 05/02/2018    INR 0.93 02/20/2015[DT1.4]           CC  Ronaldo Son MD  [address]  [place] [address][DT1.1]                    Revision History        User Key Date/Time User Provider Type Action    > DT1.3 5/17/2018  8:41 AM Scar Harris APRN CNP Nurse Practitioner Sign     DT1.2 5/10/2018  5:16 PM Scar Harris APRN CNP Nurse Practitioner      DT1.4 5/10/2018  4:04 PM Scar Harris APRN CNP Nurse Practitioner      DT1.1 5/10/2018  3:40 PM Scar Harris APRN CNP Nurse Practitioner                   H&P (View-Only) by Scar Harris APRN CNP at 5/10/2018  2:55 PM     Author:  Scar Harris APRN CNP Service:  (none) Author Type:  Nurse Practitioner    Filed:  5/10/2018  9:05 PM Date of Service:  5/10/2018  2:55 PM Creation Time:  5/17/2018  8:41 AM    Status:  Signed :  Scar Harris APRN CNP (Nurse Practitioner)         History of Present Illness:    Nacho Joseph is a 82 year old[DT1.1] Thai-speaking[DT1.2] male[DT1.1] I am meeting for the first time today with his .  He returns today for post hospital follow-up and post angiogram follow-up.  He[DT1.2] h[DT1.3]s followed in the past with[DT1.2]a[DT1.3] Dr. Carlo Llanes.  The patient wishes to reestablish care with him.    Recently he was referred back to us for complaints of chest discomfort[DT1.2] and[DT1.3] saw Dr. aCtalan[DT1.2]-[DT1.3]Lex.   He has a previously known occluded right coronary artery with a high-grade circumflex stenosis without flow-limiting disease in the LAD.  The right coronary received collateral flow from the left.    When he saw Dr. Hossein Burnett he was complaining of chest discomfort a[DT1.2]fter walking[DT1.3] 500 yards[DT1.2] and[DT1.3] increasing shortness of breath for a few weeks.  He had influenza over Anthony and has not recovered well from this.  He was also found to have a blood pressure of 85/59 at the office visit.  He has a prior history of complete heart block and has undergone a permanent pacemaker inserted by his electrophysiologist Dr. Chavarria.    He has an abdominal aortic aneurysm that measured 4.1 cm in 2015.    He was referred for an outpatient coronary angiogram, however before he could be admitted for this he presented to the emergency room with significant right flank pain and was found to have an infected obstructive ureteral calculi also causing hydronephrosis with increase in his creatinine from a baseline of 0.79-0.89-1.5.  He was seen by urology and[DT1.2] a right[DT1.3] ureteral stent was placed with improvement in his creatinine to baseline prior to discharge.    He had intermittent hypotension during that hospital stay.  He was kept off of lisinopril and amlodipine and sent home on metoprolol 50 mg per day Imdur 30 mg per day.  In the past he[DT1.2] has[DT1.3] hermes[DT1.2]en[DT1.3] as much as 100 mg twice per day of metoprolol which was reduced to 100 mg a day at our office visit here and reduce further to 50 mg per day after his hospital admission[DT1.2] to to low bp.[DT1.3]    He return[DT1.2]ed for his[DT1.3] outpatient coronary angiography done by Dr. Dewitt on 5-2-2018.  Again the right coronary artery was chronically occluded.  The left circumflex is now also chronically occluded[DT1.2] and[DT1.3] the LAD continues to have non-flow-limiting disease.  He has a separate circumflex and right coronary  artery ostia in lieu of a traditional left main.  During LV gram he had variable levels of mitral regurgitation.  On previous echocardiogram he[DT1.2] has not had[DT1.3] mitral regurgitation.  Dr. Dewitt felt at some point he may be a candidate for chronic total occlusion procedure of the circumflex but this would be long and complicated due to heavy calcification.  It would require minimum Rotablator or CSI if a wire would be able to be passed on the OM.[DT1.2] In addition, he wanted to be sure he did not have significant MR. Dr. Son felt the MR may be catheter induced.[DT1.3]    This patient continues have a[DT1.2] right[DT1.3] ureteral stent in place and certainly this would need to be re-addressed and removed prior to any consideration for intervention[DT1.2] requiring[DT1.3] dual antiplatelet therapy.      During his recent hospital admission he was seen by Dr. Jensen for his infrarenal[DT1.2] abdominal[DT1.3] aortic aneurysm which measured 5.2 X 4.4cm which would have been an interval increase from 2015, however Dr. Jensen felt this measured closer to 4.6 upon his review.  He suggested a CT scan in approximately 6 months time    Dr. Hossein Burnett noted a right carotid bruit.  Ultrasound was performed indicating less than 50% stenosis in the right internal carotid artery and 50-69% stenosis in the left internal carotid artery.  Today I appreciate at least a 20-25 mm difference in his blood pressure between his right and left arms, higher on the left.  His brachial and radial pulses are weaker in the right arm as well.  He has a loud bruit over the right subclavian and I suspect he potentially could have subclavian steal.  I suggest all of his blood pressures be checked on his left arm and treated accordingly.    Today on his left arm his blood pressures in the mid 120s over 70s when checked by me.[DT1.2]    Cardiac[DT1.3] exam today revealed tachycardia at a rate of 120 bpm.  I obtained an EKG which  looks suspicious for atrial tachycardia.  The device[DT1.2] RN[DT1.3] interrogated and we spoke to Dr. Chavarria who confirmed this.  Further exam today shows bilateral carotid bruits[DT1.2],[DT1.3]  right subclavian bruit[DT1.2],[DT1.3]  soft systolic murmur with clear lungs[DT1.2]  and[DT1.3] edema.  Right groin site is healed well with minimal ecchymosis.[DT1.2]        Impression/Plan:[DT1.1]     1.  Coronary artery disease with anatomy outlined above  -Preserved[DT1.2],[DT1.3] if not hyperdynamic LV function on echo in April 2018  -Chest pain has improved  -Troponins were negative during his hospital admission  -Continue beta-blockers and isosorbide  -Consider chronic total occlusion procedure in the future, however his ureteral stent needs to be removed first for consideration of dual antiplatelet therapy and the patient would appreciate Dr. Llanes's input as well.    2.  Markedly different blood pressures between left and right arm, higher on the left.  -Subclavian bruits on the right[DT1.2]-->[DT1.3]suspicious for subclavian steal  -[DT1.2]check b[DT1.3]lood pressure on left arm only and treat accordingly  -Consider ultrasound of th[DT1.2]e subclavian area[DT1.3] in the future    3.  History of complete heart block with pacemaker in place    4.  New atrial tachycardia today heart rate of 120[DT1.2] at rest[DT1.3] which[DT1.2] he[DT1.3] is tolerating well so far  -Increase metoprolol to 100 mg per day in divided doses (50 mg in the morning, 50 mg in the evening)  -He will return in a few days for a pacemaker check and his heart rate is still high, his heart rate will be reduced by switching to VVIR mode temporarily[DT1.2] in order to[DT1.3] complete an echocardiogram[DT1.2] when he is not in tachycardia[DT1.3] and switch back to DDDR with consideration of increasing Toprol further[DT1.2] or referral back to EP service.[DT1.3]    5.  Question of mitral regurgitation during his LV gram which may have been catheter  induced  Dr. Son suggests repeating an echocardiogram which has been scheduled next week    6.  Recent ureteral stent which was infective and caused obstructive hydronephrosis  -Sees urology tomorrow  -Basic metabolic panel will be repeated today if the lab is able, if it is too late[DT1.2] in the day, we w[DT1.3]ill recheck early next week    7.  Peripheral Arterial disease with carotid stenosis as outlined above  -Suspicious for subclavian steal as outlined above  -Abdominal aortic aneurysm measuring 5.2 x 4.4, however by Dr. Jensen's to review slightly smaller  -Dr. Jensen suggested a CT scan in 6 months[DT1.2]  -repeat carotid ultrasound in one year[DT1.3]    8.  Dyslipidemia-currently on Lipitor 40 mg per day  -Check lipid panel at some point as there are none available that I can find on file    This patient lives at [facility] and we have had difficulty communicating with him[DT1.2] and the staff there.[DT1.3]  The phone number he brought in today on his paperwork is [phone]    I have ordered a pacemaker check with an echocardiogram and a basic metabolic panel in a few days.  He will then reestablish his care with Dr. Llanes per his request.     Rater than 50% of this one-hour and 15 minute visit today was spent in counseling and collaboration[DT1.2]-this plan was reviewed with Dr. Son and Aura[DT1.3]      Scar Rollins-Jyotsna, MSN, APRN-BC, CNP  Cardiology[DT1.1]    Orders Placed This Encounter   Procedures     US Carotid Bilateral     EKG 12-lead complete w/read - Clinics     Orders Placed This Encounter   Medications     doxycycline (VIBRA-TABS) 100 MG tablet     Sig: Take 100 mg by mouth 2 times daily     sodium chloride (DEEP SEA NASAL SPRAY) 0.65 % nasal spray     Sig: Spray 1 spray into both nostrils daily as needed for congestion     There are no discontinued medications.      Encounter Diagnoses   Name Primary?     Ischemic cardiomyopathy      Atrioventricular  block, complete (H)      History of permanent cardiac pacemaker placement      Other forms of angina pectoris (H)      Coronary artery disease involving native coronary artery of native heart without angina pectoris      Abdominal aortic aneurysm (AAA) without rupture (H)      Benign essential hypertension      Non-compliance      Bruit of right carotid artery      SOB (shortness of breath)      Tachycardia Yes[DT1.4]       CURRENT MEDICATIONS:[DT1.1]  Current Outpatient Prescriptions   Medication Sig Dispense Refill     Acetaminophen (TYLENOL PO) Take 1,000 mg by mouth 3 times daily       aspirin 81 MG tablet Take 1 tablet (81 mg) by mouth daily       atorvastatin (LIPITOR) 40 MG tablet Take 1 tablet (40 mg) by mouth daily       doxycycline (VIBRA-TABS) 100 MG tablet Take 100 mg by mouth 2 times daily       isosorbide mononitrate (IMDUR) 30 MG 24 hr tablet Take 1 tablet (30 mg) by mouth daily 90 tablet 3     magnesium hydroxide (MILK OF MAGNESIA) 400 MG/5ML suspension Take 30 mLs by mouth daily as needed for constipation or heartburn       metoprolol succinate (TOPROL-XL) 50 MG 24 hr tablet Take 1 tablet (50 mg) by mouth daily 30 tablet 0     nitroglycerin (NITROSTAT) 0.4 MG SL tablet Place 1 tablet (0.4 mg) under the tongue every 5 minutes as needed for chest pain 25 tablet 4     Pseudoephedrine-Guaifenesin (MUCINEX D MAX STRENGTH PO) Take 800 mg by mouth daily        sodium chloride (DEEP SEA NASAL SPRAY) 0.65 % nasal spray Spray 1 spray into both nostrils daily as needed for congestion[DT1.4]         ALLERGIES[DT1.1]   No Known Allergies[DT1.4]    PAST MEDICAL HISTORY:[DT1.1]  Past Medical History:   Diagnosis Date     AAA (abdominal aortic aneurysm) (H) 7/8/2014    4.1 cm     Anemia      Atrioventricular block, complete (HEART) 7/8/2014    S/p PPM 7/2014     Bradycardia      CAD (coronary artery disease) 2/9/2015     Carotid stenosis      Chest pain 1/20/2015     COPD (chronic obstructive pulmonary disease) (H)       Depression 7/21/2014     Depressive disorder      HTN (hypertension) 7/8/2014     Hyperlipidemia 7/8/2014     Hypertension      Ischemic cardiomyopathy 2/9/2015     Lung abscess (H)      Syncope[DT1.4]        PAST SURGICAL HISTORY:[DT1.1]  Past Surgical History:   Procedure Laterality Date     COMBINED CYSTOSCOPY, INSERT STENT URETER(S) Right 4/20/2018    Procedure: COMBINED CYSTOSCOPY, INSERT STENT URETER(S);  CYSTOSCOPY,RIGHT URETERAL STENT PLACEMENT, RIGHT RETROGRADES(LANGUAGE:Kenyan);  Surgeon: Gurvinder Sanz MD;  Location:  OR     CORONARY ANGIOGRAPHY ADULT ORDER  2/20/15    medical managed     CYSTOSCOPY, RETROGRADES, COMBINED  4/20/2018    Procedure: COMBINED CYSTOSCOPY, RETROGRADES;;  Surgeon: Gurvinder Sanz MD;  Location:  OR     IMPLANT PACEMAKER  7/7/14    dual chamber[DT1.4]       FAMILY HISTORY:[DT1.1]  Family History   Problem Relation Age of Onset     Other - See Comments Mother      old age     HEART DISEASE Father      unknown[DT1.4]       SOCIAL HISTORY:[DT1.1]  Social History     Social History     Marital status:      Spouse name: N/A     Number of children: N/A     Years of education: N/A     Social History Main Topics     Smoking status: Former Smoker     Start date: 1/10/1949     Quit date: 7/7/2012     Smokeless tobacco: Never Used     Alcohol use No     Drug use: No     Sexual activity: Not Asked     Other Topics Concern     Caffeine Concern No     2 cups of green tea      Special Diet No     Exercise Yes     walking     Seat Belt Yes     Social History Narrative[DT1.4]       Review of Systems:  Skin:[DT1.1]  Negative[DT1.4]       Eyes:[DT1.1]  Negative[DT1.4]      ENT:[DT1.1]  Negative[DT1.4]      Respiratory:[DT1.1]  Positive for cough;dyspnea on exertion (pt is getting over the flu) COPD[DT1.4]   Cardiovascular:[DT1.1]    Positive for;lightheadedness cp when coughing[DT1.4]  Gastroenterology:[DT1.1] Negative for[DT1.4]      Genitourinary:[DT1.1]   "Negative[DT1.4]      Musculoskeletal:[DT1.1]  Positive for muscular weakness[DT1.4]    Neurologic:[DT1.1]  Negative[DT1.4]      Psychiatric:[DT1.1]  Negative[DT1.4]      Heme/Lymph/Imm:[DT1.1]  Negative[DT1.4]      Endocrine:[DT1.1]  Negative[DT1.4]        Physical Exam:  Vitals:[DT1.1] BP (!) 88/0  Pulse 120  Ht 1.854 m (6' 0.99\")[DT1.4]    Constitutional:[DT1.1]  cooperative, alert and oriented, well developed, well nourished, in no acute distress thin[DT1.4]      Skin:[DT1.1]  warm and dry to the touch   pacemaker incision in the left infraclavicular area was well-healed  (actinic keratoses)[DT1.4]    Head:[DT1.1]  normocephalic[DT1.4]        Eyes:[DT1.1]  pupils equal and round[DT1.4]        Lymph:      ENT:[DT1.1]  no pallor or cyanosis[DT1.4]        Neck:[DT1.1]  JVP normal right carotid bruit[DT1.4]      Respiratory:[DT1.1]  normal symmetry prolonged expiration[DT1.4]       Cardiac:[DT1.1] regular rhythm, normal S1/S2, no S3 or S4, apical impulse not displaced, no murmurs, gallops or rubs tachycardic     grade 1;LLSB;systolic murmur grade 2;RUSB;systolic ejection murmur   distant heart sounds  pulses full and equal         right subclavian artery                     right femoral bruit (+) left femoral bruit (+) right subclavian bruit; right groin site clean without hum or bruit[DT1.4]    GI:[DT1.1]  abdomen soft[DT1.4]        Extremities and Muscular Skeletal:[DT1.1]  no deformities, clubbing, cyanosis, erythema observed;no edema[DT1.4]              Neurological:[DT1.1]  no gross motor deficits;affect appropriate[DT1.4]        Psych:[DT1.1]  Alert and Oriented x 3[DT1.4]      Recent Lab Results:  LIPID RESULTS:[DT1.1]  No results found for: CHOL, HDL, LDL, TRIG, CHOLHDLRATIO[DT1.4]    LIVER ENZYME RESULTS:[DT1.1]  Lab Results   Component Value Date    AST 12 04/20/2018    ALT 11 04/20/2018[DT1.4]       CBC RESULTS:[DT1.1]  Lab Results   Component Value Date    WBC 9.2 05/02/2018    RBC 4.13 (L) 05/02/2018 "    HGB 12.7 (L) 05/02/2018    HCT 39.5 (L) 05/02/2018    MCV 96 05/02/2018    MCH 30.8 05/02/2018    MCHC 32.2 05/02/2018    RDW 15.3 (H) 05/02/2018     05/02/2018[DT1.4]       BMP RESULTS:[DT1.1]  Lab Results   Component Value Date     (H) 05/02/2018    POTASSIUM 4.0 05/02/2018    CHLORIDE 112 (H) 05/02/2018    CO2 26 05/02/2018    ANIONGAP 7 05/02/2018     (H) 05/02/2018    BUN 25 05/02/2018    CR 0.85 05/02/2018    GFRESTIMATED 86 05/02/2018    GFRESTBLACK >90 05/02/2018    ARELIS 9.3 05/02/2018[DT1.4]        A1C RESULTS:[DT1.1]  No results found for: A1C[DT1.4]    INR RESULTS:[DT1.1]  Lab Results   Component Value Date    INR 1.09 05/02/2018    INR 0.93 02/20/2015[DT1.4]           CC  Ronaldo Son MD  [address]  [place] [id number][DT1.1]                   Revision History        User Key Date/Time User Provider Type Action    > DT1.3 5/17/2018  8:41 AM Scar Harris APRN CNP Nurse Practitioner Sign     DT1.2 5/10/2018  5:16 PM Scar Harris APRN CNP Nurse Practitioner      DT1.4 5/10/2018  4:04 PM Scar Harris APRN CNP Nurse Practitioner      DT1.1 5/10/2018  3:40 PM Scar Harris APRN CNP Nurse Practitioner                      Discharge Summaries      Discharge Summaries by Katelin Escoabr PA-C at 5/19/2018  7:51 AM     Author:  Katelin Escobar PA-C Service:  Urology Author Type:  Physician Assistant - CURRY    Filed:  5/19/2018  7:51 AM Date of Service:  5/19/2018  7:51 AM Creation Time:  5/19/2018  7:39 AM    Status:  Cosign Needed :  Katelin Escobar PA-C (Physician Assistant - C)    Cosign Required:  Yes             [facility]    Discharge Summary  Urology    Date of Admission:  5/18/2018  Date of Discharge:[MG1.1]  5/19/2018[MG1.2]  Discharging Provider:[MG1.1] Katelin Escobar[MG1.2], JERAMIE[MG1.1]    Discharge Diagnoses[MG1.2]   S/P right ureteroscopy and ureteral stent  removal[MG1.1]     History of Present Illness[MG1.2]   Nacho Joseph is an 82 year old male who is POD #1 s/p right ureteroscopy, right ureteral stent removal.[MG1.1]     Hospital Course[MG1.2]   Nacho Joseph was admitted on 5/18/2018.  The following problems were addressed during his hospitalization: right ureteral stone. Perioperative and hospital course were uneventful. Recovered from anesthesia without issue. He was admitted overnight for further cardiac monitoring. Pain remained controlled-- reports only some burning at the tip of his penis with urination yet notes that this is improved compared to yesterday. Vitals remained stable throughout hospital stay.     Plan: D/C later this AM.   -keflex x 5 days   -Follow up with VIDAL FloresC  [facility]  Pager: [phone]  Office: [phone][MG1.1]    Significant Results and Procedures[MG1.2]   Cystoscopy, right ureteral stent removal, right ureteroscopy for distal ureteral stone[MG1.1]     Pending Results[MG1.2]   These results will be followed up by Dr. Sanz[MG1.1]  Unresulted Labs Ordered in the Past 30 Days of this Admission     No orders found for last 61 day(s).          Code Status[MG1.2]   Full Code[MG1.1]    Primary Care Physician   Paulino Lynch[MG1.2]    Physical Exam   Temp: 97  F (36.1  C) Temp src: Oral BP: 102/48   Heart Rate: 72 Resp: 18 SpO2: 95 % O2 Device: None (Room air) Oxygen Delivery: 2 LPM  Vitals:    05/18/18 1054   Weight: 63.5 kg (140 lb)[MG1.3]     Vital Signs with Ranges[MG1.1]  Temp:  [96.4  F (35.8  C)-99  F (37.2  C)] 97  F (36.1  C)  Heart Rate:  [59-95] 72  Resp:  [12-26] 18  BP: (102-150)/(48-96) 102/48  SpO2:  [95 %-100 %] 95 %  I/O last 3 completed shifts:  In: 640 [P.O.:240; I.V.:400]  Out: 330 [Urine:330][MG1.3]    Constitutional: Sitting up in bed, NAD, resting comfortably   Eyes: no icterus  ENT: normocephalic, atraumatic   Respiratory: breathing unlabored    Cardiovascular: chest wall symmetric   GI: soft, NT, ND; no CVAT   Lymph/Hematologic: no pedal edema   Skin: well perfused   Neurologic: no focal deficits  Neuropsychiatric: Alert and oriented[MG1.1]     Time Spent on this Encounter[MG1.2]   IKatelin, personally saw the patient today and spent less than or equal to 30 minutes discharging this patient.[MG1.1]    Discharge Disposition[MG1.2]   Discharged to assisted living  Condition at discharge: Stable[MG1.1]    Consultations This Hospital Stay   None    Discharge Orders     Reason for your hospital stay   Surgery for kidney stone     Follow-up and recommended labs and tests    Follow up with Dr. Sanz in 1-2wks.   [facility]  [facility] [place] ([address], [address]  [place]. [address]  You may call [phone] with any questions or concerns.   Central Appointment #: [phone]     Activity   Your activity upon discharge: activity as tolerated     General info for SNF   Length of Stay Estimate: Long Term Care  Condition at Discharge: Stable  Level of care:skilled   Rehabilitation Potential: Good  Admission H&P remains valid and up-to-date: Yes  Recent Chemotherapy: N/A  Use Nursing Home Standing Orders: N/A     Activity - Up ad juve     Full Code     Diet   Follow this diet upon discharge: Regular     Advance Diet as Tolerated   Follow this diet upon discharge: Regular       Discharge Medications   Current Discharge Medication List      START taking these medications    Details   cephALEXin (KEFLEX) 500 MG capsule Take 1 capsule (500 mg) by mouth 2 times daily for 5 days  Qty: 10 capsule, Refills: 0    Associated Diagnoses: Post-operative state         CONTINUE these medications which have NOT CHANGED    Details   Acetaminophen (TYLENOL PO) Take 1,000 mg by mouth 3 times daily      aspirin 81 MG tablet Take 1 tablet (81 mg) by mouth daily    Associated Diagnoses: Chest pain       atorvastatin (LIPITOR) 40 MG tablet Take 1 tablet (40 mg) by mouth daily    Associated Diagnoses: CAD (coronary artery disease); Hyperlipidemia      DOXYCYCLINE HYCLATE PO Take 100 mg by mouth 2 times daily      Fexofenadine HCl (MUCINEX ALLERGY PO) Take 800 mg by mouth 2 times daily (2 x 400 mg = 800 mg dose)      magnesium hydroxide (MILK OF MAGNESIA) 400 MG/5ML suspension Take 30 mLs by mouth daily as needed for constipation or heartburn      metoprolol succinate (TOPROL-XL) 50 MG 24 hr tablet Take 1 tablet (50 mg) by mouth two times daily  Qty: 180 tablet, Refills: 3    Comments: Start today  Associated Diagnoses: Abdominal aortic aneurysm (AAA) without rupture (H)      nitroglycerin (NITROSTAT) 0.4 MG SL tablet Place 1 tablet (0.4 mg) under the tongue every 5 minutes as needed for chest pain  Qty: 25 tablet, Refills: 4    Associated Diagnoses: Chest pain      sodium chloride (DEEP SEA NASAL SPRAY) 0.65 % nasal spray Spray 1 spray into both nostrils daily as needed for congestion           Allergies   No Known Allergies  Data[MG1.2]   Most Recent 3 CBC's:[MG1.1]  Recent Labs   Lab Test  05/02/18   0836  04/21/18   0646  04/20/18   0415   WBC  9.2  8.9  12.5*   HGB  12.7*  12.0*  11.0*   MCV  96  95  96   PLT  234  164  167[MG1.4]      Most Recent 3 BMP's:[MG1.1]  Recent Labs   Lab Test  05/18/18   1030  05/17/18   1454  05/02/18   0836  04/21/18   0646   NA   --   141  145*  141   POTASSIUM  3.7  4.1  4.0  3.8   CHLORIDE   --   106  112*  109   CO2   --   24  26  24   BUN   --   23  25  19   CR   --   1.01  0.85  0.86   ANIONGAP   --   15.1  7  8   ARELIS   --   9.7  9.3  8.7   GLC   --   117*  122*  96[MG1.4]     Most Recent 2 LFT's:[MG1.1]  Recent Labs   Lab Test  04/20/18   0415  07/07/14   0015   AST  12  23   ALT  11  26   ALKPHOS  75  83   BILITOTAL  0.5  0.8[MG1.4]     Most Recent INR's and Anticoagulation Dosing History:  Anticoagulation Dose History     Recent Dosing and Labs Latest Ref Rng & Units  7/7/2014 2/20/2015 5/2/2018    INR 0.86 - 1.14 1.00 0.93 1.09        Most Recent 3 Troponin's:[MG1.1]  Recent Labs   Lab Test  07/07/14   1040  07/07/14   0655  07/07/14   0240  07/07/14   0020   TROPI  0.203*  0.182*  0.143*   --    TROPONIN   --    --    --   0.16*[MG1.4]     Most Recent Cholesterol Panel:[MG1.1]No lab results found.[MG1.4]  Most Recent 6 Bacteria Isolates From Any Culture (See EPIC Reports for Culture Details):[MG1.1]  Recent Labs   Lab Test  05/02/18   0845   CULT  50,000 to 100,000 colonies/mL  mixed urogenital lizy[MG1.4]       Most Recent TSH, T4 and A1c Labs:[MG1.1]  Recent Labs   Lab Test  07/07/14   0015   TSH  2.07     Results for orders placed or performed during the hospital encounter of 05/18/18   XR Surgery VASQUEZ L/T 5 Min Fluoro w Stills    Narrative    SURGERY C-ARM FLUOROSCOPY LESS THAN FIVE MINUTES WITH STILLS 5/18/2018  12:30 PM     HISTORY: Cysto, right ureteroscopy, right stent removal, OR 18, 2  images, 7 seconds fluoroscopy.     COMPARISON: 4/20/2018      Impression    IMPRESSION: Two spot fluoroscopic images obtained during right  ureteroscopy. Stent is noted in the distal ureter on one image and  guide placement over a wire on the subsequent image. Total  fluoroscopic time 0.2 minutes.    SHERRIE DEJESUS MD[MG1.4]          Revision History        User Key Date/Time User Provider Type Action    > MG1.4 5/19/2018  7:51 AM Katelin Ecsobar PA-C Physician Assistant - CURRY Sign     MG1.3 5/19/2018  7:44 AM Katelin Escobar PA-C Physician Assistant - C      MG1.2 5/19/2018  7:40 AM Katelin Escobar PA-C Physician Assistant - C      MG1.1 5/19/2018  7:39 AM Katelin Escobar PA-C Physician Assistant - C                   Consult Notes     No notes of this type exist for this encounter.         Progress Notes - Physician (Notes from 05/16/18 through 05/19/18)      Progress Notes by Keke Blank at 5/18/2018 10:15 AM     Author:  Keke Blank Service:   (none) Author Type:  (none)    Filed:  5/18/2018 10:16 AM Date of Service:  5/18/2018 10:15 AM Creation Time:  5/18/2018 10:15 AM    Status:  Signed :  BlankNickyKeke         Admission medication history interview status for the 5/18/2018  admission is complete. See EPIC admission navigator for prior to admission medications     Medication history source reliability:Good    Medication history interview source(s):Caregiver    Medication history resources (including written lists, pill bottles, clinic record):Recieved last doses from [facility]    Primary pharmacy.[facility]    Additional medication history information not noted on PTA med list :None    Time spent in this activity: 60 minutes    Prior to Admission medications    Medication Sig Last Dose Taking? Auth Provider   Acetaminophen (TYLENOL PO) Take 1,000 mg by mouth 3 times daily 5/17/2018 at PM Yes Reported, Patient   aspirin 81 MG tablet Take 1 tablet (81 mg) by mouth daily 5/14/2018 at 0800 Yes Iliana Ignacio PA-C   atorvastatin (LIPITOR) 40 MG tablet Take 1 tablet (40 mg) by mouth daily 5/17/2018 at PM Yes Iilana Ignacio PA-C   DOXYCYCLINE HYCLATE PO Take 100 mg by mouth 2 times daily 5/18/2018 at 0730 Yes Reported, Patient   Fexofenadine HCl (MUCINEX ALLERGY PO) Take 800 mg by mouth 2 times daily (2 x 400 mg = 800 mg dose) 5/17/2018 at PM Yes Reported, Patient   magnesium hydroxide (MILK OF MAGNESIA) 400 MG/5ML suspension Take 30 mLs by mouth daily as needed for constipation or heartburn More than a Month at PRN Yes Reported, Patient   metoprolol succinate (TOPROL-XL) 50 MG 24 hr tablet Take 1 tablet (50 mg) by mouth two times daily 5/18/2018 at 0730 Yes Scar Harris APRN CNP   nitroglycerin (NITROSTAT) 0.4 MG SL tablet Place 1 tablet (0.4 mg) under the tongue every 5 minutes as needed for chest pain More than a Month at PRN Yes Iliana Ignacio PA-C   sodium chloride (DEEP SEA NASAL  SPRAY) 0.65 % nasal spray Spray 1 spray into both nostrils daily as needed for congestion More than a month at PRN Yes Reported, Patient[AH1.1]            Revision History        User Key Date/Time User Provider Type Action    > AH1.1 5/18/2018 10:16 AM Keke Blank (none) Sign            Progress Notes by Tonia Sesay RN at 5/17/2018  3:00 PM     Author:  Tonia Sesay RN Service:  (none) Author Type:  Registered Nurse    Filed:  5/17/2018  3:29 PM Encounter Date:  5/17/2018 Status:  Signed    :  Tonia Sesay RN (Registered Nurse)           Courtesy check today to assess rate and rhythm. Pt was in atrial tachycardia when last seen on 5/10/2018, his metoprolol was increased, and he's back today for an echo. If pt is still in atrial tachycardia, will change his programming to so his heart rate is not fast for the echo.     Presenting rhythm today AS/ in the 80's. No need to change programming to VVIR.     Since last check on 5/10/2018 there have been no A or V arrhythmias. There were 5 episodes recorded as PMT on 5/14/2018, 3 available EGMs show ST at max tracking rate of 130 bpm, no true PMT.     Pt also had labs and echo today. F/u OV with Dr. Llanes next week on 5/22/2018. Next remote already scheduled for 7/23/2018. Pt says his care facility is having a hard time getting his Latitude monitor set up, attempted to call them to assist, had to leave a voicemail.[EC1.1]      Revision History        User Key Date/Time User Provider Type Action    > EC1.1 5/17/2018  3:29 PM Tonia Sesay, RN Registered Nurse Sign                  Procedure Notes     No notes of this type exist for this encounter.         Progress Notes - Therapies (Notes from 05/16/18 through 05/19/18)      Progress Notes by Keke Blank at 5/18/2018 10:15 AM     Author:  Keke Blank Service:  (none) Author Type:  (none)    Filed:  5/18/2018 10:16 AM Date of Service:  5/18/2018 10:15 AM  Creation Time:  5/18/2018 10:15 AM    Status:  Signed :  BlankNickyKeke         Admission medication history interview status for the 5/18/2018  admission is complete. See EPIC admission navigator for prior to admission medications     Medication history source reliability:Good    Medication history interview source(s):Caregiver    Medication history resources (including written lists, pill bottles, clinic record):Recieved last doses from [facility]    Primary pharmacy.[facility]    Additional medication history information not noted on PTA med list :None    Time spent in this activity: 60 minutes    Prior to Admission medications    Medication Sig Last Dose Taking? Auth Provider   Acetaminophen (TYLENOL PO) Take 1,000 mg by mouth 3 times daily 5/17/2018 at PM Yes Reported, Patient   aspirin 81 MG tablet Take 1 tablet (81 mg) by mouth daily 5/14/2018 at 0800 Yes Iliana Ignacio PA-C   atorvastatin (LIPITOR) 40 MG tablet Take 1 tablet (40 mg) by mouth daily 5/17/2018 at PM Yes Iliana Ignacio PA-C   DOXYCYCLINE HYCLATE PO Take 100 mg by mouth 2 times daily 5/18/2018 at 0730 Yes Reported, Patient   Fexofenadine HCl (MUCINEX ALLERGY PO) Take 800 mg by mouth 2 times daily (2 x 400 mg = 800 mg dose) 5/17/2018 at PM Yes Reported, Patient   magnesium hydroxide (MILK OF MAGNESIA) 400 MG/5ML suspension Take 30 mLs by mouth daily as needed for constipation or heartburn More than a Month at PRN Yes Reported, Patient   metoprolol succinate (TOPROL-XL) 50 MG 24 hr tablet Take 1 tablet (50 mg) by mouth two times daily 5/18/2018 at 0730 Yes Scar Harris APRN CNP   nitroglycerin (NITROSTAT) 0.4 MG SL tablet Place 1 tablet (0.4 mg) under the tongue every 5 minutes as needed for chest pain More than a Month at PRN Yes Iliana Ignacio PA-C   sodium chloride (DEEP SEA NASAL SPRAY) 0.65 % nasal spray Spray 1 spray into both nostrils daily as needed for congestion More than  a month at PRN Yes Reported, Patient[AH1.1]            Revision History        User Key Date/Time User Provider Type Action    > AH1.1 5/18/2018 10:16 AM Keke Blank (none) Sign

## 2018-05-18 NOTE — IP AVS SNAPSHOT
` [place] [address] ONCOLOGY: [phone]                                              INTERAGENCY TRANSFER FORM - NURSING   2018                    Hospital Admission Date: 2018  LATOSHA VIZCAINO   : 1935  Sex: Male        Attending Provider: Gurvinder Sanz MD     Allergies:  No Known Allergies    Infection:  None   Service:  SURGERY    Ht:  1.829 m (6')   Wt:  63.5 kg (140 lb)   Admission Wt:  63.5 kg (140 lb)    BMI:  18.99 kg/m 2   BSA:  1.8 m 2            Patient PCP Information     Provider PCP Type    Paulino [facility]      Current Code Status     Date Active Code Status Order ID Comments User Context       Prior      Code Status History     Date Active Date Inactive Code Status Order ID Comments User Context    2018  7:01 AM  Full Code [id number]  Katelin Escobar PA-C Outpatient    2018  2:32 PM 2018  7:01 AM Full Code [id number]  Gurvinder Sanz MD Inpatient    2018  8:59 AM 2018  2:32 PM Full Code [id number]  Olivia Escobar MD Outpatient    2018  8:54 AM 2018  8:59 AM Full Code [id number]  Olivia Escobar MD Outpatient    2018  9:36 AM 2018  8:54 AM Full Code [id number]  Gurvinder Sanz MD Inpatient    2018  2:39 PM 2018  9:36 AM Full Code [id number]  Sylvia Carney MD Inpatient    2014  2:19 AM 2014  8:57 PM Full Code [id number]  Harinder Noyola MD Inpatient      Advance Directives        Scanned docmt in ACP Activity?           No scanned doc        Hospital Problems as of 2018              Priority Class Noted POA    Post-operative state Medium  2018 Yes      Non-Hospital Problems as of 2018              Priority Class Noted    Bradycardia Medium  2014    AAA (abdominal aortic aneurysm) (H) Medium  2014    HTN (hypertension) Medium  2014    Hyperlipidemia Medium  2014    Atrioventricular block, complete (HEART) Medium  2014    Depression Medium   "7/21/2014    COPD (chronic obstructive pulmonary disease) (H) Medium  7/21/2014    Lung abscess (H) Medium  7/21/2014    Chest pain Medium  1/20/2015    CAD (coronary artery disease) Medium  2/9/2015    Ischemic cardiomyopathy Medium  2/9/2015    Obstructive uropathy High Acute 4/19/2018    Right flank pain Medium  4/19/2018    Pyelonephritis Medium  4/20/2018      Immunizations     None         END      ASSESSMENT     Discharge Profile Flowsheet     GASTROINTESTINAL (ADULT,PEDIATRIC,OB)     Did the patient decline [place]  and sign paper waiver form? (Place signed waiver form on chart)  no 05/18/18 1042    GI WDL  ex 05/19/18 0017   SKIN      All Quadrants Bowel Sounds  audible and active in all quadrants 05/19/18 0017   Inspection of bony prominences  Full 05/19/18 0017    Passing flatus  no 05/19/18 0017   Inspection under devices  Full 05/19/18 0017    COMMUNICATION ASSESSMENT     Skin WDL  WDL 05/19/18 0017    Patient's communication style  spoken language (non-English) 05/18/18 1042   SAFETY      Patient's primary language  [place] 05/18/18 1042   Safety WDL  WDL 05/19/18 0017     services offered to the patient? (Install  services phone or TTY, if applicable)  yes 05/18/18 1042   All Alarms  alarm(s) activated and audible 05/19/18 0017                 Assessment WDL (Within Defined Limits) Definitions           Safety WDL     Effective: 09/28/15    Row Information: <b>WDL Definition:</b> Bed in low position, wheels locked; call light in reach; upper side rails up x 2; ID band on<br> <font color=\"gray\"><i>Item=AS safety wdl>>List=AS safety wdl>>Version=F14</i></font>      Skin WDL     Effective: 09/28/15    Row Information: <b>WDL Definition:</b> Warm; dry; intact; elastic; without discoloration; pressure points without redness<br> <font color=\"gray\"><i>Item=AS skin wdl>>List=AS skin wdl>>Version=F14</i></font>      Vitals     Vital Signs Flowsheet     VITAL SIGNS     Pain " Intervention(s)  Medication (See eMAR) 05/18/18 2326    Temp  97  F (36.1  C) 05/18/18 2344   Response to Interventions  Absence of nonverbal indicators of pain 05/19/18 0234    Temp src  Oral 05/18/18 2344   ANALGESIA SIDE EFFECTS MONITORING      Resp  18 05/18/18 2344   Side Effects Monitoring: Respiratory Quality  R 05/18/18 2344    Heart Rate  72 05/18/18 2344   Side Effects Monitoring: Respiratory Depth  N 05/18/18 2344    Pulse/Heart Rate Source  Monitor 05/18/18 1958   Side Effects Monitoring: Sedation Level  1 05/18/18 2344    BP  102/48 05/18/18 2344   HEIGHT AND WEIGHT      BP Location  Left arm 05/18/18 2344   Height  1.829 m (6') 05/18/18 1056    OXYGEN THERAPY     Weight  63.5 kg (140 lb) 05/18/18 1056    SpO2  95 % 05/18/18 2344   BSA (Calculated - sq m)  1.8 05/18/18 1056    O2 Device  None (Room air) 05/18/18 2344   BMI (Calculated)  19.03 05/18/18 1056    Oxygen Delivery  2 LPM 05/18/18 1334   POSITIONING      RESPIRATORY MONITORING     Body Position  independently positioning 05/18/18 2344    Respiratory Monitoring (EtCO2)  31 mmHg 05/18/18 1501   Head of Bed (HOB)  HOB at 20-30 degrees 05/18/18 2344    Integrated Pulmonary Index (IPI)  8-9 05/18/18 1501   Positioning/Transfer Devices  pillows;in use 05/18/18 2344    PACEMAKER     DAILY CARE      Pacemaker  Permanent 05/19/18 0017   Activity Management  activity adjusted per tolerance 05/18/18 2344    PAIN/COMFORT     Activity Assistance Provided  assistance, 1 person 05/18/18 2344    Patient Currently in Pain  denies 05/19/18 0012   Assistive Device Utilized  gait belt;walker 05/18/18 2344    Preferred Pain Scale  number (Numeric Rating Pain Scale) 05/19/18 0234   ECG      Patient's Stated Pain Goal  3 05/18/18 1056   ECG Rhythm  Paced rhythm 05/18/18 1334    0-10 Pain Scale  8 05/18/18 2316   Lead Monitored  Lead II 05/18/18 1334    Pain Location  -- (Painful urination) 05/18/18 2326                 Patient Lines/Drains/Airways Status    Active  LINES/DRAINS/AIRWAYS     Name: Placement date: Placement time: Site: Days: Last dressing change:    Peripheral IV 05/17/18 Right Upper arm 05/17/18   1644   Upper arm   1     Peripheral IV 05/18/18 Left Hand 05/18/18   1100   Hand   less than 1     Arterial Sheath  02/20/15   1053      1183             Patient Lines/Drains/Airways Status    Active PICC/CVC     None            Intake/Output Detail Report     Date Intake     Output   Net    Shift P.O. I.V. IV Piggyback Total Urine Blood Total       Noc 05/17/18 2300 - 05/18/18 0659 -- -- -- -- -- -- -- 0    Day 05/18/18 0700 - 05/18/18 1459 -- 400 -- 400 -- 0 -- 400    Any 05/18/18 1500 - 05/18/18 2259 240 -- -- 240 230 -- 230 10    Noc 05/18/18 2300 - 05/19/18 0659 -- -- -- -- 100 -- 100 -100    Day 05/19/18 0700 - 05/19/18 1459 -- -- -- -- 225 -- 225 -225      Last Void/BM       Most Recent Value    Urine Occurrence 1 at 05/19/2018 0717    Stool Occurrence       Case Management/Discharge Planning     Case Management/Discharge Planning Flowsheet     LIVING ENVIRONMENT     QUESTION TO PATIENT:  Has a member of your family or a partner(now or in the past) intimidated, hurt, manipulated, or controlled you in any way?  no 05/18/18 1040    Lives With  other (see comments) (nursing home ) 05/18/18 1020   QUESTION TO PATIENT: Do you feel safe going back to the place where you are living?  yes 05/18/18 1040    COPING/STRESS     OBSERVATION: Is there reason to believe there has been maltreatment of a vulnerable adult (ie. Physical/Sexual/Emotional abuse, self neglect, lack of adequate food, shelter, medical care, or financial exploitation)?  no 05/18/18 1040    Major Change/Loss/Stressor  surgery/procedure 05/18/18 1042   OTHER      ABUSE RISK SCREEN     Are you depressed or being treated for depression?  No 05/18/18 1042

## 2018-05-18 NOTE — BRIEF OP NOTE
[facility] Urology Brief Operative Note    Pre-operative diagnosis: RIGHT URETERAL STONE   Post-operative diagnosis: Same   Procedure: Procedure(s):  CYSTOSCOPY, URETEROSCOPY, INSERT STENT  COMBINED CYSTOSCOPY, REMOVE STENT(S)   Surgeon: Gurvinder Sanz MD, MD   Assistant(s): none   Anesthesia: LMA   Estimated blood loss: None   Total IV fluids: (See anesthesia record)   Blood transfusion: No transfusion was given during surgery   Total urine output: Not measured   Drains: None   Specimens: None   Implants: None   Findings: See dictation.   Complications: None   Condition: Stable   Comments: See dictated operative report for full details.    Gurvinder Sanz MD

## 2018-05-18 NOTE — IP AVS SNAPSHOT
[place] [address] ONCOLOGY: [phone]                                              INTERAGENCY TRANSFER FORM - PHYSICIAN ORDERS   2018                    Hospital Admission Date: 2018  LATOSHA VIZCAINO   : 1935  Sex: Male        Attending Provider: Gurvinder Sanz MD     Allergies:  No Known Allergies    Infection:  None   Service:  SURGERY    Ht:  1.829 m (6')   Wt:  63.5 kg (140 lb)   Admission Wt:  63.5 kg (140 lb)    BMI:  18.99 kg/m 2   BSA:  1.8 m 2            Patient PCP Information     Provider PCP Type    Paulino [facility]      ED Clinical Impression     Diagnosis Description Comment Added By Time Added    Post-operative state [Z98.890] Post-operative state [Z98.890]  Katelin Escobar PA-C 2018  6:52 AM      Hospital Problems as of 2018              Priority Class Noted POA    Post-operative state Medium  2018 Yes      Non-Hospital Problems as of 2018              Priority Class Noted    Bradycardia Medium  2014    AAA (abdominal aortic aneurysm) (H) Medium  2014    HTN (hypertension) Medium  2014    Hyperlipidemia Medium  2014    Atrioventricular block, complete (HEART) Medium  2014    Depression Medium  2014    COPD (chronic obstructive pulmonary disease) (H) Medium  2014    Lung abscess (H) Medium  2014    Chest pain Medium  2015    CAD (coronary artery disease) Medium  2015    Ischemic cardiomyopathy Medium  2015    Obstructive uropathy High Acute 2018    Right flank pain Medium  2018    Pyelonephritis Medium  2018      Code Status History     Date Active Date Inactive Code Status Order ID Comments User Context    2018  7:01 AM  Full Code [id number]  Katelin Escobar PA-C Outpatient    2018  2:32 PM 2018  7:01 AM Full Code [id number]  Gurvinder Sanz MD Inpatient    2018  8:59 AM 2018  2:32 PM Full Code [id number]  Olivia Escobar MD  Outpatient    4/21/2018  8:54 AM 4/21/2018  8:59 AM Full Code [id number]  Oilvia Escobar MD Outpatient    4/20/2018  9:36 AM 4/21/2018  8:54 AM Full Code [id number]  Gurvinder Sanz MD Inpatient    4/19/2018  2:39 PM 4/20/2018  9:36 AM Full Code [id number]  Sylvia Carney MD Inpatient    7/7/2014  2:19 AM 7/8/2014  8:57 PM Full Code [id number]  Harinder Noyola MD Inpatient         Medication Review      START taking        Dose / Directions Comments    cephALEXin 500 MG capsule   Commonly known as:  KEFLEX        Dose:  500 mg   Take 1 capsule (500 mg) by mouth 2 times daily for 5 days   Quantity:  10 capsule   Refills:  0          CONTINUE these medications which have NOT CHANGED        Dose / Directions Comments    aspirin 81 MG tablet   Used for:  Chest pain        Dose:  81 mg   Take 1 tablet (81 mg) by mouth daily   Refills:  0        atorvastatin 40 MG tablet   Commonly known as:  LIPITOR   Used for:  CAD (coronary artery disease), Hyperlipidemia        Dose:  40 mg   Take 1 tablet (40 mg) by mouth daily   Refills:  0        DEEP SEA NASAL SPRAY 0.65 % nasal spray   Generic drug:  sodium chloride        Dose:  1 spray   Spray 1 spray into both nostrils daily as needed for congestion   Refills:  0        DOXYCYCLINE HYCLATE PO        Dose:  100 mg   Take 100 mg by mouth 2 times daily   Refills:  0        metoprolol succinate 50 MG 24 hr tablet   Commonly known as:  TOPROL-XL   Used for:  Abdominal aortic aneurysm (AAA) without rupture (H)        Dose:  50 mg   Take 1 tablet (50 mg) by mouth two times daily   Quantity:  180 tablet   Refills:  3    Start today       MILK OF MAGNESIA 400 MG/5ML suspension   Generic drug:  magnesium hydroxide        Dose:  30 mL   Take 30 mLs by mouth daily as needed for constipation or heartburn   Refills:  0        MUCINEX ALLERGY PO        Dose:  800 mg   Take 800 mg by mouth 2 times daily (2 x 400 mg = 800 mg dose)   Refills:  0        nitroGLYcerin 0.4 MG sublingual tablet    Commonly known as:  NITROSTAT   Used for:  Chest pain        Dose:  0.4 mg   Place 1 tablet (0.4 mg) under the tongue every 5 minutes as needed for chest pain   Quantity:  25 tablet   Refills:  4        TYLENOL PO        Dose:  1000 mg   Take 1,000 mg by mouth 3 times daily   Refills:  0                Summary of Visit     Reason for your hospital stay       Surgery for kidney stone             After Care     Activity       Your activity upon discharge: activity as tolerated       Activity - Up ad juve           Advance Diet as Tolerated       Follow this diet upon discharge: Regular       Diet       Follow this diet upon discharge: Regular       General info for SNF       Length of Stay Estimate: Long Term Care  Condition at Discharge: Stable  Level of care:skilled   Rehabilitation Potential: Good  Admission H&P remains valid and up-to-date: Yes  Recent Chemotherapy: N/A  Use Nursing Home Standing Orders: N/A             Your next 10 appointments already scheduled     May 22, 2018  8:30 AM CDT   Return Visit with Carlo Llanes MD   [facility] ([facility])    [facility] [address] [address] [phone] OPT 2            Jul 05, 2018  9:15 AM CDT   Return Visit with Ronaldo Son MD   [facility] ([facility])    [address] [id number] [phone] OPT 2            Jul 23, 2018  2:30 PM CDT   Remote PPM Check with JERI KEITA   [facility] ([facility])    [address] [address] [phone] OPT 2           This appointment is for a remote check of your pacemaker.  This is not an appointment at the office.              Follow-Up Appointment Instructions     Future Labs/Procedures    Follow-up and recommended labs and tests      Comments:    Follow up with Dr. Sanz as needed.     [facility] ([facility])  [address], [address]  [place]. [address]  You may call [phone] with any questions or concerns.   Central Appointment #: [phone]      Follow-Up Appointment Instructions     Follow-up and recommended labs and tests        Follow up with Dr. Sanz as needed.    [facility] ([facility])  [address], [address]  [place]. [address]  You may call [phone] with any questions or concerns.   Central Appointment #: [phone]             Statement of Approval     Ordered          05/19/18 0739  I have reviewed and agree with all the recommendations and orders detailed in this document.  EFFECTIVE NOW     Approved and electronically signed by:  Katelin Escobar PA-C

## 2018-05-18 NOTE — ANESTHESIA PREPROCEDURE EVALUATION
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    S/p PPM 7/2014     Bradycardia      CAD (coronary artery disease) 2/9/2015     Carotid stenosis      Chest pain 1/20/2015     COPD (chronic obstructive pulmonary disease) (H)      Depression 7/21/2014     Depressive disorder      HTN (hypertension) 7/8/2014     Hyperlipidemia 7/8/2014     Hypertension      Ischemic cardiomyopathy 2/9/2015     Lung abscess (H)      Syncope      Past Surgical History:   Procedure Laterality Date     COMBINED CYSTOSCOPY, INSERT STENT URETER(S) Right 4/20/2018    Procedure: COMBINED CYSTOSCOPY, INSERT STENT URETER(S);  CYSTOSCOPY,RIGHT URETERAL STENT PLACEMENT, RIGHT RETROGRADES(LANGUAGE:Grenadian);  Surgeon: Gurvinder Sanz MD;  Location:  OR     CORONARY ANGIOGRAPHY ADULT ORDER  2/20/15    medical managed     CYSTOSCOPY, RETROGRADES, COMBINED  4/20/2018    Procedure: COMBINED CYSTOSCOPY, RETROGRADES;;  Surgeon: Gurvinder Sanz MD;  Location:  OR     IMPLANT PACEMAKER  7/7/14    dual chamber     Social History   Substance Use Topics     Smoking status: Former Smoker     Start date: 1/10/1949     Quit date: 7/7/2012     Smokeless tobacco: Never Used     Alcohol use No     Prior to Admission medications    Medication Sig Start Date End Date Taking? Authorizing Provider   Acetaminophen (TYLENOL PO) Take 1,000 mg by mouth 3 times daily   Yes Reported, Patient   aspirin 81 MG tablet Take 1 tablet (81 mg) by mouth daily 1/21/15  Yes Iliana Ignacio PA-C   atorvastatin (LIPITOR) 40 MG tablet Take 1 tablet (40 mg) by mouth daily 2/10/15  Yes Iliana Ignacio PA-C   DOXYCYCLINE HYCLATE PO Take 100 mg by mouth 2 times daily   Yes  Reported, Patient   Fexofenadine HCl (MUCINEX ALLERGY PO) Take 800 mg by mouth 2 times daily (2 x 400 mg = 800 mg dose)   Yes Reported, Patient   magnesium hydroxide (MILK OF MAGNESIA) 400 MG/5ML suspension Take 30 mLs by mouth daily as needed for constipation or heartburn   Yes Reported, Patient   metoprolol succinate (TOPROL-XL) 50 MG 24 hr tablet Take 1 tablet (50 mg) by mouth two times daily 5/10/18  Yes Scar Harris, APRN CNP   nitroglycerin (NITROSTAT) 0.4 MG SL tablet Place 1 tablet (0.4 mg) under the tongue every 5 minutes as needed for chest pain 1/21/15  Yes Iliana Ignacio PA-C   sodium chloride (DEEP SEA NASAL SPRAY) 0.65 % nasal spray Spray 1 spray into both nostrils daily as needed for congestion   Yes Reported, Patient     Current Facility-Administered Medications Ordered in Epic   Medication Dose Route Frequency Last Rate Last Dose     ceFAZolin (ANCEF) intermittent infusion 2 g in 100 mL dextrose PRE-MIX  2 g Intravenous Pre-Op/Pre-procedure x 1 dose         lactated ringers infusion   Intravenous Continuous         lidocaine 1 % 1 mL  1 mL Other Q1H PRN         No current [facility]-ordered outpatient prescriptions on file.       lactated ringers       Wt Readings from Last 1 Encounters:   05/02/18 63.9 kg (140 lb 14.4 oz)     Temp Readings from Last 1 Encounters:   05/02/18 36.9  C (98.5  F) (Oral)     BP Readings from Last 6 Encounters:   05/10/18 (!) 88/0   05/02/18 130/84   04/21/18 118/78   04/16/18 (!) 85/59   10/20/15 140/84   03/24/15 122/70     Pulse Readings from Last 4 Encounters:   05/10/18 120   04/20/18 83   04/16/18 71   10/20/15 84     Resp Readings from Last 1 Encounters:   05/02/18 16     SpO2 Readings from Last 1 Encounters:   05/02/18 96%     Recent Labs   Lab Test  05/17/18   1454  05/02/18   0836   NA  141  145*   POTASSIUM  4.1  4.0   CHLORIDE  106  112*   CO2  24  26   ANIONGAP  15.1  7   GLC  117*  122*   BUN  23  25   CR  1.01  0.85   ARELIS  9.7  9.3      Recent Labs   Lab Test  04/20/18   0415  07/07/14   0015   AST  12  23   ALT  11  26   ALKPHOS  75  83   BILITOTAL  0.5  0.8     Recent Labs   Lab Test  05/02/18   0836  04/21/18   0646   WBC  9.2  8.9   HGB  12.7*  12.0*   PLT  234  164     No results for input(s): ABO, RH in the last 18583 hours.  Recent Labs   Lab Test  05/02/18   0836  02/20/15   0900   INR  1.09  0.93   PTT  27  30      Recent Labs   Lab Test  07/07/14   1040  07/07/14   0655  07/07/14   0240   TROPI  0.203*  0.182*  0.143*     No results for input(s): PH, PCO2, PO2, HCO3 in the last 19170 hours.  No results for input(s): HCG in the last 43622 hours.  Recent Results (from the past 744 hour(s))   Abd/pelvis CT no contrast - Stone Protocol    Narrative    Exam: CT ABDOMEN PELVIS W/O CONTRAST  4/19/2018 10:45 AM    History: Right flank pain.    Comparison: Aortic aneurysm ultrasound dated 7/8/2014.    Technique: Volumetric acquisition with reconstruction in the axial,  coronal planes through the abdomen and pelvis without contrast.  Radiation dose for this scan was reduced using automated exposure  control, adjustment of the mA and/or kV according to patient size, or  iterative reconstruction technique.    Contrast: None    Findings:   Lung Bases: Bronchiectasis involving the left lower lobe and  atelectasis in both bases. No pleural or pericardial effusion.    Abdomen: Unenhanced liver, spleen, adrenal glands and gallbladder are  normal. Atrophic pancreas. Multiple vascular calcifications in both  kidneys. Additionally, there is a small group of stones in the distal  right ureter, the largest measures 2 x 3 mm (series 2 image 72). There  is mild to moderate associated right hydronephrosis and hydroureter.  No definite left renal or ureteral calculi are seen.    Infrarenal abdominal aortic aneurysm measures 5.2 x 4.5 cm in diameter  (series 2 image 36), this is increased from 3.7 cm on ultrasound dated  7/8/2014. Additionally, common iliac  artery aneurysms are also seen,  measuring 2.1 cm on the right and 1.9 cm on the left (series 2 image  49).    No areas of bowel wall thickening or bowel dilatation. Normal  appendix. No free fluid. No abdominal or pelvic lymphadenopathy.    Bones: No concerning lytic or sclerotic lesions.      Impression    Impression:   1. 2 x 3 mm obstructing stone at the right ureterovesical junction  with mild to moderate associated right hydronephrosis and hydroureter.  2. Infrarenal abdominal aortic aneurysm measures up to 5.2 cm in  maximal dimension, increased from 3.7 cm on 7/8/2014. Aneurysm extends  into both the right and left common iliac arteries.  3. Bronchiectasis in the left lower lobe.    JIMI FLANAGAN MD   XR Surgery VASQUEZ L/T 5 Min Fluoro w Stills    Narrative    SURGERY C-ARM FLUOROSCOPY LESS THAN 5 MINUTES WITH STILLS  4/20/2018  7:55 AM     COMPARISON: None.    HISTORY: Cysto, right retrograde, right stent.    NUMBER OF IMAGES ACQUIRED: 3    VIEWS: 1    FLUOROSCOPY TIME: .3 minutes.      Impression    IMPRESSION: Double-J stent was placed from the right renal pelvis to  the bladder. The catheter is somewhat coiled in the renal pelvis on  the final image.    SEBASTIAN OLSON MD   US Aorta/Ivc/Iliac Duplex Complete    Narrative    INDICATION: Abdominal Aortic Aneurysm      Impression    IMPRESSION:  1. There is a infrarenal abdominal aortic aneurysm with mural thrombus  measuring 5.2 cm transverse and 4.4 cm AP.  2. Bilateral common iliac artery aneurysm measuring 2.1 cm in the  right and 2.0 cm in the left.  3. Ultrasound findings are similar to the CT done on 4/19/2018.  Compared to the ultrasound from 7/8/14 the aneurysm increased from 4.1  cm to 5.2 cm.    EXAM DESCRIPTION AND FINDINGS:  An abdominal aortoiliac ultrasound was performed using duplex  ultrasound imaging with spectral and color Doppler. Atherosclerotic  plaque was visualized in the aortoiliac arterial system. There is a  infrarenal abdominal  aortic aneurysm measuring 5.2 cm transverse and  4.4 cm AP. The aneurysm is 6.2 cm long. There is mural thrombus within  the aneurysm. The common iliac arteries are also aneurysmal and  measure 2.1 cm on the right and 2.0 cm on the left. The flow  velocities in the aortoiliac system is within the normal range  indicating no significant stenosis. Compared to the prior ultrasound  from 2014 changes are noted as above.    Aortoiliac velocities:  Infra-renal aorto 4.4 cm AP dimension 5.2 cm transverse dimension.  Infrarenal aorta peak systolic velocity 29 cm/s.  Right common iliac artery 2.1 cm AP AP dimension 2.1 cm transverse  dimension.  Right common iliac artery peak systolic velocity 26 cm/s.  Left common iliac artery 2.0 cm AP dimension 1.8 cm transverse  dimension  Left common iliac artery peak systolic velocity 50 cm/s    STUDY QUALITY: Good    [facility]  Dedicated to Excellence in Vascular Care    To schedule a Vascular Lab study or consultation  Call [phone]. OPTION 2.    CAITLIN CAI MD   US Carotid Bilateral    Narrative    INDICATION: Bruit, 785.9      Impression    IMPRESSION:  1. A calcific atherosclerotic plaque is present in the proximal  internal carotid arteries and carotid bifurcation bilaterally  2. There is a moderate (50-69%) left internal carotid artery stenosis  and mild (less than 50%) right internal carotid artery stenosis    EXAM DESCRIPTION AND FINDINGS:  A bilateral carotid and vertebral artery ultrasound was performed  using duplex ultrasound imaging with spectral and color Doppler. A  calcific atherosclerotic plaque is seen in the proximal internal  carotid arteries and carotid bifurcation bilaterally. The flow  velocities in the left internal carotid arteries are mildly elevated  indicating 50-69% stenosis. The flow velocities in the right internal  carotid artery are within the normal range indicating no significant  stenosis (diameter  stenosis less than 50%). There is a normal  antegrade vertebral flow bilaterally.    Right ICA peak systolic velocity (PSV) 63 cm/s  Right ICA end diastolic velocity (EDV) 18 cm/s  Right ICA/CCA peak systolic velocity ratio 0.7    Left ICA peak systolic velocity (PSV) 198  cm/s  Left ICA end diastolic velocity (EDV) 56 cm/s  Left ICA/CCA peak systolic velocity ratio  1.96    Study quality: Excellent    [facility]  Dedicated to Excellence in Vascular Care    To schedule a Vascular Lab study or consultation  Call [phone]. OPTION 2.      CAITLIN CAI MD         Procedure  Complete Echo Adult. Contrast Optison.  _____________________________________________________________________________  __        Interpretation Summary     Techcnically difficult imaging  Left ventricular hypertrophy: asymmetric with no LVOT obstruction  The left ventricle is normal in size.  Hyperdynamic left ventricular function  The right ventricle is normal in structure, function and size.  Right ventricular systolic pressure is elevated, consistent with mild  pulmonary hypertension.  Moderate aortic root dilatation.  The ascending aorta is Mildly dilated.     Doppler interrogation does not demonstrate significant stenosis or  insufficiency involvng cardiac valsves. Although the patient's heart rate has  increased since the last study ( may be in atrial flutter), there has been no  other significant change.  _____________________________________________________________________________  __        Left Ventricle  The left ventricle is normal in size. Left ventricular hypertrophy: asymmetric  with no LVOT obstruction. Hyperdynamic left ventricular function. The visual  ejection fraction is estimated at >70%. Left ventricular diastolic function is  indeterminate. No regional wall motion abnormalities noted. There is no  thrombus seen in the left ventricle.     Right Ventricle  The right ventricle is  normal in structure, function and size. There is no  mass or thrombus in the right ventricle.     Atria  Normal left atrial size. Right atrial size is normal. There is no atrial shunt  seen.     Mitral Valve  The mitral valve leaflets appear normal. There is no evidence of stenosis,  fluttering, or prolapse. There is no mitral regurgitation noted. There is no  mitral valve stenosis.        Tricuspid Valve  Normal tricuspid valve. The right ventricular systolic pressure is  approximated at 31.3 mmHg plus the right atrial pressure. Right ventricular  systolic pressure is elevated, consistent with mild pulmonary hypertension.  There is no tricuspid stenosis.     Aortic Valve  The aortic valve is trileaflet. No aortic regurgitation is present. No aortic  stenosis is present.     Pulmonic Valve  Normal pulmonic valve. There is no pulmonic valvular regurgitation. There is  no pulmonic valvular stenosis.     Vessels  Moderate aortic root dilatation. The ascending aorta is Mildly dilated. The  IVC is normal in size and reactivity with respiration, suggesting normal  central venous pressure. The pulmonary artery is normal size.     Pericardium  The pericardium appears normal. There is no pleural effusion.        Rhythm  The rhythm was supraventricular tachycardia.      Anesthesia Evaluation     .             ROS/MED HX    ENT/Pulmonary:     (+)tobacco use, Past use COPD, , . .    Neurologic:       Cardiovascular: Comment: AAA now 5.2cm on CT from 4.1cm on 2014 US  Complete AVB - pacemaker dependent    (+) Dyslipidemia, hypertension-Peripheral Vascular Disease-CAD, angina-with excertion, -. : . . SCHWARZ, fainting (syncope). pacemaker :. dysrhythmias 3rd Deg Heart Block, .       METS/Exercise Tolerance:     Hematologic:     (+) Anemia, -      Musculoskeletal:   (+) arthritis, , , -       GI/Hepatic:        (-) GERD   Renal/Genitourinary: Comment: Beginning of urosepsis on admission with low SBPs, not VSS    (+) chronic renal  disease, type: ARF, Nephrolithiasis ,       Endo:  - neg endo ROS       Psychiatric:     (+) psychiatric history depression      Infectious Disease:         Malignancy:         Other:                     Physical Exam  Normal systems: cardiovascular    Airway   Mallampati: I  TM distance: >3 FB  Neck ROM: full    Dental   (+) upper dentures    Cardiovascular   Rhythm and rate: regular      Pulmonary    breath sounds clear to auscultation(+) decreased breath sounds                           Anesthesia Plan      History & Physical Review  History and physical reviewed and following examination; no interval change.    ASA Status:  3 .    NPO Status:  > 8 hours    Plan for General and LMA with Intravenous and Propofol induction. Maintenance will be Balanced.    PONV prophylaxis:  Ondansetron (or other 5HT-3) and Dexamethasone or Solumedrol    No midazolam  Fentanyl and precedex bolus prn  Phenylephrine gtt available       Postoperative Care  Postoperative pain management:  IV analgesics.      Consents  Anesthetic plan, risks, benefits and alternatives discussed with:  Patient..                          .

## 2018-05-19 VITALS
HEIGHT: 72 IN | WEIGHT: 140 LBS | BODY MASS INDEX: 18.96 KG/M2 | TEMPERATURE: 97 F | DIASTOLIC BLOOD PRESSURE: 60 MMHG | RESPIRATION RATE: 18 BRPM | SYSTOLIC BLOOD PRESSURE: 137 MMHG | OXYGEN SATURATION: 95 %

## 2018-05-19 PROCEDURE — 25000132 ZZH RX MED GY IP 250 OP 250 PS 637: Performed by: UROLOGY

## 2018-05-19 RX ORDER — CEPHALEXIN 500 MG/1
500 CAPSULE ORAL 2 TIMES DAILY
Qty: 10 CAPSULE | Refills: 0 | Status: SHIPPED | OUTPATIENT
Start: 2018-05-19 | End: 2018-05-24

## 2018-05-19 RX ADMIN — ACETAMINOPHEN 1000 MG: 500 TABLET, FILM COATED ORAL at 08:30

## 2018-05-19 RX ADMIN — ASPIRIN 81 MG: 81 TABLET, COATED ORAL at 08:30

## 2018-05-19 RX ADMIN — METOPROLOL SUCCINATE 50 MG: 50 TABLET, EXTENDED RELEASE ORAL at 08:30

## 2018-05-19 NOTE — PLAN OF CARE
Problem: Patient Care Overview  Goal: Plan of Care/Patient Progress Review  Outcome: No Change  POD#0 of cysto stent removal and right ureteroscopy, voiding saji urine, pain 2/10, scheduled tylenol effective, tele 100% Paced, positive bowel sounds, ambulated with walker, pt's personal wheelchair in room, VSS, O2 stable on RA, A&O, [place] speaking,  phone at bedside, tolerating full liquid diet, advanced to regular diet for tomorrow (please order breakfast for pt, on front of chart), pts preferred transport company [employer] has limited availability tomorrow, please call early to arrange transport once d/c time is known [phone]

## 2018-05-19 NOTE — PLAN OF CARE
Problem: Patient Care Overview  Goal: Plan of Care/Patient Progress Review  Outcome: No Change  Pt is A&Ox4. [place] speaking. VSS on RA. Tele- 100% paced. POD#1 cysto and stent removal and R ureteroscopy. Voiding adequately per urinal, saji colored urine. Denies pain this shift. BS active, has not passed gas yet. Up A1 with walker, pt has personal wheelchair in room. Advanced to regular diet for breakfast, (please order breakfast for pt when awake, on front of chart). Pts preferred transport company [employer] has limited availability tomorrow, please call early to arrange transport once d/c time is known [phone].

## 2018-05-19 NOTE — DISCHARGE SUMMARY
[facility]    Discharge Summary  Urology    Date of Admission:  5/18/2018  Date of Discharge:  5/19/2018  Discharging Provider: Katelin Escobar PA-C    Discharge Diagnoses   S/P right ureteroscopy and ureteral stent removal     History of Present Illness   Nacho Joseph is an 82 year old male who is POD #1 s/p right ureteroscopy, right ureteral stent removal.     Hospital Course   Nacho Joseph was admitted on 5/18/2018.  The following problems were addressed during his hospitalization: right ureteral stone. Perioperative and hospital course were uneventful. Recovered from anesthesia without issue. He was admitted overnight for further cardiac monitoring. Pain remained controlled-- reports only some burning at the tip of his penis with urination yet notes that this is improved compared to yesterday. Vitals remained stable throughout hospital stay.     Plan: D/C later this AM.   -keflex x 5 days   -Follow up with Dr. Yvon Escobar PA-C  [facility]  Pager: [phone]  Office: [phone]    Significant Results and Procedures   Cystoscopy, right ureteral stent removal, right ureteroscopy for distal ureteral stone     Pending Results   These results will be followed up by Dr. Sanz  Unresulted Labs Ordered in the Past 30 Days of this Admission     No orders found for last 61 day(s).          Code Status   Full Code    Primary Care Physician   Paulino Lynch    Physical Exam   Temp: 97  F (36.1  C) Temp src: Oral BP: 102/48   Heart Rate: 72 Resp: 18 SpO2: 95 % O2 Device: None (Room air) Oxygen Delivery: 2 LPM  Vitals:    05/18/18 1054   Weight: 63.5 kg (140 lb)     Vital Signs with Ranges  Temp:  [96.4  F (35.8  C)-99  F (37.2  C)] 97  F (36.1  C)  Heart Rate:  [59-95] 72  Resp:  [12-26] 18  BP: (102-150)/(48-96) 102/48  SpO2:  [95 %-100 %] 95 %  I/O last 3 completed shifts:  In: 640 [P.O.:240; I.V.:400]  Out: 330  [Urine:330]    Constitutional: Sitting up in bed, NAD, resting comfortably   Eyes: no icterus  ENT: normocephalic, atraumatic   Respiratory: breathing unlabored   Cardiovascular: chest wall symmetric   GI: soft, NT, ND; no CVAT   Lymph/Hematologic: no pedal edema   Skin: well perfused   Neurologic: no focal deficits  Neuropsychiatric: Alert and oriented     Time Spent on this Encounter   I, Katelin Escobar, personally saw the patient today and spent less than or equal to 30 minutes discharging this patient.    Discharge Disposition   Discharged to assisted living  Condition at discharge: Stable    Consultations This Hospital Stay   None    Discharge Orders     Reason for your hospital stay   Surgery for kidney stone     Follow-up and recommended labs and tests    Follow up with Dr. Sanz in 1-2wks.   [facility]  [facility] ([address], [address]  [place]. [address]  You may call [phone] with any questions or concerns.   Central Appointment #: [phone]     Activity   Your activity upon discharge: activity as tolerated     General info for SNF   Length of Stay Estimate: Long Term Care  Condition at Discharge: Stable  Level of care:skilled   Rehabilitation Potential: Good  Admission H&P remains valid and up-to-date: Yes  Recent Chemotherapy: N/A  Use Nursing Home Standing Orders: N/A     Activity - Up ad juve     Full Code     Diet   Follow this diet upon discharge: Regular     Advance Diet as Tolerated   Follow this diet upon discharge: Regular       Discharge Medications   Current Discharge Medication List      START taking these medications    Details   cephALEXin (KEFLEX) 500 MG capsule Take 1 capsule (500 mg) by mouth 2 times daily for 5 days  Qty: 10 capsule, Refills: 0    Associated Diagnoses: Post-operative state         CONTINUE these medications which have NOT CHANGED    Details   Acetaminophen (TYLENOL PO) Take 1,000 mg by mouth 3  times daily      aspirin 81 MG tablet Take 1 tablet (81 mg) by mouth daily    Associated Diagnoses: Chest pain      atorvastatin (LIPITOR) 40 MG tablet Take 1 tablet (40 mg) by mouth daily    Associated Diagnoses: CAD (coronary artery disease); Hyperlipidemia      DOXYCYCLINE HYCLATE PO Take 100 mg by mouth 2 times daily      Fexofenadine HCl (MUCINEX ALLERGY PO) Take 800 mg by mouth 2 times daily (2 x 400 mg = 800 mg dose)      magnesium hydroxide (MILK OF MAGNESIA) 400 MG/5ML suspension Take 30 mLs by mouth daily as needed for constipation or heartburn      metoprolol succinate (TOPROL-XL) 50 MG 24 hr tablet Take 1 tablet (50 mg) by mouth two times daily  Qty: 180 tablet, Refills: 3    Comments: Start today  Associated Diagnoses: Abdominal aortic aneurysm (AAA) without rupture (H)      nitroglycerin (NITROSTAT) 0.4 MG SL tablet Place 1 tablet (0.4 mg) under the tongue every 5 minutes as needed for chest pain  Qty: 25 tablet, Refills: 4    Associated Diagnoses: Chest pain      sodium chloride (DEEP SEA NASAL SPRAY) 0.65 % nasal spray Spray 1 spray into both nostrils daily as needed for congestion           Allergies   No Known Allergies  Data   Most Recent 3 CBC's:  Recent Labs   Lab Test  05/02/18   0836  04/21/18   0646  04/20/18   0415   WBC  9.2  8.9  12.5*   HGB  12.7*  12.0*  11.0*   MCV  96  95  96   PLT  234  164  167      Most Recent 3 BMP's:  Recent Labs   Lab Test  05/18/18   1030  05/17/18   1454  05/02/18   0836  04/21/18   0646   NA   --   141  145*  141   POTASSIUM  3.7  4.1  4.0  3.8   CHLORIDE   --   106  112*  109   CO2   --   24  26  24   BUN   --   23  25  19   CR   --   1.01  0.85  0.86   ANIONGAP   --   15.1  7  8   ARELIS   --   9.7  9.3  8.7   GLC   --   117*  122*  96     Most Recent 2 LFT's:  Recent Labs   Lab Test  04/20/18   0415  07/07/14   0015   AST  12  23   ALT  11  26   ALKPHOS  75  83   BILITOTAL  0.5  0.8     Most Recent INR's and Anticoagulation Dosing History:  Anticoagulation  Dose History     Recent Dosing and Labs Latest Ref Rng & Units 7/7/2014 2/20/2015 5/2/2018    INR 0.86 - 1.14 1.00 0.93 1.09        Most Recent 3 Troponin's:  Recent Labs   Lab Test  07/07/14   1040  07/07/14   0655  07/07/14   0240  07/07/14   0020   TROPI  0.203*  0.182*  0.143*   --    TROPONIN   --    --    --   0.16*     Most Recent Cholesterol Panel:No lab results found.  Most Recent 6 Bacteria Isolates From Any Culture (See EPIC Reports for Culture Details):  Recent Labs   Lab Test  05/02/18   0845   CULT  50,000 to 100,000 colonies/mL  mixed urogenital lizy       Most Recent TSH, T4 and A1c Labs:  Recent Labs   Lab Test  07/07/14   0015   TSH  2.07     Results for orders placed or performed during the hospital encounter of 05/18/18   XR Surgery VASQUEZ L/T 5 Min Fluoro w Stills    Narrative    SURGERY C-ARM FLUOROSCOPY LESS THAN FIVE MINUTES WITH STILLS 5/18/2018  12:30 PM     HISTORY: Cysto, right ureteroscopy, right stent removal, OR 18, 2  images, 7 seconds fluoroscopy.     COMPARISON: 4/20/2018      Impression    IMPRESSION: Two spot fluoroscopic images obtained during right  ureteroscopy. Stent is noted in the distal ureter on one image and  guide placement over a wire on the subsequent image. Total  fluoroscopic time 0.2 minutes.    SHERRIE DEJESUS MD

## 2018-05-19 NOTE — PROGRESS NOTES
Patient A/o x 4, c/o minimal discomfort to penis. Scheduled Tylenol given.  DC orders given by urology.  Called and gave report to Tere at [facility].  Updated that patient will need to take Keflex twice daily for 5 days.  Medication filled and sent with patient.  No new appointments were needed and can follow up with Dr. Arndt as needed.  Packet was made and sent with patient and was instructed DC orders and told to give packet to Tere at nursing home via blue phone .  Call placed to [employer] and they only had a 915 transport time available. Patient DC back to [facility] via transportation at 915, escorted out of the building in person WC with nursing assistant.  IV removed prior to DC.

## 2018-05-21 ENCOUNTER — RECORDS - HEALTHEAST (OUTPATIENT)
Dept: LAB | Facility: CLINIC | Age: 83
End: 2018-05-21

## 2018-05-21 ENCOUNTER — CARE COORDINATION (OUTPATIENT)
Dept: CARDIOLOGY | Facility: CLINIC | Age: 83
End: 2018-05-21

## 2018-05-21 NOTE — PROGRESS NOTES
Received message from Clarissa Sanford from [facility] saying patient is S/P right ureteroscopy and ureteral stent removal  5/17-5/19. Pt had been on Imur 30 mg daily, however, this was not on his discharge medications. Nurse would like to know if patient is to continue this medication. Will update Scar and call nurse back with her recommendations. Varsha Lea RN 1:45 PM 05/21/18

## 2018-05-22 ENCOUNTER — OFFICE VISIT (OUTPATIENT)
Dept: CARDIOLOGY | Facility: CLINIC | Age: 83
End: 2018-05-22
Attending: NURSE PRACTITIONER
Payer: MEDICAID

## 2018-05-22 VITALS
DIASTOLIC BLOOD PRESSURE: 89 MMHG | SYSTOLIC BLOOD PRESSURE: 149 MMHG | WEIGHT: 140 LBS | BODY MASS INDEX: 18.55 KG/M2 | HEART RATE: 72 BPM | HEIGHT: 73 IN

## 2018-05-22 DIAGNOSIS — I25.10 CORONARY ARTERY DISEASE INVOLVING NATIVE CORONARY ARTERY OF NATIVE HEART WITHOUT ANGINA PECTORIS: ICD-10-CM

## 2018-05-22 LAB
ANION GAP SERPL CALCULATED.3IONS-SCNC: 12 MMOL/L (ref 5–18)
BUN SERPL-MCNC: 18 MG/DL (ref 8–28)
CALCIUM SERPL-MCNC: 9.6 MG/DL (ref 8.5–10.5)
CHLORIDE BLD-SCNC: 107 MMOL/L (ref 98–107)
CO2 SERPL-SCNC: 24 MMOL/L (ref 22–31)
CREAT SERPL-MCNC: 0.75 MG/DL (ref 0.7–1.3)
ERYTHROCYTE [DISTWIDTH] IN BLOOD BY AUTOMATED COUNT: 14.6 % (ref 11–14.5)
GFR SERPL CREATININE-BSD FRML MDRD: >60 ML/MIN/1.73M2
GLUCOSE BLD-MCNC: 106 MG/DL (ref 70–125)
HCT VFR BLD AUTO: 40.6 % (ref 40–54)
HGB BLD-MCNC: 13.1 G/DL (ref 14–18)
MCH RBC QN AUTO: 32.1 PG (ref 27–34)
MCHC RBC AUTO-ENTMCNC: 32.3 G/DL (ref 32–36)
MCV RBC AUTO: 100 FL (ref 80–100)
PLATELET # BLD AUTO: 210 THOU/UL (ref 140–440)
PMV BLD AUTO: 10.4 FL (ref 8.5–12.5)
POTASSIUM BLD-SCNC: 4.3 MMOL/L (ref 3.5–5)
RBC # BLD AUTO: 4.08 MILL/UL (ref 4.4–6.2)
SODIUM SERPL-SCNC: 143 MMOL/L (ref 136–145)
WBC: 9.4 THOU/UL (ref 4–11)

## 2018-05-22 PROCEDURE — 99214 OFFICE O/P EST MOD 30 MIN: CPT | Performed by: INTERNAL MEDICINE

## 2018-05-22 NOTE — MR AVS SNAPSHOT
After Visit Summary   5/22/2018    Nacho Joseph    MRN: [id number]           Patient Information     Date Of Birth          1935        Visit Information        Provider Department      5/22/2018 8:30 AM Carlo Llanes MD; [facility]        Today's Diagnoses     Coronary artery disease involving native coronary artery of native heart without angina pectoris          Care Instructions    We will do a small dose of HCTZ 12.5mg every other day for blood pressure and to help reduce kidney stones.    If you get more chest pain or shortness of breath, then we think about opening up one of the blocked heart arteries.    See you in six months, but let us know if you're feeling worse in the meantime.              Follow-ups after your visit        Additional Services     Follow-Up with Cardiologist                 Your next 10 appointments already scheduled     Jul 05, 2018  9:15 AM CDT   Return Visit with Ronaldo Son MD   [facility] ([facility])    [address] [place], [place] [id number]  [facility] [id number] [phone] OPT 2            Jul 23, 2018  2:30 PM CDT   Remote PPM Check with [employer] TECH1   [facility] ([facility])    [address]  [facility] [address] [phone] OPT 2           This appointment is for a remote check of your pacemaker.  This is not an appointment at the office.              Future tests that were ordered for you today     Open Future Orders        Priority Expected Expires Ordered    Follow-Up with Cardiologist Routine 11/18/2018 5/22/2019 5/22/2018            Who to contact     If you have questions or need follow up information about today's clinic visit or your schedule please contact [facility] directly at [phone].  Normal or  "non-critical lab and imaging results will be communicated to you by MyChart, letter or phone within 4 business days after the clinic has received the results. If you do not hear from us within 7 days, please contact the clinic through [employer] or phone. If you have a critical or abnormal lab result, we will notify you by phone as soon as possible.  Submit refill requests through [employer] or call your pharmacy and they will forward the refill request to us. Please allow 3 business days for your refill to be completed.          Additional Information About Your Visit        [employer] Information     [employer] lets you send messages to your doctor, view your test results, renew your prescriptions, schedule appointments and more. To sign up, go to [email or web address] . Click on \"Log in\" on the left side of the screen, which will take you to the Welcome page. Then click on \"Sign up Now\" on the right side of the page.     You will be asked to enter the access code listed below, as well as some personal information. Please follow the directions to create your username and password.     Your access code is: [email or web address]  Expires: 7/15/2018  9:37 AM     Your access code will  in 90 days. If you need help or a new code, please call your [facility] clinic or [phone].        Care EveryWhere ID     This is your Care EveryWhere ID. This could be used by other organizations to access your [facility] medical records  [id number]        Your Vitals Were     Pulse Height BMI (Body Mass Index)             72 1.854 m (6' 1\") 18.47 kg/m2          Blood Pressure from Last 3 Encounters:   18 149/89   18 137/60   05/10/18 (!) 88/0    Weight from Last 3 Encounters:   18 63.5 kg (140 lb)   18 63.5 kg (140 lb)   18 63.9 kg (140 lb 14.4 oz)              We Performed the Following     Follow-Up with Cardiologist        Primary Care Provider    Paulino Lynch       [id number]        Equal Access to Services     " TERRY PENA : Hadii aad ku pj Evangelista, waaxda luqadaha, qaybta kaalmada adelizethmarilou, adams marcy sallysofiya earlyloreharry nielson . So [facility] [phone].    ATENCIÓN: Si habla español, tiene a varela disposición servicios gratuitos de asistencia lingüística. Llame al [phone].    We comply with applicable federal civil rights laws and [place] laws. We do not discriminate on the basis of race, color, national origin, age, disability, sex, sexual orientation, or gender identity.            Thank you!     Thank you for choosing [facility]  for your care. Our goal is always to provide you with excellent care. Hearing back from our patients is one way we can continue to improve our services. Please take a few minutes to complete the written survey that you may receive in the mail after your visit with us. Thank you!             Your Updated Medication List - Protect others around you: Learn how to safely use, store and throw away your medicines at www.disposemymeds.org.          This list is accurate as of 5/22/18 12:39 PM.  Always use your most recent med list.                   Brand Name Dispense Instructions for use Diagnosis    aspirin 81 MG tablet      Take 1 tablet (81 mg) by mouth daily    Chest pain       atorvastatin 40 MG tablet    LIPITOR     Take 1 tablet (40 mg) by mouth daily    CAD (coronary artery disease), Hyperlipidemia       cephALEXin 500 MG capsule    KEFLEX    10 capsule    Take 1 capsule (500 mg) by mouth 2 times daily for 5 days    Post-operative state       DEEP SEA NASAL SPRAY 0.65 % nasal spray   Generic drug:  sodium chloride      Spray 1 spray into both nostrils daily as needed for congestion        DOXYCYCLINE HYCLATE PO      Take 100 mg by mouth 2 times daily        ISOSORBIDE DINITRATE PO      Take 30 mg by mouth daily        metoprolol succinate 50 MG 24 hr tablet    TOPROL-XL    180 tablet    Take 1 tablet (50 mg) by mouth two times daily     Abdominal aortic aneurysm (AAA) without rupture (H)       MILK OF MAGNESIA 400 MG/5ML suspension   Generic drug:  magnesium hydroxide      Take 30 mLs by mouth daily as needed for constipation or heartburn        MUCINEX ALLERGY PO      Take 800 mg by mouth 2 times daily (2 x 400 mg = 800 mg dose)        nitroGLYcerin 0.4 MG sublingual tablet    NITROSTAT    25 tablet    Place 1 tablet (0.4 mg) under the tongue every 5 minutes as needed for chest pain    Chest pain       TYLENOL PO      Take 1,000 mg by mouth 3 times daily

## 2018-05-22 NOTE — LETTER
5/22/2018    Paulino VYKendal Lynch  [id number]    RE: Nacho NICHOLAS Jake       Dear Colleague,    I had the pleasure of seeing Nacho NICHOLAS Joseph in the [facility].    Cardiology Progress Note          Assessment and Plan:     1. 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  Could consider  revascularization percutaneously if symptoms escalate.  Patient really wants to avoid sternotomy.    Continue current medications.      2. Complete heart block, status post dual-chamber pacemaker    No pacemaker tachycardia on his most recent device check.    No atrial fibrillation detected on the device, would consider the echo comment of atrial fibrillation erroneous.      3. Carotid artery disease and AAA, followed by vascular surgery      4. Kidney stones, obstructive with ureteral stent    Add a little bit of hydrochlorothiazide 12.5 mg every other day to help reduce calcium stones and help blood pressure.    Follow-up in 6 months to revisit symptoms.      This note was transcribed using electronic voice recognition software and there may be typographical errors present.                Interval History:   The patient is a very pleasant 82 year old Croatian-speaking Estonian here with the help of .  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  He has stable symptoms at this point.  LV function is normal on echo 5/17/2018.  He does not have much mitral regurgitation on that echo.  We are also following him along with vascular surgery for a AAA.  He really does not want surgery.  He has had kidney stone and ureteral stent recently.  He comes in to reestablish care.                     Review of Systems:   Review of Systems:  Skin:  Negative bruising (on right wrist from angiogram)   Eyes:  Negative    ENT:  Negative   "  Respiratory:  Positive for dyspnea on exertion;shortness of breath  Cardiovascular:  Negative for;palpitations;chest pain;fatigue;lightheadedness;dizziness    Gastroenterology: Negative    Genitourinary:  Negative    Musculoskeletal:  Positive for muscular weakness  Neurologic:  Positive for headaches  Psychiatric:  Negative    Heme/Lymph/Imm:  Negative    Endocrine:  Negative                Physical Exam:     Vitals: /89  Pulse 72  Ht 1.854 m (6' 1\")  Wt 63.5 kg (140 lb)  BMI 18.47 kg/m2  Constitutional:  cooperative, alert and oriented, well developed, well nourished, in no acute distress thin      Skin:  warm and dry to the touch    (actinic keratoses)    Head:  normocephalic        Eyes:  pupils equal and round        ENT:  no pallor or cyanosis        Neck:  JVP normal right carotid bruit      Chest:  normal symmetry prolonged expiration      Cardiac: regular rhythm   distant heart sounds   grade 1;LLSB;systolic murmur          Abdomen:      benign    Extremities and Back:  no deformities, clubbing, cyanosis, erythema observed;no edema        Neurological:  no gross motor deficits;affect appropriate                 Medications:     Current Outpatient Prescriptions   Medication Sig Dispense Refill     Acetaminophen (TYLENOL PO) Take 1,000 mg by mouth 3 times daily       atorvastatin (LIPITOR) 40 MG tablet Take 1 tablet (40 mg) by mouth daily       cephALEXin (KEFLEX) 500 MG capsule Take 1 capsule (500 mg) by mouth 2 times daily for 5 days 10 capsule 0     DOXYCYCLINE HYCLATE PO Take 100 mg by mouth 2 times daily       Fexofenadine HCl (MUCINEX ALLERGY PO) Take 800 mg by mouth 2 times daily (2 x 400 mg = 800 mg dose)       ISOSORBIDE DINITRATE PO Take 30 mg by mouth daily       magnesium hydroxide (MILK OF MAGNESIA) 400 MG/5ML suspension Take 30 mLs by mouth daily as needed for constipation or heartburn       metoprolol succinate (TOPROL-XL) 50 MG 24 hr tablet Take 1 tablet (50 mg) by mouth two times " daily 180 tablet 3     nitroglycerin (NITROSTAT) 0.4 MG SL tablet Place 1 tablet (0.4 mg) under the tongue every 5 minutes as needed for chest pain 25 tablet 4     sodium chloride (DEEP SEA NASAL SPRAY) 0.65 % nasal spray Spray 1 spray into both nostrils daily as needed for congestion       aspirin 81 MG tablet Take 1 tablet (81 mg) by mouth daily (Patient not taking: Reported on 5/22/2018)                  Data:   All laboratory data reviewed  No results found for this or any previous visit (from the past 24 hour(s)).    All laboratory data reviewed  No results found for: CHOL  No results found for: HDL  No results found for: LDL  No results found for: TRIG  No results found for: CHOLHDLRATIO  TSH   Date Value Ref Range Status   07/07/2014 2.07 0.4 - 5.0 mU/L Final     Last Basic Metabolic Panel:  Lab Results   Component Value Date     05/17/2018      Lab Results   Component Value Date    POTASSIUM 3.7 05/18/2018     Lab Results   Component Value Date    CHLORIDE 106 05/17/2018     Lab Results   Component Value Date    ARELIS 9.7 05/17/2018     Lab Results   Component Value Date    CO2 24 05/17/2018     Lab Results   Component Value Date    BUN 23 05/17/2018     Lab Results   Component Value Date    CR 1.01 05/17/2018     Lab Results   Component Value Date     05/17/2018     Lab Results   Component Value Date    WBC 9.2 05/02/2018     Lab Results   Component Value Date    RBC 4.13 05/02/2018     Lab Results   Component Value Date    HGB 12.7 05/02/2018     Lab Results   Component Value Date    HCT 39.5 05/02/2018     Lab Results   Component Value Date    MCV 96 05/02/2018     Lab Results   Component Value Date    MCH 30.8 05/02/2018     Lab Results   Component Value Date    MCHC 32.2 05/02/2018     Lab Results   Component Value Date    RDW 15.3 05/02/2018     Lab Results   Component Value Date     05/02/2018                 Thank you for allowing me to participate in the care of your  patient.      Sincerely,     Carlo Llanes MD     [facility]    cc:   Paulino Lynch  [id number]

## 2018-05-22 NOTE — PATIENT INSTRUCTIONS
We will do a small dose of HCTZ 12.5mg every other day for blood pressure and to help reduce kidney stones.    If you get more chest pain or shortness of breath, then we think about opening up one of the blocked heart arteries.    See you in six months, but let us know if you're feeling worse in the meantime.

## 2018-05-22 NOTE — PROGRESS NOTES
Called the [facility] and spoke with JAY Sanford with Scar's recommendation: Okay for patient to restart the Imdur 30 mg daily, unless he is hypotensive. Order faxed to [phone]. Varsha Lea RN 9:51 AM 05/22/18

## 2018-05-22 NOTE — PROGRESS NOTES
Cardiology Progress Note          Assessment and Plan:     1. 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  Could consider  revascularization percutaneously if symptoms escalate.  Patient really wants to avoid sternotomy.    Continue current medications.      2. Complete heart block, status post dual-chamber pacemaker    No pacemaker tachycardia on his most recent device check.    No atrial fibrillation detected on the device, would consider the echo comment of atrial fibrillation erroneous.      3. Carotid artery disease and AAA, followed by vascular surgery      4. Kidney stones, obstructive with ureteral stent    Add a little bit of hydrochlorothiazide 12.5 mg every other day to help reduce calcium stones and help blood pressure.    Follow-up in 6 months to revisit symptoms.      This note was transcribed using electronic voice recognition software and there may be typographical errors present.                Interval History:   The patient is a very pleasant 82 year old Tanzanian-speaking Faroese here with the help of .  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  He has stable symptoms at this point.  LV function is normal on echo 5/17/2018.  He does not have much mitral regurgitation on that echo.  We are also following him along with vascular surgery for a AAA.  He really does not want surgery.  He has had kidney stone and ureteral stent recently.  He comes in to reestablish care.                     Review of Systems:   Review of Systems:  Skin:  Negative bruising (on right wrist from angiogram)   Eyes:  Negative    ENT:  Negative    Respiratory:  Positive for dyspnea on exertion;shortness of breath  Cardiovascular:  Negative for;palpitations;chest pain;fatigue;lightheadedness;dizziness    Gastroenterology: Negative    Genitourinary:   "Negative    Musculoskeletal:  Positive for muscular weakness  Neurologic:  Positive for headaches  Psychiatric:  Negative    Heme/Lymph/Imm:  Negative    Endocrine:  Negative                Physical Exam:     Vitals: /89  Pulse 72  Ht 1.854 m (6' 1\")  Wt 63.5 kg (140 lb)  BMI 18.47 kg/m2  Constitutional:  cooperative, alert and oriented, well developed, well nourished, in no acute distress thin      Skin:  warm and dry to the touch    (actinic keratoses)    Head:  normocephalic        Eyes:  pupils equal and round        ENT:  no pallor or cyanosis        Neck:  JVP normal right carotid bruit      Chest:  normal symmetry prolonged expiration      Cardiac: regular rhythm   distant heart sounds   grade 1;LLSB;systolic murmur          Abdomen:      benign    Extremities and Back:  no deformities, clubbing, cyanosis, erythema observed;no edema        Neurological:  no gross motor deficits;affect appropriate                 Medications:     Current Outpatient Prescriptions   Medication Sig Dispense Refill     Acetaminophen (TYLENOL PO) Take 1,000 mg by mouth 3 times daily       atorvastatin (LIPITOR) 40 MG tablet Take 1 tablet (40 mg) by mouth daily       cephALEXin (KEFLEX) 500 MG capsule Take 1 capsule (500 mg) by mouth 2 times daily for 5 days 10 capsule 0     DOXYCYCLINE HYCLATE PO Take 100 mg by mouth 2 times daily       Fexofenadine HCl (MUCINEX ALLERGY PO) Take 800 mg by mouth 2 times daily (2 x 400 mg = 800 mg dose)       ISOSORBIDE DINITRATE PO Take 30 mg by mouth daily       magnesium hydroxide (MILK OF MAGNESIA) 400 MG/5ML suspension Take 30 mLs by mouth daily as needed for constipation or heartburn       metoprolol succinate (TOPROL-XL) 50 MG 24 hr tablet Take 1 tablet (50 mg) by mouth two times daily 180 tablet 3     nitroglycerin (NITROSTAT) 0.4 MG SL tablet Place 1 tablet (0.4 mg) under the tongue every 5 minutes as needed for chest pain 25 tablet 4     sodium chloride (DEEP SEA NASAL SPRAY) 0.65 " % nasal spray Spray 1 spray into both nostrils daily as needed for congestion       aspirin 81 MG tablet Take 1 tablet (81 mg) by mouth daily (Patient not taking: Reported on 5/22/2018)                  Data:   All laboratory data reviewed  No results found for this or any previous visit (from the past 24 hour(s)).    All laboratory data reviewed  No results found for: CHOL  No results found for: HDL  No results found for: LDL  No results found for: TRIG  No results found for: CHOLHDLRATIO  TSH   Date Value Ref Range Status   07/07/2014 2.07 0.4 - 5.0 mU/L Final     Last Basic Metabolic Panel:  Lab Results   Component Value Date     05/17/2018      Lab Results   Component Value Date    POTASSIUM 3.7 05/18/2018     Lab Results   Component Value Date    CHLORIDE 106 05/17/2018     Lab Results   Component Value Date    ARELIS 9.7 05/17/2018     Lab Results   Component Value Date    CO2 24 05/17/2018     Lab Results   Component Value Date    BUN 23 05/17/2018     Lab Results   Component Value Date    CR 1.01 05/17/2018     Lab Results   Component Value Date     05/17/2018     Lab Results   Component Value Date    WBC 9.2 05/02/2018     Lab Results   Component Value Date    RBC 4.13 05/02/2018     Lab Results   Component Value Date    HGB 12.7 05/02/2018     Lab Results   Component Value Date    HCT 39.5 05/02/2018     Lab Results   Component Value Date    MCV 96 05/02/2018     Lab Results   Component Value Date    MCH 30.8 05/02/2018     Lab Results   Component Value Date    MCHC 32.2 05/02/2018     Lab Results   Component Value Date    RDW 15.3 05/02/2018     Lab Results   Component Value Date     05/02/2018

## 2018-07-23 ENCOUNTER — ALLIED HEALTH/NURSE VISIT (OUTPATIENT)
Dept: CARDIOLOGY | Facility: CLINIC | Age: 83
End: 2018-07-23
Payer: MEDICAID

## 2018-07-23 DIAGNOSIS — Z95.0 CARDIAC PACEMAKER IN SITU: Primary | ICD-10-CM

## 2018-07-23 PROCEDURE — 93296 REM INTERROG EVL PM/IDS: CPT | Performed by: INTERNAL MEDICINE

## 2018-07-23 PROCEDURE — 93294 REM INTERROG EVL PM/LDLS PM: CPT | Performed by: INTERNAL MEDICINE

## 2018-07-23 NOTE — MR AVS SNAPSHOT
After Visit Summary   7/23/2018    Nacho Joseph    MRN: [id number]           Patient Information     Date Of Birth          1935        Visit Information        Provider Department      7/23/2018 2:30 PM JERI KEITA [facility]        Today's Diagnoses     Cardiac pacemaker in situ    -  1       Follow-ups after your visit        Your next 10 appointments already scheduled     Jul 23, 2018  2:30 PM CDT   Remote PPM Check with JERI KEITA   [facility] ([facility])    [address]  [facility] [id number] [phone] OPT [address]           This appointment is for a remote check of your pacemaker.  This is not an appointment at the office.            Nov 01, 2018 11:45 AM CDT   Return Visit with Carlo Llanes MD   [facility] ([facility])    [address]  [facility] [id number] [phone] OPT 2              Who to contact     If you have questions or need follow up information about today's clinic visit or your schedule please contact [facility] directly at [phone].  Normal or non-critical lab and imaging results will be communicated to you by [employer], letter or phone within 4 business days after the clinic has received the results. If you do not hear from us within 7 days, please contact the clinic through [employer] or phone. If you have a critical or abnormal lab result, we will notify you by phone as soon as possible.  Submit refill requests through [employer] or call your pharmacy and they will forward the refill request to us. Please allow 3 business days for your refill to be completed.          Additional Information About Your Visit        [employer] Information     [employer] lets you send messages to your doctor, view your test results, renew your prescriptions, schedule appointments and  "more. To sign up, go to [email or web address] . Click on \"Log in\" on the left side of the screen, which will take you to the Welcome page. Then click on \"Sign up Now\" on the right side of the page.     You will be asked to enter the access code listed below, as well as some personal information. Please follow the directions to create your username and password.     Your access code is: [email or web address]  Expires: 10/21/2018 10:37 AM     Your access code will  in 90 days. If you need help or a new code, please call your [place] clinic or [phone].        Care EveryWhere ID     This is your Care EveryWhere ID. This could be used by other organizations to access your [place] medical records  [id number]         Blood Pressure from Last 3 Encounters:   18 149/89   18 137/60   05/10/18 (!) 88/0    Weight from Last 3 Encounters:   18 63.5 kg (140 lb)   18 63.5 kg (140 lb)   18 63.9 kg (140 lb 14.4 oz)              We Performed the Following     INTERROGATION DEVICE EVAL REMOTE, PACER/ICD (39184)     PM DEVICE INTERROGATE REMOTE (29946)        Primary Care Provider    Paulino Lynch       [id number]        Equal Access to Services     [facility]: Hadii aad ku hadasho Soomaali, waaxda luqadaha, qaybta kaalmada adeegyada, waxay marcy nielson . So [facility] [phone].    ATENCIÓN: Si habla español, tiene a varela disposición servicios gratuitos de asistencia lingüística. Llame al [phone].    We comply with applicable federal civil rights laws and [place] laws. We do not discriminate on the basis of race, color, national origin, age, disability, sex, sexual orientation, or gender identity.            Thank you!     Thank you for choosing [facility]  for your care. Our goal is always to provide you with excellent care. Hearing back from our patients is one way we can continue to improve our services. Please take a few minutes to " complete the written survey that you may receive in the mail after your visit with us. Thank you!             Your Updated Medication List - Protect others around you: Learn how to safely use, store and throw away your medicines at www.disposemymeds.org.          This list is accurate as of 7/23/18 10:37 AM.  Always use your most recent med list.                   Brand Name Dispense Instructions for use Diagnosis    aspirin 81 MG tablet      Take 1 tablet (81 mg) by mouth daily    Chest pain       atorvastatin 40 MG tablet    LIPITOR     Take 1 tablet (40 mg) by mouth daily    CAD (coronary artery disease), Hyperlipidemia       DEEP SEA NASAL SPRAY 0.65 % nasal spray   Generic drug:  sodium chloride      Spray 1 spray into both nostrils daily as needed for congestion        DOXYCYCLINE HYCLATE PO      Take 100 mg by mouth 2 times daily        ISOSORBIDE DINITRATE PO      Take 30 mg by mouth daily        metoprolol succinate 50 MG 24 hr tablet    TOPROL-XL    180 tablet    Take 1 tablet (50 mg) by mouth two times daily    Abdominal aortic aneurysm (AAA) without rupture (H)       MILK OF MAGNESIA 400 MG/5ML suspension   Generic drug:  magnesium hydroxide      Take 30 mLs by mouth daily as needed for constipation or heartburn        MUCINEX ALLERGY PO      Take 800 mg by mouth 2 times daily (2 x 400 mg = 800 mg dose)        nitroGLYcerin 0.4 MG sublingual tablet    NITROSTAT    25 tablet    Place 1 tablet (0.4 mg) under the tongue every 5 minutes as needed for chest pain    Chest pain       TYLENOL PO      Take 1,000 mg by mouth 3 times daily

## 2018-07-23 NOTE — PROGRESS NOTES
[employer] (D) Remote PPM Device Check  AP: 65 % : 100 %  Mode: DDD        Presenting Rhythm: AP/  Heart Rate: Adequate rates per histogram  Sensing: Stable    Pacing Threshold: Stable    Impedance: Stable  Battery Status: 3.5 years  Atrial Arrhythmia: 1 PMT episode detected, EGM shows atrial rate at max tracking rate of 130bpm  Ventricular Arrhythmia: None     Care Plan: F/u PPM [place] q 3 months. Gave patient's RN results over the phone. Monica,CVT

## 2018-10-04 NOTE — IP AVS SNAPSHOT
` `           [place] [address] ONCOLOGY: [phone]                 INTERAGENCY TRANSFER FORM - NOTES (H&P, Discharge Summary, Consults, Procedures, Therapies)   2018                    Hospital Admission Date: 2018  NACHO VIZCAINO   : 1935  Sex: Male        Patient PCP Information     Provider PCP Type    Paulino Lynch General         History & Physicals      H&P by Sylvia Carney MD at 2018  1:47 PM     Author:  Sylvia Carney MD Service:  Hospitalist Author Type:  Physician    Filed:  2018  2:52 PM Date of Service:  2018  1:47 PM Creation Time:  2018  1:46 PM    Status:  Signed :  Sylvia Carney MD (Physician)         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  Patient was evaluated in the emergency room where he was found to have possible obstructing 2 x 3 mm stone at right ureterovesical junction associated with right hydronephrosis and hydroureter.  He also has known history of AAA but on the current CT scan it has progressed to 5.2 cm in maximal dimension as compared to 3.7 in 2014. patient was admitted for further evaluation and management.[SI1.2]    Assessment & Plan[SI1.3]     Active Problems:  Obstructive uropathy (2018)[SI1.1]: Possibly causing his right-sided flank pain and CT scan of the abdomen showed 2 x 3 mm obstructing stone at the right ureterovesical junction with mild to moderate associated  right hydronephrosis and hydroureter.  Patient also have associated leukocytosis although his pro-calcitonin is negative at this point.  He also have associated increase in creatinine.  Acute renal failure: Most likely secondary to obstructive uropathy as above.    --Admit patient to general medical floor with telemetry.  --Diet and activity orders.  --We will start patient only on a very gentle hydration as patient has a very poor oral intake but would not worsen his hydronephrosis by giving him too much fluids  --At this time considering her obstructive uropathy and leukocytosis, will start patient on IV ceftriaxone until she is evaluated by urology.  --We will consult urology will start patient on diet right now probably will make patient n.p.o. after midnight.  -- continue patient on his home dose of Flomax.[SI1.2]    AAA (abdominal aortic aneurysm) (H) (7/8/2014)[SI1.1]: Patient has history of abdominal aortic aneurysm measuring about 4.1 cm in 2015 it has not been assessed since then as today patient presented with right-sided flank pain CT scan of the abdomen and pelvis was done which is showing infrarenal abdominal aortic aneurysm measuring 5.2 into 4.5 cm in diameter that has increased from 3.7 cm on ultrasound dated July 2014.  There are also seen, and iliac artery aneurysms measuring 2.1 cm on the right and 1.9 cm on the left.    -- will consult vascular surgery , considering patient was also planned to go for angiogram.[SI1.2]      HTN (hypertension) (7/8/2014)[SI1.1]: Patient was initially hypotensive on presentation with blood pressure of 75/59 but now his blood pressure has improved to 121/88 he is on beta-blocker , Lisinopril and Norvasc at home.[SI1.2]    Hyperlipidemia (7/8/2014)[SI1.1]: on Lipitor at home.[SI1.2]    Atrioventricular block, complete (HEART) (7/8/2014)[SI1.1]: Status post pacemaker placement[SI1.2]    CAD (coronary artery disease) (2/9/2015)[SI1.1]: Patient was  recently evaluated  by Dr. Slaters Burnett from cardiology on April 16 for his history of coronary artery disease, his last coronary angiography was in 2015 which showed chronically occluded proximal right coronary artery.  He also had high-grade stenosis and proximal circumflex and moderate though no flow-limiting disease in the left anterior descending artery.  As this patient continues to have chest discomfort on minimal exertion and also have associated shortness of breath on exertion over the last few weeks cardiology was planning patient to take for cardiac cath today.[SI1.2]    Ischemic cardiomyopathy (2/9/2015)[SI1.1]: Improved, echo done yesterday showed left ventricular systolic function estimated at more than 70%.    -- continue to monitor patient on telemetry  --Continue patient on home dose of aspirin 81 mg p.o. daily, Norvasc 5 mg p.o. daily, Imdur 30 mg p.o. daily, lisinopril 2.5 mg p.o. daily but will decrease his dose of Toprol-XL to 25 mg p.o. daily from 100 mg due to borderline blood pressure.  Will place holding parameters on the blood pressure medication.  --We will consult cardiology as patient was planned to go for cardiac angiogram, we might need to coordinate plans of angiogram with vascular surgery considering his AAA.[SI1.2]      Depression (7/21/2014)[SI1.1]  -- currently stable , not taking any medications[SI1.2]       COPD (chronic obstructive pulmonary disease) (H) (7/21/2014)[SI1.1]  -- ex smoker , quit 5 years ago, no active issues , not on any home INH or oxygen.[SI1.2]    # Pain Assessment:[SI1.1]  Current Pain Score 4/19/2018   Pain score (0-10) 7   Pain location -[SI1.3]   - Nacho is experiencing pain due to renal stone. Pain management was discussed and the plan was created in a collaborative fashion.  Nacho's response to the current recommendations: engaged  - Opioid regimen: Roxicodone and low dose IV Dilaudid   - Response to opioid medications: Reduction of symptoms   - Bowel regimen:  senna  - Pharmacologic adjuvants: Acetaminophen[SI1.2]    DVT Prophylaxis:[SI1.1] Pneumatic Compression Devices and Anti-embolisim stockings (TEDs)[SI1.2]  Code Status:[SI1.1] Full Code[SI1.2]    Disposition:[SI1.1] Expecting patient to stay for the next couple of days here for stabilization of his obstructive uropathy in the presence of active workup for his coronary artery disease and his AAA.  1 of my hospitalist colleague will assume the care from tomorrow morning.   is posted for discharge planning. Currently patient lives in [facility] with her wife.[SI1.2]    Sylvia Carney[SI1.3], MD,FACP[SI1.1]    Primary Care Physician   Paulino Lynch    Chief Complaint[SI1.3]   Right flank pain, he was scheduled to get cardiac catheterization today.    History is obtained from the patient[SI1.2]    Patient Active Problem List   Diagnosis     Bradycardia     AAA (abdominal aortic aneurysm) (H)     HTN (hypertension)     Hyperlipidemia     Atrioventricular block, complete (HEART)     Depression     COPD (chronic obstructive pulmonary disease) (H)     Lung abscess (H)     Chest pain     CAD (coronary artery disease)     Ischemic cardiomyopathy     Obstructive uropathy[SI1.1]       History of Present Illness[SI1.3]   Nacho Joseph is a 82 year old male[SI1.1] With past medical history significant for essential hypertension, hyperlipidemia, coronary artery disease, COPD, ischemic cardiomyopathy, AV block completed status post pacemaker placement and previous history of AAA who was admitted from the emergency room where he presented with right flank pain.    Patient was  recently evaluated by Dr. Marco Burnett from cardiology on April 16 for his history of coronary artery disease, his last coronary angiography was in 2015 which showed chronically occluded proximal right coronary artery.  He also had high-grade stenosis and proximal circumflex and moderate though no flow-limiting disease  in the left anterior descending artery.  As this patient continues to have chest discomfort on minimal exertion and also have associated shortness of breath on exertion over the last few weeks cardiology was planning patient to take for cardiac cath today.  Of note patient also has history of complete heart block in the past for which he has undergone permanent pacemaker placement.    Patient also have history of abdominal aortic aneurysm measuring about 4.1 cm in 2015 it has not been assessed since then as today patient presented with right-sided flank pain CT scan of the abdomen and pelvis was done which is showing infrarenal abdominal aortic aneurysm measuring 5.2 into 4.5 cm in diameter that has increased from 3.7 cm on ultrasound dated July 2014.  There are also seen, and iliac artery aneurysms measuring 2.1 cm on the right and 1.9 cm on the left.    CT scan of the abdomen and pelvis also showed 2 x 3 mm obstructing stone at the right ureterovesical junction with mild to moderate associated right hydronephrosis and hydroureter.  Patient is complaining of significant right-sided lower abdominal pain radiating towards his right flank which is started yesterday and he is also have associated nausea and not feeling generalized well, he did not eat anything yesterday and this morning.  He felt that he has some chills but not too much he does not know if he had any fever.  Most of the history was obtained with the help of [place] .  Patient is denying any similar pains in the past or any similar history of renal or gallbladder stones in the past.  I did review his CT scan findings of his worsening AAA with the patient.  Patient was admitted for further evaluation and management.[SI1.2]    Past Medical History[SI1.3]    I have reviewed this patient's medical history and updated it with pertinent information if needed.[SI1.2]   Past Medical History:   Diagnosis Date     AAA (abdominal aortic aneurysm) (H)  7/8/2014    4.1 cm     Anemia      Atrioventricular block, complete (HEART) 7/8/2014    S/p PPM 7/2014     Bradycardia      CAD (coronary artery disease) 2/9/2015     Chest pain 1/20/2015     COPD (chronic obstructive pulmonary disease) (H)      Depression 7/21/2014     Depressive disorder      HTN (hypertension) 7/8/2014     Hyperlipidemia 7/8/2014     Hypertension      Ischemic cardiomyopathy 2/9/2015     Lung abscess (H)      Syncope[SI1.4]        Past Surgical History[SI1.3]   I have reviewed this patient's surgical history and updated it with pertinent information if needed.[SI1.2]  Past Surgical History:   Procedure Laterality Date     CORONARY ANGIOGRAPHY ADULT ORDER  2/20/15    medical managed     IMPLANT PACEMAKER  7/7/14    dual chamber[SI1.4]       Prior to Admission Medications   Prior to Admission Medications   Prescriptions Last Dose Informant Patient Reported? Taking?   Acetaminophen (TYLENOL PO) 4/17/2018 Nursing Home Yes Yes   Sig: Take 1,000 mg by mouth 3 times daily   Pseudoephedrine-Guaifenesin (MUCINEX D MAX STRENGTH PO) 4/17/2018 Nursing Home Yes Yes   Sig: Take 800 mg by mouth daily    amLODIPine (NORVASC) 5 MG tablet 4/16/2018 USP No Yes   Sig: Take 1 tablet (5 mg) by mouth daily   aspirin 81 MG tablet 4/17/2018 USP No Yes   Sig: Take 1 tablet (81 mg) by mouth daily   atorvastatin (LIPITOR) 40 MG tablet 4/16/2018 USP No Yes   Sig: Take 1 tablet (40 mg) by mouth daily   doxycycline (VIBRAMYCIN) 100 MG capsule 4/17/2018 [facility]P Yes Yes   Sig: Take 100 mg by mouth 2 times daily   isosorbide mononitrate (IMDUR) 30 MG 24 hr tablet 4/17/2018 USP No Yes   Sig: Take 1 tablet (30 mg) by mouth daily   lisinopril (PRINIVIL/ZESTRIL) 2.5 MG tablet 4/17/2018 USP No Yes   Sig: Take 1 tablet (2.5 mg) by mouth daily   magnesium hydroxide (MILK OF MAGNESIA) 400 MG/5ML suspension  Nursing Home Yes Yes   Sig: Take 30 mLs by mouth daily as needed for constipation  or heartburn   metoprolol succinate (TOPROL-XL) 100 MG 24 hr tablet 4/17/2018 assisted No Yes   Sig: Take 1 tablet (100 mg) by mouth daily   nitroglycerin (NITROSTAT) 0.4 MG SL tablet  Nursing Home No Yes   Sig: Place 1 tablet (0.4 mg) under the tongue every 5 minutes as needed for chest pain      Facility-Administered Medications: None     Allergies   No Known Allergies    Social History[SI1.3]   I have reviewed this patient's social history and updated it with pertinent information if needed. Nacho PETERSON Маринаky[SI1.2]  reports that he quit smoking about 5 years ago. He has never used smokeless tobacco. He reports that he does not drink alcohol or use illicit drugs.[SI1.4]    Family History[SI1.3]   I have reviewed this patient's family history and updated it with pertinent information if needed.[SI1.2]   Family History   Problem Relation Age of Onset     Other - See Comments Mother      old age     HEART DISEASE Father      unknown[SI1.4]       Review of Systems[SI1.3]   CONSTITUTIONAL:  positive for  fatigue and generalized weakness.  EYES:  negative for blurred vision and visual disturbance  HEENT:  negative for hoarseness and voice change  RESPIRATORY:  negative for cough with sputum, dyspnea, wheezing and chest pain  CARDIOVASCULAR:  negative for chest pain, palpitations, orthopnea, exertional chest pressure/discomfort[SI1.1] at this point but he has been[SI1.2]   GASTROINTESTINAL: Negative for abd pain, nausea , vomiting ,constipation and abdominal pain  GENITOURINARY: Negative for burning /urgency and frequency.  HEMATOLOGIC/LYMPHATIC:  negative  ALLERGIC/IMMUNOLOGIC:  negative for drug reactions  ENDOCRINE:  negative for diabetic symptoms including polyuria, polydipsia and weight loss  MUSCULOSKELETAL:  positive for arthralgias  NEUROLOGICAL:  negative  BEHAVIOR/PSYCH: negative[SI1.1]    Physical Exam   Temp: 98.6  F (37  C)   BP: 122/89 Pulse: 85 Heart Rate: 85 Resp: 16 SpO2: 97 % O2 Device: None  (Room air)[SI1.3]    Vital Signs with Ranges[SI1.1]  Temp:  [98.6  F (37  C)] 98.6  F (37  C)  Pulse:  [85] 85  Heart Rate:  [85] 85  Resp:  [16] 16  BP: ()/(59-92) 122/89  SpO2:  [95 %-99 %] 97 %  138 lbs 14.24 oz[SI1.3]    Constitutional: Awake, alert,oriented to time, place and person , cooperative, no apparent distress.Pleasent and cooperative , family present at the bedside   Eyes: Conjunctiva and pupils examined and normal.  HEENT: Moist mucous membranes, normal dentition.  Respiratory: Clear to auscultation bilaterally, no crackles or wheezing.  Cardiovascular: Regular rate and rhythm, normal S1 and S2, and no murmur noted.  GI: Soft, non-distended, non-tender, normal bowel sounds.  Lymph/Hematologic: No anterior cervical or supraclavicular adenopathy.  Skin: No rashes, no cyanosis, no edema.  Musculoskeletal: No joint swelling, erythema or tenderness.  Neurologic: Cranial nerves 2-12 intact, normal strength and sensation.  Psychiatric: Alert, oriented to person, place and time, no obvious anxiety or depression.[SI1.1]    Data[SI1.3]   Data reviewed today:  I personally reviewed[SI1.1] the EKG tracing showing NSR[SI1.2].[SI1.1]    Recent Labs  Lab 04/19/18  0955   WBC 17.2*   HGB 12.7*   MCV 95         POTASSIUM 4.2   CHLORIDE 109   CO2 23   BUN 31*   CR 1.51*   ANIONGAP 10   ARELIS 9.6   *[SI1.3]       Imaging:[SI1.1]  Recent Results (from the past 24 hour(s))   Abd/pelvis CT no contrast - Stone Protocol    Narrative    Exam: CT ABDOMEN PELVIS W/O CONTRAST  4/19/2018 10:45 AM    History: Right flank pain.    Comparison: Aortic aneurysm ultrasound dated 7/8/2014.    Technique: Volumetric acquisition with reconstruction in the axial,  coronal planes through the abdomen and pelvis without contrast.  Radiation dose for this scan was reduced using automated exposure  control, adjustment of the mA and/or kV according to patient size, or  iterative reconstruction technique.    Contrast:  None    Findings:   Lung Bases: Bronchiectasis involving the left lower lobe and  atelectasis in both bases. No pleural or pericardial effusion.    Abdomen: Unenhanced liver, spleen, adrenal glands and gallbladder are  normal. Atrophic pancreas. Multiple vascular calcifications in both  kidneys. Additionally, there is a small group of stones in the distal  right ureter, the largest measures 2 x 3 mm (series 2 image 72). There  is mild to moderate associated right hydronephrosis and hydroureter.  No definite left renal or ureteral calculi are seen.    Infrarenal abdominal aortic aneurysm measures 5.2 x 4.5 cm in diameter  (series 2 image 36), this is increased from 3.7 cm on ultrasound dated  7/8/2014. 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    No areas of bowel wall thickening or bowel dilatation. Normal  appendix. No free fluid. No abdominal or pelvic lymphadenopathy.    Bones: No concerning lytic or sclerotic lesions.      Impression    Impression:   1. 2 x 3 mm obstructing stone at the right ureterovesical junction  with mild to moderate associated right hydronephrosis and hydroureter.  2. Infrarenal abdominal aortic aneurysm measures up to 5.2 cm in  maximal dimension, increased from 3.7 cm on 7/8/2014. Aneurysm extends  into both the right and left common iliac arteries.  3. Bronchiectasis in the left lower lobe.    JIMI FLANAGAN MD[SI1.3]          Revision History        User Key Date/Time User Provider Type Action    > SI1.4 4/19/2018  2:52 PM Sylvia Carney MD Physician Sign     SI1.2 4/19/2018  2:05 PM Sylvia Carney MD Physician      SI1.3 4/19/2018  1:47 PM Sylvia Carney MD Physician      SI1.1 4/19/2018  1:46 PM Sylvia Carney MD Physician             H&P signed by Aimee Gonzalez at 4/16/2018  1:13 PM      Author:  Aimee Gonzalez Service:  (none) Author Type:  Physician    Filed:  4/16/2018  1:13 PM Date of  Service:  4/16/2018  1:13 PM Creation Time:  4/16/2018  1:13 PM    Status:  Signed :  Scan, Provider (Physician)     Scan on 4/16/2018  1:13 PM by Scan, Provider : P HISTORY AND PHYSICAL 04/16/2018 1          Revision History        User Key Date/Time User Provider Type Action    > [N/A] 4/16/2018  1:13 PM Scan, Provider Physician Sign                     Discharge Summaries      Discharge Summaries by Olivia Escobar MD at 4/21/2018  1:53 PM     Author:  Olivia Escobar MD Service:  Hospitalist Author Type:  Physician    Filed:  4/21/2018  1:57 PM Date of Service:  4/21/2018  1:53 PM Creation Time:  4/21/2018  1:53 PM    Status:  Signed :  Olivia Escobar MD (Physician)         [facility]  Discharge Summary        Nacho Joseph MRN# [id number]   YOB: 1935 Age: 82 year old     Date of Admission:  4/19/2018  Date of Discharge:  4/21/2018  Admitting Physician:  Sylvia Carney MD  Discharge Physician: Olivia Escobar MD  Discharging Service: Hospitalist     Primary Provider: Paulino Lynch  Primary Care Physician Phone Number: None         Discharge Diagnoses/Problem Oriented Hospital Course (Providers):    Nahco Joseph was admitted on 4/19/2018 by Sylvia Carney MD and I would refer you to their history and physical.  The following problems were addressed during his hospitalization:  Nacho Joseph is a 82 year old male who was admitted on 4/19/2018.  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  Patient was evaluated in  the emergency room where he was found to have possible obstructing 2 x 3 mm stone at right ureterovesical junction associated with right hydronephrosis and hydroureter.  He also has known history of AAA but on the current CT scan it has progressed to 5.2 cm in maximal dimension as compared to 3.7 in 2014. patient was admitted for further evaluation and management.          Assessment & Plan         Active Problems:  Obstructive uropathy  WITH CLARITA DUE TO OBSTRUCTIVE UROPATHY HE IS S/P  Cystoscopy, right retrograde, right ureteral stent placement:   CT scan of the abdomen showed 2 x 3 mm obstructing stone at the right ureterovesical junction with mild to moderate associated right hydronephrosis and hydroureter ON ADMISSION   CR improved to 0.8 today from 1.5 2 days ago   ON iv CEFTRIAXONE. D/venita today. No need for antibiotics per UROLOGY   ADMISSION UA was negative   Post op management per urology  Continue IVF today   AAA (abdominal aortic aneurysm) (H) (7/8/2014): Patient has history of abdominal aortic aneurysm measuring about 4.1 cm in 2015 it has not been assessed since then as today patient presented with right-sided flank pain CT scan of the abdomen and pelvis was done which is showing infrarenal abdominal aortic aneurysm measuring 5.2 into 4.5 cm in diameter that has increased from 3.7 cm on ultrasound dated July 2014  Vascular surgery consulted and they reviewd the CT and thought the AAA is at 4.6 cm  Recommended CT chest abdomen to be repeated in 6months   HTN (hypertension) (7/8/2014): Patient was initially hypotensive on presentation with blood pressure of 75/59 but now his blood pressure has improved to 121/88 he is on beta-blocker , Lisinopril and Norvasc at home.    Hyperlipidemia (7/8/2014): on Lipitor at home.    Atrioventricular block, complete (HEART) (7/8/2014): Status post pacemaker placement    CAD (coronary artery disease) (2/9/2015): Patient was  recently evaluated by Dr. Marco Burnett from  cardiology on April 16 for his history of coronary artery disease, his last coronary angiography was in 2015 which showed chronically occluded proximal right coronary artery.  He also had high-grade stenosis and proximal circumflex and moderate though no flow-limiting disease in the left anterior descending artery.  Cardiology wants to do angiogram out pt and see him in the clinic on 4/27  Pt is asymptomatic today   Ischemic cardiomyopathy (2/9/2015): Improved, echo done yesterday showed left ventricular systolic function estimated at more than 70%.      -- continue to monitor patient on telemetry  --Continue patient on home dose of aspirin 81 mg p.o. daily, Norvasc 5 mg p.o. daily, Imdur 30 mg p.o. daily, lisinopril 2.5 mg p.o. daily but will decrease his dose of Toprol-XL to 25 mg p.o. daily from 100 mg due to borderline blood pressure.  Will place holding parameters on the blood pressure medication.  Only discharging with imdur and toprol XL 50mg PO daily    Depression (7/21/2014)  -- currently stable , not taking any medications         COPD (chronic obstructive pulmonary disease) (H) (7/21/2014)  -- ex smoker , quit 5 years ago, no active issues , not on any home INH or oxygen.     DVT Prophylaxis: Pneumatic Compression Devices  Code Status: Full Code     Disposition: back to LTC today      Olivia Escobar MD  [phone] (P)           Code Status:      Full Code        Brief Hospital Stay Summary Sent Home With Patient in AVS:        Reason for your hospital stay       Kidney stone s/p right ureteral stent placement                        Important Results:      See below         Pending Results:        Unresulted Labs Ordered in the Past 30 Days of this Admission     No orders found from 2/18/2018 to 4/20/2018.            Discharge Instructions and Follow-Up:      Follow-up Appointments     Follow-up and recommended labs and tests        F/u with cardiology in 1week on 4/27  F/u with urology in 2weeks  CT  chest abdomen with contrast in 6months  F/u with vascular medicine in 6months for AAA                      Discharge Disposition:      Discharged to home        Discharge Medications:        Current Discharge Medication List      CONTINUE these medications which have CHANGED    Details   metoprolol succinate (TOPROL-XL) 50 MG 24 hr tablet Take 1 tablet (50 mg) by mouth daily  Qty: 30 tablet, Refills: 0    Associated Diagnoses: Abdominal aortic aneurysm (AAA) without rupture (H)         CONTINUE these medications which have NOT CHANGED    Details   Acetaminophen (TYLENOL PO) Take 1,000 mg by mouth 3 times daily      aspirin 81 MG tablet Take 1 tablet (81 mg) by mouth daily    Associated Diagnoses: Chest pain      atorvastatin (LIPITOR) 40 MG tablet Take 1 tablet (40 mg) by mouth daily    Associated Diagnoses: CAD (coronary artery disease); Hyperlipidemia      isosorbide mononitrate (IMDUR) 30 MG 24 hr tablet Take 1 tablet (30 mg) by mouth daily  Qty: 90 tablet, Refills: 3    Associated Diagnoses: Ischemic cardiomyopathy      magnesium hydroxide (MILK OF MAGNESIA) 400 MG/5ML suspension Take 30 mLs by mouth daily as needed for constipation or heartburn      nitroglycerin (NITROSTAT) 0.4 MG SL tablet Place 1 tablet (0.4 mg) under the tongue every 5 minutes as needed for chest pain  Qty: 25 tablet, Refills: 4    Associated Diagnoses: Chest pain      Pseudoephedrine-Guaifenesin (MUCINEX D MAX STRENGTH PO) Take 800 mg by mouth daily          STOP taking these medications       amLODIPine (NORVASC) 5 MG tablet Comments:   Reason for Stopping:         doxycycline (VIBRAMYCIN) 100 MG capsule Comments:   Reason for Stopping:         lisinopril (PRINIVIL/ZESTRIL) 2.5 MG tablet Comments:   Reason for Stopping:                 Allergies:       No Known Allergies        Consultations This Hospital Stay:      Consultation during this admission received from urology        Condition and Physical on Discharge:      Discharge  "condition: Stable   Vitals: Blood pressure 118/78, pulse 83, temperature 97.5  F (36.4  C), temperature source Oral, resp. rate 16, height 1.8 m (5' 10.87\"), weight 63.3 kg (139 lb 9.6 oz), SpO2 97 %.     Constitutional: Alert and awake    Lungs: CTA   Cardiovascular: RRR   Abdomen: Soft, NT, ND, BS+   Skin: Warm and dry    Other:          Discharge Time:      Greater than 30 minutes.        Image Results From This Hospital Stay (For Non-EPIC Providers):[ST1.1]        Results for orders placed or performed during the hospital encounter of 04/19/18   Abd/pelvis CT no contrast - Stone Protocol    Narrative    Exam: CT ABDOMEN PELVIS W/O CONTRAST  4/19/2018 10:45 AM    History: Right flank pain.    Comparison: Aortic aneurysm ultrasound dated 7/8/2014.    Technique: Volumetric acquisition with reconstruction in the axial,  coronal planes through the abdomen and pelvis without contrast.  Radiation dose for this scan was reduced using automated exposure  control, adjustment of the mA and/or kV according to patient size, or  iterative reconstruction technique.    Contrast: None    Findings:   Lung Bases: Bronchiectasis involving the left lower lobe and  atelectasis in both bases. No pleural or pericardial effusion.    Abdomen: Unenhanced liver, spleen, adrenal glands and gallbladder are  normal. Atrophic pancreas. Multiple vascular calcifications in both  kidneys. Additionally, there is a small group of stones in the distal  right ureter, the largest measures 2 x 3 mm (series 2 image 72). There  is mild to moderate associated right hydronephrosis and hydroureter.  No definite left renal or ureteral calculi are seen.    Infrarenal abdominal aortic aneurysm measures 5.2 x 4.5 cm in diameter  (series 2 image 36), this is increased from 3.7 cm on ultrasound dated  7/8/2014. 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    No areas of " Repair Performed By Another Provider Text (Leave Blank If You Do Not Want): After the tissue was excised the defect was repaired by another provider. bowel wall thickening or bowel dilatation. Normal  appendix. No free fluid. No abdominal or pelvic lymphadenopathy.    Bones: No concerning lytic or sclerotic lesions.      Impression    Impression:   1. 2 x 3 mm obstructing stone at the right ureterovesical junction  with mild to moderate associated right hydronephrosis and hydroureter.  2. Infrarenal abdominal aortic aneurysm measures up to 5.2 cm in  maximal dimension, increased from 3.7 cm on 7/8/2014. Aneurysm extends  into both the right and left common iliac arteries.  3. Bronchiectasis in the left lower lobe.    JIMI FLANAGAN MD   XR Surgery [facility] L/T 5 Min Fluoro w Stills    Narrative    SURGERY C-ARM FLUOROSCOPY LESS THAN 5 MINUTES WITH STILLS  4/20/2018  7:55 AM     COMPARISON: None.    HISTORY: Cysto, right retrograde, right stent.    NUMBER OF IMAGES ACQUIRED: 3    VIEWS: 1    FLUOROSCOPY TIME: .3 minutes.      Impression    IMPRESSION: Double-J stent was placed from the right renal pelvis to  the bladder. The catheter is somewhat coiled in the renal pelvis on  the final image.    SEBASTIAN OLSON MD[ST1.2]             Most Recent Lab Results In EPIC (For Non-EPIC Providers):    Most Recent 3 CBC's:  Recent Labs   Lab Test  04/21/18   0646  04/20/18   0415  04/19/18   0955   WBC  8.9  12.5*  17.2*   HGB  12.0*  11.0*  12.7*   MCV  95  96  95   PLT  164  167  221      Most Recent 3 BMP's:  Recent Labs   Lab Test  04/21/18   0646  04/20/18   0415  04/19/18   0955   NA  141  145*  142   POTASSIUM  3.8  4.3  4.2   CHLORIDE  109  115*  109   CO2  24  25  23   BUN  19  32*  31*   CR  0.86  1.40*  1.51*   ANIONGAP  8  5  10   ARELIS  8.7  8.8  9.6   GLC  96  103*  136*     Most Recent 3 Troponin's:  Recent Labs   Lab Test  07/07/14   1040  07/07/14   0655  07/07/14   0240  07/07/14   0020   TROPI  0.203*  0.182*  0.143*   --    TROPONIN   --    --    --   0.16*     Most Recent 3 INR's:  Recent Labs   Lab Test  02/20/15   0900  07/07/14   0015   INR  0.93  1.00      Most Recent 2 LFT's:  Recent Labs   Lab Test  04/20/18   0415  07/07/14   0015   AST  12  23   ALT  11  26   ALKPHOS  75  83   BILITOTAL  0.5  0.8     Most Recent Cholesterol Panel:No lab results found.  Most Recent 6 Bacteria Isolates From Any Culture (See EPIC Reports for Culture Details):No lab results found.  Most Recent TSH, T4 and HgbA1c:   Recent Labs   Lab Test  07/07/14   0015   TSH  2.07[ST1.1]                 Revision History        User Key Date/Time User Provider Type Action    > ST1.2 4/21/2018  1:57 PM Olivia Escobar MD Physician Sign     ST1.1 4/21/2018  1:53 PM Olivia Escobar MD Physician                      Consult Notes      Consults signed by Lupe Lantigua MD at 4/20/2018  8:23 PM      Author:  Lupe Lantigua MD Service:  Cardiology Author Type:  Physician    Filed:  4/20/2018  8:23 PM Date of Service:  4/19/2018  9:41 PM Creation Time:  4/19/2018 10:15 PM    Status:  Signed :  Lupe Lantigua MD (Physician)         Consult Date:  04/19/2018      PATIENT HISTORY:  Mr. Nacho Joseph is 82 years old and has been admitted to [facility] for obstructive uropathy.  Elective coronary angiography had been planned and Dr. Carney of the Hospitalist Service has requested Cardiology consultation.      Mr. Joseph has a history of chest discomfort.  In 2015, coronary angiography demonstrated a chronic occlusion of the proximal RCA as well as a severe stenosis of the proximal circumflex coronary artery and moderate, but nonobstructive disease in the LAD distally.  The right coronary artery had left-to-right collaterals.      Medical therapy was pursued, but Mr. Joseph continued to have exertional chest discomfort.  That had been accompanied by shortness of breath.  He had previously been hypertensive, but his blood pressure has more recently been controlled.      Mr. Joseph has a history of peripheral vascular disease and he has a 4.1 cm infrarenal abdominal  aortic aneurysm.  He has possible right carotid artery disease.  He also has a history of heart block and underwent permanent pacemaker placement in 2015.      Given Mr. Joseph's limiting exertional dyspnea and chest discomfort, Dr. Son recommended coronary angiography.  He noted that the pattern of symptoms had been relatively stable for the prior year, but was nonetheless impairing Mr. Joseph's quality of life.  At that time, he also decreased the metoprolol succinate dose from 200 to 100 mg daily and the dose of lisinopril from 10 to 2.5 mg daily.  He initiated isosorbide mononitrate.      Mr. Joseph developed right flank pain yesterday.  It has been intermittent and he has been using Tylenol, but the pain became quite severe in the morning.  As a result, he sought further attention in the Emergency Department.  It was noted from the laboratories that his GFR had fallen and his white blood cell count was elevated.  A CT scan demonstrated an obstructive right-sided 3 mm ureterovesicular junction stone.  It was also noted that his abdominal aortic aneurysm had increased to 5.2 cm.  As a result, he was admitted to the hospital.      Mr. Joseph had been planned to undergo coronary angiography today.  That has been delayed.  He currently denies any chest discomfort.  He was asleep in his bed and I awakened him from sleep.  He noted to me that he did not understand English.      PAST MEDICAL HISTORY:   1. Nephrolithiasis with obstructive uropathy.   2. Enlarging abdominal aortic aneurysm.   3. Coronary artery disease.   4. Permanent pacemaker, placed for complete heart block.   5. History of depression.   6. Chronic obstructive pulmonary disease.   7. History of carotid bruit, ultrasound pending.   8. COPD.   9. History of tobacco use, none for 5 years.      SOCIAL HISTORY:  The patient is a prior smoker.  He is a Vietnamese speaker.      FAMILY HISTORY:  Significant for heart disease in his father.       ALLERGIES:  None known.      MEDICATIONS:   1. Amlodipine 5 mg p.o. daily.   2. Aspirin 81 mg p.o. daily.   3. Atorvastatin 40 mg p.o. nightly.   4. Doxycycline 100 mg p.o. b.i.d.   5. Isosorbide mononitrate 30 mg p.o. daily.   6. Lisinopril 2.5 mg p.o. daily.   7. Magnesium hydroxide 30 mL p.o. daily for constipation or heartburn.   8. Metoprolol succinate 100 mg p.o. daily.   9. Nitroglycerin 0.4 mg SL p.r.n. chest pain.   10. Pseudoephedrine/guaifenesin 800 mg p.o. daily.      REVIEW OF SYSTEMS:  The review of systems was difficult to obtain and the 10-point review of systems is notable for the description in the HPI and also for infectious as the patient has developed a cough.      PHYSICAL EXAMINATION:   VITAL SIGNS:  The blood pressure was 100/70 mmHg, heart rate 86 beats per minute and regular, and respiratory rate was 16-20 per minute.     GENERAL:  Mr. Joseph was in no apparent distress and was alert and oriented once awakened.   SKIN:  Warm and dry without cyanosis or jaundice.   HEENT:  Head was normocephalic.  The eyes were free of scleral icterus.  The neck was free of thyromegaly.   CHEST:  Notable for clear lungs and unlabored breathing.   CARDIAC:  On cardiac auscultation, there was an S1 and an S2, without an S3 or S4.  There is a grade 1/6 systolic ejection murmur at the mid to upper sternal border.  There was no diastolic murmur.  The rhythm was regular.   EXTREMITIES:  On lower extremities, there is no peripheral pedal edema.   NEUROLOGIC:  Facies were symmetric and he moved all extremities without focal limitation.      LABORATORY DATA:  White blood cell count was 17.2, hemoglobin 12.7 and platelet count 221,000.  The sodium was 142, potassium 4.2, chloride 109, bicarbonate 23, BUN 31 and creatinine 1.51.      An EKG will be ordered but has not yet been performed.      ASSESSMENT AND RECOMMENDATIONS:  This is an individual who presented with a right urinary tract stone and apparent  urinary obstruction resulting in a decrease in his GFR.  Clearly, he requires treatment of the obstructive uropathy and a period of time to allow his creatinine to fall.  Hopefully, that will happen relatively soon.      In the meantime, his hemodynamics are stable.  His blood pressure is well controlled.  He is asymptomatic.  It is possible that he could undergo coronary angiography before he is discharged, but that will depend upon the timing of the resolution of his pain if he passes the stone.  If he does, I would prefer to wait until his renal function is improving to proceed with coronary angiography.      He has normal left ventricular systolic performance based on his echocardiogram.  Overall, he is doing quite well despite the unfortunate development of symptomatic nephrolithiasis.  Further recommendations will depend upon his course in the hospital.         LUPE LANTIGUA MD, [facility]             D: 2018   T: 2018   MT: TRACEY      Name:     LATOSHA VIZCAINO   MRN:      [id number]        Account:       [id number]   :      1935           Consult Date:  2018      Document: [id number]    [CD1.1]   Revision History        User Key Date/Time User Provider Type Action    > CD1.1 2018  8:23 PM Lupe Lantigua MD Physician Sign            Consults by Ravi Jensen MD at 2018  8:03 AM     Author:  Ravi Jensen MD Service:  Vascular Surgery Author Type:  Physician    Filed:  2018  8:04 AM Date of Service:  2018  8:03 AM Creation Time:  2018  7:58 AM    Status:  Addendum :  Ravi Jensen MD (Physician)     Consult Orders:    1. Vascular Surgery IP Consult: Patient to be seen: Routine - within 24 hours; AAA 5.2 sm , was scheduled to get cath but has developed obstructive uropathy; Consultant may enter orders: Yes [[id number]] ordered by Sylvia Carney MD at 18 1346                The patient is currently off the floor for placement of a  right ureteral stent.  Full consult to be rendered later today.    IMPRESSION:  1.  Juxtarenal AAA read out as 5.2 cm in diameter on yesterday's noncontrasted CT scan of the abdomen and pelvis.  On true orthogonal view I believe the aneurysmal diameter is closer to 4.6 cm in transverse plane.  There are anatomic features which makes this poorly suited to traditional EVAR including a calcified,[TG1.1] in[TG1.2]verse conical aortic neck of less than 10 mm length.  The iliac arteries are also heavily calcified.    2.  Possible ascending aortic aneurysm estimated to be 4 cm on aortography several years ago.  This has not been formally checked.    3.  Obstructive uropathy    4.  Coronary artery disease currently being evaluated for coronary angiography    PLAN:  Complete the urologic and cardiac workup.  No plans for vascular surgical intervention for the AAA this admission.  Once his renal function normalizes we will obtain a CTA of the chest, abdomen, and pelvis in 6 months.    Casey Jensen M.D.[TG1.1]     Revision History        User Key Date/Time User Provider Type Action    > TG1.2 4/20/2018  8:04 AM Ravi Jensen MD Physician Addend     TG1.1 4/20/2018  8:03 AM Ravi Jensen MD Physician Sign            Consults by Katelin Escobar PA-C at 4/19/2018  4:21 PM     Author:  Katelin Escobar PA-C Service:  Urology Author Type:  Physician Assistant - C    Filed:  4/19/2018  4:21 PM Date of Service:  4/19/2018  4:21 PM Creation Time:  4/19/2018  4:07 PM    Status:  Attested :  Katelin Escobar PA-C (Physician Assistant - C)    Cosigner:  Gurvinder Sanz MD at 4/20/2018  7:04 AM         Consult Orders:    1. Urology IP Consult: onstructive uropathy; Consultant may enter orders: Yes; Patient to be seen: Routine - within 24 hours [[id number]] ordered by Sylvia Carney MD at 04/19/18 1346           Attestation signed by Gurvinder Sanz MD at 4/20/2018  7:04 AM        Physician  Attestation   I, Gurvinder Sanz MD, have reviewed and discussed with the advanced practice provider their history, physical and plan for Nacho Joseph. I did not participate in a shared visit by interviewing or examining the patient and this should be billed as an advanced practice provider only visit.    Gurvinder Sanz MD  Date of Service (when I saw the patient): I did not personally see this patient today.                               [facility]    Urology Consultation     Date of Admission:  4/19/2018    Assessment & Plan   Nacho Joseph is a 82 year old male who was admitted on 4/19/2018. I was asked to see the patient for right distal ureteral stones with questionable UTI.    Plan: Flomax and IV fluids, strain urine. Will have RN obtain a bladder scan.   NPO at midnight for cystoscopy, right ureteral stent placement tomorrow AM.   Continue pain control and abx for now.   Page urology asap if spikes fever or shaking chills     Katelin Escobar PA-C  [facility], [address] [address]  [phone]  [email or web address]  Text Page (7am to 5pm)    Code Status    Full Code    Reason for Consult   Reason for consult: I was asked by Dr. Carney to evaluate this patient for right distal obstructing stones with hydroureteronephrosis, questionable UTI. .    Primary Care Physician   Paulino Lynch    Chief Complaint   Right flank pain     History is obtained from the patient    History of Present Illness   Nacho Joseph is a 82 year old male who presents with worsening right renal colic since Monday. On CT he is found to have a collection of stones within his right distal ureter, the largest of which measuring 3mm, and associated left hydroureteronephrosis. The patient is somewhat of a poor historian and requires an  so it is difficult to obtain a through and comprehensive history.     Reports  pain began earlier this week and has gradually become more severe. Denies hx of stones in the past, hematuria, hx of UTI, does not follow with a urologist. Most recent temp is 99.6F.     UA negative for signs of infection  WBC 17  LA 1.4  Creat 1.5  Hypotensive     Past Medical History   I have reviewed this patient's medical history and updated it with pertinent information if needed.[MG1.1]   Past Medical History:   Diagnosis Date     AAA (abdominal aortic aneurysm) (H) 7/8/2014    4.1 cm     Anemia      Atrioventricular block, complete (HEART) 7/8/2014    S/p PPM 7/2014     Bradycardia      CAD (coronary artery disease) 2/9/2015     Chest pain 1/20/2015     COPD (chronic obstructive pulmonary disease) (H)      Depression 7/21/2014     Depressive disorder      HTN (hypertension) 7/8/2014     Hyperlipidemia 7/8/2014     Hypertension      Ischemic cardiomyopathy 2/9/2015     Lung abscess (H)      Syncope[MG1.2]        Past Surgical History   I have reviewed this patient's surgical history and updated it with pertinent information if needed.[MG1.1]  Past Surgical History:   Procedure Laterality Date     CORONARY ANGIOGRAPHY ADULT ORDER  2/20/15    medical managed     IMPLANT PACEMAKER  7/7/14    dual chamber[MG1.2]       Prior to Admission Medications   Prior to Admission Medications   Prescriptions Last Dose Informant Patient Reported? Taking?   Acetaminophen (TYLENOL PO) 4/17/2018 Nursing Home Yes Yes   Sig: Take 1,000 mg by mouth 3 times daily   Pseudoephedrine-Guaifenesin (MUCINEX D MAX STRENGTH PO) 4/17/2018 Nursing Home Yes Yes   Sig: Take 800 mg by mouth daily    amLODIPine (NORVASC) 5 MG tablet 4/16/2018 long term No Yes   Sig: Take 1 tablet (5 mg) by mouth daily   aspirin 81 MG tablet 4/17/2018 long term No Yes   Sig: Take 1 tablet (81 mg) by mouth daily   atorvastatin (LIPITOR) 40 MG tablet 4/16/2018 long term No Yes   Sig: Take 1 tablet (40 mg) by mouth daily   doxycycline (VIBRAMYCIN) 100 MG  capsule 4/17/2018 Nursing Home Yes Yes   Sig: Take 100 mg by mouth 2 times daily   isosorbide mononitrate (IMDUR) 30 MG 24 hr tablet 4/17/2018 [facility] No Yes   Sig: Take 1 tablet (30 mg) by mouth daily   lisinopril (PRINIVIL/ZESTRIL) 2.5 MG tablet 4/17/2018 [facility] No Yes   Sig: Take 1 tablet (2.5 mg) by mouth daily   magnesium hydroxide (MILK OF MAGNESIA) 400 MG/5ML suspension  Nursing Home Yes Yes   Sig: Take 30 mLs by mouth daily as needed for constipation or heartburn   metoprolol succinate (TOPROL-XL) 100 MG 24 hr tablet 4/17/2018 [facility] No Yes   Sig: Take 1 tablet (100 mg) by mouth daily   nitroglycerin (NITROSTAT) 0.4 MG SL tablet  Nursing Home No Yes   Sig: Place 1 tablet (0.4 mg) under the tongue every 5 minutes as needed for chest pain      Facility-Administered Medications: None     Allergies   No Known Allergies    Social History   I have reviewed this patient's social history and updated it with pertinent information if needed. Nacho Joseph[MG1.1]  reports that he quit smoking about 5 years ago. He has never used smokeless tobacco. He reports that he does not drink alcohol or use illicit drugs.[MG1.2]    Family History   I have reviewed this patient's family history and updated it with pertinent information if needed.[MG1.1]   Family History   Problem Relation Age of Onset     Other - See Comments Mother      old age     HEART DISEASE Father      unknown[MG1.2]       Review of Systems   The 10 point Review of Systems is negative other than noted in the HPI or here.     Physical Exam   Temp: 98  F (36.7  C) Temp src: Oral BP: 96/63 Pulse: 86 Heart Rate: 86 Resp: 16 SpO2: 93 % O2 Device: None (Room air)    Vital Signs with Ranges  Temp:  [98  F (36.7  C)-99.6  F (37.6  C)] 98  F (36.7  C)  Pulse:  [68-86] 86  Heart Rate:  [83-86] 86  Resp:  [16-17] 16  BP: ()/(59-92) 96/63  SpO2:  [93 %-99 %] 93 %  138 lbs 14.24 oz    Constitutional: Lying in bed, NAD.  at  bedside  Eyes: no icterus  ENT: normocephalic, atraumatic   Respiratory: breathing unlabored   Cardiovascular: chest wall symmetric   GI: soft, ND, no tenderness over suprapubic region. Left CVAT   Lymph/Hematologic: no pedal edema   Genitourinary: circ penis. No penoscrotal edema.   Skin: well perfused   Neurologic: no focal deficits  Neuropsychiatric: A&Ox3    Data[MG1.1]   Results for orders placed or performed during the hospital encounter of 04/19/18 (from the past 24 hour(s))   Basic metabolic panel   Result Value Ref Range    Sodium 142 133 - 144 mmol/L    Potassium 4.2 3.4 - 5.3 mmol/L    Chloride 109 94 - 109 mmol/L    Carbon Dioxide 23 20 - 32 mmol/L    Anion Gap 10 3 - 14 mmol/L    Glucose 136 (H) 70 - 99 mg/dL    Urea Nitrogen 31 (H) 7 - 30 mg/dL    Creatinine 1.51 (H) 0.66 - 1.25 mg/dL    GFR Estimate 44 (L) >60 mL/min/1.7m2    GFR Estimate If Black 54 (L) >60 mL/min/1.7m2    Calcium 9.6 8.5 - 10.1 mg/dL   CBC with platelets + differential   Result Value Ref Range    WBC 17.2 (H) 4.0 - 11.0 10e9/L    RBC Count 4.09 (L) 4.4 - 5.9 10e12/L    Hemoglobin 12.7 (L) 13.3 - 17.7 g/dL    Hematocrit 39.0 (L) 40.0 - 53.0 %    MCV 95 78 - 100 fl    MCH 31.1 26.5 - 33.0 pg    MCHC 32.6 31.5 - 36.5 g/dL    RDW 15.5 (H) 10.0 - 15.0 %    Platelet Count 221 150 - 450 10e9/L    Diff Method Automated Method     % Neutrophils 82.6 %    % Lymphocytes 8.8 %    % Monocytes 8.1 %    % Eosinophils 0.0 %    % Basophils 0.1 %    % Immature Granulocytes 0.4 %    Nucleated RBCs 0 0 /100    Absolute Neutrophil 14.2 (H) 1.6 - 8.3 10e9/L    Absolute Lymphocytes 1.5 0.8 - 5.3 10e9/L    Absolute Monocytes 1.4 (H) 0.0 - 1.3 10e9/L    Absolute Eosinophils 0.0 0.0 - 0.7 10e9/L    Absolute Basophils 0.0 0.0 - 0.2 10e9/L    Abs Immature Granulocytes 0.1 0 - 0.4 10e9/L    Absolute Nucleated RBC 0.0    Procalcitonin   Result Value Ref Range    Procalcitonin 0.17 ng/ml   Abd/pelvis CT no contrast - Stone Protocol    Narrative    Exam: CT  ABDOMEN PELVIS W/O CONTRAST  4/19/2018 10:45 AM    History: Right flank pain.    Comparison: Aortic aneurysm ultrasound dated 7/8/2014.    Technique: Volumetric acquisition with reconstruction in the axial,  coronal planes through the abdomen and pelvis without contrast.  Radiation dose for this scan was reduced using automated exposure  control, adjustment of the mA and/or kV according to patient size, or  iterative reconstruction technique.    Contrast: None    Findings:   Lung Bases: Bronchiectasis involving the left lower lobe and  atelectasis in both bases. No pleural or pericardial effusion.    Abdomen: Unenhanced liver, spleen, adrenal glands and gallbladder are  normal. Atrophic pancreas. Multiple vascular calcifications in both  kidneys. Additionally, there is a small group of stones in the distal  right ureter, the largest measures 2 x 3 mm (series 2 image 72). There  is mild to moderate associated right hydronephrosis and hydroureter.  No definite left renal or ureteral calculi are seen.    Infrarenal abdominal aortic aneurysm measures 5.2 x 4.5 cm in diameter  (series 2 image 36), this is increased from 3.7 cm on ultrasound dated  7/8/2014. 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    No areas of bowel wall thickening or bowel dilatation. Normal  appendix. No free fluid. No abdominal or pelvic lymphadenopathy.    Bones: No concerning lytic or sclerotic lesions.      Impression    Impression:   1. 2 x 3 mm obstructing stone at the right ureterovesical junction  with mild to moderate associated right hydronephrosis and hydroureter.  2. Infrarenal abdominal aortic aneurysm measures up to 5.2 cm in  maximal dimension, increased from 3.7 cm on 7/8/2014. Aneurysm extends  into both the right and left common iliac arteries.  3. Bronchiectasis in the left lower lobe.    JIMI FLANAGAN MD   UA reflex to Microscopic    Result Value Ref Range    Color Urine Yellow     Appearance Urine Clear     Glucose Urine Negative NEG^Negative mg/dL    Bilirubin Urine Negative NEG^Negative    Ketones Urine 10 (A) NEG^Negative mg/dL    Specific Gravity Urine 1.022 1.003 - 1.035    Blood Urine Negative NEG^Negative    pH Urine 5.0 5.0 - 7.0 pH    Protein Albumin Urine 30 (A) NEG^Negative mg/dL    Urobilinogen mg/dL Normal 0.0 - 2.0 mg/dL    Nitrite Urine Negative NEG^Negative    Leukocyte Esterase Urine Negative NEG^Negative    Source Midstream Urine    Lactic acid   Result Value Ref Range    Lactic Acid 1.4 0.7 - 2.0 mmol/L[MG1.3]                Revision History        User Key Date/Time User Provider Type Action    > MG1.3 4/19/2018  4:21 PM Katelin Escobar PA-C Physician Assistant - C Sign     MG1.2 4/19/2018  4:17 PM Katelin Escobar PA-C Physician Assistant - C      MG1.1 4/19/2018  4:07 PM Katelin Escobar PA-C Physician Assistant - C             Consults by Lupe Lantigua MD at 4/19/2018  5:29 PM     Author:  Lupe Lantigua MD Service:  Cardiology Author Type:  Physician    Filed:  4/19/2018  5:30 PM Date of Service:  4/19/2018  5:29 PM Creation Time:  4/19/2018  5:28 PM    Status:  Signed :  Lupe Lantigua MD (Physician)     Consult Orders:    1. Cardiology IP Consult: Patient to be seen: Routine - within 24 hours; Cad , was planned to get the cath today but got admitted with obstructive uropathy , also have 5.2 cm AAA; Consultant may enter orders: Yes [[id number]] ordered by Sylvia Carney MD at 04/19/18 1346                Patient is currently pain-free; do not anticipate any medical changes at present.  Will plan to reschedule outpatient coronary angiogram when current problem resolved.  Thank you.[CD1.1]     Revision History        User Key Date/Time User Provider Type Action    > CD1.1 4/19/2018  5:30 PM Lupe Lantigua MD Physician Sign                     Progress Notes - Physician (Notes from  04/18/18 through 04/21/18)      Progress Notes by Gemma Crouch at 4/21/2018 11:12 AM     Author:  Gemma Crouch Service:  (none) Author Type:      Filed:  4/21/2018  3:41 PM Date of Service:  4/21/2018 11:12 AM Creation Time:  4/21/2018 11:12 AM    Status:  Addendum :  Gemma Crouch ()         SWS  D:  Per MD update, pt is ready to return to his facility today:  [facility].  I:  SW attempted to reach [facility] staff multiple times by phone, however, there is no answer [phone].  P:  SW will continue to attempt to reach [facility] staff to discuss pt return.[LL1.1]    Update:  SW also attempted to reach [facility] staff by calling [phone]; still no answer.[LL1.2]      Update:  At 1500, SW reached [facility] on call liaison Irma [phone].  Irma will contact [facility] nursing staff to make sure they can accept pt back today and call SW back with an update asap.  Pt is in room very frustrated that he has not gotten back to his SNF today since SW has been unable to reach SNF staff.[LL1.3]        1540 update:  [facility] can accept pt back today.  Since SW was unable to reach [employer], [facility]  Irma at [facility] updated with time.  Orders faxed.  Pt has MA, so transport should be covered.[LL1.4]     Revision History        User Key Date/Time User Provider Type Action    > LL1.4 4/21/2018  3:41 PM Gemma Crouch  Addend     LL1.3 4/21/2018  3:06 PM Gemma Crouch  Addend     LL1.2 4/21/2018 11:46 AM Gemma Crouch  Addend     LL1.1 4/21/2018 11:15 AM Gemma Crouch  Sign            Progress Notes by Josafat Szymanski MD at 4/21/2018  9:46 AM     Author:  Josafat Szymanski MD Service:  Urology Author Type:  Physician    Filed:  4/21/2018  9:48 AM Date of Service:  4/21/2018  9:46 AM Creation Time:  4/21/2018  9:46 AM    Status:  Signed :  Nessa  MD Josafat (Physician)         UROLOGY BRIEF NOTE  RESTING COMFORTABLY, V.S.S., WBC 8.9 CLINICALLY STABLE.  WILL FOLLOWUP WITH DR BAKER IN 3 WKS . CALL THE OFFICE FOR AN APPT [phone]. REST PER PRIMARY SERVICE. SIGN OFF[RU1.1]     Revision History        User Key Date/Time User Provider Type Action    > RU1.1 4/21/2018  9:48 AM Josafat Szymanski MD Physician Sign            Progress Notes by Saeed Irene MD at 4/21/2018  8:35 AM     Author:  Saeed Irene MD Service:  Surgery Author Type:  Physician    Filed:  4/21/2018  8:39 AM Date of Service:  4/21/2018  8:35 AM Creation Time:  4/21/2018  8:35 AM    Status:  Signed :  Saeed Irene MD (Physician)         Vascular surgery plans per Dr. Jensen's previous note. CTA abdomen and pelvis in 6 months. Vascular service will arrange for follow up. Will sign off.     Saeed Irene M.D.[KK1.1]     Revision History        User Key Date/Time User Provider Type Action    > KK1.1 4/21/2018  8:39 AM Saeed Irene MD Physician Sign            Progress Notes by Tresa Roberts APRN CNP at 4/20/2018 12:51 PM     Author:  Tresa Roberts APRN CNP Service:  Cardiology Author Type:  Nurse Practitioner    Filed:  4/20/2018  2:05 PM Date of Service:  4/20/2018 12:51 PM Creation Time:  4/20/2018 12:51 PM    Status:  Attested :  Tresa Roberts APRN CNP (Nurse Practitioner)    Cosigner:  Lupe Lantigua MD at 4/20/2018  5:28 PM        Attestation signed by Lupe Lantigua MD at 4/20/2018  5:28 PM        Physician Attestation   Lupe MOSHER, have reviewed and discussed with the advanced practice provider their history, physical and plan for Nacho A Jake. I did not participate in a shared visit by interviewing or examining the patient and this should be billed as an advanced practice provider only visit.    Lupe Lantigua  Date of Service (when I saw the patient): I did not  personally see this patient today.                               [facility]    Cardiology Progress Note    Date of Service (when I saw the patient):[NT1.1] 04/20/2018     Assessment & Plan[NT1.2]   Nacho Joseph is a 82 year old male who was admitted on 4/19/2018 for an elective coronary angiogram ordered by Dr. Hossein Burnett. He presented with right flank pain, 9/10 and was found by urology to have an infected right ureteral stone. He underwent right ureteral stent placement, the plan is to keep the stent in for 1-2 week, return to OR for right ureteroscopy, stone extraction and stent removal. He has a known infrarenal AAA and a right carotid bruit which is scheduled to be checked by ultrasound 4/24 along with an aortoiliac US.    Hypertension  -His blood pressures[NT1.1] are[NT1.3] soft.[NT1.1]   D/C[NT1.4] amlodipine.     CAD[NT1.1]  -[NT1.4]He will need a coronary evaluation and carotid US per Dr. Hossein Burnett[NT1.1]. He continues to be[NT1.4] hemodynamically stable,[NT1.1] we[NT1.4] will need to wait until stent removed and infection cleared and renal function allows.[NT1.1]  -Echo shows EF >70%, LVH, mod aortic root dilation and mildly dilated ascending aorta.   ASA, statin[NT1.3]      AAA[NT1.1]  -[NT1.3]Infrarenal AAA measures 5.2 x 4.5 on CT, but noted to be 4.6 on transverse plane. Calcified iliac arteries.[NT1.1]  - echo shows mod aortic root dilation (4.6) and mildly dilated ascending aorta (4.1).   -[NT1.3]Seen by vascular surgery, plan to reassess CTA chest/abd and pelvis in 6 months.    Complete heart block  S/p PPM. Device check 4/16 showed one mode switch.  5 NSVT since 2015. Battery life 4 years[NT1.1]  -, EKG shows V paced rate of 102.   -Would recommend tele[NT1.3].  Ordered an[NT1.4] increase[NT1.3] from 25 to 50 in[NT1.4] beta blocker[NT1.3] tomorrow[NT1.4].[NT1.3]     Keep follow up OV with NORRIS on 4/27, if he is still inpatient, this will need to be rescheduled.  Cardiology will sign off.[NT1.4]    Interval History[NT1.2]   Ureter stent placed. Denies CP, appears comfortable in bed on RA[NT1.3]    Physical Exam   Temp: 98.5  F (36.9  C) Temp src: Temporal BP: 90/68 Pulse: 96 Heart Rate: 102 Resp: 13 SpO2: 95 % O2 Device: None (Room air) Oxygen Delivery: 6 LPM  Vitals:    04/19/18 0935 04/20/18 0600   Weight: 63 kg (138 lb 14.2 oz) 63.3 kg (139 lb 9.6 oz)[NT1.2]     Vital Signs with Ranges[NT1.1]  Temp:  [97.3  F (36.3  C)-99.6  F (37.6  C)] 98.5  F (36.9  C)  Pulse:  [68-96] 96  Heart Rate:  [] 102  Resp:  [13-22] 13  BP: ()/(62-89) 90/68  SpO2:  [91 %-100 %] 95 %  I/O last 3 completed shifts:  In: 1100 [I.V.:1100]  Out: 300 [Urine:300][NT1.2]    Constitutional     alert and oriented, in no acute distress.     Skin     warm and dry to touch    ENT     no pallor or cyanosis    Neck    Supple, JVP normal    Chest     no tenderness to palpation    Lungs  clear to auscultation     Cardiac  regular rhythm, S1 normal, S2 casi[NT1.1]l[NT1.3]    Abdomen     abdomen soft    Extremities and Back     No edema observed.        Neurological     no gross motor deficits noted, affect appropriate, oriented to time, person and place.[NT1.1]    Medications       acetaminophen (TYLENOL) tablet 1,000 mg  1,000 mg Oral TID     amLODIPine  5 mg Oral Daily     aspirin EC  81 mg Oral Daily     atorvastatin  40 mg Oral Daily     cefTRIAXone  1 g Intravenous Q24H     isosorbide mononitrate  30 mg Oral Daily     lisinopril  2.5 mg Oral Daily     metoprolol succinate  25 mg Oral Daily     senna-docusate  1 tablet Oral BID    Or     senna-docusate  2 tablet Oral BID     sodium chloride (PF)  3 mL Intracatheter Q8H     sodium chloride (PF)  3 mL Intracatheter Q8H     sodium chloride   Intravenous Once       Data[NT1.2]   Results for orders placed or performed during the hospital encounter of 04/19/18 (from the past 24 hour(s))   Cardiology IP Consult: Patient to be seen: Routine - within 24  hours; Cad , was planned to get the cath today but got admitted with obstructive uropathy , also have 5.2 cm AAA; Consultant may enter orders: Yes    Lupe Thompson MD     4/19/2018  5:30 PM  Patient is currently pain-free; do not anticipate any medical   changes at present.  Will plan to reschedule outpatient coronary angiogram when   current problem resolved.  Thank you.   Vascular Surgery IP Consult: Patient to be seen: Routine - within 24 hours; AAA 5.2 sm , was scheduled to get cath but has developed obstructive uropathy; Consultant may enter orders: Yes    Ravi Valentin MD     4/20/2018  8:04 AM  The patient is currently off the floor for placement of a right   ureteral stent.  Full consult to be rendered later today.    IMPRESSION:  1.  Juxtarenal AAA read out as 5.2 cm in diameter on yesterday's   noncontrasted CT scan of the abdomen and pelvis.  On true   orthogonal view I believe the aneurysmal diameter is closer to   4.6 cm in transverse plane.  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    The iliac arteries are also heavily calcified.    2.  Possible ascending aortic aneurysm estimated to be 4 cm on   aortography several years ago.  This has not been formally   checked.    3.  Obstructive uropathy    4.  Coronary artery disease currently being evaluated for   coronary angiography    PLAN:  Complete the urologic and cardiac workup.  No plans for vascular   surgical intervention for the AAA this admission.  Once his renal   function normalizes we will obtain a CTA of the chest, abdomen,   and pelvis in 6 months.    Casey Jensen M.D.   Urology IP Consult: onstructive uropathy; Consultant may enter orders: Yes; Patient to be seen: Routine - within 24 hours    Katelin Márquez PA-C     4/19/2018  4:21 PM  [facility]    Urology Consultation     Date of Admission:  4/19/2018    Assessment & Plan   Nacho Joseph is a 82 year old male who was admitted on   4/19/2018. I was asked to see the patient for right distal   ureteral stones with questionable UTI.    Plan: Flomax and IV fluids, strain urine. Will have RN obtain a   bladder scan.   NPO at midnight for cystoscopy, right ureteral stent placement   tomorrow AM.   Continue pain control and abx for now.   Page urology asap if spikes fever or shaking chills     Katelin Escobar PA-C  [facility]  [address] [place] [address]  [phone]  [email or web address]  Text Page (7am to 5pm)    Code Status    Full Code    Reason for Consult   Reason for consult: I was asked by Dr. Carney to evaluate this   patient for right distal obstructing stones with   hydroureteronephrosis, questionable UTI. .    Primary Care Physician   Paulino Lynch    Chief Complaint   Right flank pain     History is obtained from the patient    History of Present Illness   Nacho Joseph is a 82 year old male who presents with   worsening right renal colic since Monday. On CT he is found to   have a collection of stones within his right distal ureter, the   largest of which measuring 3mm, and associated left   hydroureteronephrosis. The patient is somewhat of a poor   historian and requires an  so it is difficult to   obtain a through and comprehensive history.     Reports pain began earlier this week and has gradually become   more severe. Denies hx of stones in the past, hematuria, hx of   UTI, does not follow with a urologist. Most recent temp is 99.6F.       UA negative for signs of infection  WBC 17  LA 1.4  Creat 1.5  Hypotensive     Past Medical History   I have reviewed this patient's medical history and updated it   with pertinent information if needed.   Past Medical History:   Diagnosis Date     AAA (abdominal  aortic aneurysm) (H) 7/8/2014    4.1 cm     Anemia      Atrioventricular block, complete (HEART) 7/8/2014    S/p PPM 7/2014     Bradycardia      CAD (coronary artery disease) 2/9/2015     Chest pain 1/20/2015     COPD (chronic obstructive pulmonary disease) (H)      Depression 7/21/2014     Depressive disorder      HTN (hypertension) 7/8/2014     Hyperlipidemia 7/8/2014     Hypertension      Ischemic cardiomyopathy 2/9/2015     Lung abscess (H)      Syncope        Past Surgical History   I have reviewed this patient's surgical history and updated it   with pertinent information if needed.  Past Surgical History:   Procedure Laterality Date     CORONARY ANGIOGRAPHY ADULT ORDER  2/20/15    medical managed     IMPLANT PACEMAKER  7/7/14    dual chamber       Prior to Admission Medications   Prior to Admission Medications   Prescriptions Last Dose Informant Patient Reported? Taking?   Acetaminophen (TYLENOL PO) 4/17/2018 Nursing Home Yes Yes   Sig: Take 1,000 mg by mouth 3 times daily   Pseudoephedrine-Guaifenesin (MUCINEX D MAX STRENGTH PO) 4/17/2018   Nursing Home Yes Yes   Sig: Take 800 mg by mouth daily    amLODIPine (NORVASC) 5 MG tablet 4/16/2018 [facility] No Yes   Sig: Take 1 tablet (5 mg) by mouth daily   aspirin 81 MG tablet 4/17/2018 [facility] No Yes   Sig: Take 1 tablet (81 mg) by mouth daily   atorvastatin (LIPITOR) 40 MG tablet 4/16/2018 [facility] No Yes     Sig: Take 1 tablet (40 mg) by mouth daily   doxycycline (VIBRAMYCIN) 100 MG capsule 4/17/2018 [facility]   Yes Yes   Sig: Take 100 mg by mouth 2 times daily   isosorbide mononitrate (IMDUR) 30 MG 24 hr tablet 4/17/2018   [facility] No Yes   Sig: Take 1 tablet (30 mg) by mouth daily   lisinopril (PRINIVIL/ZESTRIL) 2.5 MG tablet 4/17/2018 Nursing   Home No Yes   Sig: Take 1 tablet (2.5 mg) by mouth daily   magnesium hydroxide (MILK OF MAGNESIA) 400 MG/5ML suspension    Nursing Home Yes Yes   Sig: Take 30 mLs by mouth daily as needed for  constipation or   heartburn   metoprolol succinate (TOPROL-XL) 100 MG 24 hr tablet 4/17/2018   longterm No Yes   Sig: Take 1 tablet (100 mg) by mouth daily   nitroglycerin (NITROSTAT) 0.4 MG SL tablet  Nursing Home No Yes   Sig: Place 1 tablet (0.4 mg) under the tongue every 5 minutes as   needed for chest pain      Facility-Administered Medications: None     Allergies   No Known Allergies    Social History   I have reviewed this patient's social history and updated it with   pertinent information if needed. Nacho Lisuellenky  reports   that he quit smoking about 5 years ago. He has never used   smokeless tobacco. He reports that he does not drink alcohol or   use illicit drugs.    Family History   I have reviewed this patient's family history and updated it with   pertinent information if needed.   Family History   Problem Relation Age of Onset     Other - See Comments Mother      old age     HEART DISEASE Father      unknown       Review of Systems   The 10 point Review of Systems is negative other than noted in   the HPI or here.     Physical Exam   Temp: 98  F (36.7  C) Temp src: Oral BP: 96/63 Pulse: 86 Heart   Rate: 86 Resp: 16 SpO2: 93 % O2 Device: None (Room air)    Vital Signs with Ranges  Temp:  [98  F (36.7  C)-99.6  F (37.6  C)] 98  F (36.7  C)  Pulse:  [68-86] 86  Heart Rate:  [83-86] 86  Resp:  [16-17] 16  BP: ()/(59-92) 96/63  SpO2:  [93 %-99 %] 93 %  138 lbs 14.24 oz    Constitutional: Lying in bed, NAD.  at bedside  Eyes: no icterus  ENT: normocephalic, atraumatic   Respiratory: breathing unlabored   Cardiovascular: chest wall symmetric   GI: soft, ND, no tenderness over suprapubic region. Left CVAT   Lymph/Hematologic: no pedal edema   Genitourinary: circ penis. No penoscrotal edema.   Skin: well perfused   Neurologic: no focal deficits  Neuropsychiatric: A&Ox3    Data   Results for orders placed or performed during the hospital   encounter of 04/19/18 (from the past 24  hour(s))   Basic metabolic panel   Result Value Ref Range    Sodium 142 133 - 144 mmol/L    Potassium 4.2 3.4 - 5.3 mmol/L    Chloride 109 94 - 109 mmol/L    Carbon Dioxide 23 20 - 32 mmol/L    Anion Gap 10 3 - 14 mmol/L    Glucose 136 (H) 70 - 99 mg/dL    Urea Nitrogen 31 (H) 7 - 30 mg/dL    Creatinine 1.51 (H) 0.66 - 1.25 mg/dL    GFR Estimate 44 (L) >60 mL/min/1.7m2    GFR Estimate If Black 54 (L) >60 mL/min/1.7m2    Calcium 9.6 8.5 - 10.1 mg/dL   CBC with platelets + differential   Result Value Ref Range    WBC 17.2 (H) 4.0 - 11.0 10e9/L    RBC Count 4.09 (L) 4.4 - 5.9 10e12/L    Hemoglobin 12.7 (L) 13.3 - 17.7 g/dL    Hematocrit 39.0 (L) 40.0 - 53.0 %    MCV 95 78 - 100 fl    MCH 31.1 26.5 - 33.0 pg    MCHC 32.6 31.5 - 36.5 g/dL    RDW 15.5 (H) 10.0 - 15.0 %    Platelet Count 221 150 - 450 10e9/L    Diff Method Automated Method     % Neutrophils 82.6 %    % Lymphocytes 8.8 %    % Monocytes 8.1 %    % Eosinophils 0.0 %    % Basophils 0.1 %    % Immature Granulocytes 0.4 %    Nucleated RBCs 0 0 /100    Absolute Neutrophil 14.2 (H) 1.6 - 8.3 10e9/L    Absolute Lymphocytes 1.5 0.8 - 5.3 10e9/L    Absolute Monocytes 1.4 (H) 0.0 - 1.3 10e9/L    Absolute Eosinophils 0.0 0.0 - 0.7 10e9/L    Absolute Basophils 0.0 0.0 - 0.2 10e9/L    Abs Immature Granulocytes 0.1 0 - 0.4 10e9/L    Absolute Nucleated RBC 0.0    Procalcitonin   Result Value Ref Range    Procalcitonin 0.17 ng/ml   Abd/pelvis CT no contrast - Stone Protocol    Narrative    Exam: CT ABDOMEN PELVIS W/O CONTRAST  4/19/2018 10:45 AM    History: Right flank pain.    Comparison: Aortic aneurysm ultrasound dated 7/8/2014.    Technique: Volumetric acquisition with reconstruction in the   axial,  coronal planes through the abdomen and pelvis without contrast.  Radiation dose for this scan was reduced using automated exposure  control, adjustment of the mA and/or kV according to patient   size, or  iterative reconstruction technique.    Contrast: None    Findings:    Lung Bases: Bronchiectasis involving the left lower lobe and  atelectasis in both bases. No pleural or pericardial effusion.    Abdomen: Unenhanced liver, spleen, adrenal glands and gallbladder   are  normal. Atrophic pancreas. Multiple vascular calcifications in   both  kidneys. Additionally, there is a small group of stones in the   distal  right ureter, the largest measures 2 x 3 mm (series 2 image 72).   There  is mild to moderate associated right hydronephrosis and   hydroureter.  No definite left renal or ureteral calculi are seen.    Infrarenal abdominal aortic aneurysm measures 5.2 x 4.5 cm in   diameter  (series 2 image 36), this is increased from 3.7 cm on ultrasound   dated  7/8/2014. Additionally, common iliac artery aneurysms are also   seen,  measuring 2.1 cm on the right and 1.9 cm on the left (series 2   image  49).    No areas of bowel wall thickening or bowel dilatation. Normal  appendix. No free fluid. No abdominal or pelvic lymphadenopathy.    Bones: No concerning lytic or sclerotic lesions.      Impression    Impression:   1. 2 x 3 mm obstructing stone at the right ureterovesical   junction  with mild to moderate associated right hydronephrosis and   hydroureter.  2. Infrarenal abdominal aortic aneurysm measures up to 5.2 cm in  maximal dimension, increased from 3.7 cm on 7/8/2014. Aneurysm   extends  into both the right and left common iliac arteries.  3. Bronchiectasis in the left lower lobe.    JIMI FLANAGAN MD   UA reflex to Microscopic   Result Value Ref Range    Color Urine Yellow     Appearance Urine Clear     Glucose Urine Negative NEG^Negative mg/dL    Bilirubin Urine Negative NEG^Negative    Ketones Urine 10 (A) NEG^Negative mg/dL    Specific Gravity Urine 1.022 1.003 - 1.035    Blood Urine Negative NEG^Negative    pH Urine 5.0 5.0 - 7.0 pH    Protein Albumin Urine 30 (A) NEG^Negative mg/dL    Urobilinogen mg/dL Normal 0.0 - 2.0 mg/dL    Nitrite Urine Negative NEG^Negative     Leukocyte Esterase Urine Negative NEG^Negative    Source Midstream Urine    Lactic acid   Result Value Ref Range    Lactic Acid 1.4 0.7 - 2.0 mmol/L              Lactic acid level STAT   Result Value Ref Range    Lactic Acid 1.1 0.7 - 2.0 mmol/L   Comprehensive metabolic panel   Result Value Ref Range    Sodium 145 (H) 133 - 144 mmol/L    Potassium 4.3 3.4 - 5.3 mmol/L    Chloride 115 (H) 94 - 109 mmol/L    Carbon Dioxide 25 20 - 32 mmol/L    Anion Gap 5 3 - 14 mmol/L    Glucose 103 (H) 70 - 99 mg/dL    Urea Nitrogen 32 (H) 7 - 30 mg/dL    Creatinine 1.40 (H) 0.66 - 1.25 mg/dL    GFR Estimate 48 (L) >60 mL/min/1.7m2    GFR Estimate If Black 59 (L) >60 mL/min/1.7m2    Calcium 8.8 8.5 - 10.1 mg/dL    Bilirubin Total 0.5 0.2 - 1.3 mg/dL    Albumin 3.0 (L) 3.4 - 5.0 g/dL    Protein Total 6.5 (L) 6.8 - 8.8 g/dL    Alkaline Phosphatase 75 40 - 150 U/L    ALT 11 0 - 70 U/L    AST 12 0 - 45 U/L   CBC with platelets   Result Value Ref Range    WBC 12.5 (H) 4.0 - 11.0 10e9/L    RBC Count 3.49 (L) 4.4 - 5.9 10e12/L    Hemoglobin 11.0 (L) 13.3 - 17.7 g/dL    Hematocrit 33.6 (L) 40.0 - 53.0 %    MCV 96 78 - 100 fl    MCH 31.5 26.5 - 33.0 pg    MCHC 32.7 31.5 - 36.5 g/dL    RDW 15.7 (H) 10.0 - 15.0 %    Platelet Count 167 150 - 450 10e9/L   EKG 12-lead, tracing only   Result Value Ref Range    Interpretation ECG Click View Image link to view waveform and result    XR Surgery VASQUEZ L/T 5 Min Fluoro w Stills    Narrative    SURGERY C-ARM FLUOROSCOPY LESS THAN 5 MINUTES WITH STILLS  4/20/2018  7:55 AM     COMPARISON: None.    HISTORY: Cysto, right retrograde, right stent.    NUMBER OF IMAGES ACQUIRED: 3    VIEWS: 1    FLUOROSCOPY TIME: .3 minutes.      Impression    IMPRESSION: Double-J stent was placed from the right renal pelvis to  the bladder. The catheter is somewhat coiled in the renal pelvis on  the final image.    SEBASTIAN OLSON MD[NT1.5]       Tresa Roberts[NT1.2], APRN, CNP  Cardiology  Pager:   [phone][NT1.1]     Revision History        User Key Date/Time User Provider Type Action    > NT1.4 4/20/2018  2:05 PM Tresa Roberts, APRN CNP Nurse Practitioner Sign     NT1.5 4/20/2018  1:31 PM Tresa Roberts, APRN CNP Nurse Practitioner Sign     NT1.3 4/20/2018  1:18 PM Tresa Roberts, APRN CNP Nurse Practitioner      NT1.2 4/20/2018 12:52 PM [facility] C[facility] Nurse Practitioner      NT1.1 4/20/2018 12:51 PM Tresa Roberts, APRN CNP Nurse Practitioner             Progress Notes by Paulino Bender at 4/20/2018  3:52 PM     Author:  Paulino Bender Service:  Spiritual Health Author Type:      Filed:  4/20/2018  4:00 PM Date of Service:  4/20/2018  3:52 PM Creation Time:  4/20/2018  3:52 PM    Status:  Signed :  Paulino Bender ()         SPIRITUAL HEALTH SERVICES Progress Note  [facility]    , follow up visit. The patient talked about Jew danni and belief in reading the bible. The patient asked for prayer, for the  to pray at a private place and not in the room. The patient talked about Faith and Rastafarian relationships in [place] both difficulties and hope for improved relations.       provided care in presence, non-judgmental listening, and prayed in  office.     Coping and appreciates Rastafarian danni conversation     remains available upon request.     Paulino Bender  Chaplain Resident[DC1.1]     Revision History        User Key Date/Time User Provider Type Action    > DC1.1 4/20/2018  4:00 PM Paulino Bender  Sign            Progress Notes by Olivia Escobar MD at 4/20/2018 12:52 PM     Author:  Olivia Escobar MD Service:  Hospitalist Author Type:  Physician    Filed:  4/20/2018 12:58 PM Date of Service:  4/20/2018 12:52 PM Creation Time:  4/20/2018 12:52 PM    Status:  Signed :  Olivia Escobar MD (Physician)         [facility]    Hospitalist Progress Note    Date of  Service (when I saw the patient): 04/20/2018    Assessment & Plan   Nacho Joseph is a 82 year old male who was admitted on 4/19/2018.  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  Patient was evaluated in the emergency room where he was found to have possible obstructing 2 x 3 mm stone at right ureterovesical junction associated with right hydronephrosis and hydroureter.  He also has known history of AAA but on the current CT scan it has progressed to 5.2 cm in maximal dimension as compared to 3.7 in 2014. patient was admitted for further evaluation and management.        Assessment & Plan        Active Problems:  Obstructive uropathy  WITH CLARITA DUE TO OBSTRUCTIVE UROPATHY HE IS S/P  Cystoscopy, right retrograde, right ureteral stent placement:   CT scan of the abdomen showed 2 x 3 mm obstructing stone at the right ureterovesical junction with mild to moderate associated right hydronephrosis and hydroureter ON ADMISSION   CR REMAINS HIGH AT 1.4 TODAY FROM 1.5 YESTERDAY  ON iv CEFTRIAXONE   ADMISSION UA was negative   Post op management per urology  Continue IVF today   AAA (abdominal aortic aneurysm) (H) (7/8/2014): Patient has history of abdominal aortic aneurysm measuring about 4.1 cm in 2015 it has not been assessed since then as today patient presented with right-sided flank pain CT scan of the abdomen and pelvis was done which is showing infrarenal abdominal aortic aneurysm measuring 5.2 into 4.5 cm in diameter that has increased from 3.7 cm on ultrasound dated July  2014  Vascular surgery consulted and they reviewd the CT and thought the AAA is at 4.6 cm  Recommended CT chest abdomen to be repeated in 6months   HTN (hypertension) (7/8/2014): Patient was initially hypotensive on presentation with blood pressure of 75/59 but now his blood pressure has improved to 121/88 he is on beta-blocker , Lisinopril and Norvasc at home.    Hyperlipidemia (7/8/2014): on Lipitor at home.    Atrioventricular block, complete (HEART) (7/8/2014): Status post pacemaker placement    CAD (coronary artery disease) (2/9/2015): Patient was  recently evaluated by Dr. Marco Burnett from cardiology on April 16 for his history of coronary artery disease, his last coronary angiography was in 2015 which showed chronically occluded proximal right coronary artery.  He also had high-grade stenosis and proximal circumflex and moderate though no flow-limiting disease in the left anterior descending artery.  Cardiology awaiting improveemnt in cr and reschedule cardia cath  Pt is asymptomatic today   Ischemic cardiomyopathy (2/9/2015): Improved, echo done yesterday showed left ventricular systolic function estimated at more than 70%.     -- continue to monitor patient on telemetry  --Continue patient on home dose of aspirin 81 mg p.o. daily, Norvasc 5 mg p.o. daily, Imdur 30 mg p.o. daily, lisinopril 2.5 mg p.o. daily but will decrease his dose of Toprol-XL to 25 mg p.o. daily from 100 mg due to borderline blood pressure.  Will place holding parameters on the blood pressure medication.   Depression (7/21/2014)  -- currently stable , not taking any medications        COPD (chronic obstructive pulmonary disease) (H) (7/21/2014)  -- ex smoker , quit 5 years ago, no active issues , not on any home INH or oxygen.     DVT Prophylaxis: Pneumatic Compression Devices  Code Status: Full Code    Disposition: Expected discharge in 1-2days when OK with URology and cardiology     Olivia Escobar MD  [phone] (P)      Interval  History   Doing OK. C/o pain at the end of urination from stent placement today. No f/c    -Data reviewed today: I reviewed all new labs and imaging results over the last 24 hours. I personally reviewed no images or EKG's today.    Physical Exam   Temp: 98.5  F (36.9  C) Temp src: Temporal BP: 90/68 Pulse: 96 Heart Rate: 102 Resp: 13 SpO2: 95 % O2 Device: None (Room air) Oxygen Delivery: 6 LPM  Vitals:    04/19/18 0935 04/20/18 0600   Weight: 63 kg (138 lb 14.2 oz) 63.3 kg (139 lb 9.6 oz)     Vital Signs with Ranges  Temp:  [97.3  F (36.3  C)-99.6  F (37.6  C)] 98.5  F (36.9  C)  Pulse:  [68-96] 96  Heart Rate:  [] 102  Resp:  [13-22] 13  BP: ()/(62-89) 90/68  SpO2:  [91 %-100 %] 95 %  I/O last 3 completed shifts:  In: 1100 [I.V.:1100]  Out: 300 [Urine:300]    Constitutional: Awake, alert, cooperative, no apparent distress  Respiratory: Clear to auscultation bilaterally, no crackles or wheezing  Cardiovascular: Regular rate and rhythm, normal S1 and S2, and no murmur noted  GI: Normal bowel sounds, soft, non-distended, non-tender  Skin/Integumen: No rashes, no cyanosis, no edema  Other:     Medications       acetaminophen (TYLENOL) tablet 1,000 mg  1,000 mg Oral TID     amLODIPine  5 mg Oral Daily     aspirin EC  81 mg Oral Daily     atorvastatin  40 mg Oral Daily     cefTRIAXone  1 g Intravenous Q24H     isosorbide mononitrate  30 mg Oral Daily     lisinopril  2.5 mg Oral Daily     metoprolol succinate  25 mg Oral Daily     senna-docusate  1 tablet Oral BID    Or     senna-docusate  2 tablet Oral BID     sodium chloride (PF)  3 mL Intracatheter Q8H     sodium chloride (PF)  3 mL Intracatheter Q8H     sodium chloride   Intravenous Once       Data     Recent Labs  Lab 04/20/18  0415 04/19/18  0955   WBC 12.5* 17.2*   HGB 11.0* 12.7*   MCV 96 95    221   * 142   POTASSIUM 4.3 4.2   CHLORIDE 115* 109   CO2 25 23   BUN 32* 31*   CR 1.40* 1.51*   ANIONGAP 5 10   ARELIS 8.8 9.6   * 136*  "  ALBUMIN 3.0*  --    PROTTOTAL 6.5*  --    BILITOTAL 0.5  --    ALKPHOS 75  --    ALT 11  --    AST 12  --        Recent Results (from the past 24 hour(s))   XR Surgery VASQUEZ L/T 5 Min Fluoro w Stills    Narrative    SURGERY C-ARM FLUOROSCOPY LESS THAN 5 MINUTES WITH STILLS  4/20/2018  7:55 AM     COMPARISON: None.    HISTORY: Cysto, right retrograde, right stent.    NUMBER OF IMAGES ACQUIRED: 3    VIEWS: 1    FLUOROSCOPY TIME: .3 minutes.      Impression    IMPRESSION: Double-J stent was placed from the right renal pelvis to  the bladder. The catheter is somewhat coiled in the renal pelvis on  the final image.[ST1.1]            Revision History        User Key Date/Time User Provider Type Action    > ST1.1 4/20/2018 12:58 PM Olivia Escobar MD Physician Sign            Progress Notes by Faviola Akbar at 4/20/2018 11:23 AM     Author:  Faviola Akbar Service:  Spiritual Health Author Type:      Filed:  4/20/2018 11:25 AM Date of Service:  4/20/2018 11:23 AM Creation Time:  4/20/2018 11:23 AM    Status:  Signed :  Faviola Akbar ()         SPIRITUAL HEALTH SERVICES Progress Note  [facility]    Met with pt, per request in chart for spiritual support.  When I introduced myself pt said, \"I don't speak English.  I can't talk to you.\"  I will complete  consult, but chaplains are available if needs arise.  If pt wishes to have a visit when an  is present, please feel free to page a .                                                                                                                                                 Faviola Akbar M.A.  Staff   Pager [phone]  Phone [phone][PL1.1]         Revision History        User Key Date/Time User Provider Type Action    > PL1.1 4/20/2018 11:25 AM Faviola Akbar  Sign            Progress Notes by Corina Lopez, RN at 4/20/2018  9:56 AM     Author:  Corina Lopez, RN Service:   Nursing Author Type:  " "Registered Nurse    Filed:  4/20/2018 10:01 AM Date of Service:  4/20/2018  9:56 AM Creation Time:  4/20/2018  9:56 AM    Status:  Addendum :  Corina Lopez RN (Registered Nurse)         Pt states at discharge, someone should call [employer] for his ride: [phone][LH1.1]  His Address is: [facility], [address] [place] [id number] (  [employer] phone [phone], Fax [phone][LH1.2]    He also states he was sent to the ED instead of his appointment with Dr. Son ([phone]), he would appreciate if this MD was contacted and appointment rescheduled.[LH1.3]       Revision History        User Key Date/Time User Provider Type Action    > LH1.3 4/20/2018 10:01 AM Corina Lopez RN Registered Nurse Addend     LH1.2 4/20/2018  9:58 AM Corina Lopez RN Registered Nurse Addend     LH1.1 4/20/2018  9:57 AM Corina Lopez RN Registered Nurse Sign            ED Provider Notes by Alisson Lazar MD at 4/19/2018  9:26 AM     Author:  Alisson Lazar MD Service:  (none) Author Type:  Physician    Filed:  4/19/2018  2:15 PM Date of Service:  4/19/2018  9:26 AM Creation Time:  4/19/2018  9:34 AM    Status:  Signed :  Alisson Lazar MD (Physician)           History     Chief Complaint:  Flank Pain (right flank pain 9/10 since yesterday here today for heart cath and needs pain checked first, speaks Nauruan  to arrive at 0945)      The history is limited by[RB1.1] a language barrier[RB1.2].[RB1.1] A  was used (Hungarian)[RB1.2].      Nacho Joseph is a 82 year old male who presents with[RB1.1] flank pain. The patient developed intense right side flank pain around 1300 yesterday . The pain is intermittent and usually resolves with Tylenol. However when the Tylenol wears off the pain returns. The pain was very bad this morning, which he rates as 8-9/10 and describes as \"stabbing\". He does have nausea but no " "vomiting. The patient doesn't have diarrhea but hasn't had a bowel movement in 3 days. He has had dysuria but not hematuria, and no fever, testicular, or penile pain. The patient doesn't have a history of kidney stones.[RB1.3]  History obtained through a .[TP1.1]      Allergies:  No Known Allergies     Medications:[RB1.1]    Acetaminophen  Albuterol  Amlodipine   Aspirin 81 MG   Atorvastatin   Doxycycline   Isosorbide mononitrate   Lisinopril   Metoprolol succinate  Nitroglycerin[RB1.4]     Past Medical History:[RB1.1]    AAA (abdominal aortic aneurysm) (H)  Anemia   Atrioventricular block, complete (HEART)  Bradycardia   CAD   Chest pain   COPD   Depression   Depressive disorder   HTN   Hyperlipidemia  Hypertension   Ischemic cardiomyopathy  Lung abscess (H)   Syncope[RB1.4]     Past Surgical History:[RB1.1]    Implant Pacemaker[RB1.4]    Family History:[RB1.1]    Heart Disease[RB1.4]    Social History:  Marital Status:   [2][RB1.1]  Smoking status: Former Smoker  Alcohol use: No[RB1.4]       Review of Systems   Constitutional: Negative for[RB1.1] fever[RB1.3].   Gastrointestinal: Positive for[RB1.1] abdominal pain[RB1.3],[RB1.1] constipation[RB1.3] and[RB1.1] nausea[RB1.3]. Negative for[RB1.1] diarrhea[RB1.3] and[RB1.1] vomiting[RB1.3].   Genitourinary: Positive for[RB1.1] dysuria[RB1.3]. Negative for[RB1.1] hematuria[RB1.3],[RB1.1] penile pain[RB1.3] and[RB1.1] testicular pain[RB1.3].[RB1.1]   All other systems reviewed and are negative[RB1.3].        Physical Exam   First Vitals:[RB1.1]  BP: 99/77  Pulse: 85  Heart Rate: 85  Temp: 98.6  F (37  C)  Resp: 16  Height: 180 cm (5' 10.87\")  Weight: 63 kg (138 lb 14.2 oz)  SpO2: 96 %[RB1.5]      Physical Exam[RB1.1]  Nursing note and vitals reviewed.    Constitutional:  Appears well-developed and well-nourished,[RB1.3] un[TP1.2]comfortable.    HENT:    No evidence of facial or scalp trauma.      Nose normal.  No discharge.      Oropharynx is clear and " moist.  Eyes:    Conjunctivae are normal without injection. No lid droop.     Pupils are equal, round, and reactive to light.      Right eye exhibits no discharge. Left eye exhibits no discharge.      No scleral icterus.  Lymph:  No enlarged or tender cervical or submandibular lymph nodes.   Cardiovascular:[RB1.3]  Pacemaker[RB1.6] left chest[RB1.7].[RB1.8]     Normal rate, regular rhythm with normal S1 and S2.      Normal heart sounds and peripheral pulses 2+ and equal.       No murmur or sahara.  Pulmonary:  Effort normal and breath sounds clear to auscultation bilaterally. No respiratory distress.  No stridor.     No wheezes. No rales. No chest wall tenderness.    GI:    Soft. No distension and no mass.[RB1.3] Right flank[RB1.8] tenderness.      No rebound and no guarding.  No HSM.  Musculoskeletal:  Normal range of motion. No extremity deformity.     No edema and no tenderness.  No cyanosis.   Neurological:   Alert and oriented. No cranial nerve deficit, no facial droop.     Exhibits good muscle tone. Coordination normal. Gait is steady.     GCS eye subscore is 4. GCS verbal subscore is 5.      GCS motor subscore is 6.   Skin:    Skin is warm and dry. No rash noted. No diaphoresis.      No erythema. No pallor.  No lesions.  Psychiatric:   Behavior is normal. Appropriate mood and affect.     Judgment and thought content normal.[RB1.3]         Emergency Department Course     Imaging:[RB1.1]  Radiographic findings were communicated with the patient who voiced understanding of the findings.[RB1.9]  CT Abdomen/Pelvis w/o IV contrast-stone protocol:   1. 2 x 3 mm obstructing stone at the right ureterovesical junction  with mild to moderate associated right hydronephrosis and hydroureter.  2. Infrarenal abdominal aortic aneurysm measures up to 5.2 cm in  maximal dimension, increased from 3.7 cm on 7/8/2014. Aneurysm extends  into both the right and left common iliac arteries.  3. Bronchiectasis in the left lower lobe  As per radiology.[RB1.10]       Laboratory:[RB1.1]  BMP: Glucose[RB1.9] 136[RB1.11],[RB1.9] BUN 31, GFR Estimate 44[RB1.11], Creatinine:[RB1.9] 1.15,[RB1.11] o/w WNL    CBC: WBC: 17.2, HGB: 12.7, PLT: 221    UA[RB1.9] reflex to[RB1.12] micro[RB1.9]scopic[RB1.12]:[RB1.9] Protein Albumin 30, Urinketon 10,[RB1.10] o/w negative[RB1.9]    1120 Lactic Acid: 1.4[RB1.10]    Procalcitonin: 0.17[RB1.13]    Interventions:[RB1.1]  1019 NS 1L IV Bolus[RB1.9]   1052 Tylenol 975 mg PO[RB1.11]  1321 Flomax 0.4 mg PO  1323 Rocephin 1 g in  mL IV infusion  1321 Fentanyl 25 mcg IV[RB1.13]    Emergency Department Course:  Nursing notes and vitals reviewed.[RB1.1]     0955[RB1.7] I performed an exam of the patient as documented above.     IV inserted. Medicine administered as documented above.     Blood drawn. This was sent to the lab for further testing, results above.[RB1.1]    The patient provided a urine sample here in the emergency department. This was sent for laboratory testing, findings above.[RB1.9]     The patient was sent for a[RB1.1] CT of the abdomen and pelvis[RB1.9] while in the emergency department, findings above.[RB1.1]     1203[RB1.14] I rechecked the patient and discussed the results of his workup thus far.[RB1.1]     1213 I consulted with Dr. Carney, Hospitalist, regarding the patient's history and presentation here in the emergency department.[RB1.15]    1218[RB1.6] I rechecked the patient and discussed the results of his workup thus far.[RB1.1]     Findings and plan explained to the[RB1.15] patient[RB1.6] who consents to admission. Discussed the patient with [RB1.15] Rommel[RB1.6], who will admit the patient to a[RB1.15]n inpatient med tele[RB1.6] bed for further monitoring, evaluation, and treatment.[RB1.15]      Impression & Plan         Medical Decision Making:[RB1.1]  Patient comes in with right flank pain. His blood pressure was noted to be in the upper 80s[TP1.1], but[RB1.13] here[TP1.1] was[RB1.13]  in[TP1.1] the[RB1.13] low 90s. In looking at his chart, that has been his normal since he had influenza this winter. He was scheduled to have a cardiac catheterization today to evaluate his heart to find out why his blood pressure is running low. His urine came back essentially normal[TP1.1]. H[RB1.13]cecilia has acute new renal failure with a GFR reduced at 44 and his CBC shows have a white count of 17.2[TP1.1]. O[RB1.13]therwise[TP1.1] labs are[RB1.13] normal. He has not had[TP1.1] a[RB1.13] fever. He has been nauseated and has vomited a few times with this pain. I was suspicious of a kidney stone[TP1.1]. H[RB1.13]cecilia was given IV fluids and a CT was obtained[TP1.1],[RB1.13] which confirmed a obstructing right 3 mm distal UVJ stone. This may be the cause for his acute renal failure. The CT scan also showed a 5.2 cm known abdominal aneurysm, larger than the last CT when it was 3.7 cm in 2014. It is possible that the aneurysm could be affecting his renal function as well. His blood pressure ha[TP1.1]s[RB1.13] improved with IV fluids. Because of his low blood pressure[TP1.1],[RB1.13] however[TP1.1],[RB1.13] I could not give him narcotic pain medicine. Tylenol seemed to help him a lot and he was given 1000 mg of Tylenol[TP1.1],[RB1.13] with good relief of his pain.[TP1.1] The pain[RB1.13] started coming back and now his blood pressure is 122. Flomax has been ordered[TP1.1],[RB1.13] 0.4 mg orally[TP1.1],[RB1.13] and 25 mcg of fentanyl. I talked with Dr. Carney who will be admitting him to a [facility] telemetry bed. Vascular surgery will be consulted along with cardiology. I did postpone his catheterization today[TP1.1] so[RB1.13] this stone can be managed. Urology can be consulted as well. We will have to follow his renal function closely and make sure it improves with fluids and with getting the stone out. Because of his elevated white count[TP1.1],[RB1.13] which may be secondary to the pain and vomiting, he was given a gram of  Rocephin IV.[TP1.1]      Diagnosis:[RB1.1]    ICD-10-CM   1. Calculus of ureter N20.1      2. Acute renal failure, unspecified acute renal failure type (H) N17.9   3. Abdominal aortic aneurysm (AAA) without rupture (H) I71.4      4. Leukocytosis, unspecified type D72.829   5. Hypotension, unspecified hypotension type I95.9[RB1.5]          Disposition:[RB1.1]  Admitted to a[RB1.15] inpatient med tele[RB1.6] bed[RB1.15].[RB1.6]    Vascular surgery consultation, cardiology consultation, urology consultation. Monitor renal function and IV fluids. Caution with pain medication with his low blood pressures.[TP1.1]    Discharge Medications:[RB1.1]  Fernando MOSHER am serving as a scribe on 4/19/2018 at 9:55 AM to personally document services performed by Alisson Lazar MD based on my observations and the provider's statements to me.[RB1.3]       Fernando Gee  4/19/2018    EMERGENCY DEPARTMENT[RB1.1]       Alisson Lazar MD  04/19/18 1415  [TP1.3]     Revision History        User Key Date/Time User Provider Type Action    > TP1.3 4/19/2018  2:15 PM Alisson Lazar MD Physician Sign     TP1.2 4/19/2018  2:14 PM Alisson Lazar MD Physician      RB1.13 4/19/2018  2:06 PM Fernando Gee Scribe Share     RB1.2 4/19/2018  1:34 PM Fernando Geeibcecilia Share     TP1.1 4/19/2018  1:25 PM Alisson Lazar MD Physician Share     [N/A] 4/19/2018 12:56 PM Fernando Geeibcecilia Share     RB1.5 4/19/2018 12:28 PM Fernando Gee Share     RB1.6 4/19/2018 12:26 PM Fernando Gee      [N/A] 4/19/2018 12:15 PM Fernando Gee Scribe Share     RB1.14 4/19/2018 12:14 PM Fernando Geeibcecilia Share     RB1.15 4/19/2018 12:13 PM Fernando Gee      [N/A] 4/19/2018 12:08 PM Fernando Gee Share     RB1.12 4/19/2018 11:44 AM Fernando Gee Share     RB1.10 4/19/2018 11:42 AM Fernando Gee Share     RB1.11 4/19/2018 10:58 AM Fernando Gee Share     RB1.9  4/19/2018 10:23 AM Fernando Gee Scribe Share     RB1.8 4/19/2018 10:14 AM Fernando Gee Scribe Share     [N/A] 4/19/2018 10:11 AM Fernando eGe Scribe Share     RB1.7 4/19/2018 10:11 AM Fernando Gee Scribe Share     RB1.3 4/19/2018  9:55 AM Fernando Gee Scribe      RB1.4 4/19/2018  9:44 AM Fernando Gee Scribe Share     RB1.1 4/19/2018  9:35 AM Fernando Gee Scribe Share            Progress Notes by Cinthya Wilson RN at 4/19/2018  1:54 PM     Author:  Cinthya Wilson RN Service:  Skilled Nursing Author Type:  Registered Nurse    Filed:  4/19/2018  1:54 PM Date of Service:  4/19/2018  1:54 PM Creation Time:  4/19/2018  1:54 PM    Status:  Signed :  Cinthya Wilson RN (Registered Nurse)         RECEIVING UNIT ED HANDOFF REVIEW    ED Nurse Handoff Report was reviewed by: Cinthya Wilson on April 19, 2018 at 1:54 PM[RS1.1]            Revision History        User Key Date/Time User Provider Type Action    > RS1.1 4/19/2018  1:54 PM Cinthya Wilson RN Registered Nurse Sign            ED Notes by Jessica Ratliff RN at 4/19/2018  1:44 PM     Author:  Jessica Ratliff RN Service:  Nursing Author Type:  Registered Nurse    Filed:  4/19/2018  1:50 PM Date of Service:  4/19/2018  1:44 PM Creation Time:  4/19/2018  1:50 PM    Status:  Signed :  Jessica Ratliff RN (Registered Nurse)         [facility]  ED Nurse Handoff Report    ED Chief complaint: Flank Pain (right flank pain 9/10 since yesterday here today for heart cath and needs pain checked first, speaks Tongan  to arrive at 0945)      ED Diagnosis:   Final diagnoses:   Calculus of ureter - 3 mm right UVJ   Acute renal failure, unspecified acute renal failure type (H)   Abdominal aortic aneurysm (AAA) without rupture (H) - 5.2 cm   Leukocytosis, unspecified type   Hypotension, unspecified hypotension type - chronic       Code Status: Full Code    Allergies: No Known Allergies    Activity level -  "Baseline/Home:  Stand with Assist    Activity Level - Current:   Stand with Assist     Needed?: Yes, Swedish  herre until 2 p and another one ordered for 2 p    Isolation: No  Infection: Not Applicable    Bariatric?: No    Vital Signs:   Vitals:    04/19/18 1215 04/19/18 1230 04/19/18 1245 04/19/18 1300   BP: 120/82 109/72 (!) 109/92 122/89   Pulse:       Resp:       Temp:       SpO2: 98% 97% 95% 97%   Weight:       Height:           Cardiac Rhythm: ,        Pain level: 0-10 Pain Scale: 7    Is this patient confused?: No     Patient Report: Initial Complaint:Here for cardiac angio, told staff intense right side flank pain around 1300 yesterday . The pain is intermittent and usually resolves with Tylenol. However when the Tylenol wears off the pain returns. The pain was very bad this morning, which he rates as 8-9/10 and describes as \"stabbing\".  Sent to ER for eval  Focused Assessment: R flank pain  Tests Performed: labs, xray, ct  Abnormal Results: obstructing R sided stone, elevated wbc, bronchiolectasis, AAA (known), elevated creat  Treatments provided: 1 liter NS, tylenol 1 gm, fentanyl 25 mcg, flomax, rocephin    Family Comments: none, has family phone numbers with him, lives in assisted living    OBS brochure/video discussed/provided to patient: N/A    ED Medications:   Medications   sodium chloride 0.9% infusion (not administered)   0.9% sodium chloride BOLUS (0 mLs Intravenous Stopped 4/19/18 1130)   acetaminophen (TYLENOL) tablet 1,000 mg (975 mg Oral Given 4/19/18 1052)   tamsulosin (FLOMAX) capsule 0.4 mg (0.4 mg Oral Given 4/19/18 1321)   cefTRIAXone (ROCEPHIN) 1 g vial to attach to  mL bag for ADULTS or NS 50 mL bag for PEDS (1 g Intravenous New Bag 4/19/18 1323)   fentaNYL (PF) (SUBLIMAZE) injection 25 mcg (25 mcg Intravenous Given 4/19/18 1321)       Drips infusing?:  No    For the majority of the shift this patient was Green.   Interventions performed were " "none  .    Severe Sepsis OR Septic Shock Diagnosis Present: No      ED NURSE PHONE NUMBER: *[phone][PS1.1]            Revision History        User Key Date/Time User Provider Type Action    > PS1.1 4/19/2018  1:50 PM Jessica Ratliff RN Registered Nurse Sign            ED Notes by Jessica Ratliff RN at 4/19/2018 11:52 AM     Author:  Jessica Ratliff RN Service:  Nursing Author Type:  Registered Nurse    Filed:  4/19/2018 12:15 PM Date of Service:  4/19/2018 11:52 AM Creation Time:  4/19/2018 12:15 PM    Status:  Signed :  Jessica Ratliff RN (Registered Nurse)         Spoke with cath lab staff[PS1.1]      Revision History        User Key Date/Time User Provider Type Action    > PS1.1 4/19/2018 12:15 PM Jessica Ratliff RN Registered Nurse Sign            Progress Notes by Corina Plaza RN at 4/19/2018  9:43 AM     Author:  Corina Plaza RN Service:  (none) Author Type:  Registered Nurse    Filed:  4/19/2018  9:51 AM Date of Service:  4/19/2018  9:43 AM Creation Time:  4/19/2018  9:43 AM    Status:  Signed :  Corina Plaza RN (Registered Nurse)          called and stated they could not be here until 0945--had been requested for 0800.  Welcome desk registered pt by  phone and welcome desk called [facility] to say pt c/o severe right back pain.  I took an  phone with interpterer #[phone].  Pt stated he has had right back pain at 9/10 since 1PM yesterday.  States he has not had pain like this before and it has been consistent since yesterday and only had brief minimal pain relief with Tylenol.  Asked pt if he thought it was something that should be taken care of before his heart cath and gave a strong \"Yes\"!  Pt taken to ED to address his pain at 0920.  Pt very appreciative.[LL1.1]     Revision History        User Key Date/Time User Provider Type Action    > LL1.1 4/19/2018  9:51 AM Corina Plaza RN Registered Nurse Sign            ED Notes by " Schuerman, Keith, RN at 4/19/2018  9:28 AM     Author:  Ketih Morris RN Service:  (none) Author Type:  Registered Nurse    Filed:  4/19/2018  9:28 AM Date of Service:  4/19/2018  9:28 AM Creation Time:  4/19/2018  9:28 AM    Status:  Signed :  Keith Morris RN (Registered Nurse)         Bed: ED23  Expected date:   Expected time:   Means of arrival:   Comments:  triage     Revision History        User Key Date/Time User Provider Type Action    > MS1.1 4/19/2018  9:28 AM Keith Morris RN Registered Nurse Sign                  Procedure Notes     No notes of this type exist for this encounter.      Progress Notes - Therapies (Notes from 04/18/18 through 04/21/18)     No notes of this type exist for this encounter.

## 2018-10-29 ENCOUNTER — ALLIED HEALTH/NURSE VISIT (OUTPATIENT)
Dept: CARDIOLOGY | Facility: CLINIC | Age: 83
End: 2018-10-29
Payer: MEDICAID

## 2018-10-29 DIAGNOSIS — Z95.0 CARDIAC PACEMAKER IN SITU: Primary | ICD-10-CM

## 2018-10-29 PROCEDURE — 93294 REM INTERROG EVL PM/LDLS PM: CPT | Performed by: INTERNAL MEDICINE

## 2018-10-29 PROCEDURE — 93296 REM INTERROG EVL PM/IDS: CPT | Performed by: INTERNAL MEDICINE

## 2018-10-29 NOTE — MR AVS SNAPSHOT
After Visit Summary   10/29/2018    Nacho Joseph    MRN: [id number]           Patient Information     Date Of Birth          1935        Visit Information        Provider Department      10/29/2018 2:30 PM JERI KEITA [facility]        Today's Diagnoses     Cardiac pacemaker in situ    -  1       Follow-ups after your visit        Your next 10 appointments already scheduled     Oct 29, 2018  2:30 PM CDT   Remote PPM Check with JERI KEITA   [facility] ([facility])    [address]  [facility] [id number] [phone] [address]           This appointment is for a remote check of your pacemaker.  This is not an appointment at the office.            Nov 01, 2018 11:45 AM CDT   Return Visit with Carlo Llanes MD   [facility] ([facility])    [address]  [facility] [id number] [phone] [address]              Who to contact     If you have questions or need follow up information about today's clinic visit or your schedule please contact [facility] directly at [phone].  Normal or non-critical lab and imaging results will be communicated to you by [email or web address], letter or phone within 4 business days after the clinic has received the results. If you do not hear from us within 7 days, please contact the clinic through [email or web address] or phone. If you have a critical or abnormal lab result, we will notify you by phone as soon as possible.  Submit refill requests through Taqua or call your pharmacy and they will forward the refill request to us. Please allow 3 business days for your refill to be completed.          Additional Information About Your Visit        MyChart Information     Taqua lets you send messages to your doctor, view your test results, renew your prescriptions, schedule appointments  "and more. To sign up, go to [email or web address] . Click on \"Log in\" on the left side of the screen, which will take you to the Welcome page. Then click on \"Sign up Now\" on the right side of the page.     You will be asked to enter the access code listed below, as well as some personal information. Please follow the directions to create your username and password.     Your access code is: [email or web address]  Expires: 2019  8:18 AM     Your access code will  in 90 days. If you need help or a new code, please call your [place] clinic or [phone].        Care EveryWhere ID     This is your Care EveryWhere ID. This could be used by other organizations to access your [place] medical records  [id number]         Blood Pressure from Last 3 Encounters:   18 149/89   18 137/60   05/10/18 (!) 88/0    Weight from Last 3 Encounters:   18 63.5 kg (140 lb)   18 63.5 kg (140 lb)   18 63.9 kg (140 lb 14.4 oz)              We Performed the Following     INTERROGATION DEVICE EVAL REMOTE, PACER/ICD (27094)     PM DEVICE INTERROGATE REMOTE (39544)        Primary Care Provider    Paulino Lynch       [id number]        Equal Access to Services     [facility] : Hadii aad ku hadasho Soomaali, waaxda luqadaha, qaybta kaalmada adeegyada, waxay marcy nielson . So [facility] [phone].    ATENCIÓN: Si habla español, tiene a varela disposición servicios gratuitos de asistencia lingüística. Llame al [phone].    We comply with applicable federal civil rights laws and [place] laws. We do not discriminate on the basis of race, color, national origin, age, disability, sex, sexual orientation, or gender identity.            Thank you!     Thank you for choosing [facility]  for your care. Our goal is always to provide you with excellent care. Hearing back from our patients is one way we can continue to improve our services. Please take a few minutes " to complete the written survey that you may receive in the mail after your visit with us. Thank you!             Your Updated Medication List - Protect others around you: Learn how to safely use, store and throw away your medicines at www.disposemymeds.org.          This list is accurate as of 10/29/18  8:18 AM.  Always use your most recent med list.                   Brand Name Dispense Instructions for use Diagnosis    aspirin 81 MG tablet      Take 1 tablet (81 mg) by mouth daily    Chest pain       atorvastatin 40 MG tablet    LIPITOR     Take 1 tablet (40 mg) by mouth daily    CAD (coronary artery disease), Hyperlipidemia       DEEP SEA NASAL SPRAY 0.65 % nasal spray   Generic drug:  sodium chloride      Spray 1 spray into both nostrils daily as needed for congestion        DOXYCYCLINE HYCLATE PO      Take 100 mg by mouth 2 times daily        ISOSORBIDE DINITRATE PO      Take 30 mg by mouth daily        metoprolol succinate 50 MG 24 hr tablet    TOPROL-XL    180 tablet    Take 1 tablet (50 mg) by mouth two times daily    Abdominal aortic aneurysm (AAA) without rupture (H)       MILK OF MAGNESIA 400 MG/5ML suspension   Generic drug:  magnesium hydroxide      Take 30 mLs by mouth daily as needed for constipation or heartburn        MUCINEX ALLERGY PO      Take 800 mg by mouth 2 times daily (2 x 400 mg = 800 mg dose)        nitroGLYcerin 0.4 MG sublingual tablet    NITROSTAT    25 tablet    Place 1 tablet (0.4 mg) under the tongue every 5 minutes as needed for chest pain    Chest pain       TYLENOL PO      Take 1,000 mg by mouth 3 times daily

## 2018-10-29 NOTE — PROGRESS NOTES
[employer] K063 (D) Remote PPM Device Check  AP: 64% : 100%  Mode: DDD        Presenting Rhythm: AP/  Heart Rate: adequate heart rates per histogram  Sensing: RA: not performed RV: stable    Pacing Threshold: RA: not performed RV: stable    Impedance: stable  Battery Status: 3 years remaining  Atrial Arrhythmia: 1 mode switch episode for 3 second burst of PAT. 2 PMT episodes, EGMs show AS/ at max tracking rate of 130bpm  Ventricular Arrhythmia: 2 vent high rates for NSVT. EGMs shows Vs > As lasting 1 - 2 seconds, rates 170 - 190bpm. EF 55 - 60%      Care Plan: Pt scheduled to see Dr. Llanes later this week. F/U Lat[facility] NXT q 3 months. Gave results over the phone to Claribel at care facility and also faxed report. Naz MAHERT

## 2018-11-01 ENCOUNTER — OFFICE VISIT (OUTPATIENT)
Dept: CARDIOLOGY | Facility: CLINIC | Age: 83
End: 2018-11-01
Attending: INTERNAL MEDICINE
Payer: MEDICAID

## 2018-11-01 VITALS
HEIGHT: 73 IN | HEART RATE: 95 BPM | BODY MASS INDEX: 20 KG/M2 | SYSTOLIC BLOOD PRESSURE: 125 MMHG | DIASTOLIC BLOOD PRESSURE: 79 MMHG | WEIGHT: 150.9 LBS

## 2018-11-01 DIAGNOSIS — I25.10 CORONARY ARTERY DISEASE INVOLVING NATIVE CORONARY ARTERY OF NATIVE HEART WITHOUT ANGINA PECTORIS: ICD-10-CM

## 2018-11-01 PROCEDURE — 99213 OFFICE O/P EST LOW 20 MIN: CPT | Performed by: INTERNAL MEDICINE

## 2018-11-01 PROCEDURE — T1013 SIGN LANG/ORAL INTERPRETER: HCPCS | Mod: U3 | Performed by: INTERNAL MEDICINE

## 2018-11-01 RX ORDER — HYDROCHLOROTHIAZIDE 12.5 MG/1
12.5 CAPSULE ORAL EVERY OTHER DAY
COMMUNITY
End: 2022-01-01

## 2018-11-01 NOTE — LETTER
11/1/2018    Paulino Lynch  [id number]    RE: Nacho Joseph       Dear Colleague,    I had the pleasure of seeing Nacho Joseph in the [facility].    Cardiology Progress Note          Assessment and Plan:     1. 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    Change back from isosorbide dinitrate to isosorbide mononitrate.  Consider  revascularization if symptoms escalate.    Routine follow-up in 6 months.      2. 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    Device interrogation reviewed from 10/29/2018.  64% atrially paced, 100% ventricularly paced.    No atrial fibrillation, brief PAT and nonsustained VT.    Continue current medications    This note was transcribed using electronic voice recognition software and there may be typographical errors present.                Interval History:   The patient is a very pleasant and respectful 83 year old whom I have been following for coronary artery disease with chronic totally occluded right coronary artery and anomalous left circumflex.  He has exertional chest discomfort if he moves quickly.  I reviewed his medication list from his assisted care and he is on isosorbide dinitrate instead of isosorbide mononitrate.    He feels that his overall quality of life is okay and really would not like to do anything more aggressive at this point.                     Review of Systems:   Review of Systems:  Skin:  Negative bruising (on right wrist from angiogram)   Eyes:  Negative    ENT:  Negative    Respiratory:  Positive for dyspnea on exertion;shortness of breath;cough  Cardiovascular:  Negative for;palpitations;chest pain;lightheadedness;dizziness Positive for;lightheadedness;fatigue  Gastroenterology: Positive for poor appetite  Genitourinary:  Negative    Musculoskeletal:  Positive for muscular weakness  Neurologic:  Negative    Psychiatric:  Positive for  "anxiety  Heme/Lymph/Imm:  Negative    Endocrine:  Negative                Physical Exam:     Vitals: /79  Pulse 95  Ht 1.854 m (6' 1\")  Wt 68.4 kg (150 lb 14.4 oz)  BMI 19.91 kg/m2  Constitutional:  cooperative, alert and oriented, well developed, well nourished, in no acute distress thin      Skin:  warm and dry to the touch    (actinic keratoses)    Head:  normocephalic        Eyes:  pupils equal and round        ENT:  no pallor or cyanosis        Neck:  JVP normal right carotid bruit      Chest:  normal symmetry prolonged expiration      Cardiac: regular rhythm   distant heart sounds   grade 1;LLSB;systolic murmur          Abdomen:      benign    Extremities and Back:  no deformities, clubbing, cyanosis, erythema observed;no edema        Neurological:  no gross motor deficits;affect appropriate                 Medications:     Current Outpatient Prescriptions   Medication Sig Dispense Refill     Acetaminophen (TYLENOL PO) Take 1,000 mg by mouth 3 times daily       aspirin 81 MG tablet Take 1 tablet (81 mg) by mouth daily       atorvastatin (LIPITOR) 40 MG tablet Take 1 tablet (40 mg) by mouth daily       DOXYCYCLINE HYCLATE PO Take 100 mg by mouth 2 times daily       Fexofenadine HCl (MUCINEX ALLERGY PO) Take 800 mg by mouth 2 times daily (2 x 400 mg = 800 mg dose)       hydrochlorothiazide (MICROZIDE) 12.5 MG capsule Take 12.5 mg by mouth every other day Hold if bp <110       ISOSORBIDE DINITRATE PO Take 30 mg by mouth daily       metoprolol succinate (TOPROL-XL) 50 MG 24 hr tablet Take 1 tablet (50 mg) by mouth two times daily 180 tablet 3     nitroglycerin (NITROSTAT) 0.4 MG SL tablet Place 1 tablet (0.4 mg) under the tongue every 5 minutes as needed for chest pain 25 tablet 4     Nutritional Supplements (HI-ARELIS) LIQD Take by mouth 3 times daily       magnesium hydroxide (MILK OF MAGNESIA) 400 MG/5ML suspension Take 30 mLs by mouth daily as needed for constipation or heartburn       sodium chloride " (DEEP SEA NASAL SPRAY) 0.65 % nasal spray Spray 1 spray into both nostrils daily as needed for congestion                  Data:   All laboratory data reviewed  No results found for this or any previous visit (from the past 24 hour(s)).    All laboratory data reviewed  No results found for: CHOL  No results found for: HDL  No results found for: LDL  No results found for: TRIG  No results found for: CHOLHDLRATIO  TSH   Date Value Ref Range Status   07/07/2014 2.07 0.4 - 5.0 mU/L Final     Last Basic Metabolic Panel:  Lab Results   Component Value Date     05/17/2018      Lab Results   Component Value Date    POTASSIUM 3.7 05/18/2018     Lab Results   Component Value Date    CHLORIDE 106 05/17/2018     Lab Results   Component Value Date    ARELIS 9.7 05/17/2018     Lab Results   Component Value Date    CO2 24 05/17/2018     Lab Results   Component Value Date    BUN 23 05/17/2018     Lab Results   Component Value Date    CR 1.01 05/17/2018     Lab Results   Component Value Date     05/17/2018     Lab Results   Component Value Date    WBC 9.2 05/02/2018     Lab Results   Component Value Date    RBC 4.13 05/02/2018     Lab Results   Component Value Date    HGB 12.7 05/02/2018     Lab Results   Component Value Date    HCT 39.5 05/02/2018     Lab Results   Component Value Date    MCV 96 05/02/2018     Lab Results   Component Value Date    MCH 30.8 05/02/2018     Lab Results   Component Value Date    MCHC 32.2 05/02/2018     Lab Results   Component Value Date    RDW 15.3 05/02/2018     Lab Results   Component Value Date     05/02/2018                 Thank you for allowing me to participate in the care of your patient.      Sincerely,     Carlo Llanes MD     [facility]    cc:   Carlo Llanes MD  [address]  [place] [address]

## 2018-11-01 NOTE — PROGRESS NOTES
"Cardiology Progress Note          Assessment and Plan:     1. 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    Change back from isosorbide dinitrate to isosorbide mononitrate.  Consider  revascularization if symptoms escalate.    Routine follow-up in 6 months.      2. 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    Device interrogation reviewed from 10/29/2018.  64% atrially paced, 100% ventricularly paced.    No atrial fibrillation, brief PAT and nonsustained VT.    Continue current medications    This note was transcribed using electronic voice recognition software and there may be typographical errors present.                Interval History:   The patient is a very pleasant and respectful 83 year old whom I have been following for coronary artery disease with chronic totally occluded right coronary artery and anomalous left circumflex.  He has exertional chest discomfort if he moves quickly.  I reviewed his medication list from his assisted care and he is on isosorbide dinitrate instead of isosorbide mononitrate.    He feels that his overall quality of life is okay and really would not like to do anything more aggressive at this point.                     Review of Systems:   Review of Systems:  Skin:  Negative bruising (on right wrist from angiogram)   Eyes:  Negative    ENT:  Negative    Respiratory:  Positive for dyspnea on exertion;shortness of breath;cough  Cardiovascular:  Negative for;palpitations;chest pain;lightheadedness;dizziness Positive for;lightheadedness;fatigue  Gastroenterology: Positive for poor appetite  Genitourinary:  Negative    Musculoskeletal:  Positive for muscular weakness  Neurologic:  Negative    Psychiatric:  Positive for anxiety  Heme/Lymph/Imm:  Negative    Endocrine:  Negative                Physical Exam:     Vitals: /79  Pulse 95  Ht 1.854 m (6' 1\")  Wt 68.4 kg (150 lb 14.4 oz)  BMI 19.91 kg/m2  Constitutional:  " cooperative, alert and oriented, well developed, well nourished, in no acute distress thin      Skin:  warm and dry to the touch    (actinic keratoses)    Head:  normocephalic        Eyes:  pupils equal and round        ENT:  no pallor or cyanosis        Neck:  JVP normal right carotid bruit      Chest:  normal symmetry prolonged expiration      Cardiac: regular rhythm   distant heart sounds   grade 1;LLSB;systolic murmur          Abdomen:      benign    Extremities and Back:  no deformities, clubbing, cyanosis, erythema observed;no edema        Neurological:  no gross motor deficits;affect appropriate                 Medications:     Current Outpatient Prescriptions   Medication Sig Dispense Refill     Acetaminophen (TYLENOL PO) Take 1,000 mg by mouth 3 times daily       aspirin 81 MG tablet Take 1 tablet (81 mg) by mouth daily       atorvastatin (LIPITOR) 40 MG tablet Take 1 tablet (40 mg) by mouth daily       DOXYCYCLINE HYCLATE PO Take 100 mg by mouth 2 times daily       Fexofenadine HCl (MUCINEX ALLERGY PO) Take 800 mg by mouth 2 times daily (2 x 400 mg = 800 mg dose)       hydrochlorothiazide (MICROZIDE) 12.5 MG capsule Take 12.5 mg by mouth every other day Hold if bp <110       ISOSORBIDE DINITRATE PO Take 30 mg by mouth daily       metoprolol succinate (TOPROL-XL) 50 MG 24 hr tablet Take 1 tablet (50 mg) by mouth two times daily 180 tablet 3     nitroglycerin (NITROSTAT) 0.4 MG SL tablet Place 1 tablet (0.4 mg) under the tongue every 5 minutes as needed for chest pain 25 tablet 4     Nutritional Supplements (HI-AREILS) LIQD Take by mouth 3 times daily       magnesium hydroxide (MILK OF MAGNESIA) 400 MG/5ML suspension Take 30 mLs by mouth daily as needed for constipation or heartburn       sodium chloride (DEEP SEA NASAL SPRAY) 0.65 % nasal spray Spray 1 spray into both nostrils daily as needed for congestion                  Data:   All laboratory data reviewed  No results found for this or any previous visit  (from the past 24 hour(s)).    All laboratory data reviewed  No results found for: CHOL  No results found for: HDL  No results found for: LDL  No results found for: TRIG  No results found for: CHOLHDLRATIO  TSH   Date Value Ref Range Status   07/07/2014 2.07 0.4 - 5.0 mU/L Final     Last Basic Metabolic Panel:  Lab Results   Component Value Date     05/17/2018      Lab Results   Component Value Date    POTASSIUM 3.7 05/18/2018     Lab Results   Component Value Date    CHLORIDE 106 05/17/2018     Lab Results   Component Value Date    ARELIS 9.7 05/17/2018     Lab Results   Component Value Date    CO2 24 05/17/2018     Lab Results   Component Value Date    BUN 23 05/17/2018     Lab Results   Component Value Date    CR 1.01 05/17/2018     Lab Results   Component Value Date     05/17/2018     Lab Results   Component Value Date    WBC 9.2 05/02/2018     Lab Results   Component Value Date    RBC 4.13 05/02/2018     Lab Results   Component Value Date    HGB 12.7 05/02/2018     Lab Results   Component Value Date    HCT 39.5 05/02/2018     Lab Results   Component Value Date    MCV 96 05/02/2018     Lab Results   Component Value Date    MCH 30.8 05/02/2018     Lab Results   Component Value Date    MCHC 32.2 05/02/2018     Lab Results   Component Value Date    RDW 15.3 05/02/2018     Lab Results   Component Value Date     05/02/2018

## 2018-11-01 NOTE — MR AVS SNAPSHOT
After Visit Summary   11/1/2018    Nacho Joseph    MRN: [id number]           Patient Information     Date Of Birth          1935        Visit Information        Provider Department      11/1/2018 11:30 AM Mei Ford; Carlo Llanes MD [facility]        Today's Diagnoses     Coronary artery disease involving native coronary artery of native heart without angina pectoris           Follow-ups after your visit        Additional Services     Follow-Up with Cardiologist                 Your next 10 appointments already scheduled     Feb 11, 2019  4:45 PM CST   Remote PPM Check with [employer] TECH1   [facility] ([facility])    [address]  [place] [address] [phone] OPT 2           This appointment is for a remote check of your pacemaker.  This is not an appointment at the office.              Future tests that were ordered for you today     Open Future Orders        Priority Expected Expires Ordered    Follow-Up with Cardiologist Routine 4/30/2019 11/1/2019 11/1/2018            Who to contact     If you have questions or need follow up information about today's clinic visit or your schedule please contact [facility] directly at [phone].  Normal or non-critical lab and imaging results will be communicated to you by MyChart, letter or phone within 4 business days after the clinic has received the results. If you do not hear from us within 7 days, please contact the clinic through MyChart or phone. If you have a critical or abnormal lab result, we will notify you by phone as soon as possible.  Submit refill requests through [employer] or call your pharmacy and they will forward the refill request to us. Please allow 3 business days for your refill to be completed.          Additional Information About Your Visit        MyChart Information      "[employer] lets you send messages to your doctor, view your test results, renew your prescriptions, schedule appointments and more. To sign up, go to [email or web address] . Click on \"Log in\" on the left side of the screen, which will take you to the Welcome page. Then click on \"Sign up Now\" on the right side of the page.     You will be asked to enter the access code listed below, as well as some personal information. Please follow the directions to create your username and password.     Your access code is: [email or web address]  Expires: 2019  8:18 AM     Your access code will  in 90 days. If you need help or a new code, please call your [place] clinic or [phone].        Care EveryWhere ID     This is your Care EveryWhere ID. This could be used by other organizations to access your [place] medical records  [id number]        Your Vitals Were     Pulse Height BMI (Body Mass Index)             95 1.854 m (6' 1\") 19.91 kg/m2          Blood Pressure from Last 3 Encounters:   18 125/79   18 149/89   18 137/60    Weight from Last 3 Encounters:   18 68.4 kg (150 lb 14.4 oz)   18 63.5 kg (140 lb)   18 63.5 kg (140 lb)              We Performed the Following     Follow-Up with Cardiologist        Primary Care Provider    Paulino Lynch       [id number]        Equal Access to Services     [facility]: Hadii aad ku hadasho Soomaali, waaxda luqadaha, qaybta kaalmada adeegyada, adams nielson . So [facility] [phone].    ATENCIÓN: Si habla español, tiene a varela disposición servicios gratuitos de asistencia lingüística. Llame al [phone].    We comply with applicable federal civil rights laws and [place] laws. We do not discriminate on the basis of race, color, national origin, age, disability, sex, sexual orientation, or gender identity.            Thank you!     Thank you for choosing [facility]   ARNOLD  for your care. Our " goal is always to provide you with excellent care. Hearing back from our patients is one way we can continue to improve our services. Please take a few minutes to complete the written survey that you may receive in the mail after your visit with us. Thank you!             Your Updated Medication List - Protect others around you: Learn how to safely use, store and throw away your medicines at www.disposemymeds.org.          This list is accurate as of 11/1/18 12:28 PM.  Always use your most recent med list.                   Brand Name Dispense Instructions for use Diagnosis    aspirin 81 MG tablet      Take 1 tablet (81 mg) by mouth daily    Chest pain       atorvastatin 40 MG tablet    LIPITOR     Take 1 tablet (40 mg) by mouth daily    CAD (coronary artery disease), Hyperlipidemia       DEEP SEA NASAL SPRAY 0.65 % nasal spray   Generic drug:  sodium chloride      Spray 1 spray into both nostrils daily as needed for congestion        DOXYCYCLINE HYCLATE PO      Take 100 mg by mouth 2 times daily        HI-ARELIS Liqd      Take by mouth 3 times daily        hydrochlorothiazide 12.5 MG capsule    MICROZIDE     Take 12.5 mg by mouth every other day Hold if bp <110        ISOSORBIDE DINITRATE PO      Take 30 mg by mouth daily        metoprolol succinate 50 MG 24 hr tablet    TOPROL-XL    180 tablet    Take 1 tablet (50 mg) by mouth two times daily    Abdominal aortic aneurysm (AAA) without rupture (H)       MILK OF MAGNESIA 400 MG/5ML suspension   Generic drug:  magnesium hydroxide      Take 30 mLs by mouth daily as needed for constipation or heartburn        MUCINEX ALLERGY PO      Take 800 mg by mouth 2 times daily (2 x 400 mg = 800 mg dose)        nitroGLYcerin 0.4 MG sublingual tablet    NITROSTAT    25 tablet    Place 1 tablet (0.4 mg) under the tongue every 5 minutes as needed for chest pain    Chest pain       TYLENOL PO      Take 1,000 mg by mouth 3 times daily

## 2019-02-11 ENCOUNTER — ANCILLARY PROCEDURE (OUTPATIENT)
Dept: CARDIOLOGY | Facility: CLINIC | Age: 84
End: 2019-02-11
Payer: MEDICAID

## 2019-02-11 DIAGNOSIS — I48.91 ATRIAL FIBRILLATION (H): ICD-10-CM

## 2019-02-11 LAB
MDC_IDC_EPISODE_DTM: NORMAL
MDC_IDC_EPISODE_ID: NORMAL
MDC_IDC_EPISODE_TYPE: NORMAL
MDC_IDC_LEAD_IMPLANT_DT: NORMAL
MDC_IDC_LEAD_IMPLANT_DT: NORMAL
MDC_IDC_LEAD_LOCATION: NORMAL
MDC_IDC_LEAD_LOCATION: NORMAL
MDC_IDC_LEAD_MFG: NORMAL
MDC_IDC_LEAD_MFG: NORMAL
MDC_IDC_LEAD_MODEL: NORMAL
MDC_IDC_LEAD_MODEL: NORMAL
MDC_IDC_LEAD_POLARITY_TYPE: NORMAL
MDC_IDC_LEAD_POLARITY_TYPE: NORMAL
MDC_IDC_LEAD_SERIAL: NORMAL
MDC_IDC_LEAD_SERIAL: NORMAL
MDC_IDC_MSMT_BATTERY_DTM: NORMAL
MDC_IDC_MSMT_BATTERY_REMAINING_LONGEVITY: 36 MO
MDC_IDC_MSMT_BATTERY_REMAINING_PERCENTAGE: 57 %
MDC_IDC_MSMT_BATTERY_STATUS: NORMAL
MDC_IDC_MSMT_LEADCHNL_RA_IMPEDANCE_VALUE: 547 OHM
MDC_IDC_MSMT_LEADCHNL_RA_PACING_THRESHOLD_AMPLITUDE: 1.2 V
MDC_IDC_MSMT_LEADCHNL_RA_PACING_THRESHOLD_PULSEWIDTH: 0.5 MS
MDC_IDC_MSMT_LEADCHNL_RV_IMPEDANCE_VALUE: 482 OHM
MDC_IDC_MSMT_LEADCHNL_RV_PACING_THRESHOLD_AMPLITUDE: 1.8 V
MDC_IDC_MSMT_LEADCHNL_RV_PACING_THRESHOLD_PULSEWIDTH: 0.4 MS
MDC_IDC_PG_IMPLANT_DTM: NORMAL
MDC_IDC_PG_MFG: NORMAL
MDC_IDC_PG_MODEL: NORMAL
MDC_IDC_PG_SERIAL: NORMAL
MDC_IDC_PG_TYPE: NORMAL
MDC_IDC_SESS_CLINIC_NAME: NORMAL
MDC_IDC_SESS_DTM: NORMAL
MDC_IDC_SESS_TYPE: NORMAL
MDC_IDC_SET_BRADY_AT_MODE_SWITCH_MODE: NORMAL
MDC_IDC_SET_BRADY_AT_MODE_SWITCH_RATE: 170 {BEATS}/MIN
MDC_IDC_SET_BRADY_LOWRATE: 60 {BEATS}/MIN
MDC_IDC_SET_BRADY_MAX_SENSOR_RATE: 130 {BEATS}/MIN
MDC_IDC_SET_BRADY_MAX_TRACKING_RATE: 130 {BEATS}/MIN
MDC_IDC_SET_BRADY_MODE: NORMAL
MDC_IDC_SET_BRADY_PAV_DELAY_HIGH: 150 MS
MDC_IDC_SET_BRADY_PAV_DELAY_LOW: 200 MS
MDC_IDC_SET_BRADY_SAV_DELAY_HIGH: 150 MS
MDC_IDC_SET_BRADY_SAV_DELAY_LOW: 200 MS
MDC_IDC_SET_LEADCHNL_RA_PACING_AMPLITUDE: 2.4 V
MDC_IDC_SET_LEADCHNL_RA_PACING_POLARITY: NORMAL
MDC_IDC_SET_LEADCHNL_RA_PACING_PULSEWIDTH: 0.5 MS
MDC_IDC_SET_LEADCHNL_RA_SENSING_ADAPTATION_MODE: NORMAL
MDC_IDC_SET_LEADCHNL_RA_SENSING_POLARITY: NORMAL
MDC_IDC_SET_LEADCHNL_RA_SENSING_SENSITIVITY: 0.25 MV
MDC_IDC_SET_LEADCHNL_RV_PACING_AMPLITUDE: 2.3 V
MDC_IDC_SET_LEADCHNL_RV_PACING_CAPTURE_MODE: NORMAL
MDC_IDC_SET_LEADCHNL_RV_PACING_POLARITY: NORMAL
MDC_IDC_SET_LEADCHNL_RV_PACING_PULSEWIDTH: 0.4 MS
MDC_IDC_SET_LEADCHNL_RV_SENSING_ADAPTATION_MODE: NORMAL
MDC_IDC_SET_LEADCHNL_RV_SENSING_POLARITY: NORMAL
MDC_IDC_SET_LEADCHNL_RV_SENSING_SENSITIVITY: 1.5 MV
MDC_IDC_SET_ZONE_DETECTION_INTERVAL: 375 MS
MDC_IDC_SET_ZONE_TYPE: NORMAL
MDC_IDC_SET_ZONE_VENDOR_TYPE: NORMAL
MDC_IDC_STAT_AT_BURDEN_PERCENT: 1 %
MDC_IDC_STAT_AT_DTM_END: NORMAL
MDC_IDC_STAT_AT_DTM_START: NORMAL
MDC_IDC_STAT_BRADY_DTM_END: NORMAL
MDC_IDC_STAT_BRADY_DTM_START: NORMAL
MDC_IDC_STAT_BRADY_RA_PERCENT_PACED: 62 %
MDC_IDC_STAT_BRADY_RV_PERCENT_PACED: 99 %
MDC_IDC_STAT_EPISODE_RECENT_COUNT: 0
MDC_IDC_STAT_EPISODE_RECENT_COUNT: 0
MDC_IDC_STAT_EPISODE_RECENT_COUNT: 1
MDC_IDC_STAT_EPISODE_RECENT_COUNT: 2
MDC_IDC_STAT_EPISODE_RECENT_COUNT_DTM_END: NORMAL
MDC_IDC_STAT_EPISODE_RECENT_COUNT_DTM_START: NORMAL
MDC_IDC_STAT_EPISODE_TYPE: NORMAL
MDC_IDC_STAT_EPISODE_VENDOR_TYPE: NORMAL

## 2019-02-11 PROCEDURE — 93296 REM INTERROG EVL PM/IDS: CPT | Performed by: INTERNAL MEDICINE

## 2019-02-11 PROCEDURE — 93294 REM INTERROG EVL PM/LDLS PM: CPT | Performed by: INTERNAL MEDICINE

## 2019-04-29 ENCOUNTER — RECORDS - HEALTHEAST (OUTPATIENT)
Dept: LAB | Facility: CLINIC | Age: 84
End: 2019-04-29

## 2019-04-29 LAB
ALBUMIN SERPL-MCNC: 4 G/DL (ref 3.5–5)
ALP SERPL-CCNC: 101 U/L (ref 45–120)
ALT SERPL W P-5'-P-CCNC: 11 U/L (ref 0–45)
ANION GAP SERPL CALCULATED.3IONS-SCNC: 10 MMOL/L (ref 5–18)
AST SERPL W P-5'-P-CCNC: 15 U/L (ref 0–40)
BILIRUB SERPL-MCNC: 0.5 MG/DL (ref 0–1)
BUN SERPL-MCNC: 25 MG/DL (ref 8–28)
CALCIUM SERPL-MCNC: 10.1 MG/DL (ref 8.5–10.5)
CHLORIDE BLD-SCNC: 108 MMOL/L (ref 98–107)
CHOLEST SERPL-MCNC: 131 MG/DL
CO2 SERPL-SCNC: 27 MMOL/L (ref 22–31)
CREAT SERPL-MCNC: 0.78 MG/DL (ref 0.7–1.3)
ERYTHROCYTE [DISTWIDTH] IN BLOOD BY AUTOMATED COUNT: 13.2 % (ref 11–14.5)
FASTING STATUS PATIENT QL REPORTED: NORMAL
GFR SERPL CREATININE-BSD FRML MDRD: >60 ML/MIN/1.73M2
GLUCOSE BLD-MCNC: 91 MG/DL (ref 70–125)
HCT VFR BLD AUTO: 44.6 % (ref 40–54)
HDLC SERPL-MCNC: 47 MG/DL
HGB BLD-MCNC: 14.2 G/DL (ref 14–18)
LDLC SERPL CALC-MCNC: 55 MG/DL
MCH RBC QN AUTO: 32.6 PG (ref 27–34)
MCHC RBC AUTO-ENTMCNC: 31.8 G/DL (ref 32–36)
MCV RBC AUTO: 103 FL (ref 80–100)
PLATELET # BLD AUTO: 191 THOU/UL (ref 140–440)
PMV BLD AUTO: 10.5 FL (ref 8.5–12.5)
POTASSIUM BLD-SCNC: 4.4 MMOL/L (ref 3.5–5)
PROT SERPL-MCNC: 7.4 G/DL (ref 6–8)
RBC # BLD AUTO: 4.35 MILL/UL (ref 4.4–6.2)
SODIUM SERPL-SCNC: 145 MMOL/L (ref 136–145)
TRIGL SERPL-MCNC: 143 MG/DL
TSH SERPL DL<=0.005 MIU/L-ACNC: 2.26 UIU/ML (ref 0.3–5)
WBC: 8.3 THOU/UL (ref 4–11)

## 2019-05-08 ENCOUNTER — ANCILLARY PROCEDURE (OUTPATIENT)
Dept: CARDIOLOGY | Facility: CLINIC | Age: 84
End: 2019-05-08
Attending: INTERNAL MEDICINE
Payer: MEDICAID

## 2019-05-08 DIAGNOSIS — I48.91 ATRIAL FIBRILLATION (H): ICD-10-CM

## 2019-05-08 DIAGNOSIS — I44.2 ATRIOVENTRICULAR BLOCK, COMPLETE (H): Primary | ICD-10-CM

## 2019-05-08 PROCEDURE — 93280 PM DEVICE PROGR EVAL DUAL: CPT | Performed by: INTERNAL MEDICINE

## 2019-06-04 LAB
MDC_IDC_EPISODE_DTM: NORMAL
MDC_IDC_EPISODE_ID: NORMAL
MDC_IDC_EPISODE_TYPE: NORMAL
MDC_IDC_LEAD_IMPLANT_DT: NORMAL
MDC_IDC_LEAD_IMPLANT_DT: NORMAL
MDC_IDC_LEAD_LOCATION: NORMAL
MDC_IDC_LEAD_LOCATION: NORMAL
MDC_IDC_LEAD_MFG: NORMAL
MDC_IDC_LEAD_MFG: NORMAL
MDC_IDC_LEAD_MODEL: NORMAL
MDC_IDC_LEAD_MODEL: NORMAL
MDC_IDC_LEAD_POLARITY_TYPE: NORMAL
MDC_IDC_LEAD_POLARITY_TYPE: NORMAL
MDC_IDC_LEAD_SERIAL: NORMAL
MDC_IDC_LEAD_SERIAL: NORMAL
MDC_IDC_MSMT_BATTERY_STATUS: NORMAL
MDC_IDC_MSMT_LEADCHNL_RA_IMPEDANCE_VALUE: 554 OHM
MDC_IDC_MSMT_LEADCHNL_RA_PACING_THRESHOLD_AMPLITUDE: 1.1 V
MDC_IDC_MSMT_LEADCHNL_RA_PACING_THRESHOLD_PULSEWIDTH: 0.5 MS
MDC_IDC_MSMT_LEADCHNL_RA_SENSING_INTR_AMPL: 2 MV
MDC_IDC_MSMT_LEADCHNL_RV_IMPEDANCE_VALUE: 497 OHM
MDC_IDC_MSMT_LEADCHNL_RV_PACING_THRESHOLD_AMPLITUDE: 1.6 V
MDC_IDC_MSMT_LEADCHNL_RV_PACING_THRESHOLD_PULSEWIDTH: 0.4 MS
MDC_IDC_MSMT_LEADCHNL_RV_SENSING_INTR_AMPL: 10.8 MV
MDC_IDC_PG_IMPLANT_DTM: NORMAL
MDC_IDC_PG_MFG: NORMAL
MDC_IDC_PG_MODEL: NORMAL
MDC_IDC_PG_SERIAL: NORMAL
MDC_IDC_PG_TYPE: NORMAL
MDC_IDC_SESS_CLINIC_NAME: NORMAL
MDC_IDC_SESS_DTM: NORMAL
MDC_IDC_SESS_TYPE: NORMAL
MDC_IDC_SET_BRADY_AT_MODE_SWITCH_MODE: NORMAL
MDC_IDC_SET_BRADY_AT_MODE_SWITCH_RATE: 170 {BEATS}/MIN
MDC_IDC_SET_BRADY_HYSTRATE: NORMAL
MDC_IDC_SET_BRADY_LOWRATE: 60 {BEATS}/MIN
MDC_IDC_SET_BRADY_MAX_SENSOR_RATE: 130 {BEATS}/MIN
MDC_IDC_SET_BRADY_MAX_TRACKING_RATE: 130 {BEATS}/MIN
MDC_IDC_SET_BRADY_MODE: NORMAL
MDC_IDC_SET_BRADY_PAV_DELAY_HIGH: 150 MS
MDC_IDC_SET_BRADY_PAV_DELAY_LOW: 200 MS
MDC_IDC_SET_BRADY_SAV_DELAY_HIGH: 150 MS
MDC_IDC_SET_BRADY_SAV_DELAY_LOW: 200 MS
MDC_IDC_SET_LEADCHNL_RA_PACING_AMPLITUDE: 2 V
MDC_IDC_SET_LEADCHNL_RA_PACING_ANODE_ELECTRODE_1: NORMAL
MDC_IDC_SET_LEADCHNL_RA_PACING_ANODE_LOCATION_1: NORMAL
MDC_IDC_SET_LEADCHNL_RA_PACING_CAPTURE_MODE: NORMAL
MDC_IDC_SET_LEADCHNL_RA_PACING_CATHODE_ELECTRODE_1: NORMAL
MDC_IDC_SET_LEADCHNL_RA_PACING_CATHODE_LOCATION_1: NORMAL
MDC_IDC_SET_LEADCHNL_RA_PACING_POLARITY: NORMAL
MDC_IDC_SET_LEADCHNL_RA_PACING_PULSEWIDTH: 0.5 MS
MDC_IDC_SET_LEADCHNL_RA_SENSING_ADAPTATION_MODE: NORMAL
MDC_IDC_SET_LEADCHNL_RA_SENSING_ANODE_ELECTRODE_1: NORMAL
MDC_IDC_SET_LEADCHNL_RA_SENSING_ANODE_LOCATION_1: NORMAL
MDC_IDC_SET_LEADCHNL_RA_SENSING_CATHODE_ELECTRODE_1: NORMAL
MDC_IDC_SET_LEADCHNL_RA_SENSING_CATHODE_LOCATION_1: NORMAL
MDC_IDC_SET_LEADCHNL_RA_SENSING_POLARITY: NORMAL
MDC_IDC_SET_LEADCHNL_RA_SENSING_SENSITIVITY: 0.25 MV
MDC_IDC_SET_LEADCHNL_RV_PACING_AMPLITUDE: 2.2 V
MDC_IDC_SET_LEADCHNL_RV_PACING_ANODE_ELECTRODE_1: NORMAL
MDC_IDC_SET_LEADCHNL_RV_PACING_ANODE_LOCATION_1: NORMAL
MDC_IDC_SET_LEADCHNL_RV_PACING_CAPTURE_MODE: NORMAL
MDC_IDC_SET_LEADCHNL_RV_PACING_CATHODE_ELECTRODE_1: NORMAL
MDC_IDC_SET_LEADCHNL_RV_PACING_CATHODE_LOCATION_1: NORMAL
MDC_IDC_SET_LEADCHNL_RV_PACING_POLARITY: NORMAL
MDC_IDC_SET_LEADCHNL_RV_PACING_PULSEWIDTH: 0.4 MS
MDC_IDC_SET_LEADCHNL_RV_SENSING_ADAPTATION_MODE: NORMAL
MDC_IDC_SET_LEADCHNL_RV_SENSING_ANODE_ELECTRODE_1: NORMAL
MDC_IDC_SET_LEADCHNL_RV_SENSING_ANODE_LOCATION_1: NORMAL
MDC_IDC_SET_LEADCHNL_RV_SENSING_CATHODE_ELECTRODE_1: NORMAL
MDC_IDC_SET_LEADCHNL_RV_SENSING_CATHODE_LOCATION_1: NORMAL
MDC_IDC_SET_LEADCHNL_RV_SENSING_POLARITY: NORMAL
MDC_IDC_SET_LEADCHNL_RV_SENSING_SENSITIVITY: 1.5 MV
MDC_IDC_SET_ZONE_DETECTION_INTERVAL: 375 MS
MDC_IDC_SET_ZONE_TYPE: NORMAL
MDC_IDC_SET_ZONE_VENDOR_TYPE: NORMAL
MDC_IDC_STAT_EPISODE_RECENT_COUNT: 6
MDC_IDC_STAT_EPISODE_RECENT_COUNT_DTM_END: NORMAL
MDC_IDC_STAT_EPISODE_RECENT_COUNT_DTM_START: NORMAL
MDC_IDC_STAT_EPISODE_TOTAL_COUNT: 11
MDC_IDC_STAT_EPISODE_TOTAL_COUNT_DTM_END: NORMAL
MDC_IDC_STAT_EPISODE_TYPE: NORMAL
MDC_IDC_STAT_EPISODE_TYPE: NORMAL
MDC_IDC_STAT_EPISODE_VENDOR_TYPE: NORMAL
MDC_IDC_STAT_EPISODE_VENDOR_TYPE: NORMAL

## 2019-08-14 ENCOUNTER — ANCILLARY PROCEDURE (OUTPATIENT)
Dept: CARDIOLOGY | Facility: CLINIC | Age: 84
End: 2019-08-14
Attending: INTERNAL MEDICINE
Payer: COMMERCIAL

## 2019-08-14 VITALS
DIASTOLIC BLOOD PRESSURE: 86 MMHG | BODY MASS INDEX: 20.34 KG/M2 | WEIGHT: 153.5 LBS | HEART RATE: 79 BPM | SYSTOLIC BLOOD PRESSURE: 134 MMHG | HEIGHT: 73 IN

## 2019-08-14 DIAGNOSIS — I25.10 CORONARY ARTERY DISEASE INVOLVING NATIVE CORONARY ARTERY OF NATIVE HEART WITHOUT ANGINA PECTORIS: ICD-10-CM

## 2019-08-14 DIAGNOSIS — I44.2 ATRIOVENTRICULAR BLOCK, COMPLETE (H): ICD-10-CM

## 2019-08-14 PROCEDURE — 93294 REM INTERROG EVL PM/LDLS PM: CPT | Performed by: INTERNAL MEDICINE

## 2019-08-14 PROCEDURE — 93296 REM INTERROG EVL PM/IDS: CPT | Performed by: INTERNAL MEDICINE

## 2019-08-14 PROCEDURE — 99214 OFFICE O/P EST MOD 30 MIN: CPT | Mod: 25 | Performed by: INTERNAL MEDICINE

## 2019-08-14 ASSESSMENT — MIFFLIN-ST. JEOR: SCORE: 1440.15

## 2019-08-14 NOTE — PROGRESS NOTES
"Cardiology Progress Note          Assessment and Plan:     1. 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    Continue medical therapy.  Consider revascularization if symptoms worsen.    Follow-up in 6 months per patient request.      2. Complete heart block, PAT and nonsustained ventricular tachycardia    Dual-chamber pacemaker in place.  Continue to follow.    This note was transcribed using electronic voice recognition software and there may be typographical errors present.                Interval History:   The patient is a very pleasant 84 year old whom I have been following for coronary artery disease with chronic totally occluded right coronary artery and anomalous left circumflex.  His exertional chest discomfort is stable.  He feels a little better than he did last year.  He gets lightheaded if he stands up quickly.  He does have complete heart block and is status post dual-chamber pacemaker.  He had interrogation earlier today.  Brief nonsustained VT and PAT as he has been having.  Overall, no new complaints.                     Review of Systems:   Review of Systems:  Skin:  Negative bruising(on right wrist from angiogram)   Eyes:  Negative    ENT:  Negative    Respiratory:  Negative cough  Cardiovascular:  Negative for;palpitations;lightheadedness;dizziness;edema Positive for;fatigue;chest pain  Gastroenterology: Positive for poor appetite  Genitourinary:  Negative    Musculoskeletal:  Positive for muscular weakness  Neurologic:  Negative headaches  Psychiatric:  Positive for anxiety  Heme/Lymph/Imm:  Negative    Endocrine:  Negative                Physical Exam:     Vitals: /86   Pulse 79   Ht 1.854 m (6' 1\")   Wt 69.6 kg (153 lb 8 oz)   BMI 20.25 kg/m    Constitutional:  cooperative, alert and oriented, well developed, well nourished, in no acute distress thin      Skin:  warm and dry to the touch   (actinic keratoses)    Head:  normocephalic    "     Eyes:  pupils equal and round        ENT:  no pallor or cyanosis        Neck:  JVP normal right carotid bruit      Chest:  normal symmetry prolonged expiration      Cardiac: regular rhythm   distant heart sounds   grade 1;LLSB;systolic murmur          Abdomen:      benign    Extremities and Back:  no deformities, clubbing, cyanosis, erythema observed;no edema        Neurological:  no gross motor deficits;affect appropriate                 Medications:     Current Outpatient Medications   Medication Sig Dispense Refill     Acetaminophen (TYLENOL PO) Take 1,000 mg by mouth 3 times daily       aspirin 81 MG tablet Take 1 tablet (81 mg) by mouth daily       atorvastatin (LIPITOR) 40 MG tablet Take 1 tablet (40 mg) by mouth daily       DOXYCYCLINE HYCLATE PO Take 100 mg by mouth 2 times daily       Fexofenadine HCl (MUCINEX ALLERGY PO) Take 800 mg by mouth 2 times daily (2 x 400 mg = 800 mg dose)       hydrochlorothiazide (MICROZIDE) 12.5 MG capsule Take 12.5 mg by mouth every other day Hold if bp <110       ISOSORBIDE DINITRATE PO Take 30 mg by mouth daily       magnesium hydroxide (MILK OF MAGNESIA) 400 MG/5ML suspension Take 30 mLs by mouth daily as needed for constipation or heartburn       metoprolol succinate (TOPROL-XL) 50 MG 24 hr tablet Take 1 tablet (50 mg) by mouth two times daily 180 tablet 3     nitroglycerin (NITROSTAT) 0.4 MG SL tablet Place 1 tablet (0.4 mg) under the tongue every 5 minutes as needed for chest pain 25 tablet 4     Nutritional Supplements (HI-ARELIS) LIQD Take by mouth 3 times daily       sodium chloride (DEEP SEA NASAL SPRAY) 0.65 % nasal spray Spray 1 spray into both nostrils daily as needed for congestion                  Data:   All laboratory data reviewed  Results for orders placed or performed in visit on 08/14/19 (from the past 24 hour(s))   Cardiac Device Check - Remote   Result Value Ref Range    Date Time Interrogation Session [id number]     Implantable Pulse Generator   Unicotrip     Implantable Pulse Generator Model K063 Audioscribe     Implantable Pulse Generator Serial Number [id number]     Type Interrogation Session Remote      Clinic Name Bill     Implantable Pulse Generator Type Pacemaker     Implantable Pulse Generator Implant Date 20140707     Implantable Lead  Guidant     Implantable Lead Model 4136 Dextrus     Implantable Lead Serial Number [id number]     Implantable Lead Implant Date 20140707     Implantable Lead Polarity Type Bipolar Lead     Implantable Lead Location Right Atrium     Implantable Lead  Guidant     Implantable Lead Model 4137 Dextrus     Implantable Lead Serial Number [id number]     Implantable Lead Implant Date 20140707     Implantable Lead Polarity Type Bipolar Lead     Implantable Lead Location Right Ventricle     Chris Setting Mode (NBG Code) DDD     Chris Setting Lower Rate Limit 60 [beats]/min    Chris Setting Maximum Tracking Rate 130 [beats]/min    Chris Setting Maximum Sensor Rate 130 [beats]/min    Chris Setting LAZARUS Delay Low 200 ms    Chris Setting PAV Delay Low 200 ms    Chris Setting PAV Delay High 150 ms    Chris Setting LAZARUS Delay High 150 ms    Chris Setting AT Mode Switch Rate 170 [beats]/min    Chris Setting AT Mode Switch Mode VDIR     Lead Channel Setting Sensing Polarity Bipolar     Lead Channel Setting Sensing Sensitivity 0.25 mV    Lead Channel Setting Sensing Adaptation Mode Adaptive     Lead Channel Setting Sensing Polarity Bipolar     Lead Channel Setting Sensing Sensitivity 1.5 mV    Lead Channel Setting Sensing Adaptation Mode Adaptive     Lead Channel Setting Pacing Polarity Bipolar     Lead Channel Setting Pacing Pulse Width 0.5 ms    Lead Channel Setting Pacing Amplitude 2.0 V    Lead Channel Setting Pacing Polarity Bipolar     Lead Channel Setting Pacing Pulse Width 0.4 ms    Lead Channel Setting Pacing Amplitude 2.2 V    Lead Channel Setting Pacing Capture Mode Adaptive     Zone  Setting Type Category VT     Zone Setting Vendor Type Category VT     Zone Setting Detection Interval 375 ms    Lead Channel Impedance Value 557 ohm    Lead Channel Pacing Threshold Amplitude 1.1 V    Lead Channel Pacing Threshold Pulse Width 0.5 ms    Lead Channel Impedance Value 494 ohm    Lead Channel Pacing Threshold Amplitude 1.8 V    Lead Channel Pacing Threshold Pulse Width 0.4 ms    Battery Date Time of Measurements [id number]     Battery Status Beginning of Service     Battery Remaining Longevity 30 mo    Battery Remaining Percentage 52 %    Chris Statistic Date Time Start 20190508050000     Chris Statistic Date Time End 20190814050000     Chris Statistic RA Percent Paced 62 %    Chris Statistic RV Percent Paced 96 %    Atrial Tachy Statistic Date Time Start 20190509050000     Atrial Tachy Statistic Date Time End 20190813050000     Atrial Tachy Statistic AT/AF [place] Percent 0 %    Episode Statistic Recent Count 0     Episode Statistic Type Category AT/AF     Episode Statistic Vendor Type Category AF     Episode Statistic Recent Count 0     Episode Statistic Type Category SVT     Episode Statistic Vendor Type Category SVT     Episode Statistic Recent Count 3     Episode Statistic Type Category VT     Episode Statistic Vendor Type Category NSVT     Episode Statistic Recent Count 0     Episode Statistic Type Category VT     Episode Statistic Vendor Type Category VT     Episode Statistic Recent Date Time Start 20190508050000     Episode Statistic Recent Date Time End [id number]     Episode Statistic Recent Date Time Start 20190508050000     Episode Statistic Recent Date Time End [id number]     Episode Statistic Recent Date Time Start 20190508050000     Episode Statistic Recent Date Time End 10055951147884     Episode Statistic Recent Date Time Start 20190508050000     Episode Statistic Recent Date Time End [id number]     Episode Identifier APM-7     Episode Type Category Periodic EGM      Episode Date Time [id number]     Episode Identifier [id number]     Episode Type Category Other     Episode Date Time [id number]     Episode Identifier V-14     Episode Type Category VT     Episode Date Time [id number]     Episode Duration 11 s    Episode Identifier V-13     Episode Type Category VT     Episode Date Time [id number]     Episode Duration 9 s    Episode Identifier V-12     Episode Type Category VT     Episode Date Time [id number]     Episode Duration 9 s    Episode Identifier PMT-190     Episode Type Category Other     Episode Date Time [id number]        All laboratory data reviewed  No results found for: CHOL  No results found for: HDL  No results found for: LDL  No results found for: TRIG  No results found for: CHOLHDLRATIO  TSH   Date Value Ref Range Status   07/07/2014 2.07 0.4 - 5.0 mU/L Final     Last Basic Metabolic Panel:  Lab Results   Component Value Date     05/17/2018      Lab Results   Component Value Date    POTASSIUM 3.7 05/18/2018     Lab Results   Component Value Date    CHLORIDE 106 05/17/2018     Lab Results   Component Value Date    ARELIS 9.7 05/17/2018     Lab Results   Component Value Date    CO2 24 05/17/2018     Lab Results   Component Value Date    BUN 23 05/17/2018     Lab Results   Component Value Date    CR 1.01 05/17/2018     Lab Results   Component Value Date     05/17/2018     Lab Results   Component Value Date    WBC 9.2 05/02/2018     Lab Results   Component Value Date    RBC 4.13 05/02/2018     Lab Results   Component Value Date    HGB 12.7 05/02/2018     Lab Results   Component Value Date    HCT 39.5 05/02/2018     Lab Results   Component Value Date    MCV 96 05/02/2018     Lab Results   Component Value Date    MCH 30.8 05/02/2018     Lab Results   Component Value Date    MCHC 32.2 05/02/2018     Lab Results   Component Value Date    RDW 15.3 05/02/2018     Lab Results   Component Value Date     05/02/2018

## 2019-08-14 NOTE — LETTER
"8/14/2019    Paulino SOTO Cris  [id number]    RE: Nacho Joseph       Dear Colleague,    I had the pleasure of seeing Nacho Joseph in the [facility].    Cardiology Progress Note          Assessment and Plan:     1. 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    Continue medical therapy.  Consider revascularization if symptoms worsen.    Follow-up in 6 months per patient request.      2. Complete heart block, PAT and nonsustained ventricular tachycardia    Dual-chamber pacemaker in place.  Continue to follow.    This note was transcribed using electronic voice recognition software and there may be typographical errors present.                Interval History:   The patient is a very pleasant 84 year old whom I have been following for coronary artery disease with chronic totally occluded right coronary artery and anomalous left circumflex.  His exertional chest discomfort is stable.  He feels a little better than he did last year.  He gets lightheaded if he stands up quickly.  He does have complete heart block and is status post dual-chamber pacemaker.  He had interrogation earlier today.  Brief nonsustained VT and PAT as he has been having.  Overall, no new complaints.                     Review of Systems:   Review of Systems:  Skin:  Negative bruising(on right wrist from angiogram)   Eyes:  Negative    ENT:  Negative    Respiratory:  Negative cough  Cardiovascular:  Negative for;palpitations;lightheadedness;dizziness;edema Positive for;fatigue;chest pain  Gastroenterology: Positive for poor appetite  Genitourinary:  Negative    Musculoskeletal:  Positive for muscular weakness  Neurologic:  Negative headaches  Psychiatric:  Positive for anxiety  Heme/Lymph/Imm:  Negative    Endocrine:  Negative                Physical Exam:     Vitals: /86   Pulse 79   Ht 1.854 m (6' 1\")   Wt 69.6 kg (153 lb 8 oz)   BMI 20.25 kg/m   "   Constitutional:  cooperative, alert and oriented, well developed, well nourished, in no acute distress thin      Skin:  warm and dry to the touch   (actinic keratoses)    Head:  normocephalic        Eyes:  pupils equal and round        ENT:  no pallor or cyanosis        Neck:  JVP normal right carotid bruit      Chest:  normal symmetry prolonged expiration      Cardiac: regular rhythm   distant heart sounds   grade 1;LLSB;systolic murmur          Abdomen:      benign    Extremities and Back:  no deformities, clubbing, cyanosis, erythema observed;no edema        Neurological:  no gross motor deficits;affect appropriate                 Medications:     Current Outpatient Medications   Medication Sig Dispense Refill     Acetaminophen (TYLENOL PO) Take 1,000 mg by mouth 3 times daily       aspirin 81 MG tablet Take 1 tablet (81 mg) by mouth daily       atorvastatin (LIPITOR) 40 MG tablet Take 1 tablet (40 mg) by mouth daily       DOXYCYCLINE HYCLATE PO Take 100 mg by mouth 2 times daily       Fexofenadine HCl (MUCINEX ALLERGY PO) Take 800 mg by mouth 2 times daily (2 x 400 mg = 800 mg dose)       hydrochlorothiazide (MICROZIDE) 12.5 MG capsule Take 12.5 mg by mouth every other day Hold if bp <110       ISOSORBIDE DINITRATE PO Take 30 mg by mouth daily       magnesium hydroxide (MILK OF MAGNESIA) 400 MG/5ML suspension Take 30 mLs by mouth daily as needed for constipation or heartburn       metoprolol succinate (TOPROL-XL) 50 MG 24 hr tablet Take 1 tablet (50 mg) by mouth two times daily 180 tablet 3     nitroglycerin (NITROSTAT) 0.4 MG SL tablet Place 1 tablet (0.4 mg) under the tongue every 5 minutes as needed for chest pain 25 tablet 4     Nutritional Supplements (HI-ARELIS) LIQD Take by mouth 3 times daily       sodium chloride (DEEP SEA NASAL SPRAY) 0.65 % nasal spray Spray 1 spray into both nostrils daily as needed for congestion                  Data:   All laboratory data reviewed  Results for orders placed or  performed in visit on 08/14/19 (from the past 24 hour(s))   Cardiac Device Check - Remote   Result Value Ref Range    Date Time Interrogation Session [id number]     Implantable Pulse Generator  [employer]     Implantable Pulse Generator Model K063 ADVANTIO     Implantable Pulse Generator Serial Number [id number]     Type Interrogation Session Remote      Clinic Name Bill     Implantable Pulse Generator Type Pacemaker     Implantable Pulse Generator Implant Date 20140707     Implantable Lead  [employer]ant     Implantable Lead Model 4136 Dextrus     Implantable Lead Serial Number [id number]     Implantable Lead Implant Date 20140707     Implantable Lead Polarity Type Bipolar Lead     Implantable Lead Location Right Atrium     Implantable Lead  Guidant     Implantable Lead Model 4137 Dextrus     Implantable Lead Serial Number [id number]     Implantable Lead Implant Date 20140707     Implantable Lead Polarity Type Bipolar Lead     Implantable Lead Location Right Ventricle     Chris Setting Mode (NBG Code) DDD     Chris Setting Lower Rate Limit 60 [beats]/min    Chris Setting Maximum Tracking Rate 130 [beats]/min    Chris Setting Maximum Sensor Rate 130 [beats]/min    Chris Setting LAZARUS Delay Low 200 ms    Chris Setting PAV Delay Low 200 ms    Chris Setting PAV Delay High 150 ms    Chris Setting LAZARUS Delay High 150 ms    Chris Setting AT Mode Switch Rate 170 [beats]/min    Chris Setting AT Mode Switch Mode VDIR     Lead Channel Setting Sensing Polarity Bipolar     Lead Channel Setting Sensing Sensitivity 0.25 mV    Lead Channel Setting Sensing Adaptation Mode Adaptive     Lead Channel Setting Sensing Polarity Bipolar     Lead Channel Setting Sensing Sensitivity 1.5 mV    Lead Channel Setting Sensing Adaptation Mode Adaptive     Lead Channel Setting Pacing Polarity Bipolar     Lead Channel Setting Pacing Pulse Width 0.5 ms    Lead Channel Setting Pacing Amplitude 2.0 V    Lead Channel  Setting Pacing Polarity Bipolar     Lead Channel Setting Pacing Pulse Width 0.4 ms    Lead Channel Setting Pacing Amplitude 2.2 V    Lead Channel Setting Pacing Capture Mode Adaptive     Zone Setting Type Category VT     Zone Setting Vendor Type Category VT     Zone Setting Detection Interval 375 ms    Lead Channel Impedance Value 557 ohm    Lead Channel Pacing Threshold Amplitude 1.1 V    Lead Channel Pacing Threshold Pulse Width 0.5 ms    Lead Channel Impedance Value 494 ohm    Lead Channel Pacing Threshold Amplitude 1.8 V    Lead Channel Pacing Threshold Pulse Width 0.4 ms    Battery Date Time of Measurements [id number]     Battery Status Beginning of Service     Battery Remaining Longevity 30 mo    Battery Remaining Percentage 52 %    Chris Statistic Date Time Start 20190508050000     Chris Statistic Date Time End 20190814050000     Chris Statistic RA Percent Paced 62 %    Chris Statistic RV Percent Paced 96 %    Atrial Tachy Statistic Date Time Start 20190509050000     Atrial Tachy Statistic Date Time End 20190813050000     Atrial Tachy Statistic AT/AF [place] Percent 0 %    Episode Statistic Recent Count 0     Episode Statistic Type Category AT/AF     Episode Statistic Vendor Type Category AF     Episode Statistic Recent Count 0     Episode Statistic Type Category SVT     Episode Statistic Vendor Type Category SVT     Episode Statistic Recent Count 3     Episode Statistic Type Category VT     Episode Statistic Vendor Type Category NSVT     Episode Statistic Recent Count 0     Episode Statistic Type Category VT     Episode Statistic Vendor Type Category VT     Episode Statistic Recent Date Time Start 20190508050000     Episode Statistic Recent Date Time End 20190814050000     Episode Statistic Recent Date Time Start 20190508050000     Episode Statistic Recent Date Time End 20190814050000     Episode Statistic Recent Date Time Start 20190508050000     Episode Statistic Recent Date Time End 20190814050000      Episode Statistic Recent Date Time Start [id number]     Episode Statistic Recent Date Time End [id number]     Episode Identifier APM-7     Episode Type Category Periodic EGM     Episode Date Time [id number]     Episode Identifier [id number]     Episode Type Category Other     Episode Date Time [id number]     Episode Identifier V-14     Episode Type Category VT     Episode Date Time [id number]     Episode Duration 11 s    Episode Identifier V-13     Episode Type Category VT     Episode Date Time [id number]     Episode Duration 9 s    Episode Identifier V-12     Episode Type Category VT     Episode Date Time [id number]     Episode Duration 9 s    Episode Identifier [email or web address]-[id number]     Episode Type Category Other     Episode Date Time [id number]        All laboratory data reviewed  No results found for: CHOL  No results found for: HDL  No results found for: LDL  No results found for: TRIG  No results found for: CHOLHDLRATIO  TSH   Date Value Ref Range Status   07/07/2014 2.07 0.4 - 5.0 mU/L Final     Last Basic Metabolic Panel:  Lab Results   Component Value Date     05/17/2018      Lab Results   Component Value Date    POTASSIUM 3.7 05/18/2018     Lab Results   Component Value Date    CHLORIDE 106 05/17/2018     Lab Results   Component Value Date    ARELIS 9.7 05/17/2018     Lab Results   Component Value Date    CO2 24 05/17/2018     Lab Results   Component Value Date    BUN 23 05/17/2018     Lab Results   Component Value Date    CR 1.01 05/17/2018     Lab Results   Component Value Date     05/17/2018     Lab Results   Component Value Date    WBC 9.2 05/02/2018     Lab Results   Component Value Date    RBC 4.13 05/02/2018     Lab Results   Component Value Date    HGB 12.7 05/02/2018     Lab Results   Component Value Date    HCT 39.5 05/02/2018     Lab Results   Component Value Date    MCV 96 05/02/2018     Lab Results   Component Value Date    MCH 30.8 05/02/2018     Lab Results    Component Value Date    MCHC 32.2 05/02/2018     Lab Results   Component Value Date    RDW 15.3 05/02/2018     Lab Results   Component Value Date     05/02/2018                 Thank you for allowing me to participate in the care of your patient.      Sincerely,     Carlo Llanes MD     [facility] [place]    cc:   Carlo Llanes MD  [address] [facility] [address]  [place] [address]

## 2019-08-21 LAB
MDC_IDC_EPISODE_DTM: NORMAL
MDC_IDC_EPISODE_DURATION: 11 S
MDC_IDC_EPISODE_DURATION: 9 S
MDC_IDC_EPISODE_DURATION: 9 S
MDC_IDC_EPISODE_ID: NORMAL
MDC_IDC_EPISODE_TYPE: NORMAL
MDC_IDC_LEAD_IMPLANT_DT: NORMAL
MDC_IDC_LEAD_IMPLANT_DT: NORMAL
MDC_IDC_LEAD_LOCATION: NORMAL
MDC_IDC_LEAD_LOCATION: NORMAL
MDC_IDC_LEAD_MFG: NORMAL
MDC_IDC_LEAD_MFG: NORMAL
MDC_IDC_LEAD_MODEL: NORMAL
MDC_IDC_LEAD_MODEL: NORMAL
MDC_IDC_LEAD_POLARITY_TYPE: NORMAL
MDC_IDC_LEAD_POLARITY_TYPE: NORMAL
MDC_IDC_LEAD_SERIAL: NORMAL
MDC_IDC_LEAD_SERIAL: NORMAL
MDC_IDC_MSMT_BATTERY_DTM: NORMAL
MDC_IDC_MSMT_BATTERY_REMAINING_LONGEVITY: 30 MO
MDC_IDC_MSMT_BATTERY_REMAINING_PERCENTAGE: 52 %
MDC_IDC_MSMT_BATTERY_STATUS: NORMAL
MDC_IDC_MSMT_LEADCHNL_RA_IMPEDANCE_VALUE: 557 OHM
MDC_IDC_MSMT_LEADCHNL_RA_PACING_THRESHOLD_AMPLITUDE: 1.1 V
MDC_IDC_MSMT_LEADCHNL_RA_PACING_THRESHOLD_PULSEWIDTH: 0.5 MS
MDC_IDC_MSMT_LEADCHNL_RV_IMPEDANCE_VALUE: 494 OHM
MDC_IDC_MSMT_LEADCHNL_RV_PACING_THRESHOLD_AMPLITUDE: 1.8 V
MDC_IDC_MSMT_LEADCHNL_RV_PACING_THRESHOLD_PULSEWIDTH: 0.4 MS
MDC_IDC_PG_IMPLANT_DTM: NORMAL
MDC_IDC_PG_MFG: NORMAL
MDC_IDC_PG_MODEL: NORMAL
MDC_IDC_PG_SERIAL: NORMAL
MDC_IDC_PG_TYPE: NORMAL
MDC_IDC_SESS_CLINIC_NAME: NORMAL
MDC_IDC_SESS_DTM: NORMAL
MDC_IDC_SESS_TYPE: NORMAL
MDC_IDC_SET_BRADY_AT_MODE_SWITCH_MODE: NORMAL
MDC_IDC_SET_BRADY_AT_MODE_SWITCH_RATE: 170 {BEATS}/MIN
MDC_IDC_SET_BRADY_LOWRATE: 60 {BEATS}/MIN
MDC_IDC_SET_BRADY_MAX_SENSOR_RATE: 130 {BEATS}/MIN
MDC_IDC_SET_BRADY_MAX_TRACKING_RATE: 130 {BEATS}/MIN
MDC_IDC_SET_BRADY_MODE: NORMAL
MDC_IDC_SET_BRADY_PAV_DELAY_HIGH: 150 MS
MDC_IDC_SET_BRADY_PAV_DELAY_LOW: 200 MS
MDC_IDC_SET_BRADY_SAV_DELAY_HIGH: 150 MS
MDC_IDC_SET_BRADY_SAV_DELAY_LOW: 200 MS
MDC_IDC_SET_LEADCHNL_RA_PACING_AMPLITUDE: 2 V
MDC_IDC_SET_LEADCHNL_RA_PACING_POLARITY: NORMAL
MDC_IDC_SET_LEADCHNL_RA_PACING_PULSEWIDTH: 0.5 MS
MDC_IDC_SET_LEADCHNL_RA_SENSING_ADAPTATION_MODE: NORMAL
MDC_IDC_SET_LEADCHNL_RA_SENSING_POLARITY: NORMAL
MDC_IDC_SET_LEADCHNL_RA_SENSING_SENSITIVITY: 0.25 MV
MDC_IDC_SET_LEADCHNL_RV_PACING_AMPLITUDE: 2.2 V
MDC_IDC_SET_LEADCHNL_RV_PACING_CAPTURE_MODE: NORMAL
MDC_IDC_SET_LEADCHNL_RV_PACING_POLARITY: NORMAL
MDC_IDC_SET_LEADCHNL_RV_PACING_PULSEWIDTH: 0.4 MS
MDC_IDC_SET_LEADCHNL_RV_SENSING_ADAPTATION_MODE: NORMAL
MDC_IDC_SET_LEADCHNL_RV_SENSING_POLARITY: NORMAL
MDC_IDC_SET_LEADCHNL_RV_SENSING_SENSITIVITY: 1.5 MV
MDC_IDC_SET_ZONE_DETECTION_INTERVAL: 375 MS
MDC_IDC_SET_ZONE_TYPE: NORMAL
MDC_IDC_SET_ZONE_VENDOR_TYPE: NORMAL
MDC_IDC_STAT_AT_BURDEN_PERCENT: 0 %
MDC_IDC_STAT_AT_DTM_END: NORMAL
MDC_IDC_STAT_AT_DTM_START: NORMAL
MDC_IDC_STAT_BRADY_DTM_END: NORMAL
MDC_IDC_STAT_BRADY_DTM_START: NORMAL
MDC_IDC_STAT_BRADY_RA_PERCENT_PACED: 62 %
MDC_IDC_STAT_BRADY_RV_PERCENT_PACED: 96 %
MDC_IDC_STAT_EPISODE_RECENT_COUNT: 0
MDC_IDC_STAT_EPISODE_RECENT_COUNT: 3
MDC_IDC_STAT_EPISODE_RECENT_COUNT_DTM_END: NORMAL
MDC_IDC_STAT_EPISODE_RECENT_COUNT_DTM_START: NORMAL
MDC_IDC_STAT_EPISODE_TYPE: NORMAL
MDC_IDC_STAT_EPISODE_VENDOR_TYPE: NORMAL

## 2019-11-20 ENCOUNTER — ANCILLARY PROCEDURE (OUTPATIENT)
Dept: CARDIOLOGY | Facility: CLINIC | Age: 84
End: 2019-11-20
Attending: INTERNAL MEDICINE
Payer: COMMERCIAL

## 2019-11-20 DIAGNOSIS — I44.2 ATRIOVENTRICULAR BLOCK, COMPLETE (H): ICD-10-CM

## 2019-11-20 PROCEDURE — 93296 REM INTERROG EVL PM/IDS: CPT | Performed by: INTERNAL MEDICINE

## 2019-11-20 PROCEDURE — 93294 REM INTERROG EVL PM/LDLS PM: CPT | Performed by: INTERNAL MEDICINE

## 2019-12-10 LAB
MDC_IDC_EPISODE_DTM: NORMAL
MDC_IDC_EPISODE_ID: NORMAL
MDC_IDC_EPISODE_TYPE: NORMAL
MDC_IDC_LEAD_IMPLANT_DT: NORMAL
MDC_IDC_LEAD_IMPLANT_DT: NORMAL
MDC_IDC_LEAD_LOCATION: NORMAL
MDC_IDC_LEAD_LOCATION: NORMAL
MDC_IDC_LEAD_MFG: NORMAL
MDC_IDC_LEAD_MFG: NORMAL
MDC_IDC_LEAD_MODEL: NORMAL
MDC_IDC_LEAD_MODEL: NORMAL
MDC_IDC_LEAD_POLARITY_TYPE: NORMAL
MDC_IDC_LEAD_POLARITY_TYPE: NORMAL
MDC_IDC_LEAD_SERIAL: NORMAL
MDC_IDC_LEAD_SERIAL: NORMAL
MDC_IDC_MSMT_BATTERY_DTM: NORMAL
MDC_IDC_MSMT_BATTERY_REMAINING_LONGEVITY: 30 MO
MDC_IDC_MSMT_BATTERY_REMAINING_PERCENTAGE: 50 %
MDC_IDC_MSMT_BATTERY_STATUS: NORMAL
MDC_IDC_MSMT_LEADCHNL_RA_IMPEDANCE_VALUE: 543 OHM
MDC_IDC_MSMT_LEADCHNL_RA_PACING_THRESHOLD_AMPLITUDE: 1.1 V
MDC_IDC_MSMT_LEADCHNL_RA_PACING_THRESHOLD_PULSEWIDTH: 0.5 MS
MDC_IDC_MSMT_LEADCHNL_RV_IMPEDANCE_VALUE: 486 OHM
MDC_IDC_MSMT_LEADCHNL_RV_PACING_THRESHOLD_AMPLITUDE: 1.8 V
MDC_IDC_MSMT_LEADCHNL_RV_PACING_THRESHOLD_PULSEWIDTH: 0.4 MS
MDC_IDC_PG_IMPLANT_DTM: NORMAL
MDC_IDC_PG_MFG: NORMAL
MDC_IDC_PG_MODEL: NORMAL
MDC_IDC_PG_SERIAL: NORMAL
MDC_IDC_PG_TYPE: NORMAL
MDC_IDC_SESS_CLINIC_NAME: NORMAL
MDC_IDC_SESS_DTM: NORMAL
MDC_IDC_SESS_TYPE: NORMAL
MDC_IDC_SET_BRADY_AT_MODE_SWITCH_MODE: NORMAL
MDC_IDC_SET_BRADY_AT_MODE_SWITCH_RATE: 170 {BEATS}/MIN
MDC_IDC_SET_BRADY_LOWRATE: 60 {BEATS}/MIN
MDC_IDC_SET_BRADY_MAX_SENSOR_RATE: 130 {BEATS}/MIN
MDC_IDC_SET_BRADY_MAX_TRACKING_RATE: 130 {BEATS}/MIN
MDC_IDC_SET_BRADY_MODE: NORMAL
MDC_IDC_SET_BRADY_PAV_DELAY_HIGH: 150 MS
MDC_IDC_SET_BRADY_PAV_DELAY_LOW: 200 MS
MDC_IDC_SET_BRADY_SAV_DELAY_HIGH: 150 MS
MDC_IDC_SET_BRADY_SAV_DELAY_LOW: 200 MS
MDC_IDC_SET_LEADCHNL_RA_PACING_AMPLITUDE: 2 V
MDC_IDC_SET_LEADCHNL_RA_PACING_POLARITY: NORMAL
MDC_IDC_SET_LEADCHNL_RA_PACING_PULSEWIDTH: 0.5 MS
MDC_IDC_SET_LEADCHNL_RA_SENSING_ADAPTATION_MODE: NORMAL
MDC_IDC_SET_LEADCHNL_RA_SENSING_POLARITY: NORMAL
MDC_IDC_SET_LEADCHNL_RA_SENSING_SENSITIVITY: 0.25 MV
MDC_IDC_SET_LEADCHNL_RV_PACING_AMPLITUDE: 2.4 V
MDC_IDC_SET_LEADCHNL_RV_PACING_CAPTURE_MODE: NORMAL
MDC_IDC_SET_LEADCHNL_RV_PACING_POLARITY: NORMAL
MDC_IDC_SET_LEADCHNL_RV_PACING_PULSEWIDTH: 0.4 MS
MDC_IDC_SET_LEADCHNL_RV_SENSING_ADAPTATION_MODE: NORMAL
MDC_IDC_SET_LEADCHNL_RV_SENSING_POLARITY: NORMAL
MDC_IDC_SET_LEADCHNL_RV_SENSING_SENSITIVITY: 1.5 MV
MDC_IDC_SET_ZONE_DETECTION_INTERVAL: 375 MS
MDC_IDC_SET_ZONE_TYPE: NORMAL
MDC_IDC_SET_ZONE_VENDOR_TYPE: NORMAL
MDC_IDC_STAT_AT_BURDEN_PERCENT: 0 %
MDC_IDC_STAT_AT_DTM_END: NORMAL
MDC_IDC_STAT_AT_DTM_START: NORMAL
MDC_IDC_STAT_BRADY_DTM_END: NORMAL
MDC_IDC_STAT_BRADY_DTM_START: NORMAL
MDC_IDC_STAT_BRADY_RA_PERCENT_PACED: 64 %
MDC_IDC_STAT_BRADY_RV_PERCENT_PACED: 97 %
MDC_IDC_STAT_EPISODE_RECENT_COUNT: 0
MDC_IDC_STAT_EPISODE_RECENT_COUNT: 3
MDC_IDC_STAT_EPISODE_RECENT_COUNT_DTM_END: NORMAL
MDC_IDC_STAT_EPISODE_RECENT_COUNT_DTM_START: NORMAL
MDC_IDC_STAT_EPISODE_TYPE: NORMAL
MDC_IDC_STAT_EPISODE_VENDOR_TYPE: NORMAL

## 2020-02-26 ENCOUNTER — ANCILLARY PROCEDURE (OUTPATIENT)
Dept: CARDIOLOGY | Facility: CLINIC | Age: 85
End: 2020-02-26
Attending: INTERNAL MEDICINE
Payer: COMMERCIAL

## 2020-02-26 DIAGNOSIS — Z95.0 CARDIAC PACEMAKER IN SITU: ICD-10-CM

## 2020-02-26 PROCEDURE — 93296 REM INTERROG EVL PM/IDS: CPT | Performed by: INTERNAL MEDICINE

## 2020-02-26 PROCEDURE — 93294 REM INTERROG EVL PM/LDLS PM: CPT | Performed by: INTERNAL MEDICINE

## 2020-03-10 LAB
MDC_IDC_EPISODE_DTM: NORMAL
MDC_IDC_EPISODE_DURATION: 1 S
MDC_IDC_EPISODE_DURATION: 8 S
MDC_IDC_EPISODE_ID: NORMAL
MDC_IDC_EPISODE_TYPE: NORMAL
MDC_IDC_LEAD_IMPLANT_DT: NORMAL
MDC_IDC_LEAD_IMPLANT_DT: NORMAL
MDC_IDC_LEAD_LOCATION: NORMAL
MDC_IDC_LEAD_LOCATION: NORMAL
MDC_IDC_LEAD_MFG: NORMAL
MDC_IDC_LEAD_MFG: NORMAL
MDC_IDC_LEAD_MODEL: NORMAL
MDC_IDC_LEAD_MODEL: NORMAL
MDC_IDC_LEAD_POLARITY_TYPE: NORMAL
MDC_IDC_LEAD_POLARITY_TYPE: NORMAL
MDC_IDC_LEAD_SERIAL: NORMAL
MDC_IDC_LEAD_SERIAL: NORMAL
MDC_IDC_MSMT_BATTERY_DTM: NORMAL
MDC_IDC_MSMT_BATTERY_REMAINING_LONGEVITY: 30 MO
MDC_IDC_MSMT_BATTERY_REMAINING_PERCENTAGE: 47 %
MDC_IDC_MSMT_BATTERY_STATUS: NORMAL
MDC_IDC_MSMT_LEADCHNL_RA_IMPEDANCE_VALUE: 517 OHM
MDC_IDC_MSMT_LEADCHNL_RA_PACING_THRESHOLD_AMPLITUDE: 1.1 V
MDC_IDC_MSMT_LEADCHNL_RA_PACING_THRESHOLD_PULSEWIDTH: 0.5 MS
MDC_IDC_MSMT_LEADCHNL_RV_IMPEDANCE_VALUE: 479 OHM
MDC_IDC_MSMT_LEADCHNL_RV_PACING_THRESHOLD_AMPLITUDE: 2 V
MDC_IDC_MSMT_LEADCHNL_RV_PACING_THRESHOLD_PULSEWIDTH: 0.4 MS
MDC_IDC_PG_IMPLANT_DTM: NORMAL
MDC_IDC_PG_MFG: NORMAL
MDC_IDC_PG_MODEL: NORMAL
MDC_IDC_PG_SERIAL: NORMAL
MDC_IDC_PG_TYPE: NORMAL
MDC_IDC_SESS_CLINIC_NAME: NORMAL
MDC_IDC_SESS_DTM: NORMAL
MDC_IDC_SESS_TYPE: NORMAL
MDC_IDC_SET_BRADY_AT_MODE_SWITCH_MODE: NORMAL
MDC_IDC_SET_BRADY_AT_MODE_SWITCH_RATE: 170 {BEATS}/MIN
MDC_IDC_SET_BRADY_LOWRATE: 60 {BEATS}/MIN
MDC_IDC_SET_BRADY_MAX_SENSOR_RATE: 130 {BEATS}/MIN
MDC_IDC_SET_BRADY_MAX_TRACKING_RATE: 130 {BEATS}/MIN
MDC_IDC_SET_BRADY_MODE: NORMAL
MDC_IDC_SET_BRADY_PAV_DELAY_HIGH: 150 MS
MDC_IDC_SET_BRADY_PAV_DELAY_LOW: 200 MS
MDC_IDC_SET_BRADY_SAV_DELAY_HIGH: 150 MS
MDC_IDC_SET_BRADY_SAV_DELAY_LOW: 200 MS
MDC_IDC_SET_LEADCHNL_RA_PACING_AMPLITUDE: 2 V
MDC_IDC_SET_LEADCHNL_RA_PACING_POLARITY: NORMAL
MDC_IDC_SET_LEADCHNL_RA_PACING_PULSEWIDTH: 0.5 MS
MDC_IDC_SET_LEADCHNL_RA_SENSING_ADAPTATION_MODE: NORMAL
MDC_IDC_SET_LEADCHNL_RA_SENSING_POLARITY: NORMAL
MDC_IDC_SET_LEADCHNL_RA_SENSING_SENSITIVITY: 0.25 MV
MDC_IDC_SET_LEADCHNL_RV_PACING_AMPLITUDE: 2.4 V
MDC_IDC_SET_LEADCHNL_RV_PACING_CAPTURE_MODE: NORMAL
MDC_IDC_SET_LEADCHNL_RV_PACING_POLARITY: NORMAL
MDC_IDC_SET_LEADCHNL_RV_PACING_PULSEWIDTH: 0.4 MS
MDC_IDC_SET_LEADCHNL_RV_SENSING_ADAPTATION_MODE: NORMAL
MDC_IDC_SET_LEADCHNL_RV_SENSING_POLARITY: NORMAL
MDC_IDC_SET_LEADCHNL_RV_SENSING_SENSITIVITY: 1.5 MV
MDC_IDC_SET_ZONE_DETECTION_INTERVAL: 375 MS
MDC_IDC_SET_ZONE_TYPE: NORMAL
MDC_IDC_SET_ZONE_VENDOR_TYPE: NORMAL
MDC_IDC_STAT_AT_BURDEN_PERCENT: 1 %
MDC_IDC_STAT_AT_DTM_END: NORMAL
MDC_IDC_STAT_AT_DTM_START: NORMAL
MDC_IDC_STAT_BRADY_DTM_END: NORMAL
MDC_IDC_STAT_BRADY_DTM_START: NORMAL
MDC_IDC_STAT_BRADY_RA_PERCENT_PACED: 64 %
MDC_IDC_STAT_BRADY_RV_PERCENT_PACED: 97 %
MDC_IDC_STAT_EPISODE_RECENT_COUNT: 0
MDC_IDC_STAT_EPISODE_RECENT_COUNT: 0
MDC_IDC_STAT_EPISODE_RECENT_COUNT: 1
MDC_IDC_STAT_EPISODE_RECENT_COUNT: 4
MDC_IDC_STAT_EPISODE_RECENT_COUNT_DTM_END: NORMAL
MDC_IDC_STAT_EPISODE_RECENT_COUNT_DTM_START: NORMAL
MDC_IDC_STAT_EPISODE_TYPE: NORMAL
MDC_IDC_STAT_EPISODE_VENDOR_TYPE: NORMAL

## 2020-04-15 ENCOUNTER — RECORDS - HEALTHEAST (OUTPATIENT)
Dept: LAB | Facility: CLINIC | Age: 85
End: 2020-04-15

## 2020-04-16 LAB — BACTERIA SPEC CULT: NO GROWTH

## 2020-05-11 DIAGNOSIS — Z95.0 CARDIAC PACEMAKER IN SITU: Primary | ICD-10-CM

## 2020-05-28 ENCOUNTER — RECORDS - HEALTHEAST (OUTPATIENT)
Dept: LAB | Facility: CLINIC | Age: 85
End: 2020-05-28

## 2020-05-28 LAB
ALBUMIN UR-MCNC: NEGATIVE MG/DL
APPEARANCE UR: CLEAR
BACTERIA #/AREA URNS HPF: ABNORMAL HPF
BILIRUB UR QL STRIP: NEGATIVE
COLOR UR AUTO: YELLOW
GLUCOSE UR STRIP-MCNC: NEGATIVE MG/DL
HGB UR QL STRIP: ABNORMAL
KETONES UR STRIP-MCNC: NEGATIVE MG/DL
LEUKOCYTE ESTERASE UR QL STRIP: NEGATIVE
NITRATE UR QL: NEGATIVE
PH UR STRIP: 5.5 [PH] (ref 4.5–8)
RBC #/AREA URNS AUTO: ABNORMAL HPF
SP GR UR STRIP: 1.02 (ref 1–1.03)
SQUAMOUS #/AREA URNS AUTO: ABNORMAL LPF
UROBILINOGEN UR STRIP-ACNC: ABNORMAL
WBC #/AREA URNS AUTO: ABNORMAL HPF

## 2020-05-29 LAB — BACTERIA SPEC CULT: NORMAL

## 2020-06-09 ENCOUNTER — TELEPHONE (OUTPATIENT)
Dept: CARDIOLOGY | Facility: CLINIC | Age: 85
End: 2020-06-09

## 2020-06-09 ENCOUNTER — ANCILLARY PROCEDURE (OUTPATIENT)
Dept: CARDIOLOGY | Facility: CLINIC | Age: 85
End: 2020-06-09
Attending: INTERNAL MEDICINE
Payer: COMMERCIAL

## 2020-06-09 DIAGNOSIS — Z95.0 CARDIAC PACEMAKER IN SITU: ICD-10-CM

## 2020-06-09 PROCEDURE — 93294 REM INTERROG EVL PM/LDLS PM: CPT | Performed by: INTERNAL MEDICINE

## 2020-06-09 PROCEDURE — 93296 REM INTERROG EVL PM/IDS: CPT | Performed by: INTERNAL MEDICINE

## 2020-06-11 LAB
MDC_IDC_EPISODE_DTM: NORMAL
MDC_IDC_EPISODE_DURATION: 10 S
MDC_IDC_EPISODE_DURATION: 7 S
MDC_IDC_EPISODE_DURATION: 8 S
MDC_IDC_EPISODE_ID: NORMAL
MDC_IDC_EPISODE_TYPE: NORMAL
MDC_IDC_LEAD_IMPLANT_DT: NORMAL
MDC_IDC_LEAD_IMPLANT_DT: NORMAL
MDC_IDC_LEAD_LOCATION: NORMAL
MDC_IDC_LEAD_LOCATION: NORMAL
MDC_IDC_LEAD_MFG: NORMAL
MDC_IDC_LEAD_MFG: NORMAL
MDC_IDC_LEAD_MODEL: NORMAL
MDC_IDC_LEAD_MODEL: NORMAL
MDC_IDC_LEAD_POLARITY_TYPE: NORMAL
MDC_IDC_LEAD_POLARITY_TYPE: NORMAL
MDC_IDC_LEAD_SERIAL: NORMAL
MDC_IDC_LEAD_SERIAL: NORMAL
MDC_IDC_MSMT_BATTERY_DTM: NORMAL
MDC_IDC_MSMT_BATTERY_REMAINING_LONGEVITY: 30 MO
MDC_IDC_MSMT_BATTERY_REMAINING_PERCENTAGE: 45 %
MDC_IDC_MSMT_BATTERY_STATUS: NORMAL
MDC_IDC_MSMT_LEADCHNL_RA_IMPEDANCE_VALUE: 535 OHM
MDC_IDC_MSMT_LEADCHNL_RA_PACING_THRESHOLD_AMPLITUDE: 1.1 V
MDC_IDC_MSMT_LEADCHNL_RA_PACING_THRESHOLD_PULSEWIDTH: 0.5 MS
MDC_IDC_MSMT_LEADCHNL_RV_IMPEDANCE_VALUE: 490 OHM
MDC_IDC_MSMT_LEADCHNL_RV_PACING_THRESHOLD_AMPLITUDE: 1.9 V
MDC_IDC_MSMT_LEADCHNL_RV_PACING_THRESHOLD_PULSEWIDTH: 0.4 MS
MDC_IDC_PG_IMPLANT_DTM: NORMAL
MDC_IDC_PG_MFG: NORMAL
MDC_IDC_PG_MODEL: NORMAL
MDC_IDC_PG_SERIAL: NORMAL
MDC_IDC_PG_TYPE: NORMAL
MDC_IDC_SESS_CLINIC_NAME: NORMAL
MDC_IDC_SESS_DTM: NORMAL
MDC_IDC_SESS_TYPE: NORMAL
MDC_IDC_SET_BRADY_AT_MODE_SWITCH_MODE: NORMAL
MDC_IDC_SET_BRADY_AT_MODE_SWITCH_RATE: 170 {BEATS}/MIN
MDC_IDC_SET_BRADY_LOWRATE: 60 {BEATS}/MIN
MDC_IDC_SET_BRADY_MAX_SENSOR_RATE: 130 {BEATS}/MIN
MDC_IDC_SET_BRADY_MAX_TRACKING_RATE: 130 {BEATS}/MIN
MDC_IDC_SET_BRADY_MODE: NORMAL
MDC_IDC_SET_BRADY_PAV_DELAY_HIGH: 150 MS
MDC_IDC_SET_BRADY_PAV_DELAY_LOW: 200 MS
MDC_IDC_SET_BRADY_SAV_DELAY_HIGH: 150 MS
MDC_IDC_SET_BRADY_SAV_DELAY_LOW: 200 MS
MDC_IDC_SET_LEADCHNL_RA_PACING_AMPLITUDE: 2 V
MDC_IDC_SET_LEADCHNL_RA_PACING_POLARITY: NORMAL
MDC_IDC_SET_LEADCHNL_RA_PACING_PULSEWIDTH: 0.5 MS
MDC_IDC_SET_LEADCHNL_RA_SENSING_ADAPTATION_MODE: NORMAL
MDC_IDC_SET_LEADCHNL_RA_SENSING_POLARITY: NORMAL
MDC_IDC_SET_LEADCHNL_RA_SENSING_SENSITIVITY: 0.25 MV
MDC_IDC_SET_LEADCHNL_RV_PACING_AMPLITUDE: 2.4 V
MDC_IDC_SET_LEADCHNL_RV_PACING_CAPTURE_MODE: NORMAL
MDC_IDC_SET_LEADCHNL_RV_PACING_POLARITY: NORMAL
MDC_IDC_SET_LEADCHNL_RV_PACING_PULSEWIDTH: 0.4 MS
MDC_IDC_SET_LEADCHNL_RV_SENSING_ADAPTATION_MODE: NORMAL
MDC_IDC_SET_LEADCHNL_RV_SENSING_POLARITY: NORMAL
MDC_IDC_SET_LEADCHNL_RV_SENSING_SENSITIVITY: 1.5 MV
MDC_IDC_SET_ZONE_DETECTION_INTERVAL: 375 MS
MDC_IDC_SET_ZONE_TYPE: NORMAL
MDC_IDC_SET_ZONE_VENDOR_TYPE: NORMAL
MDC_IDC_STAT_AT_BURDEN_PERCENT: 1 %
MDC_IDC_STAT_AT_DTM_END: NORMAL
MDC_IDC_STAT_AT_DTM_START: NORMAL
MDC_IDC_STAT_BRADY_DTM_END: NORMAL
MDC_IDC_STAT_BRADY_DTM_START: NORMAL
MDC_IDC_STAT_BRADY_RA_PERCENT_PACED: 62 %
MDC_IDC_STAT_BRADY_RV_PERCENT_PACED: 97 %
MDC_IDC_STAT_EPISODE_RECENT_COUNT: 0
MDC_IDC_STAT_EPISODE_RECENT_COUNT: 0
MDC_IDC_STAT_EPISODE_RECENT_COUNT: 1
MDC_IDC_STAT_EPISODE_RECENT_COUNT: 7
MDC_IDC_STAT_EPISODE_RECENT_COUNT_DTM_END: NORMAL
MDC_IDC_STAT_EPISODE_RECENT_COUNT_DTM_START: NORMAL
MDC_IDC_STAT_EPISODE_TYPE: NORMAL
MDC_IDC_STAT_EPISODE_VENDOR_TYPE: NORMAL

## 2020-06-24 NOTE — TELEPHONE ENCOUNTER
Attempted to call DON at [facility] with recommendations from Dr. Son, left message for DON to call back. Called pt's nurse at [facility] w/ recommendations from Dr. Son to f/u w/ vascular cardiology, she will have staff arrange an apt as recommended. LPenfield RN   
Did Hossein Burnett know that Dr Jensen saw this patient in the hospital for AAA and plans to reimage in 6 months?    If not, I will discuss with him   
Needs to see vascular cardiologists for aneurysm. Thx  
Reviewed echo showing   Techcnically difficult imaging  Left ventricular hypertrophy: asymmetric with no LVOT obstruction  The left ventricle is normal in size.  Hyperdynamic left ventricular function  The right ventricle is normal in structure, function and size.  Right ventricular systolic pressure is elevated, consistent with mild pulmonary hypertension.  Moderate aortic root dilatation.  The ascending aorta is Mildly dilated.     Doppler interrogation does not demonstrate significant stenosis or insufficiency involvng cardiac valsves.   Although the patient's heart rate has increased since the last study ( may be in atrial flutter), there has been no other significant change.    Reviewed abdominal U/S showing   IMPRESSION:  1. There is a infrarenal abdominal aortic aneurysm with mural thrombus measuring 5.2 cm transverse and 4.4 cm AP.  2. 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  3. Ultrasound findings are similar to the CT done on 4/19/2018.  Compared to the ultrasound from 7/8/14 the aneurysm increased from 4.1 cm to 5.2 cm.    Reviewed carotid U/S showing   1. A calcific atherosclerotic plaque is present in the proximal internal carotid arteries and carotid bifurcation bilaterally  2. There is a moderate (50-69%) left internal carotid artery stenosis and mild (less than 50%) right internal carotid artery stenosis    Pt scheduled for coronary angiogram tomorrow and will see Scar Harris NP 5/10/18.     Called nurseClaribel at [facility] with instructions:  1. NPO after midnight  2. take medications in AM w/ sip of water  3. Denies taking warfarin, Pradaxa, Xarelto or Eliquis  4. Denies being diabetic  5. Denies taking a diuretic  6. Denies dye allergy  7. Advised will need  & responsible adult with them for 24 after procedure.     LPenfield RN   
no

## 2020-10-01 ENCOUNTER — DOCUMENTATION ONLY (OUTPATIENT)
Dept: CARDIOLOGY | Facility: CLINIC | Age: 85
End: 2020-10-01

## 2020-10-01 NOTE — PROGRESS NOTES
Wellness Screening Tool    Symptom Screening:    Do you have one of the following NEW symptoms:      Fever (subjective or >100.0)?  No    New cough? No    Shortness of breath? No    Chills? No    New loss of taste or smell? No    Generalized body aches? No    New persistent headache? No    New sore throat? No    Nausea, vomiting or diarrhea? No    Within the past 2 weeks, have you been exposed to someone with a known positive illness below?      COVID - 19 (known or suspected) No    Chicken pox?  No    Measles? No    Pertussis? No    Have you had a positive COVID-19 diagnostic test (nasal swab test) in the last 14 days or are you currently   on self-quarantine restrictions (i.e.travel restriction, exposure, etc?) No        Patient notified of visitor restriction: Yes  Patient informed to wear a mask: Yes    Patient's appointment status: Patient will be seen in clinic as scheduled on 10/2/20 @ 1:00pm. Naz BARRAGAN

## 2020-10-02 ENCOUNTER — ANCILLARY PROCEDURE (OUTPATIENT)
Dept: CARDIOLOGY | Facility: CLINIC | Age: 85
End: 2020-10-02
Attending: INTERNAL MEDICINE
Payer: COMMERCIAL

## 2020-10-02 DIAGNOSIS — Z95.0 CARDIAC PACEMAKER IN SITU: ICD-10-CM

## 2020-10-02 PROCEDURE — 93280 PM DEVICE PROGR EVAL DUAL: CPT | Performed by: INTERNAL MEDICINE

## 2020-10-10 ENCOUNTER — RECORDS - HEALTHEAST (OUTPATIENT)
Dept: LAB | Facility: CLINIC | Age: 85
End: 2020-10-10

## 2020-10-12 LAB
ALBUMIN SERPL-MCNC: 4.1 G/DL (ref 3.5–5)
ALP SERPL-CCNC: 99 U/L (ref 45–120)
ALT SERPL W P-5'-P-CCNC: 10 U/L (ref 0–45)
ANION GAP SERPL CALCULATED.3IONS-SCNC: 11 MMOL/L (ref 5–18)
AST SERPL W P-5'-P-CCNC: 15 U/L (ref 0–40)
BILIRUB DIRECT SERPL-MCNC: 0.2 MG/DL
BILIRUB SERPL-MCNC: 0.6 MG/DL (ref 0–1)
BUN SERPL-MCNC: 28 MG/DL (ref 8–28)
CALCIUM SERPL-MCNC: 9.5 MG/DL (ref 8.5–10.5)
CHLORIDE BLD-SCNC: 105 MMOL/L (ref 98–107)
CHOLEST SERPL-MCNC: 122 MG/DL
CO2 SERPL-SCNC: 23 MMOL/L (ref 22–31)
CREAT SERPL-MCNC: 0.91 MG/DL (ref 0.7–1.3)
ERYTHROCYTE [DISTWIDTH] IN BLOOD BY AUTOMATED COUNT: 13.3 % (ref 11–14.5)
FASTING STATUS PATIENT QL REPORTED: NORMAL
GFR SERPL CREATININE-BSD FRML MDRD: >60 ML/MIN/1.73M2
GLUCOSE BLD-MCNC: 101 MG/DL (ref 70–125)
HBA1C MFR BLD: 5.9 %
HCT VFR BLD AUTO: 45 % (ref 40–54)
HDLC SERPL-MCNC: 42 MG/DL
HGB BLD-MCNC: 14.4 G/DL (ref 14–18)
LDLC SERPL CALC-MCNC: 50 MG/DL
MCH RBC QN AUTO: 31.9 PG (ref 27–34)
MCHC RBC AUTO-ENTMCNC: 32 G/DL (ref 32–36)
MCV RBC AUTO: 100 FL (ref 80–100)
PLATELET # BLD AUTO: 178 THOU/UL (ref 140–440)
PMV BLD AUTO: 10.7 FL (ref 8.5–12.5)
POTASSIUM BLD-SCNC: 4.1 MMOL/L (ref 3.5–5)
PROT SERPL-MCNC: 7.2 G/DL (ref 6–8)
RBC # BLD AUTO: 4.52 MILL/UL (ref 4.4–6.2)
SODIUM SERPL-SCNC: 139 MMOL/L (ref 136–145)
TRIGL SERPL-MCNC: 148 MG/DL
TSH SERPL DL<=0.005 MIU/L-ACNC: 2.38 UIU/ML (ref 0.3–5)
WBC: 6.8 THOU/UL (ref 4–11)

## 2020-10-26 LAB
MDC_IDC_LEAD_IMPLANT_DT: NORMAL
MDC_IDC_LEAD_IMPLANT_DT: NORMAL
MDC_IDC_LEAD_LOCATION: NORMAL
MDC_IDC_LEAD_LOCATION: NORMAL
MDC_IDC_LEAD_MFG: NORMAL
MDC_IDC_LEAD_MFG: NORMAL
MDC_IDC_LEAD_MODEL: NORMAL
MDC_IDC_LEAD_MODEL: NORMAL
MDC_IDC_LEAD_POLARITY_TYPE: NORMAL
MDC_IDC_LEAD_POLARITY_TYPE: NORMAL
MDC_IDC_LEAD_SERIAL: NORMAL
MDC_IDC_LEAD_SERIAL: NORMAL
MDC_IDC_MSMT_BATTERY_STATUS: NORMAL
MDC_IDC_MSMT_LEADCHNL_RA_IMPEDANCE_VALUE: 586 OHM
MDC_IDC_MSMT_LEADCHNL_RA_PACING_THRESHOLD_AMPLITUDE: 1.2 V
MDC_IDC_MSMT_LEADCHNL_RA_PACING_THRESHOLD_PULSEWIDTH: 0.5 MS
MDC_IDC_MSMT_LEADCHNL_RA_SENSING_INTR_AMPL: 4.6 MV
MDC_IDC_MSMT_LEADCHNL_RV_IMPEDANCE_VALUE: 530 OHM
MDC_IDC_MSMT_LEADCHNL_RV_PACING_THRESHOLD_AMPLITUDE: 1.4 V
MDC_IDC_MSMT_LEADCHNL_RV_PACING_THRESHOLD_PULSEWIDTH: 0.4 MS
MDC_IDC_MSMT_LEADCHNL_RV_SENSING_INTR_AMPL: NORMAL
MDC_IDC_PG_IMPLANT_DTM: NORMAL
MDC_IDC_PG_MFG: NORMAL
MDC_IDC_PG_MODEL: NORMAL
MDC_IDC_PG_SERIAL: NORMAL
MDC_IDC_PG_TYPE: NORMAL
MDC_IDC_SESS_CLINIC_NAME: NORMAL
MDC_IDC_SESS_DTM: NORMAL
MDC_IDC_SESS_TYPE: NORMAL
MDC_IDC_SET_BRADY_AT_MODE_SWITCH_MODE: NORMAL
MDC_IDC_SET_BRADY_AT_MODE_SWITCH_RATE: 170 {BEATS}/MIN
MDC_IDC_SET_BRADY_HYSTRATE: NORMAL
MDC_IDC_SET_BRADY_LOWRATE: 60 {BEATS}/MIN
MDC_IDC_SET_BRADY_MAX_SENSOR_RATE: 130 {BEATS}/MIN
MDC_IDC_SET_BRADY_MAX_TRACKING_RATE: 130 {BEATS}/MIN
MDC_IDC_SET_BRADY_MODE: NORMAL
MDC_IDC_SET_BRADY_PAV_DELAY_HIGH: 150 MS
MDC_IDC_SET_BRADY_PAV_DELAY_LOW: 200 MS
MDC_IDC_SET_BRADY_SAV_DELAY_HIGH: 150 MS
MDC_IDC_SET_BRADY_SAV_DELAY_LOW: 200 MS
MDC_IDC_SET_LEADCHNL_RA_PACING_AMPLITUDE: 2 V
MDC_IDC_SET_LEADCHNL_RA_PACING_CAPTURE_MODE: NORMAL
MDC_IDC_SET_LEADCHNL_RA_PACING_POLARITY: NORMAL
MDC_IDC_SET_LEADCHNL_RA_PACING_PULSEWIDTH: 0.5 MS
MDC_IDC_SET_LEADCHNL_RA_SENSING_ADAPTATION_MODE: NORMAL
MDC_IDC_SET_LEADCHNL_RA_SENSING_POLARITY: NORMAL
MDC_IDC_SET_LEADCHNL_RA_SENSING_SENSITIVITY: 0.25 MV
MDC_IDC_SET_LEADCHNL_RV_PACING_AMPLITUDE: 2.2 V
MDC_IDC_SET_LEADCHNL_RV_PACING_CAPTURE_MODE: NORMAL
MDC_IDC_SET_LEADCHNL_RV_PACING_POLARITY: NORMAL
MDC_IDC_SET_LEADCHNL_RV_PACING_PULSEWIDTH: 0.4 MS
MDC_IDC_SET_LEADCHNL_RV_SENSING_ADAPTATION_MODE: NORMAL
MDC_IDC_SET_LEADCHNL_RV_SENSING_POLARITY: NORMAL
MDC_IDC_SET_LEADCHNL_RV_SENSING_SENSITIVITY: 1.5 MV
MDC_IDC_SET_ZONE_DETECTION_INTERVAL: 375 MS
MDC_IDC_SET_ZONE_TYPE: NORMAL
MDC_IDC_SET_ZONE_VENDOR_TYPE: NORMAL
MDC_IDC_STAT_EPISODE_RECENT_COUNT: 8
MDC_IDC_STAT_EPISODE_RECENT_COUNT_DTM_END: NORMAL
MDC_IDC_STAT_EPISODE_RECENT_COUNT_DTM_START: NORMAL
MDC_IDC_STAT_EPISODE_TOTAL_COUNT: 19
MDC_IDC_STAT_EPISODE_TOTAL_COUNT_DTM_END: NORMAL
MDC_IDC_STAT_EPISODE_TYPE: NORMAL
MDC_IDC_STAT_EPISODE_TYPE: NORMAL
MDC_IDC_STAT_EPISODE_VENDOR_TYPE: NORMAL
MDC_IDC_STAT_EPISODE_VENDOR_TYPE: NORMAL

## 2020-10-30 ENCOUNTER — OFFICE VISIT (OUTPATIENT)
Dept: CARDIOLOGY | Facility: CLINIC | Age: 85
End: 2020-10-30
Payer: COMMERCIAL

## 2020-10-30 VITALS
HEIGHT: 73 IN | WEIGHT: 154.2 LBS | SYSTOLIC BLOOD PRESSURE: 144 MMHG | HEART RATE: 90 BPM | DIASTOLIC BLOOD PRESSURE: 82 MMHG | BODY MASS INDEX: 20.44 KG/M2

## 2020-10-30 DIAGNOSIS — Z95.0 CARDIAC PACEMAKER IN SITU: Primary | ICD-10-CM

## 2020-10-30 DIAGNOSIS — I25.10 CORONARY ARTERY DISEASE INVOLVING NATIVE CORONARY ARTERY OF NATIVE HEART WITHOUT ANGINA PECTORIS: ICD-10-CM

## 2020-10-30 PROCEDURE — 99213 OFFICE O/P EST LOW 20 MIN: CPT | Performed by: INTERNAL MEDICINE

## 2020-10-30 RX ORDER — GUAIFENESIN/DEXTROMETHORPHAN 100-10MG/5
5 SYRUP ORAL EVERY 4 HOURS PRN
COMMUNITY
End: 2023-01-01

## 2020-10-30 ASSESSMENT — MIFFLIN-ST. JEOR: SCORE: 1438.33

## 2020-10-30 NOTE — LETTER
"10/30/2020    Paulino SOTO Cris  [id number]    RE: Nacho Joseph       Dear Colleague,    I had the pleasure of seeing Nacho Joseph in the [facility].    Cardiology Progress Note          Assessment and Plan:     1. Stable coronary artery disease with known chronic totally occluded right coronary artery and stable angina on exertion    No significant change in his functional status or symptoms.  Continue medical management unless escalation in symptoms.      2. Hypertension, slight suboptimal control    Continue to follow.    May titrate medications if continued suboptimal blood pressure control.      3. Complete heart block    Pacemaker in place.  Interrogation reviewed.    Follow-up in 1 year      This note was transcribed using electronic voice recognition software and there may be typographical errors present.                Interval History:   The patient is a very pleasant 85 year old whom I am following for coronary artery disease with known chronic totally occluded RCA as well as history of AV block status post pacemaker.  I reviewed his last pacemaker check and it is functioning normally.  The patient overall has his stable exertional angina without significant change in his functional status or dyspnea.    Blood pressure is slightly suboptimal today.                     Review of Systems:   Review of Systems:  Skin:  Negative     Eyes:  Negative    ENT:  Negative    Respiratory:  Positive for dyspnea on exertion  Cardiovascular:  Negative dizziness  Gastroenterology: Negative    Genitourinary:  Negative    Musculoskeletal:  Positive for arthritis  Neurologic:  Negative    Psychiatric:  Negative    Heme/Lymph/Imm:  Negative    Endocrine:  Negative                Physical Exam:     Vitals: BP (!) 144/82   Pulse 90   Ht 1.854 m (6' 1\")   Wt 69.9 kg (154 lb 3.2 oz)   BMI 20.34 kg/m    Constitutional:  cooperative, alert and oriented, well developed, well " nourished, in no acute distress   sitting in a wheelchair    Skin:  warm and dry to the touch   (actinic keratoses)    Head:  normocephalic        Eyes:  pupils equal and round        ENT:  no pallor or cyanosis        Neck:  JVP normal        Chest:  normal symmetry prolonged expiration      Cardiac: regular rhythm   distant heart sounds   LLSB;systolic murmur;grade 2     distant heart sounds    Abdomen:  BS normoactive   benign    Extremities and Back:  no deformities, clubbing, cyanosis, erythema observed        Neurological:  no gross motor deficits;affect appropriate                 Medications:     Current Outpatient Medications   Medication Sig Dispense Refill     Acetaminophen (TYLENOL PO) Take 1,000 mg by mouth 3 times daily       aspirin 81 MG tablet Take 1 tablet (81 mg) by mouth daily       atorvastatin (LIPITOR) 40 MG tablet Take 1 tablet (40 mg) by mouth daily       DOXYCYCLINE HYCLATE PO Take 100 mg by mouth 2 times daily       Fexofenadine HCl (MUCINEX ALLERGY PO) Take 800 mg by mouth 2 times daily (2 x 400 mg = 800 mg dose)       guaiFENesin-dextromethorphan (ROBITUSSIN DM) 100-10 MG/5ML syrup Take 5 mLs by mouth every 4 hours as needed for cough       hydrochlorothiazide (MICROZIDE) 12.5 MG capsule Take 12.5 mg by mouth every other day Hold if bp <110       ISOSORBIDE DINITRATE PO Take 30 mg by mouth daily       magnesium hydroxide (MILK OF MAGNESIA) 400 MG/5ML suspension Take 30 mLs by mouth daily as needed for constipation or heartburn       metoprolol succinate (TOPROL-XL) 50 MG 24 hr tablet Take 1 tablet (50 mg) by mouth two times daily 180 tablet 3     nitroglycerin (NITROSTAT) 0.4 MG SL tablet Place 1 tablet (0.4 mg) under the tongue every 5 minutes as needed for chest pain 25 tablet 4     Nutritional Supplements (HI-ARELIS) LIQD Take by mouth 3 times daily       sodium chloride (DEEP SEA NASAL SPRAY) 0.65 % nasal spray Spray 1 spray into both nostrils daily as needed for congestion                   Data:   All laboratory data reviewed  No results found for this or any previous visit (from the past 24 hour(s)).    All laboratory data reviewed  No results found for: CHOL  No results found for: HDL  No results found for: LDL  No results found for: TRIG  No results found for: CHOLHDLRATIO  TSH   Date Value Ref Range Status   07/07/2014 2.07 0.4 - 5.0 mU/L Final     Last Basic Metabolic Panel:  Lab Results   Component Value Date     05/17/2018      Lab Results   Component Value Date    POTASSIUM 3.7 05/18/2018     Lab Results   Component Value Date    CHLORIDE 106 05/17/2018     Lab Results   Component Value Date    ARELIS 9.7 05/17/2018     Lab Results   Component Value Date    CO2 24 05/17/2018     Lab Results   Component Value Date    BUN 23 05/17/2018     Lab Results   Component Value Date    CR 1.01 05/17/2018     Lab Results   Component Value Date     05/17/2018     Lab Results   Component Value Date    WBC 9.2 05/02/2018     Lab Results   Component Value Date    RBC 4.13 05/02/2018     Lab Results   Component Value Date    HGB 12.7 05/02/2018     Lab Results   Component Value Date    HCT 39.5 05/02/2018     Lab Results   Component Value Date    MCV 96 05/02/2018     Lab Results   Component Value Date    MCH 30.8 05/02/2018     Lab Results   Component Value Date    MCHC 32.2 05/02/2018     Lab Results   Component Value Date    RDW 15.3 05/02/2018     Lab Results   Component Value Date     05/02/2018                   Thank you for allowing me to participate in the care of your patient.      Sincerely,     Carlo Llanes MD     [facility]    cc:   No referring provider defined for this encounter.

## 2020-10-30 NOTE — PROGRESS NOTES
"Cardiology Progress Note          Assessment and Plan:     1. Stable coronary artery disease with known chronic totally occluded right coronary artery and stable angina on exertion    No significant change in his functional status or symptoms.  Continue medical management unless escalation in symptoms.      2. Hypertension, slight suboptimal control    Continue to follow.    May titrate medications if continued suboptimal blood pressure control.      3. Complete heart block    Pacemaker in place.  Interrogation reviewed.    Follow-up in 1 year      This note was transcribed using electronic voice recognition software and there may be typographical errors present.                Interval History:   The patient is a very pleasant 85 year old whom I am following for coronary artery disease with known chronic totally occluded RCA as well as history of AV block status post pacemaker.  I reviewed his last pacemaker check and it is functioning normally.  The patient overall has his stable exertional angina without significant change in his functional status or dyspnea.    Blood pressure is slightly suboptimal today.                     Review of Systems:   Review of Systems:  Skin:  Negative     Eyes:  Negative    ENT:  Negative    Respiratory:  Positive for dyspnea on exertion  Cardiovascular:  Negative dizziness  Gastroenterology: Negative    Genitourinary:  Negative    Musculoskeletal:  Positive for arthritis  Neurologic:  Negative    Psychiatric:  Negative    Heme/Lymph/Imm:  Negative    Endocrine:  Negative                Physical Exam:     Vitals: BP (!) 144/82   Pulse 90   Ht 1.854 m (6' 1\")   Wt 69.9 kg (154 lb 3.2 oz)   BMI 20.34 kg/m    Constitutional:  cooperative, alert and oriented, well developed, well nourished, in no acute distress   sitting in a wheelchair    Skin:  warm and dry to the touch   (actinic keratoses)    Head:  normocephalic        Eyes:  pupils equal and round        ENT:  no pallor or " cyanosis        Neck:  JVP normal        Chest:  normal symmetry prolonged expiration      Cardiac: regular rhythm   distant heart sounds   LLSB;systolic murmur;grade 2     distant heart sounds    Abdomen:  BS normoactive   benign    Extremities and Back:  no deformities, clubbing, cyanosis, erythema observed        Neurological:  no gross motor deficits;affect appropriate                 Medications:     Current Outpatient Medications   Medication Sig Dispense Refill     Acetaminophen (TYLENOL PO) Take 1,000 mg by mouth 3 times daily       aspirin 81 MG tablet Take 1 tablet (81 mg) by mouth daily       atorvastatin (LIPITOR) 40 MG tablet Take 1 tablet (40 mg) by mouth daily       DOXYCYCLINE HYCLATE PO Take 100 mg by mouth 2 times daily       Fexofenadine HCl (MUCINEX ALLERGY PO) Take 800 mg by mouth 2 times daily (2 x 400 mg = 800 mg dose)       guaiFENesin-dextromethorphan (ROBITUSSIN DM) 100-10 MG/5ML syrup Take 5 mLs by mouth every 4 hours as needed for cough       hydrochlorothiazide (MICROZIDE) 12.5 MG capsule Take 12.5 mg by mouth every other day Hold if bp <110       ISOSORBIDE DINITRATE PO Take 30 mg by mouth daily       magnesium hydroxide (MILK OF MAGNESIA) 400 MG/5ML suspension Take 30 mLs by mouth daily as needed for constipation or heartburn       metoprolol succinate (TOPROL-XL) 50 MG 24 hr tablet Take 1 tablet (50 mg) by mouth two times daily 180 tablet 3     nitroglycerin (NITROSTAT) 0.4 MG SL tablet Place 1 tablet (0.4 mg) under the tongue every 5 minutes as needed for chest pain 25 tablet 4     Nutritional Supplements (HI-ARELIS) LIQD Take by mouth 3 times daily       sodium chloride (DEEP SEA NASAL SPRAY) 0.65 % nasal spray Spray 1 spray into both nostrils daily as needed for congestion                  Data:   All laboratory data reviewed  No results found for this or any previous visit (from the past 24 hour(s)).    All laboratory data reviewed  No results found for: CHOL  No results found for:  HDL  No results found for: LDL  No results found for: TRIG  No results found for: CHOLHDLRATIO  TSH   Date Value Ref Range Status   07/07/2014 2.07 0.4 - 5.0 mU/L Final     Last Basic Metabolic Panel:  Lab Results   Component Value Date     05/17/2018      Lab Results   Component Value Date    POTASSIUM 3.7 05/18/2018     Lab Results   Component Value Date    CHLORIDE 106 05/17/2018     Lab Results   Component Value Date    ARELIS 9.7 05/17/2018     Lab Results   Component Value Date    CO2 24 05/17/2018     Lab Results   Component Value Date    BUN 23 05/17/2018     Lab Results   Component Value Date    CR 1.01 05/17/2018     Lab Results   Component Value Date     05/17/2018     Lab Results   Component Value Date    WBC 9.2 05/02/2018     Lab Results   Component Value Date    RBC 4.13 05/02/2018     Lab Results   Component Value Date    HGB 12.7 05/02/2018     Lab Results   Component Value Date    HCT 39.5 05/02/2018     Lab Results   Component Value Date    MCV 96 05/02/2018     Lab Results   Component Value Date    MCH 30.8 05/02/2018     Lab Results   Component Value Date    MCHC 32.2 05/02/2018     Lab Results   Component Value Date    RDW 15.3 05/02/2018     Lab Results   Component Value Date     05/02/2018

## 2021-01-11 ENCOUNTER — ANCILLARY PROCEDURE (OUTPATIENT)
Dept: CARDIOLOGY | Facility: CLINIC | Age: 86
End: 2021-01-11
Attending: INTERNAL MEDICINE
Payer: COMMERCIAL

## 2021-01-11 ENCOUNTER — MEDICAL CORRESPONDENCE (OUTPATIENT)
Dept: HEALTH INFORMATION MANAGEMENT | Facility: CLINIC | Age: 86
End: 2021-01-11

## 2021-01-11 DIAGNOSIS — Z95.0 CARDIAC PACEMAKER IN SITU: ICD-10-CM

## 2021-01-11 DIAGNOSIS — I49.5 SICK SINUS SYNDROME (H): Primary | ICD-10-CM

## 2021-01-11 PROCEDURE — 93296 REM INTERROG EVL PM/IDS: CPT | Performed by: INTERNAL MEDICINE

## 2021-01-11 PROCEDURE — 93294 REM INTERROG EVL PM/LDLS PM: CPT | Performed by: INTERNAL MEDICINE

## 2021-01-12 ENCOUNTER — TRANSCRIBE ORDERS (OUTPATIENT)
Dept: OTHER | Age: 86
End: 2021-01-12

## 2021-01-12 DIAGNOSIS — H91.90 UNSPECIFIED HEARING LOSS, UNSPECIFIED EAR: Primary | ICD-10-CM

## 2021-01-15 ENCOUNTER — OFFICE VISIT (OUTPATIENT)
Dept: OPHTHALMOLOGY | Facility: CLINIC | Age: 86
End: 2021-01-15
Attending: OPHTHALMOLOGY
Payer: COMMERCIAL

## 2021-01-15 ENCOUNTER — TELEPHONE (OUTPATIENT)
Dept: OPHTHALMOLOGY | Facility: CLINIC | Age: 86
End: 2021-01-15

## 2021-01-15 DIAGNOSIS — H35.9 RETINAL LESION: Primary | ICD-10-CM

## 2021-01-15 DIAGNOSIS — H35.30 MACULAR DEGENERATION: Primary | ICD-10-CM

## 2021-01-15 PROCEDURE — 99213 OFFICE O/P EST LOW 20 MIN: CPT | Mod: 25 | Performed by: OPHTHALMOLOGY

## 2021-01-15 PROCEDURE — 76512 OPH US DX B-SCAN: CPT | Performed by: STUDENT IN AN ORGANIZED HEALTH CARE EDUCATION/TRAINING PROGRAM

## 2021-01-15 PROCEDURE — 92134 CPTRZ OPH DX IMG PST SGM RTA: CPT | Performed by: STUDENT IN AN ORGANIZED HEALTH CARE EDUCATION/TRAINING PROGRAM

## 2021-01-15 PROCEDURE — 92004 COMPRE OPH EXAM NEW PT 1/>: CPT | Performed by: STUDENT IN AN ORGANIZED HEALTH CARE EDUCATION/TRAINING PROGRAM

## 2021-01-15 PROCEDURE — 999N000103 HC STATISTIC NO CHARGE FACILITY FEE

## 2021-01-15 PROCEDURE — 99207 FUNDUS PHOTOS OU (BOTH EYES): CPT | Performed by: OPHTHALMOLOGY

## 2021-01-15 PROCEDURE — 92250 FUNDUS PHOTOGRAPHY W/I&R: CPT | Performed by: OPHTHALMOLOGY

## 2021-01-15 PROCEDURE — G0463 HOSPITAL OUTPT CLINIC VISIT: HCPCS

## 2021-01-15 RX ORDER — ANTIOX #8/OM3/DHA/EPA/LUT/ZEAX 250-2.5 MG
CAPSULE ORAL
Qty: 60 CAPSULE | Refills: 11 | Status: SHIPPED | OUTPATIENT
Start: 2021-01-15 | End: 2023-01-01

## 2021-01-15 ASSESSMENT — SLIT LAMP EXAM - LIDS
COMMENTS: NORMAL
COMMENTS: NORMAL
COMMENTS: 1+ BLEPHARITIS
COMMENTS: 1+ BLEPHARITIS

## 2021-01-15 ASSESSMENT — CONF VISUAL FIELD
OD_SUPERIOR_NASAL_RESTRICTION: 1
OD_INFERIOR_TEMPORAL_RESTRICTION: 1
OD_SUPERIOR_TEMPORAL_RESTRICTION: 1
OD_SUPERIOR_TEMPORAL_RESTRICTION: 1
OS_NORMAL: 1
OD_INFERIOR_TEMPORAL_RESTRICTION: 1
OD_INFERIOR_NASAL_RESTRICTION: 1
OS_NORMAL: 1
METHOD: COUNTING FINGERS
OD_SUPERIOR_NASAL_RESTRICTION: 1
OD_INFERIOR_NASAL_RESTRICTION: 1

## 2021-01-15 ASSESSMENT — CUP TO DISC RATIO
OD_RATIO: 0.3 (20D)
OS_RATIO: 0.4
OD_RATIO: 0.3
OS_RATIO: 0.3 (20D)

## 2021-01-15 ASSESSMENT — EXTERNAL EXAM - RIGHT EYE
OD_EXAM: NORMAL
OD_EXAM: NORMAL

## 2021-01-15 ASSESSMENT — REFRACTION_MANIFEST
OS_CYLINDER: +2.00
OS_ADD: +3.00
OS_AXIS: 002
OD_AXIS: 002
OS_SPHERE: -2.00
OD_ADD: +3.00
OD_CYLINDER: +1.75
OD_SPHERE: -1.00

## 2021-01-15 ASSESSMENT — VISUAL ACUITY
OS_SC: 20/300
OD_SC: HM
OS_PH_SC: 20/70
METHOD: SNELLEN - LINEAR
OS_SC: 20/300
OD_SC: HM
OS_PH_SC: 20/70
METHOD: NUMBERS - SINGLE
METHOD_MR_RETINOSCOPY: 1

## 2021-01-15 ASSESSMENT — TONOMETRY
IOP_METHOD: ICARE
OS_IOP_MMHG: 16
OS_IOP_MMHG: 16
OD_IOP_MMHG: 12
IOP_METHOD: ICARE
OD_IOP_MMHG: 12

## 2021-01-15 ASSESSMENT — EXTERNAL EXAM - LEFT EYE
OS_EXAM: NORMAL
OS_EXAM: NORMAL

## 2021-01-15 NOTE — NURSING NOTE
Chief Complaints and History of Present Illnesses   Patient presents with     Annual Eye Exam     Chief Complaint(s) and History of Present Illness(es)     Annual Eye Exam     Laterality: both eyes    Quality: hazy    Course: stable    Associated symptoms: dryness.  Negative for eye pain, flashes and floaters    Treatments tried: no treatments    Pain scale: 0/10              Comments     He states that some days like today he sees better without his glasses.  Other days he has a film described as a veil or a fog over both eyes.  He tells me that this has been a problem for many years.    He wears bifocal glasses but did not bring them, thinking this was an ENT appointment.    Madelyn Ford, COT 12:26 PM  January 15, 2021

## 2021-01-15 NOTE — TELEPHONE ENCOUNTER
fax to [phone]  Pt assisted living nursing fax above    Rx for AREDS eye vitamins faxed per request 1- at [phone]    Harpreet Hudson RN 4:36 PM 01/15/21

## 2021-01-15 NOTE — PROGRESS NOTES
CC:  Blurry vision right eye > left eye     HPI:  86 y/o male with PMHx CAD w/ chronic total RCA occlusion, complete heart block s/p pacemaker, HTN presents for general eye exam with blurry vision each eye, right eye > left eye.  Reports hazy vision and dryness each eye.  Feels like there's a veil / fog each eye.  Vision right eye has been worse this past year.  Some days sees better with glasses, some days not.  Where's bifocals at home, did not bring.      Patient denies any pain, redness, irritation, tearing, itchiness, photophobia, or trauma. No flashes, floaters, diplopia.    Per technician, patient very confused during work-up and history taking.      POHx:  Refractive Error each eye     Last eye exam: 2 years ago   Prior eye surgery/laser: no  CTL wearer: no  Glasses: yes, bifocals -- did not bring.     FOHx:  Denies    Gtts:  none    All:  NKDA    A/P:    1) Nuclear Sclerotic Cataract each eye   Visually significant cataracts each eye, left eye > right eye  - APD right eye with advanced wet AMD and HM vision, unlikely to benefit from CEIOL  - left eye with denser lens and visually significant.  - R/B/A/possible complications of cataract surgery reviewed, discussed higher risk of complications and dropped lens with PXF syndrome.  - No trauma, dilates poorly 4.5mm, no h/o Flomax, + PXF material left eye   - No fluoroquinolone allergy  - Target: TBD  -  Will defer surgical planning for now -- diagnosed with Wet ARMD right eye, dry left eye, evaluated by Dr. Maldonado today and patient left before returning to my clinic as he was growing impatient with eye examinations.  - will call to schedule next 1-2 months in Dr. 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 Maldonado today and will follow up with im in 4-6 months for repeat exam and imaging.     5) Blepharitis each eye  - warm compresses, lid hygiene    6) Pseudoexfoliation Syndrome:  - readily apparent on lens capsule left eye, no material seen right eye  - discussed increased complication risk of CEIOL left eye  - discussed possibility of glaucoma, though IOP wnl and C/D did not appear enlarged.  - baseline OCT rnfl next visit.    7) Monocular  - counseled on need for polycarbonate lenses at all times    Jason Goldberg, MD  Cornea & External Disease Fellow  Department of Ophthalmology and Visual Neurosciences    Follow up:  1-2 months Dr. Tsai cataract evaluation, baseline OCT rnfl, sooner prn.       Attending Physician Attestation:  Complete documentation of historical and exam elements from today's encounter can be found in the full encounter summary report (not reduplicated in this progress note).  I personally obtained the chief complaint(s) and history of present illness.  I confirmed and edited as necessary the review of systems, past medical/surgical history, family history, social history, and examination findings as documented by others; and I examined the patient myself.  I personally reviewed the relevant tests, images, and reports as documented above.  I formulated and edited as necessary the assessment and plan and discussed the findings and management plan with the patient and family. - Jason S. Goldberg, MD

## 2021-01-15 NOTE — NURSING NOTE
Chief Complaints and History of Present Illnesses   Patient presents with     Retinal Evaluation     Chief Complaint(s) and History of Present Illness(es)     Retinal Evaluation     Laterality: right eye    Associated symptoms: dryness.  Negative for eye pain, floaters and flashes    Treatments tried: no treatments    Pain scale: 0/10              Comments     He states that some days like today he sees better without his glasses.  Other days he has a film described as a veil or a fog over both eyes.  He tells me that this has been a problem for many years.     For the past year his vision in the right eye has been poor.       He wears bifocal glasses but did not bring them, thinking this was an ENT appointment.  He seems very confused (per interpretor) as he keeps asking her about his wife's appointment and about an appointment to check his hearing.  He is asked many times to wear his mask.     Madelyn Ford, COT 12:26 PM  January 15, 2021

## 2021-01-15 NOTE — PROGRESS NOTES
CC -   Wet AMD & PEHCR    INTERVAL HISTORY - Initial visit with elsi FLORIAN 2-3 years ago, no change.  History from patient via interpretor  Per patient meant to come to ENT, taken to eye instead (uncleaer).  No eye problems per patient, has been stable    [facility] -   Nacho Joseph is a  85 year old year-old patient with history of severe disciform scar OD with PEHCR lesions OD  Very poor vison OD since ~2015, no change since    PAST OCULAR SURGERY  None    RETINAL IMAGING:  U/S B-scan 1-15-21  OD - mildly elevated lesions inf/sup     OCT 1-15-21  OD - large FV scar with PED IRF/SRF, PVD  OS - 2+ drusen,  Central atrophic change, tr ERM, PVD    Photos 1-15-20  OD - disciform scar      ASSESSMENT & PLAN    # Wet AMD OD advanced   - large disciform scar central   - poor vision ?etiology    # Dry AMD OS intermediate   - focal atrophy & drusen   - AREDS & Amsler      # Peripheral lesions OD   - likely PEHCR   - cannot r/o nevus or other pigmented lesion   - recheck 4-6 months as precaution      # PVD OU   - advised S/Sx RD 1/2021      # NS OS > OD   - likely visually significant           return to clinic: 4-6 months, OCT OU, Optos OU, U/S right eye    Use AREDS II formula eye vitiamins, 1 pill 2 x daily       ATTESTATION     Attending Attestation:     Complete documentation of historical and exam elements from today's encounter can be found in the full encounter summary report (not reduplicated in this progress note).  I personally obtained the chief complaint(s) and history of present illness.  I confirmed and edited as necessary the review of systems, past medical/surgical history, family history, social history, and examination findings as documented by others; and I examined the patient myself.  I personally reviewed the relevant tests, images, and reports as documented above.  I formulated and edited as necessary the assessment and plan and discussed the findings and management plan with the patient and  family    Enedelia Maldonado MD, PhD  , Vitreoretinal Surgery  Department of Ophthalmology  [facility]

## 2021-01-18 ENCOUNTER — TELEPHONE (OUTPATIENT)
Dept: OPHTHALMOLOGY | Facility: CLINIC | Age: 86
End: 2021-01-18

## 2021-01-18 LAB
MDC_IDC_EPISODE_DTM: NORMAL
MDC_IDC_EPISODE_ID: NORMAL
MDC_IDC_EPISODE_TYPE: NORMAL
MDC_IDC_LEAD_IMPLANT_DT: NORMAL
MDC_IDC_LEAD_IMPLANT_DT: NORMAL
MDC_IDC_LEAD_LOCATION: NORMAL
MDC_IDC_LEAD_LOCATION: NORMAL
MDC_IDC_LEAD_MFG: NORMAL
MDC_IDC_LEAD_MFG: NORMAL
MDC_IDC_LEAD_MODEL: NORMAL
MDC_IDC_LEAD_MODEL: NORMAL
MDC_IDC_LEAD_POLARITY_TYPE: NORMAL
MDC_IDC_LEAD_POLARITY_TYPE: NORMAL
MDC_IDC_LEAD_SERIAL: NORMAL
MDC_IDC_LEAD_SERIAL: NORMAL
MDC_IDC_MSMT_BATTERY_DTM: NORMAL
MDC_IDC_MSMT_BATTERY_REMAINING_LONGEVITY: 24 MO
MDC_IDC_MSMT_BATTERY_REMAINING_PERCENTAGE: 42 %
MDC_IDC_MSMT_BATTERY_STATUS: NORMAL
MDC_IDC_MSMT_LEADCHNL_RA_IMPEDANCE_VALUE: 562 OHM
MDC_IDC_MSMT_LEADCHNL_RA_PACING_THRESHOLD_AMPLITUDE: 1.2 V
MDC_IDC_MSMT_LEADCHNL_RA_PACING_THRESHOLD_PULSEWIDTH: 0.5 MS
MDC_IDC_MSMT_LEADCHNL_RV_IMPEDANCE_VALUE: 494 OHM
MDC_IDC_MSMT_LEADCHNL_RV_PACING_THRESHOLD_AMPLITUDE: 1.7 V
MDC_IDC_MSMT_LEADCHNL_RV_PACING_THRESHOLD_PULSEWIDTH: 0.4 MS
MDC_IDC_PG_IMPLANT_DTM: NORMAL
MDC_IDC_PG_MFG: NORMAL
MDC_IDC_PG_MODEL: NORMAL
MDC_IDC_PG_SERIAL: NORMAL
MDC_IDC_PG_TYPE: NORMAL
MDC_IDC_SESS_CLINIC_NAME: NORMAL
MDC_IDC_SESS_DTM: NORMAL
MDC_IDC_SESS_TYPE: NORMAL
MDC_IDC_SET_BRADY_AT_MODE_SWITCH_MODE: NORMAL
MDC_IDC_SET_BRADY_AT_MODE_SWITCH_RATE: 170 {BEATS}/MIN
MDC_IDC_SET_BRADY_LOWRATE: 60 {BEATS}/MIN
MDC_IDC_SET_BRADY_MAX_SENSOR_RATE: 130 {BEATS}/MIN
MDC_IDC_SET_BRADY_MAX_TRACKING_RATE: 130 {BEATS}/MIN
MDC_IDC_SET_BRADY_MODE: NORMAL
MDC_IDC_SET_BRADY_PAV_DELAY_HIGH: 150 MS
MDC_IDC_SET_BRADY_PAV_DELAY_LOW: 200 MS
MDC_IDC_SET_BRADY_SAV_DELAY_HIGH: 150 MS
MDC_IDC_SET_BRADY_SAV_DELAY_LOW: 200 MS
MDC_IDC_SET_LEADCHNL_RA_PACING_AMPLITUDE: 2 V
MDC_IDC_SET_LEADCHNL_RA_PACING_POLARITY: NORMAL
MDC_IDC_SET_LEADCHNL_RA_PACING_PULSEWIDTH: 0.5 MS
MDC_IDC_SET_LEADCHNL_RA_SENSING_ADAPTATION_MODE: NORMAL
MDC_IDC_SET_LEADCHNL_RA_SENSING_POLARITY: NORMAL
MDC_IDC_SET_LEADCHNL_RA_SENSING_SENSITIVITY: 0.25 MV
MDC_IDC_SET_LEADCHNL_RV_PACING_AMPLITUDE: 2.1 V
MDC_IDC_SET_LEADCHNL_RV_PACING_CAPTURE_MODE: NORMAL
MDC_IDC_SET_LEADCHNL_RV_PACING_POLARITY: NORMAL
MDC_IDC_SET_LEADCHNL_RV_PACING_PULSEWIDTH: 0.4 MS
MDC_IDC_SET_LEADCHNL_RV_SENSING_ADAPTATION_MODE: NORMAL
MDC_IDC_SET_LEADCHNL_RV_SENSING_POLARITY: NORMAL
MDC_IDC_SET_LEADCHNL_RV_SENSING_SENSITIVITY: 1.5 MV
MDC_IDC_SET_ZONE_DETECTION_INTERVAL: 375 MS
MDC_IDC_SET_ZONE_TYPE: NORMAL
MDC_IDC_SET_ZONE_VENDOR_TYPE: NORMAL
MDC_IDC_STAT_AT_BURDEN_PERCENT: 0 %
MDC_IDC_STAT_AT_DTM_END: NORMAL
MDC_IDC_STAT_AT_DTM_START: NORMAL
MDC_IDC_STAT_BRADY_DTM_END: NORMAL
MDC_IDC_STAT_BRADY_DTM_START: NORMAL
MDC_IDC_STAT_BRADY_RA_PERCENT_PACED: 62 %
MDC_IDC_STAT_BRADY_RV_PERCENT_PACED: 89 %
MDC_IDC_STAT_EPISODE_RECENT_COUNT: 0
MDC_IDC_STAT_EPISODE_RECENT_COUNT: 1
MDC_IDC_STAT_EPISODE_RECENT_COUNT_DTM_END: NORMAL
MDC_IDC_STAT_EPISODE_RECENT_COUNT_DTM_START: NORMAL
MDC_IDC_STAT_EPISODE_TYPE: NORMAL
MDC_IDC_STAT_EPISODE_VENDOR_TYPE: NORMAL

## 2021-01-18 NOTE — TELEPHONE ENCOUNTER
LVM for scheduling RTN appt for requested further testing and f/u from Dr Goldberg.  No answer LVM of clinic number and provider name to schedule with.    Danielle Wolfe COA 11:47 AM January 18, 2021

## 2021-01-18 NOTE — TELEPHONE ENCOUNTER
[facility] Call Center    Phone Message    May a detailed message be left on voicemail: yes     Reason for Call: Medication Question or concern regarding medication   Prescription Clarification  Name of Medication: Multiple Vitamins-Minerals (PRESERVISION AREDS 2) CAPS  Prescribing Provider: Dr Maldonado   Pharmacy: written orders   What on the order needs clarification? Jay Jay from [employer] calling in requesting a med change order as insurance will not cover this med, they would like to change it to optimum vision support , please call back to discuss further           Action Taken: Message routed to:  [facility] eye    Travel Screening: Not Applicable   I tried calling Jay Jay, no answer. I do not know this brand or can find it, Optimum vision Support. Please tell me a different name of supplements he can have.  Valerie Richards, COT COT 9:49 AM January 25, 2021

## 2021-01-18 NOTE — TELEPHONE ENCOUNTER
----- Message from Jason Goldberg, MD sent at 1/15/2021  4:17 PM CST -----  Regarding: helder f/u  I sent this patient to retina during my encounter and then he left before scheduling follow up.    Can you please contact him or his nursing home and arrange for follow up with Dr. Tsai in 1-2 months, cat eval, OCT rnfl.     He is a Marshallese speaker, not sure if he can be contacted directly though.     Thanks  Buster

## 2021-01-22 ENCOUNTER — OFFICE VISIT (OUTPATIENT)
Dept: AUDIOLOGY | Facility: CLINIC | Age: 86
End: 2021-01-22
Payer: COMMERCIAL

## 2021-01-22 DIAGNOSIS — H91.90 UNSPECIFIED HEARING LOSS, UNSPECIFIED EAR: ICD-10-CM

## 2021-01-22 PROCEDURE — 99207 PR ASSESSMENT FOR HEARING AID: CPT | Performed by: AUDIOLOGIST

## 2021-01-22 NOTE — PROGRESS NOTES
AUDIOLOGY REPORT    SUBJECTIVE:  Nacho Joseph is a 85 year old male who was seen in the Audiology Clinic at the [facility] for audiologic evaluation, referred by Dr. Goldberg. An over the phone [place]  was present, even with the  there was some confusion regarding case history. The patient reports bilateral tinnitus and decreased hearing that he reports has gotten worse over the last year.  He reports that he previously needed his ears cleaned out. The patient denies bilateral otalgia, bilateral drainage, and bilateral aural fullness.  The patient notes difficulty with communication in a variety of listening situations.     OBJECTIVE:  Otoscopy revealed excessive cerumen bilaterally. The provider could not safely remove all of the cerumen in office and patient reports pain when the provider was touching his left ear.     This was reviewed with the patient who was in agreement with see and ENT for an ear cleaning prior to the hearing test. He had questions regarding if it was safe to have the ENT clean the ears, it was reviewed that they are medical professionals and specially trained in the ear. He also had questions regarding if the cerumen was the reason for his hearing loss. It was reviewed that it may be a contributing factor but we will have to wait until he undergoes the hearing evaluation to see the severity and type of hearing loss he may have.    ASSESSMENT:     ICD-10-CM    1. Unspecified hearing loss, unspecified ear  H91.90 AUDIOLOGY ADULT REFERRAL     Patient had excessive cerumen in the ears which could not be removed by the provider in office.     PLAN: It is recommended that the patient return for an ENT appointment to have his ears cleaned and complete the hearing evaluation following the ear cleaning. These appointments were scheduled prior to the patient leaving the clinic today. Today's appointment was a No Charge  visit as no billable service was provided.  Please call this clinic with questions regarding these results or recommendations.        Alley Barbosa  Audiologist  [place] License  #[id number]

## 2021-01-25 ENCOUNTER — TELEPHONE (OUTPATIENT)
Dept: OPHTHALMOLOGY | Facility: CLINIC | Age: 86
End: 2021-01-25

## 2021-01-25 NOTE — TELEPHONE ENCOUNTER
[facility] Call Center    Phone Message    May a detailed message be left on voicemail: yes     Reason for Call: Medication Question or concern regarding medication   Prescription Clarification  Name of Medication: Multiple Vitamins-Minerals (PRESERVISION AREDS 2) CAPS   Prescribing Provider: Harpreet Hudson RN   What on the order needs clarification? Pts care coordinator called stating that the Rx listed above is not covered by insurance and was wondering if there was a different Rx that could be prescibed and covered by insurance. Please advise, thank you!    Ph: [phone]  F: [phone]          Action Taken: Message routed to:  [facility] E[facility]    Travel Screening: Not Applicable

## 2021-01-25 NOTE — TELEPHONE ENCOUNTER
I spoke to the care giver for the patient.  They may use the Optum Vision Support vitamin/mineral supplement for the patient.

## 2021-01-25 NOTE — TELEPHONE ENCOUNTER
FUTURE VISIT INFORMATION      FUTURE VISIT INFORMATION:    Date: 3/11/2021    Time: 1:30PM    Location: [facility]  REFERRAL INFORMATION:    Referring provider:      Referring providers clinic:      Reason for visit/diagnosis  Ear cleaning with ENT and same day 2:30pm Audiology appt afterwards-Per Audiology visit on 1/22/2021 (Patient states last time his ears were cleaned elsewhere and they did it so bad they bleed)-Appt Per PT with Intptr on the phone    RECORDS REQUESTED FROM:       Clinic name Comments Records Status Imaging Status   [facility] [place] 1/22/2021 note from [facility] EPIC

## 2021-03-11 ENCOUNTER — PRE VISIT (OUTPATIENT)
Dept: OTOLARYNGOLOGY | Facility: CLINIC | Age: 86
End: 2021-03-11

## 2021-03-29 ENCOUNTER — OFFICE VISIT (OUTPATIENT)
Dept: OPHTHALMOLOGY | Facility: CLINIC | Age: 86
End: 2021-03-29
Attending: OPHTHALMOLOGY
Payer: COMMERCIAL

## 2021-03-29 DIAGNOSIS — H26.8 PSEUDOEXFOLIATION SYNDROME: Primary | ICD-10-CM

## 2021-03-29 DIAGNOSIS — H25.13 NUCLEAR SCLEROTIC CATARACT OF BOTH EYES: ICD-10-CM

## 2021-03-29 DIAGNOSIS — H01.009 BLEPHARITIS OF BOTH UPPER AND LOWER EYELID: ICD-10-CM

## 2021-03-29 PROCEDURE — 99213 OFFICE O/P EST LOW 20 MIN: CPT | Performed by: OPHTHALMOLOGY

## 2021-03-29 ASSESSMENT — CUP TO DISC RATIO
OS_RATIO: 0.3 (20D)
OD_RATIO: 0.3 (20D)

## 2021-03-29 ASSESSMENT — SLIT LAMP EXAM - LIDS
COMMENTS: NORMAL
COMMENTS: NORMAL

## 2021-03-29 ASSESSMENT — EXTERNAL EXAM - LEFT EYE: OS_EXAM: NORMAL

## 2021-03-29 ASSESSMENT — EXTERNAL EXAM - RIGHT EYE: OD_EXAM: NORMAL

## 2021-03-29 NOTE — PROGRESS NOTES
CC:  Blurry vision right eye > left eye     HPI:  86 y/o male with PMHx CAD w/ chronic total RCA occlusion, complete heart block s/p pacemaker, HTN presents for general eye exam with blurry vision each eye, right eye > left eye.  Reports hazy vision and dryness each eye.  Feels like there's a veil / fog each eye.  Vision right eye has been worse this past year.  Some days sees better with glasses, some days not.  Where's bifocals at home, did not bring.      Patient denies any pain, redness, irritation, tearing, itchiness, photophobia, or trauma. No flashes, floaters, diplopia.    Per technician, patient very confused during work-up and history taking.    Interval:  New pt Dr. Tsai here for cataract f/u.  Diagnosed with wet ARMD, PXF, and NSC each eye last visit with Dr. Goldberg 1/15/21.  Patient adamant today he does not want to pursue surgery.  Desires updated spectacle correction.  Uncooperative with technician on initial exam today. No new flashes, floaters, diplopia. No redness, tearing, discharge, photophobia.    POHx:  Refractive Error each eye   Wet ARMD each eye right eye >> left eye  Disciform scarring retina right eye  NSC each eye  PXF     Last eye exam: 2 years ago   Prior eye surgery/laser: no  CTL wearer: no  Glasses: yes, bifocals -- did not bring.     FOHx:  Denies    Gtts:  none    All:  NKDA    A/P:    1) Nuclear Sclerotic Cataract each eye   Visually significant cataracts each eye, left eye > right eye  - APD right eye with advanced wet AMD and HM vision, unlikely to benefit from CEIOL  - left eye with denser lens and visually significant.  - R/B/A/possible complications of cataract surgery reviewed, discussed higher risk of complications and dropped lens with PXF syndrome.  - No trauma, dilates poorly 4.5mm, no h/o Flomax, + PXF material left eye   - No fluoroquinolone allergy  -  Patient adamant today he does not want to pursue surgery.  Desires updated spectacle correction.  Uncooperative with  technician on initial exam today.    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 Maldonado.    -  explained multiple times via  that although patient feels as though his current vision is adequate for what he feels his lifespan will be, ARMD is a progressive disease.  We discussed that he no longer has usable vision in his right eye as a result of scarring from ARMD and that a similar result may occur in his left eye if left unmonitored and untreated.  The importance of following up with Dr. Maldonado as scheduled was repeatedly conveyed.     5) Blepharitis each eye  - warm compresses, lid hygiene    6) Pseudoexfoliation Syndrome:  - readily apparent on lens capsule left eye, no material seen right eye  - discussed increased complication risk of CEIOL left eye  - discussed possibility of glaucoma, though IOP wnl and C/D did not appear enlarged.  - baseline OCT rnfl next visit.    7) Monocular  - counseled on need for polycarbonate lenses at all times    Jason Goldberg, MD  Cornea & External Disease Fellow  Department of Ophthalmology and Visual Neurosciences    Follow up:  Follow up Dr. Maldonado for ARMD follow up as scheduled  Follow up general / Dr. Tsai as needed.  Patient does not want CEIOL.    Jason Goldberg, MD  Cornea & External Disease Fellow  Department of Ophthalmology and Visual Neurosciences    Attending Physician Attestation:  Complete documentation of historical and exam elements from today's encounter can be found in the full encounter summary report (not reduplicated in this progress note).  I personally obtained the chief complaint(s) and history of present illness.  I confirmed and edited as necessary the review of systems, past medical/surgical history, family history, social history, and examination findings as documented  by others; and I examined the patient myself.  I personally reviewed the relevant tests, images, and reports as documented above.  I formulated and edited as necessary the assessment and plan and discussed the findings and management plan with the patient and family. - Chi Tsai MD

## 2021-04-09 ENCOUNTER — OFFICE VISIT (OUTPATIENT)
Dept: AUDIOLOGY | Facility: CLINIC | Age: 86
End: 2021-04-09
Payer: COMMERCIAL

## 2021-04-09 ENCOUNTER — OFFICE VISIT (OUTPATIENT)
Dept: OTOLARYNGOLOGY | Facility: CLINIC | Age: 86
End: 2021-04-09
Payer: COMMERCIAL

## 2021-04-09 DIAGNOSIS — H90.3 SENSORY HEARING LOSS, BILATERAL: Primary | ICD-10-CM

## 2021-04-09 DIAGNOSIS — H90.3 SENSORINEURAL HEARING LOSS (SNHL) OF BOTH EARS: Primary | ICD-10-CM

## 2021-04-09 DIAGNOSIS — H61.23 BILATERAL IMPACTED CERUMEN: ICD-10-CM

## 2021-04-09 PROBLEM — Z95.0 PACEMAKER: Status: ACTIVE | Noted: 2020-04-15

## 2021-04-09 PROBLEM — N20.1 CALCULUS OF URETER: Status: ACTIVE | Noted: 2020-04-15

## 2021-04-09 PROCEDURE — 92550 TYMPANOMETRY & REFLEX THRESH: CPT | Performed by: AUDIOLOGIST

## 2021-04-09 PROCEDURE — 92555 SPEECH THRESHOLD AUDIOMETRY: CPT | Performed by: AUDIOLOGIST

## 2021-04-09 PROCEDURE — 92553 AUDIOMETRY AIR & BONE: CPT | Performed by: AUDIOLOGIST

## 2021-04-09 PROCEDURE — 69210 REMOVE IMPACTED EAR WAX UNI: CPT | Performed by: OTOLARYNGOLOGY

## 2021-04-09 RX ORDER — DOXYCYCLINE HYCLATE 100 MG
TABLET ORAL
COMMUNITY
Start: 2021-03-30

## 2021-04-09 RX ORDER — ACETAMINOPHEN 500 MG
1000 TABLET ORAL
COMMUNITY
End: 2023-01-01

## 2021-04-09 RX ORDER — POTASSIUM CITRATE 10 MEQ/1
TABLET, EXTENDED RELEASE ORAL
COMMUNITY
Start: 2021-03-28 | End: 2023-01-01

## 2021-04-09 RX ORDER — HYDROMORPHONE HYDROCHLORIDE 2 MG/1
1-2 TABLET ORAL
COMMUNITY
Start: 2020-04-19 | End: 2023-01-01

## 2021-04-09 RX ORDER — ISOSORBIDE MONONITRATE 30 MG/1
TABLET, EXTENDED RELEASE ORAL
COMMUNITY
Start: 2021-03-17 | End: 2021-10-12

## 2021-04-09 RX ORDER — GUAIFENESIN 200 MG/10ML
800 LIQUID ORAL
COMMUNITY

## 2021-04-09 ASSESSMENT — PAIN SCALES - GENERAL: PAINLEVEL: NO PAIN (0)

## 2021-04-09 NOTE — LETTER
4/9/2021       RE: Nacho Joseph  [address] [place] [address]     Dear Colleague,    Thank you for referring your patient, Nacho Joseph, to the [facility] [place] [facility]. Please see a copy of my visit note below.    The patient presents with a history of cerumen impaction and sensorineural hearing loss. The patient denies sinusitis, rhinitis, facial pain, nasal obstruction or purulent nasal discharge. The patient denies chronic or recurrent tonsillitis, chronic or recurrent pharyngitis. The patient denies otalgia, otorrhea, eustachian tube dysfunction, ear infections or dizziness.      This patient is seen in consultation at the request of Dr. Paulino Lynch.     All other systems were reviewed and they are either negative or they are not directly pertinent to this Otolaryngology examination.      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    S/p PPM 7/2014     Bradycardia      CAD (coronary artery disease) 2/9/2015     Carotid stenosis      Chest pain 1/20/2015     COPD (chronic obstructive pulmonary disease) (H)      Depression 7/21/2014     Depressive disorder      HTN (hypertension) 7/8/2014     Hyperlipidemia 7/8/2014     Hypertension      Ischemic cardiomyopathy 2/9/2015     Lung abscess (H)      Syncope        Past Surgical History:    Past Surgical History:   Procedure Laterality Date     COMBINED CYSTOSCOPY, INSERT STENT URETER(S) Right 4/20/2018    Procedure: COMBINED CYSTOSCOPY, INSERT STENT URETER(S);  CYSTOSCOPY,RIGHT URETERAL STENT PLACEMENT, RIGHT RETROGRADES(LANGUAGE:Romanian);  Surgeon: Gurvinder Sanz MD;  Location:  OR     CORONARY ANGIOGRAPHY ADULT ORDER  2/20/15    medical managed     CYSTOSCOPY, REMOVE STENT(S), COMBINED  Right 5/18/2018    Procedure: COMBINED CYSTOSCOPY, REMOVE STENT(S);;  Surgeon: Gurvinder Sanz MD;  Location:  OR     CYSTOSCOPY, RETROGRADES, COMBINED  4/20/2018    Procedure: COMBINED CYSTOSCOPY, RETROGRADES;;  Surgeon: Gurvinder Sanz MD;  Location: [facility] OR     CYSTOSCOPY, URETEROSCOPY, INSERT STENT Right 5/18/2018    Procedure: CYSTOSCOPY, URETEROSCOPY, INSERT STENT;  CYSTOSCOPY, RIGHT URETEROSCOPY, STONE REMOVAL AND SRENT REMOVAL (LANGUAGE: Moroccan);  Surgeon: Gurvinder Sanz MD;  Location:  OR     IMPLANT PACEMAKER  7/7/14    dual chamber       Medications:      Current Outpatient Medications:      HYDROmorphone (DILAUDID) 2 MG tablet, Take 1-2 mg by mouth, Disp: , Rfl:      Acetaminophen (TYLENOL PO), Take 1,000 mg by mouth 3 times daily, Disp: , Rfl:      acetaminophen (TYLENOL) 500 MG tablet, Take 1,000 mg by mouth, Disp: , Rfl:      aspirin 81 MG tablet, Take 1 tablet (81 mg) by mouth daily, Disp: , Rfl:      atorvastatin (LIPITOR) 40 MG tablet, Take 1 tablet (40 mg) by mouth daily, Disp: , Rfl:      doxycycline hyclate (VIBRA-TABS) 100 MG tablet, , Disp: , Rfl:      DOXYCYCLINE HYCLATE PO, Take 100 mg by mouth 2 times daily, Disp: , Rfl:      Fexofenadine HCl (MUCINEX ALLERGY PO), Take 800 mg by mouth 2 times daily (2 x 400 mg = 800 mg dose), Disp: , Rfl:      guaiFENesin 400 MG TABS, Take 800 mg by mouth, Disp: , Rfl:      guaiFENesin-dextromethorphan (ROBITUSSIN DM) 100-10 MG/5ML syrup, Take 5 mLs by mouth every 4 hours as needed for cough, Disp: , Rfl:      hydrochlorothiazide (MICROZIDE) 12.5 MG capsule, Take 12.5 mg by mouth every other day Hold if bp <110, Disp: , Rfl:      ISOSORBIDE DINITRATE PO, Take 30 mg by mouth daily, Disp: , Rfl:      isosorbide mononitrate (IMDUR) 30 MG 24 hr tablet, , Disp: , Rfl:      magnesium hydroxide (MILK OF MAGNESIA) 400 MG/5ML suspension, Take 30 mLs by mouth daily as needed for constipation or heartburn, Disp: , Rfl:      metoprolol succinate  (TOPROL-XL) 50 MG 24 hr tablet, Take 1 tablet (50 mg) by mouth two times daily, Disp: 180 tablet, Rfl: 3     Multiple Vitamins-Minerals (PRESERVISION AREDS 2) CAPS, Use per 's recommendations., Disp: 60 capsule, Rfl: 11     nitroglycerin (NITROSTAT) 0.4 MG SL tablet, Place 1 tablet (0.4 mg) under the tongue every 5 minutes as needed for chest pain, Disp: 25 tablet, Rfl: 4     Nutritional Supplements (HI-ARELIS) LIQD, Take by mouth 3 times daily, Disp: , Rfl:      potassium citrate (UROCIT-K) 10 MEQ (1080 MG) CR tablet, , Disp: , Rfl:      sodium chloride (DEEP SEA NASAL SPRAY) 0.65 % nasal spray, Spray 1 spray into both nostrils daily as needed for congestion, Disp: , Rfl:     Allergies:    Patient has no known allergies.    Physical Examination:    The patient is a well developed, well nourished male in no apparent distress.  He is normocepahlic, atraumatic with pupils equally round and reactive to light.    Oral Cavity Examination: Normal Mucosa with no masses or lesions  Nasal Examination: Normal Mucosa with no masses or lesions  Ear Examination: Ear canals are densely impacted cerumen impaction. The ear canals are cleaned of cerumen using an alligator forceps, a currette and a suction using a binocular microscope for visualization. The tympanic membranes and middle ear spaces normal  Neurological Examination: Facial nerve function intact and symmetric  Integumentary Examination: No lesions on the skin of the head or neck  Neck Examination: No masses or lesions, no lymphadenopathy  Endocrine Examination: Normal thyroid examination    Assessment and Plan:    The patient presents with a history of cerumen impaction and sensorineural hearing loss. His ears are cleaned of cerumen of impaction. He will proceed with his Audiogram and Tympanogram and he will be seen again as needed.     CC: Dr. Paulino Lynch        Again, thank you for allowing me to participate in the care of your patient.       Sincerely,    Johnathon Case MD

## 2021-04-09 NOTE — PROGRESS NOTES
The patient presents with a history of cerumen impaction and sensorineural hearing loss. The patient denies sinusitis, rhinitis, facial pain, nasal obstruction or purulent nasal discharge. The patient denies chronic or recurrent tonsillitis, chronic or recurrent pharyngitis. The patient denies otalgia, otorrhea, eustachian tube dysfunction, ear infections or dizziness.      This patient is seen in consultation at the request of Dr. Paulino Lynch.     All other systems were reviewed and they are either negative or they are not directly pertinent to this Otolaryngology examination.      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    S/p PPM 7/2014     Bradycardia      CAD (coronary artery disease) 2/9/2015     Carotid stenosis      Chest pain 1/20/2015     COPD (chronic obstructive pulmonary disease) (H)      Depression 7/21/2014     Depressive disorder      HTN (hypertension) 7/8/2014     Hyperlipidemia 7/8/2014     Hypertension      Ischemic cardiomyopathy 2/9/2015     Lung abscess (H)      Syncope        Past Surgical History:    Past Surgical History:   Procedure Laterality Date     COMBINED CYSTOSCOPY, INSERT STENT URETER(S) Right 4/20/2018    Procedure: COMBINED CYSTOSCOPY, INSERT STENT URETER(S);  CYSTOSCOPY,RIGHT URETERAL STENT PLACEMENT, RIGHT RETROGRADES(LANGUAGE:Bolivian);  Surgeon: Gurvinder Sanz MD;  Location:  OR     CORONARY ANGIOGRAPHY ADULT ORDER  2/20/15    medical managed     CYSTOSCOPY, REMOVE STENT(S), COMBINED Right 5/18/2018    Procedure: COMBINED CYSTOSCOPY, REMOVE STENT(S);;  Surgeon: Gurvinder Sanz MD;  Location:  OR     CYSTOSCOPY, RETROGRADES, COMBINED  4/20/2018    Procedure: COMBINED CYSTOSCOPY, RETROGRADES;;  Surgeon: Gurvinder Sanz MD;  Location:  OR     CYSTOSCOPY, URETEROSCOPY, INSERT STENT Right 5/18/2018     Procedure: CYSTOSCOPY, URETEROSCOPY, INSERT STENT;  CYSTOSCOPY, RIGHT URETEROSCOPY, STONE REMOVAL AND SRENT REMOVAL (LANGUAGE: Afghan);  Surgeon: Gurvinder Sanz MD;  Location: [facility] OR     IMPLANT PACEMAKER  7/7/14    dual chamber       Medications:      Current Outpatient Medications:      HYDROmorphone (DILAUDID) 2 MG tablet, Take 1-2 mg by mouth, Disp: , Rfl:      Acetaminophen (TYLENOL PO), Take 1,000 mg by mouth 3 times daily, Disp: , Rfl:      acetaminophen (TYLENOL) 500 MG tablet, Take 1,000 mg by mouth, Disp: , Rfl:      aspirin 81 MG tablet, Take 1 tablet (81 mg) by mouth daily, Disp: , Rfl:      atorvastatin (LIPITOR) 40 MG tablet, Take 1 tablet (40 mg) by mouth daily, Disp: , Rfl:      doxycycline hyclate (VIBRA-TABS) 100 MG tablet, , Disp: , Rfl:      DOXYCYCLINE HYCLATE PO, Take 100 mg by mouth 2 times daily, Disp: , Rfl:      Fexofenadine HCl (MUCINEX ALLERGY PO), Take 800 mg by mouth 2 times daily (2 x 400 mg = 800 mg dose), Disp: , Rfl:      guaiFENesin 400 MG TABS, Take 800 mg by mouth, Disp: , Rfl:      guaiFENesin-dextromethorphan (ROBITUSSIN DM) 100-10 MG/5ML syrup, Take 5 mLs by mouth every 4 hours as needed for cough, Disp: , Rfl:      hydrochlorothiazide (MICROZIDE) 12.5 MG capsule, Take 12.5 mg by mouth every other day Hold if bp <110, Disp: , Rfl:      ISOSORBIDE DINITRATE PO, Take 30 mg by mouth daily, Disp: , Rfl:      isosorbide mononitrate (IMDUR) 30 MG 24 hr tablet, , Disp: , Rfl:      magnesium hydroxide (MILK OF MAGNESIA) 400 MG/5ML suspension, Take 30 mLs by mouth daily as needed for constipation or heartburn, Disp: , Rfl:      metoprolol succinate (TOPROL-XL) 50 MG 24 hr tablet, Take 1 tablet (50 mg) by mouth two times daily, Disp: 180 tablet, Rfl: 3     Multiple Vitamins-Minerals (PRESERVISION AREDS 2) CAPS, Use per 's recommendations., Disp: 60 capsule, Rfl: 11     nitroglycerin (NITROSTAT) 0.4 MG SL tablet, Place 1 tablet (0.4 mg) under the tongue every 5 minutes  as needed for chest pain, Disp: 25 tablet, Rfl: 4     Nutritional Supplements (HI-ARELIS) LIQD, Take by mouth 3 times daily, Disp: , Rfl:      potassium citrate (UROCIT-K) 10 MEQ (1080 MG) CR tablet, , Disp: , Rfl:      sodium chloride (DEEP SEA NASAL SPRAY) 0.65 % nasal spray, Spray 1 spray into both nostrils daily as needed for congestion, Disp: , Rfl:     Allergies:    Patient has no known allergies.    Physical Examination:    The patient is a well developed, well nourished male in no apparent distress.  He is normocepahlic, atraumatic with pupils equally round and reactive to light.    Oral Cavity Examination: Normal Mucosa with no masses or lesions  Nasal Examination: Normal Mucosa with no masses or lesions  Ear Examination: Ear canals are densely impacted cerumen impaction. The ear canals are cleaned of cerumen using an alligator forceps, a currette and a suction using a binocular microscope for visualization. The tympanic membranes and middle ear spaces normal  Neurological Examination: Facial nerve function intact and symmetric  Integumentary Examination: No lesions on the skin of the head or neck  Neck Examination: No masses or lesions, no lymphadenopathy  Endocrine Examination: Normal thyroid examination    Assessment and Plan:    The patient presents with a history of cerumen impaction and sensorineural hearing loss. His ears are cleaned of cerumen of impaction. He will proceed with his Audiogram and Tympanogram and he will be seen again as needed.     CC: Dr. Paulino Lynch

## 2021-04-09 NOTE — PROGRESS NOTES
AUDIOLOGY REPORT    SUMMARY: Audiology visit completed. See audiogram for results.      RECOMMENDATIONS: Follow-up with ENT.    CHRISTIAN Quijano.   Audiology Doctoral Extern  License #[id number]    I was present with the patient for the entire Audiology appointment including all procedures/testing performed by the AuD student, and agree with the student s assessment and plan as documented.          Jacquelyn Dasilva, [facility]  Licensed Audiologist  [place] #[id number]

## 2021-04-09 NOTE — PATIENT INSTRUCTIONS
1. You were seen in the ENT Clinic today by Dr. Case.  If you have any questions or concerns after your appointment, please call   - Option 1: ENT Clinic: [phone]  - Option 2: Brandie (Dr. Case's Nurse): [phone]    2.   Hearing test with Audiology.    3.   Plan to return to clinic as needed.    The patient presents with a history of cerumen impaction and sensorineural hearing loss. His ears are cleaned of cerumen of impaction. He will proceed with his Audiogram and Tympanogram and he will be seen again as needed.     Brandie Carrillo, RN  Clinical Coordinator  [facility]  [phone]

## 2021-04-16 ENCOUNTER — ANCILLARY PROCEDURE (OUTPATIENT)
Dept: CARDIOLOGY | Facility: CLINIC | Age: 86
End: 2021-04-16
Attending: INTERNAL MEDICINE
Payer: COMMERCIAL

## 2021-04-16 DIAGNOSIS — I49.5 SICK SINUS SYNDROME (H): ICD-10-CM

## 2021-04-16 PROCEDURE — 93294 REM INTERROG EVL PM/LDLS PM: CPT | Performed by: INTERNAL MEDICINE

## 2021-04-16 PROCEDURE — 93296 REM INTERROG EVL PM/IDS: CPT | Performed by: INTERNAL MEDICINE

## 2021-04-19 LAB
MDC_IDC_EPISODE_DTM: NORMAL
MDC_IDC_EPISODE_ID: NORMAL
MDC_IDC_EPISODE_TYPE: NORMAL
MDC_IDC_LEAD_IMPLANT_DT: NORMAL
MDC_IDC_LEAD_IMPLANT_DT: NORMAL
MDC_IDC_LEAD_LOCATION: NORMAL
MDC_IDC_LEAD_LOCATION: NORMAL
MDC_IDC_LEAD_MFG: NORMAL
MDC_IDC_LEAD_MFG: NORMAL
MDC_IDC_LEAD_MODEL: NORMAL
MDC_IDC_LEAD_MODEL: NORMAL
MDC_IDC_LEAD_POLARITY_TYPE: NORMAL
MDC_IDC_LEAD_POLARITY_TYPE: NORMAL
MDC_IDC_LEAD_SERIAL: NORMAL
MDC_IDC_LEAD_SERIAL: NORMAL
MDC_IDC_MSMT_BATTERY_DTM: NORMAL
MDC_IDC_MSMT_BATTERY_REMAINING_LONGEVITY: 18 MO
MDC_IDC_MSMT_BATTERY_REMAINING_PERCENTAGE: 35 %
MDC_IDC_MSMT_BATTERY_STATUS: NORMAL
MDC_IDC_MSMT_LEADCHNL_RA_IMPEDANCE_VALUE: 578 OHM
MDC_IDC_MSMT_LEADCHNL_RA_PACING_THRESHOLD_AMPLITUDE: 1.2 V
MDC_IDC_MSMT_LEADCHNL_RA_PACING_THRESHOLD_PULSEWIDTH: 0.5 MS
MDC_IDC_MSMT_LEADCHNL_RV_IMPEDANCE_VALUE: 525 OHM
MDC_IDC_MSMT_LEADCHNL_RV_PACING_THRESHOLD_AMPLITUDE: 2 V
MDC_IDC_MSMT_LEADCHNL_RV_PACING_THRESHOLD_PULSEWIDTH: 0.4 MS
MDC_IDC_PG_IMPLANT_DTM: NORMAL
MDC_IDC_PG_MFG: NORMAL
MDC_IDC_PG_MODEL: NORMAL
MDC_IDC_PG_SERIAL: NORMAL
MDC_IDC_PG_TYPE: NORMAL
MDC_IDC_SESS_CLINIC_NAME: NORMAL
MDC_IDC_SESS_DTM: NORMAL
MDC_IDC_SESS_TYPE: NORMAL
MDC_IDC_SET_BRADY_AT_MODE_SWITCH_MODE: NORMAL
MDC_IDC_SET_BRADY_AT_MODE_SWITCH_RATE: 170 {BEATS}/MIN
MDC_IDC_SET_BRADY_LOWRATE: 60 {BEATS}/MIN
MDC_IDC_SET_BRADY_MAX_SENSOR_RATE: 130 {BEATS}/MIN
MDC_IDC_SET_BRADY_MAX_TRACKING_RATE: 130 {BEATS}/MIN
MDC_IDC_SET_BRADY_MODE: NORMAL
MDC_IDC_SET_BRADY_PAV_DELAY_HIGH: 150 MS
MDC_IDC_SET_BRADY_PAV_DELAY_LOW: 200 MS
MDC_IDC_SET_BRADY_SAV_DELAY_HIGH: 150 MS
MDC_IDC_SET_BRADY_SAV_DELAY_LOW: 200 MS
MDC_IDC_SET_LEADCHNL_RA_PACING_AMPLITUDE: 2 V
MDC_IDC_SET_LEADCHNL_RA_PACING_POLARITY: NORMAL
MDC_IDC_SET_LEADCHNL_RA_PACING_PULSEWIDTH: 0.5 MS
MDC_IDC_SET_LEADCHNL_RA_SENSING_ADAPTATION_MODE: NORMAL
MDC_IDC_SET_LEADCHNL_RA_SENSING_POLARITY: NORMAL
MDC_IDC_SET_LEADCHNL_RA_SENSING_SENSITIVITY: 0.25 MV
MDC_IDC_SET_LEADCHNL_RV_PACING_AMPLITUDE: 3.5 V
MDC_IDC_SET_LEADCHNL_RV_PACING_CAPTURE_MODE: NORMAL
MDC_IDC_SET_LEADCHNL_RV_PACING_POLARITY: NORMAL
MDC_IDC_SET_LEADCHNL_RV_PACING_PULSEWIDTH: 0.4 MS
MDC_IDC_SET_LEADCHNL_RV_SENSING_ADAPTATION_MODE: NORMAL
MDC_IDC_SET_LEADCHNL_RV_SENSING_POLARITY: NORMAL
MDC_IDC_SET_LEADCHNL_RV_SENSING_SENSITIVITY: 1.5 MV
MDC_IDC_SET_ZONE_DETECTION_INTERVAL: 375 MS
MDC_IDC_SET_ZONE_TYPE: NORMAL
MDC_IDC_SET_ZONE_VENDOR_TYPE: NORMAL
MDC_IDC_STAT_AT_BURDEN_PERCENT: 1 %
MDC_IDC_STAT_AT_DTM_END: NORMAL
MDC_IDC_STAT_AT_DTM_START: NORMAL
MDC_IDC_STAT_BRADY_DTM_END: NORMAL
MDC_IDC_STAT_BRADY_DTM_START: NORMAL
MDC_IDC_STAT_BRADY_RA_PERCENT_PACED: 58 %
MDC_IDC_STAT_BRADY_RV_PERCENT_PACED: 89 %
MDC_IDC_STAT_EPISODE_RECENT_COUNT: 0
MDC_IDC_STAT_EPISODE_RECENT_COUNT: 0
MDC_IDC_STAT_EPISODE_RECENT_COUNT: 1
MDC_IDC_STAT_EPISODE_RECENT_COUNT: 1
MDC_IDC_STAT_EPISODE_RECENT_COUNT_DTM_END: NORMAL
MDC_IDC_STAT_EPISODE_RECENT_COUNT_DTM_START: NORMAL
MDC_IDC_STAT_EPISODE_TYPE: NORMAL
MDC_IDC_STAT_EPISODE_VENDOR_TYPE: NORMAL

## 2021-06-28 DIAGNOSIS — H35.9 RETINAL LESION: Primary | ICD-10-CM

## 2021-07-16 ENCOUNTER — OFFICE VISIT (OUTPATIENT)
Dept: OPHTHALMOLOGY | Facility: CLINIC | Age: 86
End: 2021-07-16
Attending: OPHTHALMOLOGY
Payer: COMMERCIAL

## 2021-07-16 DIAGNOSIS — H35.9 RETINAL LESION: Primary | ICD-10-CM

## 2021-07-16 PROCEDURE — G0463 HOSPITAL OUTPT CLINIC VISIT: HCPCS

## 2021-07-16 PROCEDURE — 99207 FUNDUS PHOTOS OU (BOTH EYES): CPT | Performed by: OPHTHALMOLOGY

## 2021-07-16 PROCEDURE — 76512 OPH US DX B-SCAN: CPT | Performed by: OPHTHALMOLOGY

## 2021-07-16 PROCEDURE — 99213 OFFICE O/P EST LOW 20 MIN: CPT | Performed by: OPHTHALMOLOGY

## 2021-07-16 PROCEDURE — 92134 CPTRZ OPH DX IMG PST SGM RTA: CPT | Performed by: OPHTHALMOLOGY

## 2021-07-16 PROCEDURE — 92250 FUNDUS PHOTOGRAPHY W/I&R: CPT | Performed by: OPHTHALMOLOGY

## 2021-07-16 ASSESSMENT — CONF VISUAL FIELD
OD_INFERIOR_TEMPORAL_RESTRICTION: 1
OS_INFERIOR_TEMPORAL_RESTRICTION: 3
OD_INFERIOR_NASAL_RESTRICTION: 1
OD_SUPERIOR_NASAL_RESTRICTION: 1
OD_SUPERIOR_TEMPORAL_RESTRICTION: 1

## 2021-07-16 ASSESSMENT — REFRACTION_MANIFEST
OD_SPHERE: BALANCE
OS_ADD: +3.00
OS_CYLINDER: +2.00
OS_AXIS: 180
OS_SPHERE: -2.00

## 2021-07-16 ASSESSMENT — SLIT LAMP EXAM - LIDS
COMMENTS: NORMAL
COMMENTS: NORMAL

## 2021-07-16 ASSESSMENT — CUP TO DISC RATIO
OD_RATIO: 0.3
OS_RATIO: 0.3

## 2021-07-16 ASSESSMENT — TONOMETRY
IOP_METHOD: ICARE
OS_IOP_MMHG: 18
OD_IOP_MMHG: 12

## 2021-07-16 ASSESSMENT — VISUAL ACUITY
OS_PH_SC: 20/100
OD_SC: HM
OS_SC: 20/250
METHOD: SNELLEN - LINEAR

## 2021-07-16 ASSESSMENT — EXTERNAL EXAM - RIGHT EYE: OD_EXAM: NORMAL

## 2021-07-16 ASSESSMENT — EXTERNAL EXAM - LEFT EYE: OS_EXAM: NORMAL

## 2021-07-16 NOTE — PROGRESS NOTES
CC -   Wet AMD & PEHCR    INTERVAL HISTORY - CHIQUI 6 months ago with me.  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  Very poor vison OD since ~2015, no change since    PAST OCULAR SURGERY  None    RETINAL IMAGING:  U/S B-scan   OD - mildly elevated lesions inf/sup with increase     OCT 7-16-21  OD - (prior) large FV scar with PED IRF/SRF, PVD  OS - 2+ drusen,  [place], tr ERM, PVD    Photos  7-16-21   OD - disciform scar no change      ASSESSMENT & PLAN    # Wet AMD OD advanced   - large disciform scar central   - vision c/w clinical appearance    # Dry AMD OS intermediate / advanced    - focal atrophy & drusen   - AREDS & Annmarieler      # Peripheral lesions OD   - likely PEHCR   - no sig change likely on DFE or U/S   - recheck 6 months      # PVD OU   - advised S/Sx RD 7/2021      # NS OS > OD   - likely visually significant   - patient not interested in CE/IOL (7/2021)   - has concerns re: his cardiac condition & surgery   - advised to talk to [facility]     - VA 20/60 OS with MRx 7/2021       # JOURDAN   - artificial tears 3/day OU      return to clinic: 4-6 months, OCT OU, Optos OU, U/S right eye        ATTESTATION     Attending Attestation:     Complete documentation of historical and exam elements from today's encounter can be found in the full encounter summary report (not reduplicated in this progress note).  I personally obtained the chief complaint(s) and history of present illness.  I confirmed and edited as necessary the review of systems, past medical/surgical history, family history, social history, and examination findings as documented by others; and I examined the patient myself.  I personally reviewed the relevant tests, images, and reports as documented above.  I formulated and edited as necessary the assessment and plan and discussed the findings and management plan with the patient  and family    Enedelia Maldonado MD, PhD  , Vitreoretinal Surgery  Department of Ophthalmology  [facility]

## 2021-07-26 DIAGNOSIS — H35.9 RETINAL LESION: Primary | ICD-10-CM

## 2021-08-04 ENCOUNTER — ANCILLARY PROCEDURE (OUTPATIENT)
Dept: CARDIOLOGY | Facility: CLINIC | Age: 86
End: 2021-08-04
Attending: INTERNAL MEDICINE
Payer: COMMERCIAL

## 2021-08-04 DIAGNOSIS — Z95.0 CARDIAC PACEMAKER IN SITU: ICD-10-CM

## 2021-08-04 PROCEDURE — 93294 REM INTERROG EVL PM/LDLS PM: CPT | Performed by: INTERNAL MEDICINE

## 2021-08-04 PROCEDURE — 93296 REM INTERROG EVL PM/IDS: CPT | Performed by: INTERNAL MEDICINE

## 2021-08-07 ENCOUNTER — LAB REQUISITION (OUTPATIENT)
Dept: LAB | Facility: CLINIC | Age: 86
End: 2021-08-07
Payer: COMMERCIAL

## 2021-08-07 DIAGNOSIS — Z13.1 ENCOUNTER FOR SCREENING FOR DIABETES MELLITUS: ICD-10-CM

## 2021-08-07 DIAGNOSIS — D64.9 ANEMIA, UNSPECIFIED: ICD-10-CM

## 2021-08-07 DIAGNOSIS — I10 ESSENTIAL (PRIMARY) HYPERTENSION: ICD-10-CM

## 2021-08-09 LAB
ALBUMIN SERPL-MCNC: 3.8 G/DL (ref 3.5–5)
ALP SERPL-CCNC: 90 U/L (ref 45–120)
ALT SERPL W P-5'-P-CCNC: 10 U/L (ref 0–45)
ANION GAP SERPL CALCULATED.3IONS-SCNC: 10 MMOL/L (ref 5–18)
AST SERPL W P-5'-P-CCNC: 15 U/L (ref 0–40)
BILIRUB DIRECT SERPL-MCNC: 0.2 MG/DL
BILIRUB SERPL-MCNC: 0.6 MG/DL (ref 0–1)
BUN SERPL-MCNC: 19 MG/DL (ref 8–28)
CALCIUM SERPL-MCNC: 9.9 MG/DL (ref 8.5–10.5)
CHLORIDE BLD-SCNC: 103 MMOL/L (ref 98–107)
CHOLEST SERPL-MCNC: 120 MG/DL
CO2 SERPL-SCNC: 29 MMOL/L (ref 22–31)
CREAT SERPL-MCNC: 0.78 MG/DL (ref 0.7–1.3)
ERYTHROCYTE [DISTWIDTH] IN BLOOD BY AUTOMATED COUNT: 13.8 % (ref 10–15)
FASTING STATUS PATIENT QL REPORTED: ABNORMAL
GFR SERPL CREATININE-BSD FRML MDRD: 82 ML/MIN/1.73M2
GLUCOSE BLD-MCNC: 102 MG/DL (ref 70–125)
HBA1C MFR BLD: 5.8 %
HCT VFR BLD AUTO: 42.7 % (ref 40–53)
HDLC SERPL-MCNC: 38 MG/DL
HGB BLD-MCNC: 13.7 G/DL (ref 13.3–17.7)
LDLC SERPL CALC-MCNC: 48 MG/DL
MCH RBC QN AUTO: 32.3 PG (ref 26.5–33)
MCHC RBC AUTO-ENTMCNC: 32.1 G/DL (ref 31.5–36.5)
MCV RBC AUTO: 101 FL (ref 78–100)
PLATELET # BLD AUTO: 180 10E3/UL (ref 150–450)
POTASSIUM BLD-SCNC: 3.8 MMOL/L (ref 3.5–5)
PROT SERPL-MCNC: 7.1 G/DL (ref 6–8)
RBC # BLD AUTO: 4.24 10E6/UL (ref 4.4–5.9)
SODIUM SERPL-SCNC: 142 MMOL/L (ref 136–145)
TRIGL SERPL-MCNC: 170 MG/DL
TSH SERPL DL<=0.005 MIU/L-ACNC: 2.02 UIU/ML (ref 0.3–5)
WBC # BLD AUTO: 8 10E3/UL (ref 4–11)

## 2021-08-09 PROCEDURE — 85027 COMPLETE CBC AUTOMATED: CPT | Mod: ORL | Performed by: NURSE PRACTITIONER

## 2021-08-09 PROCEDURE — 80053 COMPREHEN METABOLIC PANEL: CPT | Mod: ORL | Performed by: NURSE PRACTITIONER

## 2021-08-09 PROCEDURE — 84443 ASSAY THYROID STIM HORMONE: CPT | Mod: ORL | Performed by: NURSE PRACTITIONER

## 2021-08-09 PROCEDURE — 36415 COLL VENOUS BLD VENIPUNCTURE: CPT | Mod: ORL | Performed by: NURSE PRACTITIONER

## 2021-08-09 PROCEDURE — 82248 BILIRUBIN DIRECT: CPT | Mod: ORL | Performed by: NURSE PRACTITIONER

## 2021-08-09 PROCEDURE — 80061 LIPID PANEL: CPT | Mod: ORL | Performed by: NURSE PRACTITIONER

## 2021-08-09 PROCEDURE — 83036 HEMOGLOBIN GLYCOSYLATED A1C: CPT | Mod: ORL | Performed by: NURSE PRACTITIONER

## 2021-08-09 PROCEDURE — P9603 ONE-WAY ALLOW PRORATED MILES: HCPCS | Mod: ORL | Performed by: NURSE PRACTITIONER

## 2021-08-26 LAB
MDC_IDC_EPISODE_DTM: NORMAL
MDC_IDC_EPISODE_ID: NORMAL
MDC_IDC_EPISODE_TYPE: NORMAL
MDC_IDC_LEAD_IMPLANT_DT: NORMAL
MDC_IDC_LEAD_IMPLANT_DT: NORMAL
MDC_IDC_LEAD_LOCATION: NORMAL
MDC_IDC_LEAD_LOCATION: NORMAL
MDC_IDC_LEAD_MFG: NORMAL
MDC_IDC_LEAD_MFG: NORMAL
MDC_IDC_LEAD_MODEL: NORMAL
MDC_IDC_LEAD_MODEL: NORMAL
MDC_IDC_LEAD_POLARITY_TYPE: NORMAL
MDC_IDC_LEAD_POLARITY_TYPE: NORMAL
MDC_IDC_LEAD_SERIAL: NORMAL
MDC_IDC_LEAD_SERIAL: NORMAL
MDC_IDC_MSMT_BATTERY_DTM: NORMAL
MDC_IDC_MSMT_BATTERY_REMAINING_LONGEVITY: 18 MO
MDC_IDC_MSMT_BATTERY_REMAINING_PERCENTAGE: 30 %
MDC_IDC_MSMT_BATTERY_STATUS: NORMAL
MDC_IDC_MSMT_LEADCHNL_RA_IMPEDANCE_VALUE: 548 OHM
MDC_IDC_MSMT_LEADCHNL_RA_PACING_THRESHOLD_AMPLITUDE: 1.2 V
MDC_IDC_MSMT_LEADCHNL_RA_PACING_THRESHOLD_PULSEWIDTH: 0.5 MS
MDC_IDC_MSMT_LEADCHNL_RV_IMPEDANCE_VALUE: 500 OHM
MDC_IDC_MSMT_LEADCHNL_RV_PACING_THRESHOLD_AMPLITUDE: 1.8 V
MDC_IDC_MSMT_LEADCHNL_RV_PACING_THRESHOLD_PULSEWIDTH: 0.4 MS
MDC_IDC_PG_IMPLANT_DTM: NORMAL
MDC_IDC_PG_MFG: NORMAL
MDC_IDC_PG_MODEL: NORMAL
MDC_IDC_PG_SERIAL: NORMAL
MDC_IDC_PG_TYPE: NORMAL
MDC_IDC_SESS_CLINIC_NAME: NORMAL
MDC_IDC_SESS_DTM: NORMAL
MDC_IDC_SESS_TYPE: NORMAL
MDC_IDC_SET_BRADY_AT_MODE_SWITCH_MODE: NORMAL
MDC_IDC_SET_BRADY_AT_MODE_SWITCH_RATE: 170 {BEATS}/MIN
MDC_IDC_SET_BRADY_LOWRATE: 60 {BEATS}/MIN
MDC_IDC_SET_BRADY_MAX_SENSOR_RATE: 130 {BEATS}/MIN
MDC_IDC_SET_BRADY_MAX_TRACKING_RATE: 130 {BEATS}/MIN
MDC_IDC_SET_BRADY_MODE: NORMAL
MDC_IDC_SET_BRADY_PAV_DELAY_HIGH: 150 MS
MDC_IDC_SET_BRADY_PAV_DELAY_LOW: 200 MS
MDC_IDC_SET_BRADY_SAV_DELAY_HIGH: 150 MS
MDC_IDC_SET_BRADY_SAV_DELAY_LOW: 200 MS
MDC_IDC_SET_LEADCHNL_RA_PACING_AMPLITUDE: 2 V
MDC_IDC_SET_LEADCHNL_RA_PACING_POLARITY: NORMAL
MDC_IDC_SET_LEADCHNL_RA_PACING_PULSEWIDTH: 0.5 MS
MDC_IDC_SET_LEADCHNL_RA_SENSING_ADAPTATION_MODE: NORMAL
MDC_IDC_SET_LEADCHNL_RA_SENSING_POLARITY: NORMAL
MDC_IDC_SET_LEADCHNL_RA_SENSING_SENSITIVITY: 0.25 MV
MDC_IDC_SET_LEADCHNL_RV_PACING_AMPLITUDE: 3.5 V
MDC_IDC_SET_LEADCHNL_RV_PACING_CAPTURE_MODE: NORMAL
MDC_IDC_SET_LEADCHNL_RV_PACING_POLARITY: NORMAL
MDC_IDC_SET_LEADCHNL_RV_PACING_PULSEWIDTH: 0.4 MS
MDC_IDC_SET_LEADCHNL_RV_SENSING_ADAPTATION_MODE: NORMAL
MDC_IDC_SET_LEADCHNL_RV_SENSING_POLARITY: NORMAL
MDC_IDC_SET_LEADCHNL_RV_SENSING_SENSITIVITY: 1.5 MV
MDC_IDC_SET_ZONE_DETECTION_INTERVAL: 375 MS
MDC_IDC_SET_ZONE_TYPE: NORMAL
MDC_IDC_SET_ZONE_VENDOR_TYPE: NORMAL
MDC_IDC_STAT_AT_BURDEN_PERCENT: 1 %
MDC_IDC_STAT_AT_DTM_END: NORMAL
MDC_IDC_STAT_AT_DTM_START: NORMAL
MDC_IDC_STAT_BRADY_DTM_END: NORMAL
MDC_IDC_STAT_BRADY_DTM_START: NORMAL
MDC_IDC_STAT_BRADY_RA_PERCENT_PACED: 59 %
MDC_IDC_STAT_BRADY_RV_PERCENT_PACED: 90 %
MDC_IDC_STAT_EPISODE_RECENT_COUNT: 0
MDC_IDC_STAT_EPISODE_RECENT_COUNT: 0
MDC_IDC_STAT_EPISODE_RECENT_COUNT: 1
MDC_IDC_STAT_EPISODE_RECENT_COUNT: 1
MDC_IDC_STAT_EPISODE_RECENT_COUNT_DTM_END: NORMAL
MDC_IDC_STAT_EPISODE_RECENT_COUNT_DTM_START: NORMAL
MDC_IDC_STAT_EPISODE_TYPE: NORMAL
MDC_IDC_STAT_EPISODE_VENDOR_TYPE: NORMAL

## 2021-10-12 ENCOUNTER — ANCILLARY PROCEDURE (OUTPATIENT)
Dept: CARDIOLOGY | Facility: CLINIC | Age: 86
End: 2021-10-12
Attending: INTERNAL MEDICINE
Payer: COMMERCIAL

## 2021-10-12 VITALS
DIASTOLIC BLOOD PRESSURE: 62 MMHG | SYSTOLIC BLOOD PRESSURE: 148 MMHG | HEIGHT: 73 IN | BODY MASS INDEX: 20.34 KG/M2 | HEART RATE: 97 BPM | OXYGEN SATURATION: 95 %

## 2021-10-12 DIAGNOSIS — Z95.0 CARDIAC PACEMAKER IN SITU: ICD-10-CM

## 2021-10-12 DIAGNOSIS — I25.10 CORONARY ARTERY DISEASE INVOLVING NATIVE CORONARY ARTERY OF NATIVE HEART WITHOUT ANGINA PECTORIS: ICD-10-CM

## 2021-10-12 DIAGNOSIS — I10 ESSENTIAL HYPERTENSION, BENIGN: Primary | ICD-10-CM

## 2021-10-12 DIAGNOSIS — I44.2 ATRIOVENTRICULAR BLOCK, COMPLETE (H): Primary | ICD-10-CM

## 2021-10-12 PROCEDURE — 99214 OFFICE O/P EST MOD 30 MIN: CPT | Mod: 25 | Performed by: INTERNAL MEDICINE

## 2021-10-12 PROCEDURE — 93280 PM DEVICE PROGR EVAL DUAL: CPT | Performed by: INTERNAL MEDICINE

## 2021-10-12 RX ORDER — ISOSORBIDE MONONITRATE 60 MG/1
60 TABLET, EXTENDED RELEASE ORAL DAILY
Qty: 90 TABLET | Refills: 3 | Status: SHIPPED | OUTPATIENT
Start: 2021-10-12 | End: 2023-01-01

## 2021-10-12 NOTE — LETTER
"10/12/2021    Paulino SOTO Cris  [id number]    RE: Nacho Joseph       Dear Colleague,    I had the pleasure of seeing Nacho Joseph in the [facility].    Cardiology Progress Note          Assessment and Plan:       1. Chronic totally occluded right coronary artery, overall stable symptoms    We will increase his Imdur from 30 mg to 60 mg daily due to hypertension.    If worsening symptoms of dyspnea or chest discomfort, consider revisiting ischemic evaluation.    Otherwise routine follow-up in 1 year.  Patient has pacemaker check later today.      30 minutes was spent with the patient, precharting and reviewing tests as well as post visit charting all done today..    This note was transcribed using electronic voice recognition software and there may be typographical errors present.                    Interval History:     The patient is a very pleasant 86 year old whom I have been following for chronic totally occluded right coronary artery.  He feels overall a little more dyspneic but denies chest discomfort.  His blood pressure is above goal.  He does have a pacemaker for sick sinus syndrome.                     Review of Systems:     Review of Systems:  Skin:  Negative bruising (on right wrist from angiogram)   Eyes:  Positive for glasses;cataracts  ENT:  Negative    Respiratory:  Positive for dyspnea on exertion;shortness of breath  Cardiovascular:  Negative Positive for  Gastroenterology: Negative poor appetite  Genitourinary:  Negative    Musculoskeletal:  Positive for arthritis  Neurologic:  Negative headaches  Psychiatric:  Positive for sleep disturbances  Heme/Lymph/Imm:  Negative    Endocrine:  Negative                Physical Exam:     Vitals: BP (!) 148/62   Pulse 97   Ht 1.854 m (6' 1\")   SpO2 95%   BMI 20.34 kg/m    Constitutional:  cooperative, alert and oriented, well developed, well nourished, in no acute distress   sitting in " a wheelchair    Skin:  warm and dry to the touch    (actinic keratoses)    Head:  normocephalic        Eyes:  pupils equal and round        Chest:  normal symmetry prolonged expiration      Cardiac: regular rhythm   distant heart sounds   LLSB;systolic murmur;grade 1     distant heart sounds    Extremities and Back:  no deformities, clubbing, cyanosis, erythema observed        Neurological:  no gross motor deficits;affect appropriate                 Medications:     Current Outpatient Medications   Medication Sig Dispense Refill     isosorbide mononitrate (IMDUR) 60 MG 24 hr tablet Take 1 tablet (60 mg) by mouth daily 90 tablet 3     Acetaminophen (TYLENOL PO) Take 1,000 mg by mouth 3 times daily       acetaminophen (TYLENOL) 500 MG tablet Take 1,000 mg by mouth       aspirin 81 MG tablet Take 1 tablet (81 mg) by mouth daily       atorvastatin (LIPITOR) 40 MG tablet Take 1 tablet (40 mg) by mouth daily       doxycycline hyclate (VIBRA-TABS) 100 MG tablet        DOXYCYCLINE HYCLATE PO Take 100 mg by mouth 2 times daily       Fexofenadine HCl (MUCINEX ALLERGY PO) Take 800 mg by mouth 2 times daily (2 x 400 mg = 800 mg dose)       guaiFENesin 400 MG TABS Take 800 mg by mouth       guaiFENesin-dextromethorphan (ROBITUSSIN DM) 100-10 MG/5ML syrup Take 5 mLs by mouth every 4 hours as needed for cough       hydrochlorothiazide (MICROZIDE) 12.5 MG capsule Take 12.5 mg by mouth every other day Hold if bp <110       HYDROmorphone (DILAUDID) 2 MG tablet Take 1-2 mg by mouth       magnesium hydroxide (MILK OF MAGNESIA) 400 MG/5ML suspension Take 30 mLs by mouth daily as needed for constipation or heartburn       metoprolol succinate (TOPROL-XL) 50 MG 24 hr tablet Take 1 tablet (50 mg) by mouth two times daily 180 tablet 3     Multiple Vitamins-Minerals (PRESERVISION AREDS 2) CAPS Use per 's recommendations. 60 capsule 11     nitroglycerin (NITROSTAT) 0.4 MG SL tablet Place 1 tablet (0.4 mg) under the tongue every 5  minutes as needed for chest pain 25 tablet 4     Nutritional Supplements (HI-ARELIS) LIQD Take by mouth 3 times daily       potassium citrate (UROCIT-K) 10 MEQ (1080 MG) CR tablet        sodium chloride (DEEP SEA NASAL SPRAY) 0.65 % nasal spray Spray 1 spray into both nostrils daily as needed for congestion                  Data:   All laboratory data reviewed  No results found for this or any previous visit (from the past 24 hour(s)).    All laboratory data reviewed  Lab Results   Component Value Date    CHOL 120 08/09/2021     Lab Results   Component Value Date    HDL 38 08/09/2021     Lab Results   Component Value Date    LDL 48 08/09/2021     Lab Results   Component Value Date    TRIG 170 08/09/2021     No results found for: CHOLHDLRATIO  TSH   Date Value Ref Range Status   08/09/2021 2.02 0.30 - 5.00 uIU/mL Final   07/07/2014 2.07 0.4 - 5.0 mU/L Final     Last Basic Metabolic Panel:  Lab Results   Component Value Date     08/09/2021     05/17/2018      Lab Results   Component Value Date    POTASSIUM 3.8 08/09/2021    POTASSIUM 3.7 05/18/2018     Lab Results   Component Value Date    CHLORIDE 103 08/09/2021    CHLORIDE 106 05/17/2018     Lab Results   Component Value Date    ARELIS 9.9 08/09/2021    ARELIS 9.7 05/17/2018     Lab Results   Component Value Date    CO2 29 08/09/2021    CO2 24 05/17/2018     Lab Results   Component Value Date    BUN 19 08/09/2021    BUN 23 05/17/2018     Lab Results   Component Value Date    CR 0.78 08/09/2021    CR 1.01 05/17/2018     Lab Results   Component Value Date     08/09/2021     05/17/2018     Lab Results   Component Value Date    WBC 8.0 08/09/2021    WBC 9.2 05/02/2018     Lab Results   Component Value Date    RBC 4.24 08/09/2021    RBC 4.13 05/02/2018     Lab Results   Component Value Date    HGB 13.7 08/09/2021    HGB 12.7 05/02/2018     Lab Results   Component Value Date    HCT 42.7 08/09/2021    HCT 39.5 05/02/2018     Lab Results   Component Value  Date     08/09/2021    MCV 96 05/02/2018     Lab Results   Component Value Date    MCH 32.3 08/09/2021    MCH 30.8 05/02/2018     Lab Results   Component Value Date    MCHC 32.1 08/09/2021    MCHC 32.2 05/02/2018     Lab Results   Component Value Date    RDW 13.8 08/09/2021    RDW 15.3 05/02/2018     Lab Results   Component Value Date     08/09/2021     05/02/2018                   Thank you for allowing me to participate in the care of your patient.      Sincerely,     Carlo Llanes MD     [facility]  cc:   Carlo Llanes MD  [address]  [place] [id number]

## 2021-10-12 NOTE — PROGRESS NOTES
"Cardiology Progress Note          Assessment and Plan:       1. Chronic totally occluded right coronary artery, overall stable symptoms    We will increase his Imdur from 30 mg to 60 mg daily due to hypertension.    If worsening symptoms of dyspnea or chest discomfort, consider revisiting ischemic evaluation.    Otherwise routine follow-up in 1 year.  Patient has pacemaker check later today.      30 minutes was spent with the patient, precharting and reviewing tests as well as post visit charting all done today..    This note was transcribed using electronic voice recognition software and there may be typographical errors present.                    Interval History:     The patient is a very pleasant 86 year old whom I have been following for chronic totally occluded right coronary artery.  He feels overall a little more dyspneic but denies chest discomfort.  His blood pressure is above goal.  He does have a pacemaker for sick sinus syndrome.                     Review of Systems:     Review of Systems:  Skin:  Negative bruising (on right wrist from angiogram)   Eyes:  Positive for glasses;cataracts  ENT:  Negative    Respiratory:  Positive for dyspnea on exertion;shortness of breath  Cardiovascular:  Negative Positive for  Gastroenterology: Negative poor appetite  Genitourinary:  Negative    Musculoskeletal:  Positive for arthritis  Neurologic:  Negative headaches  Psychiatric:  Positive for sleep disturbances  Heme/Lymph/Imm:  Negative    Endocrine:  Negative                Physical Exam:     Vitals: BP (!) 148/62   Pulse 97   Ht 1.854 m (6' 1\")   SpO2 95%   BMI 20.34 kg/m    Constitutional:  cooperative, alert and oriented, well developed, well nourished, in no acute distress   sitting in a wheelchair    Skin:  warm and dry to the touch    (actinic keratoses)    Head:  normocephalic        Eyes:  pupils equal and round        Chest:  normal symmetry prolonged expiration      Cardiac: regular rhythm   distant " heart sounds   LLSB;systolic murmur;grade 1     distant heart sounds    Extremities and Back:  no deformities, clubbing, cyanosis, erythema observed        Neurological:  no gross motor deficits;affect appropriate                 Medications:     Current Outpatient Medications   Medication Sig Dispense Refill     isosorbide mononitrate (IMDUR) 60 MG 24 hr tablet Take 1 tablet (60 mg) by mouth daily 90 tablet 3     Acetaminophen (TYLENOL PO) Take 1,000 mg by mouth 3 times daily       acetaminophen (TYLENOL) 500 MG tablet Take 1,000 mg by mouth       aspirin 81 MG tablet Take 1 tablet (81 mg) by mouth daily       atorvastatin (LIPITOR) 40 MG tablet Take 1 tablet (40 mg) by mouth daily       doxycycline hyclate (VIBRA-TABS) 100 MG tablet        DOXYCYCLINE HYCLATE PO Take 100 mg by mouth 2 times daily       Fexofenadine HCl (MUCINEX ALLERGY PO) Take 800 mg by mouth 2 times daily (2 x 400 mg = 800 mg dose)       guaiFENesin 400 MG TABS Take 800 mg by mouth       guaiFENesin-dextromethorphan (ROBITUSSIN DM) 100-10 MG/5ML syrup Take 5 mLs by mouth every 4 hours as needed for cough       hydrochlorothiazide (MICROZIDE) 12.5 MG capsule Take 12.5 mg by mouth every other day Hold if bp <110       HYDROmorphone (DILAUDID) 2 MG tablet Take 1-2 mg by mouth       magnesium hydroxide (MILK OF MAGNESIA) 400 MG/5ML suspension Take 30 mLs by mouth daily as needed for constipation or heartburn       metoprolol succinate (TOPROL-XL) 50 MG 24 hr tablet Take 1 tablet (50 mg) by mouth two times daily 180 tablet 3     Multiple Vitamins-Minerals (PRESERVISION AREDS 2) CAPS Use per 's recommendations. 60 capsule 11     nitroglycerin (NITROSTAT) 0.4 MG SL tablet Place 1 tablet (0.4 mg) under the tongue every 5 minutes as needed for chest pain 25 tablet 4     Nutritional Supplements (HI-ARELIS) LIQD Take by mouth 3 times daily       potassium citrate (UROCIT-K) 10 MEQ (1080 MG) CR tablet        sodium chloride (DEEP SEA NASAL SPRAY)  0.65 % nasal spray Spray 1 spray into both nostrils daily as needed for congestion                  Data:   All laboratory data reviewed  No results found for this or any previous visit (from the past 24 hour(s)).    All laboratory data reviewed  Lab Results   Component Value Date    CHOL 120 08/09/2021     Lab Results   Component Value Date    HDL 38 08/09/2021     Lab Results   Component Value Date    LDL 48 08/09/2021     Lab Results   Component Value Date    TRIG 170 08/09/2021     No results found for: CHOLHDLRATIO  TSH   Date Value Ref Range Status   08/09/2021 2.02 0.30 - 5.00 uIU/mL Final   07/07/2014 2.07 0.4 - 5.0 mU/L Final     Last Basic Metabolic Panel:  Lab Results   Component Value Date     08/09/2021     05/17/2018      Lab Results   Component Value Date    POTASSIUM 3.8 08/09/2021    POTASSIUM 3.7 05/18/2018     Lab Results   Component Value Date    CHLORIDE 103 08/09/2021    CHLORIDE 106 05/17/2018     Lab Results   Component Value Date    ARELIS 9.9 08/09/2021    ARELIS 9.7 05/17/2018     Lab Results   Component Value Date    CO2 29 08/09/2021    CO2 24 05/17/2018     Lab Results   Component Value Date    BUN 19 08/09/2021    BUN 23 05/17/2018     Lab Results   Component Value Date    CR 0.78 08/09/2021    CR 1.01 05/17/2018     Lab Results   Component Value Date     08/09/2021     05/17/2018     Lab Results   Component Value Date    WBC 8.0 08/09/2021    WBC 9.2 05/02/2018     Lab Results   Component Value Date    RBC 4.24 08/09/2021    RBC 4.13 05/02/2018     Lab Results   Component Value Date    HGB 13.7 08/09/2021    HGB 12.7 05/02/2018     Lab Results   Component Value Date    HCT 42.7 08/09/2021    HCT 39.5 05/02/2018     Lab Results   Component Value Date     08/09/2021    MCV 96 05/02/2018     Lab Results   Component Value Date    MCH 32.3 08/09/2021    MCH 30.8 05/02/2018     Lab Results   Component Value Date    MCHC 32.1 08/09/2021    MCHC 32.2 05/02/2018      Lab Results   Component Value Date    RDW 13.8 08/09/2021    RDW 15.3 05/02/2018     Lab Results   Component Value Date     08/09/2021     05/02/2018

## 2021-10-13 LAB
MDC_IDC_LEAD_IMPLANT_DT: NORMAL
MDC_IDC_LEAD_IMPLANT_DT: NORMAL
MDC_IDC_LEAD_LOCATION: NORMAL
MDC_IDC_LEAD_LOCATION: NORMAL
MDC_IDC_LEAD_MFG: NORMAL
MDC_IDC_LEAD_MFG: NORMAL
MDC_IDC_LEAD_MODEL: NORMAL
MDC_IDC_LEAD_MODEL: NORMAL
MDC_IDC_LEAD_POLARITY_TYPE: NORMAL
MDC_IDC_LEAD_POLARITY_TYPE: NORMAL
MDC_IDC_LEAD_SERIAL: NORMAL
MDC_IDC_LEAD_SERIAL: NORMAL
MDC_IDC_MSMT_BATTERY_DTM: NORMAL
MDC_IDC_MSMT_BATTERY_REMAINING_LONGEVITY: 12 MO
MDC_IDC_MSMT_BATTERY_REMAINING_PERCENTAGE: 23 %
MDC_IDC_MSMT_BATTERY_STATUS: NORMAL
MDC_IDC_MSMT_LEADCHNL_RA_IMPEDANCE_VALUE: 576 OHM
MDC_IDC_MSMT_LEADCHNL_RA_PACING_THRESHOLD_AMPLITUDE: 1.2 V
MDC_IDC_MSMT_LEADCHNL_RA_PACING_THRESHOLD_PULSEWIDTH: 0.5 MS
MDC_IDC_MSMT_LEADCHNL_RV_IMPEDANCE_VALUE: 510 OHM
MDC_IDC_MSMT_LEADCHNL_RV_PACING_THRESHOLD_AMPLITUDE: 1.3 V
MDC_IDC_MSMT_LEADCHNL_RV_PACING_THRESHOLD_PULSEWIDTH: 0.4 MS
MDC_IDC_PG_IMPLANT_DTM: NORMAL
MDC_IDC_PG_MFG: NORMAL
MDC_IDC_PG_MODEL: NORMAL
MDC_IDC_PG_SERIAL: NORMAL
MDC_IDC_PG_TYPE: NORMAL
MDC_IDC_SESS_CLINIC_NAME: NORMAL
MDC_IDC_SESS_DTM: NORMAL
MDC_IDC_SESS_TYPE: NORMAL
MDC_IDC_SET_BRADY_AT_MODE_SWITCH_MODE: NORMAL
MDC_IDC_SET_BRADY_AT_MODE_SWITCH_RATE: 170 {BEATS}/MIN
MDC_IDC_SET_BRADY_LOWRATE: 60 {BEATS}/MIN
MDC_IDC_SET_BRADY_MAX_SENSOR_RATE: 130 {BEATS}/MIN
MDC_IDC_SET_BRADY_MAX_TRACKING_RATE: 130 {BEATS}/MIN
MDC_IDC_SET_BRADY_MODE: NORMAL
MDC_IDC_SET_BRADY_PAV_DELAY_HIGH: 150 MS
MDC_IDC_SET_BRADY_PAV_DELAY_LOW: 200 MS
MDC_IDC_SET_BRADY_SAV_DELAY_HIGH: 150 MS
MDC_IDC_SET_BRADY_SAV_DELAY_LOW: 200 MS
MDC_IDC_SET_LEADCHNL_RA_PACING_AMPLITUDE: 2.4 V
MDC_IDC_SET_LEADCHNL_RA_PACING_POLARITY: NORMAL
MDC_IDC_SET_LEADCHNL_RA_PACING_PULSEWIDTH: 0.5 MS
MDC_IDC_SET_LEADCHNL_RA_SENSING_ADAPTATION_MODE: NORMAL
MDC_IDC_SET_LEADCHNL_RA_SENSING_POLARITY: NORMAL
MDC_IDC_SET_LEADCHNL_RA_SENSING_SENSITIVITY: 0.25 MV
MDC_IDC_SET_LEADCHNL_RV_PACING_AMPLITUDE: 1.8 V
MDC_IDC_SET_LEADCHNL_RV_PACING_CAPTURE_MODE: NORMAL
MDC_IDC_SET_LEADCHNL_RV_PACING_POLARITY: NORMAL
MDC_IDC_SET_LEADCHNL_RV_PACING_PULSEWIDTH: 0.4 MS
MDC_IDC_SET_LEADCHNL_RV_SENSING_ADAPTATION_MODE: NORMAL
MDC_IDC_SET_LEADCHNL_RV_SENSING_POLARITY: NORMAL
MDC_IDC_SET_LEADCHNL_RV_SENSING_SENSITIVITY: 1.5 MV
MDC_IDC_SET_ZONE_DETECTION_INTERVAL: 375 MS
MDC_IDC_SET_ZONE_TYPE: NORMAL
MDC_IDC_SET_ZONE_VENDOR_TYPE: NORMAL
MDC_IDC_STAT_AT_BURDEN_PERCENT: 1 %
MDC_IDC_STAT_AT_DTM_END: NORMAL
MDC_IDC_STAT_AT_DTM_START: NORMAL
MDC_IDC_STAT_BRADY_DTM_END: NORMAL
MDC_IDC_STAT_BRADY_DTM_START: NORMAL
MDC_IDC_STAT_BRADY_RA_PERCENT_PACED: 57 %
MDC_IDC_STAT_BRADY_RV_PERCENT_PACED: 91 %
MDC_IDC_STAT_EPISODE_RECENT_COUNT: 0
MDC_IDC_STAT_EPISODE_RECENT_COUNT: 0
MDC_IDC_STAT_EPISODE_RECENT_COUNT: 1
MDC_IDC_STAT_EPISODE_RECENT_COUNT: 2
MDC_IDC_STAT_EPISODE_RECENT_COUNT_DTM_END: NORMAL
MDC_IDC_STAT_EPISODE_RECENT_COUNT_DTM_START: NORMAL
MDC_IDC_STAT_EPISODE_TYPE: NORMAL
MDC_IDC_STAT_EPISODE_VENDOR_TYPE: NORMAL

## 2022-01-01 ENCOUNTER — OFFICE VISIT (OUTPATIENT)
Dept: CARDIOLOGY | Facility: CLINIC | Age: 87
End: 2022-01-01
Attending: INTERNAL MEDICINE
Payer: COMMERCIAL

## 2022-01-01 ENCOUNTER — HOSPITAL ENCOUNTER (OUTPATIENT)
Facility: CLINIC | Age: 87
Discharge: NURSING, REHAB OR LONG-TERM CARE | End: 2022-05-27
Attending: INTERNAL MEDICINE | Admitting: INTERNAL MEDICINE
Payer: COMMERCIAL

## 2022-01-01 ENCOUNTER — TELEPHONE (OUTPATIENT)
Dept: CARDIOLOGY | Facility: CLINIC | Age: 87
End: 2022-01-01
Payer: COMMERCIAL

## 2022-01-01 ENCOUNTER — HOSPITAL ENCOUNTER (OUTPATIENT)
Dept: CARDIOLOGY | Facility: CLINIC | Age: 87
Discharge: HOME | End: 2022-11-25
Attending: INTERNAL MEDICINE | Admitting: INTERNAL MEDICINE
Payer: COMMERCIAL

## 2022-01-01 ENCOUNTER — TELEPHONE (OUTPATIENT)
Dept: CARDIOLOGY | Facility: CLINIC | Age: 87
End: 2022-01-01

## 2022-01-01 ENCOUNTER — TRANSFERRED RECORDS (OUTPATIENT)
Dept: HEALTH INFORMATION MANAGEMENT | Facility: CLINIC | Age: 87
End: 2022-01-01

## 2022-01-01 VITALS
SYSTOLIC BLOOD PRESSURE: 94 MMHG | WEIGHT: 137 LBS | RESPIRATION RATE: 18 BRPM | BODY MASS INDEX: 18.16 KG/M2 | OXYGEN SATURATION: 94 % | DIASTOLIC BLOOD PRESSURE: 74 MMHG | HEART RATE: 74 BPM | TEMPERATURE: 98.1 F | HEIGHT: 73 IN

## 2022-01-01 VITALS
HEART RATE: 81 BPM | HEIGHT: 73 IN | WEIGHT: 128 LBS | DIASTOLIC BLOOD PRESSURE: 72 MMHG | OXYGEN SATURATION: 95 % | SYSTOLIC BLOOD PRESSURE: 113 MMHG | BODY MASS INDEX: 16.96 KG/M2

## 2022-01-01 VITALS
WEIGHT: 144 LBS | BODY MASS INDEX: 19.09 KG/M2 | DIASTOLIC BLOOD PRESSURE: 88 MMHG | HEIGHT: 73 IN | HEART RATE: 85 BPM | SYSTOLIC BLOOD PRESSURE: 149 MMHG

## 2022-01-01 DIAGNOSIS — I44.2 ATRIOVENTRICULAR BLOCK, COMPLETE (H): ICD-10-CM

## 2022-01-01 DIAGNOSIS — Z95.0 CARDIAC PACEMAKER IN SITU: ICD-10-CM

## 2022-01-01 DIAGNOSIS — I49.5 SICK SINUS SYNDROME (H): ICD-10-CM

## 2022-01-01 DIAGNOSIS — I25.10 CORONARY ARTERY DISEASE INVOLVING NATIVE CORONARY ARTERY OF NATIVE HEART WITHOUT ANGINA PECTORIS: ICD-10-CM

## 2022-01-01 DIAGNOSIS — Z11.59 ENCOUNTER FOR SCREENING FOR OTHER VIRAL DISEASES: Primary | ICD-10-CM

## 2022-01-01 DIAGNOSIS — I48.0 PAF (PAROXYSMAL ATRIAL FIBRILLATION) (H): Primary | ICD-10-CM

## 2022-01-01 DIAGNOSIS — I44.2 ATRIOVENTRICULAR BLOCK, COMPLETE (H): Primary | ICD-10-CM

## 2022-01-01 DIAGNOSIS — I25.10 CORONARY ARTERY DISEASE INVOLVING NATIVE CORONARY ARTERY OF NATIVE HEART WITHOUT ANGINA PECTORIS: Primary | ICD-10-CM

## 2022-01-01 DIAGNOSIS — Z95.0 CARDIAC PACEMAKER IN SITU: Primary | ICD-10-CM

## 2022-01-01 LAB
ANION GAP SERPL CALCULATED.3IONS-SCNC: 7 MMOL/L (ref 3–14)
ATRIAL RATE - MUSE: 78 BPM
BUN SERPL-MCNC: 30 MG/DL (ref 7–30)
CALCIUM SERPL-MCNC: 9.3 MG/DL (ref 8.5–10.1)
CHLORIDE BLD-SCNC: 109 MMOL/L (ref 94–109)
CO2 SERPL-SCNC: 29 MMOL/L (ref 20–32)
CREAT SERPL-MCNC: 0.91 MG/DL (ref 0.66–1.25)
DIASTOLIC BLOOD PRESSURE - MUSE: NORMAL MMHG
ERYTHROCYTE [DISTWIDTH] IN BLOOD BY AUTOMATED COUNT: 13.8 % (ref 10–15)
GFR SERPL CREATININE-BSD FRML MDRD: 82 ML/MIN/1.73M2
GLUCOSE BLD-MCNC: 121 MG/DL (ref 70–99)
HCT VFR BLD AUTO: 40.4 % (ref 40–53)
HGB BLD-MCNC: 13.1 G/DL (ref 13.3–17.7)
INTERPRETATION ECG - MUSE: NORMAL
LVEF ECHO: NORMAL
MCH RBC QN AUTO: 32.7 PG (ref 26.5–33)
MCHC RBC AUTO-ENTMCNC: 32.4 G/DL (ref 31.5–36.5)
MCV RBC AUTO: 101 FL (ref 78–100)
MDC_IDC_EPISODE_DTM: NORMAL
MDC_IDC_EPISODE_DURATION: 1002 S
MDC_IDC_EPISODE_DURATION: 11 S
MDC_IDC_EPISODE_DURATION: 1339 S
MDC_IDC_EPISODE_DURATION: 153 S
MDC_IDC_EPISODE_DURATION: 1612 S
MDC_IDC_EPISODE_DURATION: 391 S
MDC_IDC_EPISODE_DURATION: 4 S
MDC_IDC_EPISODE_DURATION: 625 S
MDC_IDC_EPISODE_DURATION: 8 S
MDC_IDC_EPISODE_DURATION: 9 S
MDC_IDC_EPISODE_ID: NORMAL
MDC_IDC_EPISODE_TYPE: NORMAL
MDC_IDC_LEAD_IMPLANT_DT: NORMAL
MDC_IDC_LEAD_LOCATION: NORMAL
MDC_IDC_LEAD_MFG: NORMAL
MDC_IDC_LEAD_MODEL: NORMAL
MDC_IDC_LEAD_POLARITY_TYPE: NORMAL
MDC_IDC_LEAD_SERIAL: NORMAL
MDC_IDC_MSMT_BATTERY_DTM: NORMAL
MDC_IDC_MSMT_BATTERY_REMAINING_LONGEVITY: 150 MO
MDC_IDC_MSMT_BATTERY_REMAINING_LONGEVITY: 3 MO
MDC_IDC_MSMT_BATTERY_REMAINING_LONGEVITY: 96 MO
MDC_IDC_MSMT_BATTERY_REMAINING_PERCENTAGE: 1 %
MDC_IDC_MSMT_BATTERY_REMAINING_PERCENTAGE: 100 %
MDC_IDC_MSMT_BATTERY_REMAINING_PERCENTAGE: 93 %
MDC_IDC_MSMT_BATTERY_STATUS: NORMAL
MDC_IDC_MSMT_LEADCHNL_RA_IMPEDANCE_VALUE: 521 OHM
MDC_IDC_MSMT_LEADCHNL_RA_IMPEDANCE_VALUE: 527 OHM
MDC_IDC_MSMT_LEADCHNL_RA_IMPEDANCE_VALUE: 559 OHM
MDC_IDC_MSMT_LEADCHNL_RA_IMPEDANCE_VALUE: 560 OHM
MDC_IDC_MSMT_LEADCHNL_RA_PACING_THRESHOLD_AMPLITUDE: 1.2 V
MDC_IDC_MSMT_LEADCHNL_RA_PACING_THRESHOLD_AMPLITUDE: 1.4 V
MDC_IDC_MSMT_LEADCHNL_RA_PACING_THRESHOLD_AMPLITUDE: 1.6 V
MDC_IDC_MSMT_LEADCHNL_RA_PACING_THRESHOLD_AMPLITUDE: 2.1 V
MDC_IDC_MSMT_LEADCHNL_RA_PACING_THRESHOLD_PULSEWIDTH: 0.4 MS
MDC_IDC_MSMT_LEADCHNL_RA_PACING_THRESHOLD_PULSEWIDTH: 0.4 MS
MDC_IDC_MSMT_LEADCHNL_RA_PACING_THRESHOLD_PULSEWIDTH: 0.5 MS
MDC_IDC_MSMT_LEADCHNL_RA_PACING_THRESHOLD_PULSEWIDTH: 0.7 MS
MDC_IDC_MSMT_LEADCHNL_RA_SENSING_INTR_AMPL: 3.7 MV
MDC_IDC_MSMT_LEADCHNL_RV_IMPEDANCE_VALUE: 468 OHM
MDC_IDC_MSMT_LEADCHNL_RV_IMPEDANCE_VALUE: 484 OHM
MDC_IDC_MSMT_LEADCHNL_RV_IMPEDANCE_VALUE: 507 OHM
MDC_IDC_MSMT_LEADCHNL_RV_IMPEDANCE_VALUE: 508 OHM
MDC_IDC_MSMT_LEADCHNL_RV_PACING_THRESHOLD_AMPLITUDE: 0.9 V
MDC_IDC_MSMT_LEADCHNL_RV_PACING_THRESHOLD_AMPLITUDE: 0.9 V
MDC_IDC_MSMT_LEADCHNL_RV_PACING_THRESHOLD_AMPLITUDE: 1.4 V
MDC_IDC_MSMT_LEADCHNL_RV_PACING_THRESHOLD_AMPLITUDE: 1.4 V
MDC_IDC_MSMT_LEADCHNL_RV_PACING_THRESHOLD_PULSEWIDTH: 0.4 MS
MDC_IDC_MSMT_LEADCHNL_RV_SENSING_INTR_AMPL: NORMAL
MDC_IDC_PG_IMPLANT_DTM: NORMAL
MDC_IDC_PG_MFG: NORMAL
MDC_IDC_PG_MODEL: NORMAL
MDC_IDC_PG_SERIAL: NORMAL
MDC_IDC_PG_TYPE: NORMAL
MDC_IDC_SESS_CLINIC_NAME: NORMAL
MDC_IDC_SESS_DTM: NORMAL
MDC_IDC_SESS_TYPE: NORMAL
MDC_IDC_SET_BRADY_AT_MODE_SWITCH_MODE: NORMAL
MDC_IDC_SET_BRADY_AT_MODE_SWITCH_RATE: 170 {BEATS}/MIN
MDC_IDC_SET_BRADY_LOWRATE: 60 {BEATS}/MIN
MDC_IDC_SET_BRADY_MAX_SENSOR_RATE: 130 {BEATS}/MIN
MDC_IDC_SET_BRADY_MAX_TRACKING_RATE: 130 {BEATS}/MIN
MDC_IDC_SET_BRADY_MODE: NORMAL
MDC_IDC_SET_BRADY_PAV_DELAY_HIGH: 150 MS
MDC_IDC_SET_BRADY_PAV_DELAY_HIGH: 80 MS
MDC_IDC_SET_BRADY_PAV_DELAY_LOW: 180 MS
MDC_IDC_SET_BRADY_PAV_DELAY_LOW: 200 MS
MDC_IDC_SET_BRADY_SAV_DELAY_HIGH: 150 MS
MDC_IDC_SET_BRADY_SAV_DELAY_HIGH: 65 MS
MDC_IDC_SET_BRADY_SAV_DELAY_LOW: 150 MS
MDC_IDC_SET_BRADY_SAV_DELAY_LOW: 200 MS
MDC_IDC_SET_LEADCHNL_RA_PACING_AMPLITUDE: 2.4 V
MDC_IDC_SET_LEADCHNL_RA_PACING_AMPLITUDE: 3.1 V
MDC_IDC_SET_LEADCHNL_RA_PACING_AMPLITUDE: 3.2 V
MDC_IDC_SET_LEADCHNL_RA_PACING_AMPLITUDE: 5 V
MDC_IDC_SET_LEADCHNL_RA_PACING_CAPTURE_MODE: NORMAL
MDC_IDC_SET_LEADCHNL_RA_PACING_POLARITY: NORMAL
MDC_IDC_SET_LEADCHNL_RA_PACING_PULSEWIDTH: 0.4 MS
MDC_IDC_SET_LEADCHNL_RA_PACING_PULSEWIDTH: 0.5 MS
MDC_IDC_SET_LEADCHNL_RA_SENSING_ADAPTATION_MODE: NORMAL
MDC_IDC_SET_LEADCHNL_RA_SENSING_POLARITY: NORMAL
MDC_IDC_SET_LEADCHNL_RA_SENSING_SENSITIVITY: 0.25 MV
MDC_IDC_SET_LEADCHNL_RV_PACING_AMPLITUDE: 1.6 V
MDC_IDC_SET_LEADCHNL_RV_PACING_AMPLITUDE: 1.8 V
MDC_IDC_SET_LEADCHNL_RV_PACING_AMPLITUDE: 1.9 V
MDC_IDC_SET_LEADCHNL_RV_PACING_AMPLITUDE: 3.5 V
MDC_IDC_SET_LEADCHNL_RV_PACING_CAPTURE_MODE: NORMAL
MDC_IDC_SET_LEADCHNL_RV_PACING_POLARITY: NORMAL
MDC_IDC_SET_LEADCHNL_RV_PACING_PULSEWIDTH: 0.4 MS
MDC_IDC_SET_LEADCHNL_RV_SENSING_ADAPTATION_MODE: NORMAL
MDC_IDC_SET_LEADCHNL_RV_SENSING_POLARITY: NORMAL
MDC_IDC_SET_LEADCHNL_RV_SENSING_SENSITIVITY: 1.5 MV
MDC_IDC_SET_ZONE_DETECTION_INTERVAL: 375 MS
MDC_IDC_SET_ZONE_TYPE: NORMAL
MDC_IDC_SET_ZONE_VENDOR_TYPE: NORMAL
MDC_IDC_STAT_AT_BURDEN_PERCENT: 0 %
MDC_IDC_STAT_AT_BURDEN_PERCENT: 0 %
MDC_IDC_STAT_AT_BURDEN_PERCENT: 1 %
MDC_IDC_STAT_AT_DTM_END: NORMAL
MDC_IDC_STAT_AT_DTM_START: NORMAL
MDC_IDC_STAT_BRADY_DTM_END: NORMAL
MDC_IDC_STAT_BRADY_DTM_START: NORMAL
MDC_IDC_STAT_BRADY_RA_PERCENT_PACED: 39 %
MDC_IDC_STAT_BRADY_RA_PERCENT_PACED: 44 %
MDC_IDC_STAT_BRADY_RA_PERCENT_PACED: 52 %
MDC_IDC_STAT_BRADY_RV_PERCENT_PACED: 94 %
MDC_IDC_STAT_BRADY_RV_PERCENT_PACED: 94 %
MDC_IDC_STAT_BRADY_RV_PERCENT_PACED: 98 %
MDC_IDC_STAT_EPISODE_RECENT_COUNT: 0
MDC_IDC_STAT_EPISODE_RECENT_COUNT: 3
MDC_IDC_STAT_EPISODE_RECENT_COUNT: 8
MDC_IDC_STAT_EPISODE_RECENT_COUNT_DTM_END: NORMAL
MDC_IDC_STAT_EPISODE_RECENT_COUNT_DTM_START: NORMAL
MDC_IDC_STAT_EPISODE_TOTAL_COUNT: 0
MDC_IDC_STAT_EPISODE_TOTAL_COUNT_DTM_END: NORMAL
MDC_IDC_STAT_EPISODE_TYPE: NORMAL
MDC_IDC_STAT_EPISODE_VENDOR_TYPE: NORMAL
P AXIS - MUSE: 24 DEGREES
PLATELET # BLD AUTO: 168 10E3/UL (ref 150–450)
POTASSIUM BLD-SCNC: 4.1 MMOL/L (ref 3.4–5.3)
PR INTERVAL - MUSE: NORMAL MS
QRS DURATION - MUSE: 176 MS
QT - MUSE: 460 MS
QTC - MUSE: 524 MS
R AXIS - MUSE: -83 DEGREES
RBC # BLD AUTO: 4.01 10E6/UL (ref 4.4–5.9)
SODIUM SERPL-SCNC: 145 MMOL/L (ref 133–144)
SYSTOLIC BLOOD PRESSURE - MUSE: NORMAL MMHG
T AXIS - MUSE: 99 DEGREES
VENTRICULAR RATE- MUSE: 78 BPM
WBC # BLD AUTO: 7.2 10E3/UL (ref 4–11)

## 2022-01-01 PROCEDURE — 258N000002 HC RX IP 258 OP 250: Performed by: INTERNAL MEDICINE

## 2022-01-01 PROCEDURE — 250N000009 HC RX 250: Performed by: INTERNAL MEDICINE

## 2022-01-01 PROCEDURE — 93280 PM DEVICE PROGR EVAL DUAL: CPT | Performed by: INTERNAL MEDICINE

## 2022-01-01 PROCEDURE — 93306 TTE W/DOPPLER COMPLETE: CPT | Mod: 26 | Performed by: INTERNAL MEDICINE

## 2022-01-01 PROCEDURE — 99214 OFFICE O/P EST MOD 30 MIN: CPT | Performed by: INTERNAL MEDICINE

## 2022-01-01 PROCEDURE — 999N000054 HC STATISTIC EKG NON-CHARGEABLE

## 2022-01-01 PROCEDURE — 999N000071 HC STATISTIC HEART CATH LAB OR EP LAB

## 2022-01-01 PROCEDURE — 85027 COMPLETE CBC AUTOMATED: CPT | Performed by: INTERNAL MEDICINE

## 2022-01-01 PROCEDURE — 36415 COLL VENOUS BLD VENIPUNCTURE: CPT | Performed by: INTERNAL MEDICINE

## 2022-01-01 PROCEDURE — 250N000011 HC RX IP 250 OP 636: Performed by: INTERNAL MEDICINE

## 2022-01-01 PROCEDURE — C1785 PMKR, DUAL, RATE-RESP: HCPCS | Performed by: INTERNAL MEDICINE

## 2022-01-01 PROCEDURE — 33228 REMV&REPLC PM GEN DUAL LEAD: CPT | Performed by: INTERNAL MEDICINE

## 2022-01-01 PROCEDURE — 93294 REM INTERROG EVL PM/LDLS PM: CPT | Performed by: INTERNAL MEDICINE

## 2022-01-01 PROCEDURE — 93296 REM INTERROG EVL PM/IDS: CPT | Performed by: INTERNAL MEDICINE

## 2022-01-01 PROCEDURE — 99152 MOD SED SAME PHYS/QHP 5/>YRS: CPT | Performed by: INTERNAL MEDICINE

## 2022-01-01 PROCEDURE — 272N000001 HC OR GENERAL SUPPLY STERILE: Performed by: INTERNAL MEDICINE

## 2022-01-01 PROCEDURE — 258N000001 HC RX 258: Performed by: INTERNAL MEDICINE

## 2022-01-01 PROCEDURE — 80048 BASIC METABOLIC PNL TOTAL CA: CPT | Performed by: INTERNAL MEDICINE

## 2022-01-01 PROCEDURE — 93010 ELECTROCARDIOGRAM REPORT: CPT | Performed by: INTERNAL MEDICINE

## 2022-01-01 PROCEDURE — 93005 ELECTROCARDIOGRAM TRACING: CPT

## 2022-01-01 PROCEDURE — 93306 TTE W/DOPPLER COMPLETE: CPT

## 2022-01-01 DEVICE — PCMKR CARD ACCOLADE MRI EL DR: Type: IMPLANTABLE DEVICE | Status: FUNCTIONAL

## 2022-01-01 RX ORDER — NALOXONE HYDROCHLORIDE 0.4 MG/ML
0.4 INJECTION, SOLUTION INTRAMUSCULAR; INTRAVENOUS; SUBCUTANEOUS
Status: DISCONTINUED | OUTPATIENT
Start: 2022-01-01 | End: 2022-01-01 | Stop reason: HOSPADM

## 2022-01-01 RX ORDER — HYDROCHLOROTHIAZIDE 25 MG/1
25 TABLET ORAL DAILY
Qty: 90 TABLET | Refills: 3 | Status: SHIPPED | OUTPATIENT
Start: 2022-01-01

## 2022-01-01 RX ORDER — CEFAZOLIN SODIUM 2 G/100ML
2 INJECTION, SOLUTION INTRAVENOUS
Status: CANCELLED | OUTPATIENT
Start: 2022-01-01

## 2022-01-01 RX ORDER — CEFAZOLIN SODIUM 1 G/3ML
1 INJECTION, POWDER, FOR SOLUTION INTRAMUSCULAR; INTRAVENOUS
Status: DISCONTINUED | OUTPATIENT
Start: 2022-01-01 | End: 2022-01-01 | Stop reason: HOSPADM

## 2022-01-01 RX ORDER — FENTANYL CITRATE 50 UG/ML
INJECTION, SOLUTION INTRAMUSCULAR; INTRAVENOUS
Status: DISCONTINUED | OUTPATIENT
Start: 2022-01-01 | End: 2022-01-01 | Stop reason: HOSPADM

## 2022-01-01 RX ORDER — SODIUM CHLORIDE 450 MG/100ML
INJECTION, SOLUTION INTRAVENOUS CONTINUOUS
Status: CANCELLED | OUTPATIENT
Start: 2022-01-01

## 2022-01-01 RX ORDER — CEFAZOLIN SODIUM 2 G/100ML
2 INJECTION, SOLUTION INTRAVENOUS
Status: COMPLETED | OUTPATIENT
Start: 2022-01-01 | End: 2022-01-01

## 2022-01-01 RX ORDER — TRAZODONE HYDROCHLORIDE 50 MG/1
50 TABLET, FILM COATED ORAL AT BEDTIME
COMMUNITY
Start: 2022-01-01

## 2022-01-01 RX ORDER — ACETAMINOPHEN 325 MG/1
650 TABLET ORAL EVERY 4 HOURS PRN
Status: DISCONTINUED | OUTPATIENT
Start: 2022-01-01 | End: 2022-01-01 | Stop reason: HOSPADM

## 2022-01-01 RX ORDER — LIDOCAINE 40 MG/G
CREAM TOPICAL
Status: DISCONTINUED | OUTPATIENT
Start: 2022-01-01 | End: 2022-01-01 | Stop reason: HOSPADM

## 2022-01-01 RX ORDER — NALOXONE HYDROCHLORIDE 0.4 MG/ML
0.2 INJECTION, SOLUTION INTRAMUSCULAR; INTRAVENOUS; SUBCUTANEOUS
Status: DISCONTINUED | OUTPATIENT
Start: 2022-01-01 | End: 2022-01-01 | Stop reason: HOSPADM

## 2022-01-01 RX ORDER — LIDOCAINE 40 MG/G
CREAM TOPICAL
Status: CANCELLED | OUTPATIENT
Start: 2022-01-01

## 2022-01-01 RX ORDER — AMIODARONE HYDROCHLORIDE 200 MG/1
200 TABLET ORAL DAILY
Qty: 90 TABLET | Refills: 3 | Status: SHIPPED | OUTPATIENT
Start: 2022-01-01

## 2022-01-01 RX ORDER — SODIUM CHLORIDE 450 MG/100ML
INJECTION, SOLUTION INTRAVENOUS CONTINUOUS
Status: DISCONTINUED | OUTPATIENT
Start: 2022-01-01 | End: 2022-01-01 | Stop reason: HOSPADM

## 2022-01-01 RX ORDER — BUPIVACAINE HYDROCHLORIDE 2.5 MG/ML
INJECTION, SOLUTION EPIDURAL; INFILTRATION; INTRACAUDAL
Status: DISCONTINUED | OUTPATIENT
Start: 2022-01-01 | End: 2022-01-01 | Stop reason: HOSPADM

## 2022-01-01 RX ORDER — MIDAZOLAM HCL IN 0.9 % NACL/PF 1 MG/ML
.5-6 PLASTIC BAG, INJECTION (ML) INTRAVENOUS CONTINUOUS PRN
Status: DISCONTINUED | OUTPATIENT
Start: 2022-01-01 | End: 2022-01-01 | Stop reason: HOSPADM

## 2022-01-01 RX ADMIN — SODIUM CHLORIDE 250 ML: 4.5 INJECTION, SOLUTION INTRAVENOUS at 12:45

## 2022-01-01 RX ADMIN — CEFAZOLIN SODIUM 2 G: 2 INJECTION, SOLUTION INTRAVENOUS at 10:05

## 2022-01-01 RX ADMIN — SODIUM CHLORIDE: 4.5 INJECTION, SOLUTION INTRAVENOUS at 09:15

## 2022-01-11 ENCOUNTER — ANCILLARY PROCEDURE (OUTPATIENT)
Dept: CARDIOLOGY | Facility: CLINIC | Age: 87
End: 2022-01-11
Attending: INTERNAL MEDICINE
Payer: COMMERCIAL

## 2022-01-11 DIAGNOSIS — Z95.0 CARDIAC PACEMAKER IN SITU: ICD-10-CM

## 2022-01-11 DIAGNOSIS — I44.2 ATRIOVENTRICULAR BLOCK, COMPLETE (H): ICD-10-CM

## 2022-01-11 PROCEDURE — 93296 REM INTERROG EVL PM/IDS: CPT | Performed by: INTERNAL MEDICINE

## 2022-01-11 PROCEDURE — 93294 REM INTERROG EVL PM/LDLS PM: CPT | Performed by: INTERNAL MEDICINE

## 2022-01-20 LAB
MDC_IDC_EPISODE_DTM: NORMAL
MDC_IDC_EPISODE_DURATION: 8 S
MDC_IDC_EPISODE_ID: NORMAL
MDC_IDC_EPISODE_TYPE: NORMAL
MDC_IDC_LEAD_IMPLANT_DT: NORMAL
MDC_IDC_LEAD_IMPLANT_DT: NORMAL
MDC_IDC_LEAD_LOCATION: NORMAL
MDC_IDC_LEAD_LOCATION: NORMAL
MDC_IDC_LEAD_MFG: NORMAL
MDC_IDC_LEAD_MFG: NORMAL
MDC_IDC_LEAD_MODEL: NORMAL
MDC_IDC_LEAD_MODEL: NORMAL
MDC_IDC_LEAD_POLARITY_TYPE: NORMAL
MDC_IDC_LEAD_POLARITY_TYPE: NORMAL
MDC_IDC_LEAD_SERIAL: NORMAL
MDC_IDC_LEAD_SERIAL: NORMAL
MDC_IDC_MSMT_BATTERY_DTM: NORMAL
MDC_IDC_MSMT_BATTERY_REMAINING_LONGEVITY: 3 MO
MDC_IDC_MSMT_BATTERY_REMAINING_PERCENTAGE: 4 %
MDC_IDC_MSMT_BATTERY_STATUS: NORMAL
MDC_IDC_MSMT_LEADCHNL_RA_IMPEDANCE_VALUE: 532 OHM
MDC_IDC_MSMT_LEADCHNL_RA_PACING_THRESHOLD_AMPLITUDE: 1.2 V
MDC_IDC_MSMT_LEADCHNL_RA_PACING_THRESHOLD_PULSEWIDTH: 0.5 MS
MDC_IDC_MSMT_LEADCHNL_RV_IMPEDANCE_VALUE: 484 OHM
MDC_IDC_MSMT_LEADCHNL_RV_PACING_THRESHOLD_AMPLITUDE: 1.5 V
MDC_IDC_MSMT_LEADCHNL_RV_PACING_THRESHOLD_PULSEWIDTH: 0.4 MS
MDC_IDC_PG_IMPLANT_DTM: NORMAL
MDC_IDC_PG_MFG: NORMAL
MDC_IDC_PG_MODEL: NORMAL
MDC_IDC_PG_SERIAL: NORMAL
MDC_IDC_PG_TYPE: NORMAL
MDC_IDC_SESS_CLINIC_NAME: NORMAL
MDC_IDC_SESS_DTM: NORMAL
MDC_IDC_SESS_TYPE: NORMAL
MDC_IDC_SET_BRADY_AT_MODE_SWITCH_MODE: NORMAL
MDC_IDC_SET_BRADY_AT_MODE_SWITCH_RATE: 170 {BEATS}/MIN
MDC_IDC_SET_BRADY_LOWRATE: 60 {BEATS}/MIN
MDC_IDC_SET_BRADY_MAX_SENSOR_RATE: 130 {BEATS}/MIN
MDC_IDC_SET_BRADY_MAX_TRACKING_RATE: 130 {BEATS}/MIN
MDC_IDC_SET_BRADY_MODE: NORMAL
MDC_IDC_SET_BRADY_PAV_DELAY_HIGH: 150 MS
MDC_IDC_SET_BRADY_PAV_DELAY_LOW: 200 MS
MDC_IDC_SET_BRADY_SAV_DELAY_HIGH: 150 MS
MDC_IDC_SET_BRADY_SAV_DELAY_LOW: 200 MS
MDC_IDC_SET_LEADCHNL_RA_PACING_AMPLITUDE: 2.4 V
MDC_IDC_SET_LEADCHNL_RA_PACING_POLARITY: NORMAL
MDC_IDC_SET_LEADCHNL_RA_PACING_PULSEWIDTH: 0.5 MS
MDC_IDC_SET_LEADCHNL_RA_SENSING_ADAPTATION_MODE: NORMAL
MDC_IDC_SET_LEADCHNL_RA_SENSING_POLARITY: NORMAL
MDC_IDC_SET_LEADCHNL_RA_SENSING_SENSITIVITY: 0.25 MV
MDC_IDC_SET_LEADCHNL_RV_PACING_AMPLITUDE: 2.1 V
MDC_IDC_SET_LEADCHNL_RV_PACING_CAPTURE_MODE: NORMAL
MDC_IDC_SET_LEADCHNL_RV_PACING_POLARITY: NORMAL
MDC_IDC_SET_LEADCHNL_RV_PACING_PULSEWIDTH: 0.4 MS
MDC_IDC_SET_LEADCHNL_RV_SENSING_ADAPTATION_MODE: NORMAL
MDC_IDC_SET_LEADCHNL_RV_SENSING_POLARITY: NORMAL
MDC_IDC_SET_LEADCHNL_RV_SENSING_SENSITIVITY: 1.5 MV
MDC_IDC_SET_ZONE_DETECTION_INTERVAL: 375 MS
MDC_IDC_SET_ZONE_TYPE: NORMAL
MDC_IDC_SET_ZONE_VENDOR_TYPE: NORMAL
MDC_IDC_STAT_AT_BURDEN_PERCENT: 0 %
MDC_IDC_STAT_AT_DTM_END: NORMAL
MDC_IDC_STAT_AT_DTM_START: NORMAL
MDC_IDC_STAT_BRADY_DTM_END: NORMAL
MDC_IDC_STAT_BRADY_DTM_START: NORMAL
MDC_IDC_STAT_BRADY_RA_PERCENT_PACED: 51 %
MDC_IDC_STAT_BRADY_RV_PERCENT_PACED: 95 %
MDC_IDC_STAT_EPISODE_RECENT_COUNT: 0
MDC_IDC_STAT_EPISODE_RECENT_COUNT: 1
MDC_IDC_STAT_EPISODE_RECENT_COUNT_DTM_END: NORMAL
MDC_IDC_STAT_EPISODE_RECENT_COUNT_DTM_START: NORMAL
MDC_IDC_STAT_EPISODE_TYPE: NORMAL
MDC_IDC_STAT_EPISODE_VENDOR_TYPE: NORMAL

## 2022-05-04 NOTE — TELEPHONE ENCOUNTER
Alert received from patients [employer] dual chamber Pacemaker that it reached REBEKA on 5/3/22 at 1506.    Patient had pacemaker implanted on 7/7/2014 for complete heart block.     Most recent device check shows patient AP-43% and -94%.    Last in clinic device check was performed on 10/12/21 and patient had an underlying rhythm of SR 80's with CHB and no junctional escape at VVI 30.     Lead info:        Lead measurements are stable:           Patient has had some NSVT episodes in the past but has been asymptomatic with episodes. Most recent EF 55-60% (5/2018)    On last Remote check 4/12/22 patient had episode of Afib/AFl logged with the longest episode lasting [id number]. Episodes occurred on 3/16/22. Total AT/AF burden is <1%.         Left voicemail with patient's son Cheo and asked for return call to device clinic. Orders placed for pacemaker generator change out. Will message scheduling after speaking with patient's son.       Patient's son did return call to device clinic and requested that I contact the nursing home where patient resides to set up appointments.     Called In[facility] at [facility] and spoke with Jessee and updated on need for pacemaker generator change and H&P. Jessee in agreement with plan and will schedule appointments with scheduling when they call.     Message sent to scheduling.

## 2022-05-24 NOTE — PROGRESS NOTES
Called patient's living facility ([facility]) with pre-procedure instructions for device implant:     Anticoagulation: None   Oral diabetes meds: None  Insulin: None  Diuretic: Hold hydrochlorothiazide the morning of procedure   Contrast allergy: None  Pt informed to be NPO at midnight    Instructed pt to shower the morning of the procedure, and then put on a clean shirt in order to help prevent infection.     Pt has post-procedure transportation and 24 hours monitoring set up. Pt reminded to call before coming in if their plans change.  With limited bed availability due to COVID, overnight hospital stays will be allowed for clinical reasons only.    Pt aware of no driving for 24 hours post procedure due to sedation.     COVID test scheduled: Done at patient's care facility. RN at his facility states that this was collected today and sent to the lab.     Reminded to self-quarantine from the time of the COVID test to the procedure.    Asked pt to take temperature the morning of the procedure and call [facility] at [phone] if it is above 100.0    Aware of arrival time (8:30am) and location. Pt verbalized understanding of instructions.

## 2022-05-26 NOTE — PROGRESS NOTES
Service Date: 05/26/2022    HISTORY OF PRESENT ILLNESS:  I saw Mr. Joseph for history and physical examination before pacemaker replacement.  He is an 86-year-old [place] who came to the clinic in a wheelchair.  The history is obtained through the phone .  He had a [employer] dual-chamber pacemaker implanted for syncope and complete AV block in 2014.  He is currently in complete AV block and requires almost persistent ventricular pacing.  The pacemaker has reached REBEKA for battery status.  The lead parameters are normal.  The pacemaker detected some nonsustained atrial fibrillation up to about 20 minutes.  He had no apparent symptoms during atrial fibrillation.    At the present time, he has some fatigue and shortness of breath but no chest pain, palpitation or dizziness.    PAST MEDICAL HISTORY:  Remarkable for coronary artery disease, abdominal aortic aneurysm, COPD, depression and hypertension.    PHYSICAL EXAMINATION:  VITAL SIGNS:  Blood pressure was 149/88, heart rate 85 beats per minute, body weight 144 pounds.  HEENT:  The eyes and ENT were unremarkable.  LUNGS:  Clear.  CARDIAC:  The pacemaker was on the left side with healed incision.  The cardiac rhythm was regular.  Heart sounds were normal with no murmur.  ABDOMEN:  No obesity.  EXTREMITIES:  There was no pedal edema.    ASSESSMENT AND RECOMMENDATIONS:  Mr. Joseph is an 86-year-old white male with a dual-chamber pacemaker that was implanted for complete AV block.  His pacemaker battery has reached REBEKA, and the patient remains in complete AV block.  I therefore agree for pacemaker replacement.  The risks and benefits of pacemaker replacement were explained to the patient, who expressed understanding and consented for the procedure.    Dena Diallo MD    cc:  Paulino Lynch MD  [address] [place]  [address]    Dena Diallo MD        D: 05/26/2022   T: 05/26/2022   MT: leon    Name:     CARRILLO  LATOSHA SY  MRN:      [id number]        Account:      [id number]   :      1935           Service Date: 2022       Document: [id number]

## 2022-05-26 NOTE — TELEPHONE ENCOUNTER
Called patient's care facility and asked that the results of his COVID test be faxed to us today prior to procedure tomorrow. Fax number provided.

## 2022-05-26 NOTE — LETTER
5/26/2022    Paulino Lynch  [id number]    RE: Nacho Joseph       Dear Colleague,     I had the pleasure of seeing Nacho Joseph in the [facility].  HPI and Plan:   See dictation      No orders of the defined types were placed in this encounter.      No orders of the defined types were placed in this encounter.      There are no discontinued medications.      Encounter Diagnoses   Name Primary?     Atrioventricular block, complete (H)      Cardiac pacemaker in situ        CURRENT MEDICATIONS:  Current Outpatient Medications   Medication Sig Dispense Refill     Acetaminophen (TYLENOL PO) Take 1,000 mg by mouth 3 times daily       acetaminophen (TYLENOL) 500 MG tablet Take 1,000 mg by mouth       aspirin 81 MG tablet Take 1 tablet (81 mg) by mouth daily       atorvastatin (LIPITOR) 40 MG tablet Take 1 tablet (40 mg) by mouth daily       doxycycline hyclate (VIBRA-TABS) 100 MG tablet        DOXYCYCLINE HYCLATE PO Take 100 mg by mouth 2 times daily       Fexofenadine HCl (MUCINEX ALLERGY PO) Take 800 mg by mouth 2 times daily (2 x 400 mg = 800 mg dose)       guaiFENesin 400 MG TABS Take 800 mg by mouth       guaiFENesin-dextromethorphan (ROBITUSSIN DM) 100-10 MG/5ML syrup Take 5 mLs by mouth every 4 hours as needed for cough       hydrochlorothiazide (MICROZIDE) 12.5 MG capsule Take 12.5 mg by mouth every other day Hold if bp <110       HYDROmorphone (DILAUDID) 2 MG tablet Take 1-2 mg by mouth       isosorbide mononitrate (IMDUR) 60 MG 24 hr tablet Take 1 tablet (60 mg) by mouth daily 90 tablet 3     magnesium hydroxide (MILK OF MAGNESIA) 400 MG/5ML suspension Take 30 mLs by mouth daily as needed for constipation or heartburn       metoprolol succinate (TOPROL-XL) 50 MG 24 hr tablet Take 1 tablet (50 mg) by mouth two times daily 180 tablet 3     Multiple Vitamins-Minerals (PRESERVISION AREDS 2) CAPS Use per 's recommendations. 60 capsule 11     nitroglycerin (NITROSTAT) 0.4  MG SL tablet Place 1 tablet (0.4 mg) under the tongue every 5 minutes as needed for chest pain 25 tablet 4     Nutritional Supplements (HI-ARELIS) LIQD Take by mouth 3 times daily       potassium citrate (UROCIT-K) 10 MEQ (1080 MG) CR tablet        sodium chloride (OCEAN) 0.65 % nasal spray Spray 1 spray into both nostrils daily as needed for congestion         ALLERGIES   No Known Allergies    PAST MEDICAL HISTORY:  Past Medical History:   Diagnosis Date     AAA (abdominal aortic aneurysm) (H) 07/08/2014    4.1 cm     Atrioventricular block, complete (HEART) 07/08/2014    S/p PPM 7/2014     CAD 02/09/2015    moderate stenosis per angiography     Carotid stenosis      COPD (chronic obstructive pulmonary disease) (H)      Depression 07/21/2014     Hypertension      Lung abscess (H)      Syncope        PAST SURGICAL HISTORY:  Past Surgical History:   Procedure Laterality Date     COMBINED CYSTOSCOPY, INSERT STENT URETER(S) Right 4/20/2018    Procedure: COMBINED CYSTOSCOPY, INSERT STENT URETER(S);  CYSTOSCOPY,RIGHT URETERAL STENT PLACEMENT, RIGHT RETROGRADES(LANGUAGE:Tristanian);  Surgeon: Gurvinder Sanz MD;  Location:  OR     CORONARY ANGIOGRAPHY ADULT ORDER  2/20/15    medical managed     CYSTOSCOPY, REMOVE STENT(S), COMBINED Right 5/18/2018    Procedure: COMBINED CYSTOSCOPY, REMOVE STENT(S);;  Surgeon: Gurvinder Sanz MD;  Location:  OR     CYSTOSCOPY, RETROGRADES, COMBINED  4/20/2018    Procedure: COMBINED CYSTOSCOPY, RETROGRADES;;  Surgeon: Gurvinder Sanz MD;  Location:  OR     CYSTOSCOPY, URETEROSCOPY, INSERT STENT Right 5/18/2018    Procedure: CYSTOSCOPY, URETEROSCOPY, INSERT STENT;  CYSTOSCOPY, RIGHT URETEROSCOPY, STONE REMOVAL AND SRENT REMOVAL (LANGUAGE: Tristanian);  Surgeon: Gurvinder Sanz MD;  Location:  OR     IMPLANT PACEMAKER  7/7/14    dual chamber       FAMILY HISTORY:  Family History   Problem Relation Age of Onset     Other - See Comments Mother         old age     Heart  "Disease Father         unknown       SOCIAL HISTORY:  Social History     Socioeconomic History     Marital status:      Spouse name: None     Number of children: None     Years of education: None     Highest education level: None   Tobacco Use     Smoking status: Former Smoker     Start date: 1/10/1949     Quit date: 2012     Years since quittin.8     Smokeless tobacco: Never Used   Substance and Sexual Activity     Alcohol use: No     Drug use: No   Other Topics Concern     Caffeine Concern No     Comment: 2 cups of green tea      Special Diet No     Exercise Yes     Comment: walking     Seat Belt Yes       Review of Systems:  Skin:          Eyes:         ENT:         Respiratory:          Cardiovascular:         Gastroenterology:        Genitourinary:         Musculoskeletal:         Neurologic:         Psychiatric:         Heme/Lymph/Imm:         Endocrine:           Physical Exam:  Vitals: BP (!) 149/88 (BP Location: Left arm, Cuff Size: Adult Large)   Pulse 85   Ht 1.854 m (6' 1\")   Wt 65.3 kg (144 lb)   BMI 19.00 kg/m      Constitutional:  cooperative, alert and oriented, well developed, well nourished, in no acute distress thin sitting in a wheelchair    Skin:  warm and dry to the touch   pacemaker incision in the left infraclavicular area was well-healed  (actinic keratoses)    Head:  normocephalic        Eyes:  pupils equal and round        Lymph:      ENT:  no pallor or cyanosis        Neck:  JVP normal right carotid bruit      Respiratory:  normal symmetry prolonged expiration       Cardiac: regular rhythm tachycardic distant heart sounds   LLSB;systolic murmur;grade 1 grade 2;RUSB;systolic ejection murmur   distant heart sounds  pulses full and equal         right subclavian artery                     right femoral bruit (+) left femoral bruit (+) right subclavian bruit; right groin site clean without hum or bruit    GI:  BS normoactive   benign    Extremities and Muscular Skeletal:  " no deformities, clubbing, cyanosis, erythema observed              Neurological:  no gross motor deficits;affect appropriate        Psych:  Alert and Oriented x 3        CC  Dena Diallo MD  [address]  [address]  [place] [address]                  Thank you for allowing me to participate in the care of your patient.      Sincerely,     Dena Diallo MD     [facility]  cc:   Dena Diallo MD  [address]  [address]  [place] [address]

## 2022-05-26 NOTE — PROGRESS NOTES
HPI and Plan:   See dictation      No orders of the defined types were placed in this encounter.      No orders of the defined types were placed in this encounter.      There are no discontinued medications.      Encounter Diagnoses   Name Primary?     Atrioventricular block, complete (H)      Cardiac pacemaker in situ        CURRENT MEDICATIONS:  Current Outpatient Medications   Medication Sig Dispense Refill     Acetaminophen (TYLENOL PO) Take 1,000 mg by mouth 3 times daily       acetaminophen (TYLENOL) 500 MG tablet Take 1,000 mg by mouth       aspirin 81 MG tablet Take 1 tablet (81 mg) by mouth daily       atorvastatin (LIPITOR) 40 MG tablet Take 1 tablet (40 mg) by mouth daily       doxycycline hyclate (VIBRA-TABS) 100 MG tablet        DOXYCYCLINE HYCLATE PO Take 100 mg by mouth 2 times daily       Fexofenadine HCl (MUCINEX ALLERGY PO) Take 800 mg by mouth 2 times daily (2 x 400 mg = 800 mg dose)       guaiFENesin 400 MG TABS Take 800 mg by mouth       guaiFENesin-dextromethorphan (ROBITUSSIN DM) 100-10 MG/5ML syrup Take 5 mLs by mouth every 4 hours as needed for cough       hydrochlorothiazide (MICROZIDE) 12.5 MG capsule Take 12.5 mg by mouth every other day Hold if bp <110       HYDROmorphone (DILAUDID) 2 MG tablet Take 1-2 mg by mouth       isosorbide mononitrate (IMDUR) 60 MG 24 hr tablet Take 1 tablet (60 mg) by mouth daily 90 tablet 3     magnesium hydroxide (MILK OF MAGNESIA) 400 MG/5ML suspension Take 30 mLs by mouth daily as needed for constipation or heartburn       metoprolol succinate (TOPROL-XL) 50 MG 24 hr tablet Take 1 tablet (50 mg) by mouth two times daily 180 tablet 3     Multiple Vitamins-Minerals (PRESERVISION AREDS 2) CAPS Use per 's recommendations. 60 capsule 11     nitroglycerin (NITROSTAT) 0.4 MG SL tablet Place 1 tablet (0.4 mg) under the tongue every 5 minutes as needed for chest pain 25 tablet 4     Nutritional Supplements (HI-ARELIS) LIQD Take by mouth 3 times daily        potassium citrate (UROCIT-K) 10 MEQ (1080 MG) CR tablet        sodium chloride (OCEAN) 0.65 % nasal spray Spray 1 spray into both nostrils daily as needed for congestion         ALLERGIES   No Known Allergies    PAST MEDICAL HISTORY:  Past Medical History:   Diagnosis Date     AAA (abdominal aortic aneurysm) (H) 07/08/2014    4.1 cm     Atrioventricular block, complete (HEART) 07/08/2014    S/p PPM 7/2014     CAD 02/09/2015    moderate stenosis per angiography     Carotid stenosis      COPD (chronic obstructive pulmonary disease) (H)      Depression 07/21/2014     Hypertension      Lung abscess (H)      Syncope        PAST SURGICAL HISTORY:  Past Surgical History:   Procedure Laterality Date     COMBINED CYSTOSCOPY, INSERT STENT URETER(S) Right 4/20/2018    Procedure: COMBINED CYSTOSCOPY, INSERT STENT URETER(S);  CYSTOSCOPY,RIGHT URETERAL STENT PLACEMENT, RIGHT RETROGRADES(LANGUAGE:Martiniquais);  Surgeon: Gurvinder Sanz MD;  Location:  OR     CORONARY ANGIOGRAPHY ADULT ORDER  2/20/15    medical managed     CYSTOSCOPY, REMOVE STENT(S), COMBINED Right 5/18/2018    Procedure: COMBINED CYSTOSCOPY, REMOVE STENT(S);;  Surgeon: Gurvinder Sanz MD;  Location:  OR     CYSTOSCOPY, RETROGRADES, COMBINED  4/20/2018    Procedure: COMBINED CYSTOSCOPY, RETROGRADES;;  Surgeon: Gurvinder Sanz MD;  Location:  OR     CYSTOSCOPY, URETEROSCOPY, INSERT STENT Right 5/18/2018    Procedure: CYSTOSCOPY, URETEROSCOPY, INSERT STENT;  CYSTOSCOPY, RIGHT URETEROSCOPY, STONE REMOVAL AND SRENT REMOVAL (LANGUAGE: Martiniquais);  Surgeon: Gurvinder Sanz MD;  Location:  OR     IMPLANT PACEMAKER  7/7/14    dual chamber       FAMILY HISTORY:  Family History   Problem Relation Age of Onset     Other - See Comments Mother         old age     Heart Disease Father         unknown       SOCIAL HISTORY:  Social History     Socioeconomic History     Marital status:      Spouse name: None     Number of children: None     Years  "of education: None     Highest education level: None   Tobacco Use     Smoking status: Former Smoker     Start date: 1/10/1949     Quit date: 2012     Years since quittin.8     Smokeless tobacco: Never Used   Substance and Sexual Activity     Alcohol use: No     Drug use: No   Other Topics Concern     Caffeine Concern No     Comment: 2 cups of green tea      Special Diet No     Exercise Yes     Comment: walking     Seat Belt Yes       Review of Systems:  Skin:          Eyes:         ENT:         Respiratory:          Cardiovascular:         Gastroenterology:        Genitourinary:         Musculoskeletal:         Neurologic:         Psychiatric:         Heme/Lymph/Imm:         Endocrine:           Physical Exam:  Vitals: BP (!) 149/88 (BP Location: Left arm, Cuff Size: Adult Large)   Pulse 85   Ht 1.854 m (6' 1\")   Wt 65.3 kg (144 lb)   BMI 19.00 kg/m      Constitutional:  cooperative, alert and oriented, well developed, well nourished, in no acute distress thin sitting in a wheelchair    Skin:  warm and dry to the touch   pacemaker incision in the left infraclavicular area was well-healed  (actinic keratoses)    Head:  normocephalic        Eyes:  pupils equal and round        Lymph:      ENT:  no pallor or cyanosis        Neck:  JVP normal right carotid bruit      Respiratory:  normal symmetry prolonged expiration       Cardiac: regular rhythm tachycardic distant heart sounds   LLSB;systolic murmur;grade 1 grade 2;RUSB;systolic ejection murmur   distant heart sounds  pulses full and equal         right subclavian artery                     right femoral bruit (+) left femoral bruit (+) right subclavian bruit; right groin site clean without hum or bruit    GI:  BS normoactive   benign    Extremities and Muscular Skeletal:  no deformities, clubbing, cyanosis, erythema observed              Neurological:  no gross motor deficits;affect appropriate        Psych:  Alert and Oriented x 3        CC  Dena Diallo, " MD  [address]  [address]  [place] [address]

## 2022-05-26 NOTE — TELEPHONE ENCOUNTER
Called patient's care facility again to follow up on his COVID test results. His nurse stated that this test has not yet resulted. We discussed that we need to have this test result prior to procedure tomorrow morning. She states that she thinks this will result tonight. His arrival time is 8:30am tomorrow. Will follow up first thing in the morning to make sure that this result has been received.

## 2022-05-26 NOTE — LETTER
Date:May 26, 2022      Provider requested that no letter be sent. Do not send.       [facility]

## 2022-05-27 NOTE — PROGRESS NOTES
"Page to Dr. Dailey: \"BPs 80s/50s-60s, pt asymptomatic. Procedure site WDL. Procedural IVF infusing at 75 mL/hr.\"    TORB from Dr. Dailey for 250 mL bolus of NS over 30 minutes, if no improvement in symptoms or pt stands up and becomes symptomatic, give another 250 mL bolus of NS over 30 minutes.  "

## 2022-05-27 NOTE — PRE-PROCEDURE
GENERAL PRE-PROCEDURE:   Procedure:  PM replacement  Date/Time:  5/27/2022 10:25 AM    Written consent obtained?: Yes    Risks and benefits: Risks, benefits and alternatives were discussed    Consent given by:  Patient  Patient states understanding of procedure being performed: Yes    Patient's understanding of procedure matches consent: Yes    Procedure consent matches procedure scheduled: Yes    Expected level of sedation:  Moderate  Appropriately NPO:  Yes  ASA Class:  2  Mallampati  :  Grade 2- soft palate, base of uvula, tonsillar pillars, and portion of posterior pharyngeal wall visible  Lungs:  Lungs clear with good breath sounds bilaterally  Heart:  Normal heart sounds and rate and other (comment)  Heart exam comment:  Paced  History & Physical reviewed:  History and physical reviewed and no updates needed  Statement of review:  I have reviewed the lab findings, diagnostic data, medications, and the plan for sedation

## 2022-05-27 NOTE — PROGRESS NOTES
Care Suites Discharge Nursing Note    Patient Information  Name: Nacho Joseph  Age: 86 year old    Discharge Education:  Discharge instructions reviewed: Yes - reviewed thoroughly with surekha  at bedside  Additional education/resources provided: device card and all supplies provided by [employer] rep  Patient/patient representative verbalizes understanding: Yes  Patient discharging on new medications: No  Medication education completed: N/A    Discharge Plans:   Discharge location: back to assisted living facility  Discharge ride contacted: Yes  Approximate discharge time: 1330    Discharge Criteria:  Discharge criteria met and vital signs stable: Yes    Patient Belongs:  Patient belongings returned to patient: Yes    BP improvement with 250 ml NS bolus per MAR. Pt remains asymptomatic and has stood with a pivot to his wheelchair without issues which is his baseline. Pt has voided and eaten without issue. Generator change dressing CDI, CMS intact. PIV removed. Russian  at bedside and interpreting for all cares/education. No pain or complaints at time of discharge. Discharged via wheelchair to transport vehicle which will transport him back to his HETAL.    Alcira Ruiz RN

## 2022-05-27 NOTE — PROGRESS NOTES
Dictated.    Successful replacement of dual-chamber pacemaker generator.    The pocket was expanded slightly to accommodate the larger new generator.  EBL less than 10 mL.      Plan:  -Home later today, after he recovers from sedation  -Follow-up will be arranged in the device clinic in 7 to 10 days

## 2022-05-27 NOTE — PROGRESS NOTES
Care Suites Admission Nursing Note    Patient Information  Name: Nacho Joseph  Age: 86 year old  Reason for admission: Pacemaker Generator Change  Care Suites arrival time: 9:00 am    Visitor Information  Name: Interpretor   Informed of visitor restrictions: Yes  1 visitor allowed per patient   Visitor must screen negative for COVID symptoms   Visitor must wear a mask    Patient Admission/Assessment   Pre-procedure assessment complete: Yes  If abnormal assessment/labs, provider notified: N/A  NPO: Yes  Medications held per instructions/orders: Yes  Consent: deferred  Patient oriented to room: Yes  Education/questions answered: Yes  Plan/other: Gen change at 10:30 am then return to Care Suites     Discharge Planning  Discharge name/phone number: [facility]  [phone]  Overnight post sedation caregiver: [facility] nursing staff  Discharge location: to Care Facility via [employer] [facility] [phone]

## 2022-05-27 NOTE — PROGRESS NOTES
Care Suites Post Procedure Note    Patient Information  Name: Nacho Joseph  Age: 86 year old    Post Procedure  Time patient returned to Care Suites: 11:10 am  Concerns/abnormal assessment: None  If abnormal assessment, provider notified: N/A  Plan/Other: Continue to monitor patient.  Review discharge instructions.    Discharge to [facility] when patient meets criteria.

## 2022-05-27 NOTE — DISCHARGE INSTRUCTIONS
Pacemaker Generator Change Discharge Instructions    After you go home:    Have an adult stay with you until tomorrow.  You may resume your normal diet.       For 24 hours - due to the sedation you received:  Relax and take it easy.  Do NOT make any important or legal decisions.  Do NOT drive or operate machines at home or at work.  Do NOT drink alcohol.    Care of Chest Incision:    Keep the bandage on at least 3 days. You may remove the dressing on Tuesday. Change it only if it gets loose or soaked. If you need to change it, use 4x4-inch gauze and a large clear bandage.   If there is a pressure dressing (gauze & tape) - 24 hours after your procedure you may remove ONLY the top dressing. Leave the bottom dressing on.  Leave the strips of tape on. They will fall off on their own, or we will remove them at your first check-up.  Check your wound daily for signs of infection, such as increased redness, severe swelling or draining. Fever may also be a sign of infection. Call us if you see any of these signs.  If there are no signs of infection, you may shower after the bandage comes off in 3 days. If you take a tub bath, keep the wound dry.  No soaking the incision (swimming pool, bathtub, hot tub) for 2 weeks.  You may have mild to medium pain for 3 to 5 days. Take Acetaminophen (Tylenol) or Ibuprofen (Advil) for the pain. If the pain persists or is severe, call us.    Activity:    You can begin to use your arm as it feels comfortable to you.  No driving for one day & limit to necessary driving for one week.    Bleeding:    If you start bleeding from the incision site, sit down and press firmly on the site for 10 minutes.   Once bleeding stops, call [facility] as soon as you can.       Call 911 right away if you have heavy bleeding or bleeding that does not stop.      Medicines:    Take your medications, including blood thinners, unless your provider tells you not to.  If you have stopped any other medicines,  check with your provider about when to restart them.    Follow Up Appointments:    Follow up with Device Clinic at [facility] of patient preference in 7-10 days.    Call the clinic if:    You have a large or growing hard lump around the site.  The site is red, swollen, hot or tender.  Blood or fluid is draining from the site.  You have chills or a fever greater than 101 F (38 C).  You feel dizzy or light-headed.  Questions or concerns.      Telling others about your device:    Before you leave the hospital, you will receive a temporary ID card. A permanent card will be mailed to you about 6 to 8 weeks later. Always carry the ID card with you. It has important details about your device.  You may also get a Medical Alert bracelet or tag that says you have a pacemaker or a defibrillator (ICD).  Go to www.[email or web address].   Always tell doctors, dentists and other care providers that you have a device implanted in you.  Let us know before you plan any surgeries. Your care team must take special steps to keep you safe during certain procedures. These steps will depend on the type of device you have. Your provider will need to see your ID card. They may need to call us for instructions.    Device Safety:    Please refer to device  s booklet for further information.        [facility] [place]:    [phone] [facility] (7 days a week)

## 2022-05-31 NOTE — TELEPHONE ENCOUNTER
Pt had PPM gen change on Friday last week. Called patient's nursing facility and spoke with a nurse.     Post device implant discharge phone call.    Reviewed the following:  Remove outer dressing 3 days after implant. May shower after outer dressing removed. Leave steri-strips in place, will be removed at 1 week device check  Watch for redness, drainage, warmth, or fever. Call device clinic if any signs of infection.     1 week device check scheduled: nurse will give available times to their , and the  will call us to secure an appointment time.     Patient's RN states understanding of all instructions.

## 2022-06-01 NOTE — TELEPHONE ENCOUNTER
Received callback from [place] at [facility].  1 week device check scheduled for 6/9/22.      JAY Rico

## 2022-09-16 NOTE — TELEPHONE ENCOUNTER
Patient presented to clinic for routine PPM device check.    3 AF mode switches logged, longest lasting 3h21m. EGMs confirm A>V for AF with controlled V rates in the 70s. Episodes of atrial fib were noted on previous interrogations lasting up to 20m, per Epic charting Dr Diallo was made aware. These current episodes logged are longer in duration, patient continues to deny symptoms. Will route update to Dr. Llanes for review and recommendations r/t anticoagulation.    JAY Tate

## 2022-11-01 NOTE — PROGRESS NOTES
Cardiology Progress Note          Assessment and Plan:       1. Increased paroxysmal atrial fibrillation    Add amiodarone.  Will not anticoagulate at this point secondary to gait instability.      2. Dyspnea on exertion, probably progressive coronary artery disease with known chronic totally occluded RCA and circumflex    Resting echocardiogram to assess.    If improvement in symptoms with addition of amiodarone, continue conservative management due to advanced age.  If still progressive dyspnea, may consider repeat cardiac catheterization and complex intervention for his small vessels and chronic total occlusions.  =    30 minutes was spent with the patient, precharting and reviewing tests as well as post visit charting all done today..    This note was transcribed using electronic voice recognition software and there may be typographical errors present.                    Interval History:     The patient is a very pleasant 87 year old whom I have been following for coronary artery disease with last angiogram 2018 and having chronic totally occluded right coronary artery and left circumflex.  Not a good surgical candidate due to advanced age.  He has had progressive dyspnea on exertion but no orthopnea or PND.  Denies chest discomfort.    He also has more paroxysmal atrial fibrillation on device check.                     Review of Systems:     Review of Systems:  Skin:  Negative  (on right wrist from angiogram)   Eyes:  Positive for glasses;cataracts  ENT:  Negative    Respiratory:  Positive for dyspnea on exertion  Cardiovascular:  Negative;chest pain;fatigue;lightheadedness;dizziness palpitations;Positive for  Gastroenterology: not assessed    Genitourinary:  not assessed    Musculoskeletal:  not assessed    Neurologic:  Negative headaches  Psychiatric:  Positive for sleep disturbances  Heme/Lymph/Imm:  Negative    Endocrine:  Negative thyroid disorder              Physical Exam:     Vitals: /72   Pulse  "81   Ht 1.854 m (6' 1\")   Wt 58.1 kg (128 lb)   SpO2 95%   BMI 16.89 kg/m    Constitutional:  cooperative, alert and oriented, well developed, well nourished, in no acute distress thin sitting in a wheelchair    Skin:  warm and dry to the touch    (actinic keratoses)    Head:  normocephalic        Eyes:  pupils equal and round        Chest:  normal symmetry prolonged expiration;basal rales      Cardiac: regular rhythm   distant heart sounds   LLSB;systolic murmur;grade 1     distant heart sounds    Extremities and Back:  no deformities, clubbing, cyanosis, erythema observed        Neurological:  no gross motor deficits;affect appropriate                 Medications:     Current Outpatient Medications   Medication Sig Dispense Refill     Acetaminophen (TYLENOL PO) Take 1,000 mg by mouth 3 times daily       amiodarone (PACERONE) 200 MG tablet Take 1 tablet (200 mg) by mouth daily 90 tablet 3     aspirin 81 MG tablet Take 1 tablet (81 mg) by mouth daily       atorvastatin (LIPITOR) 40 MG tablet Take 1 tablet (40 mg) by mouth daily (Patient taking differently: Take 20 mg by mouth daily)       doxycycline hyclate (VIBRA-TABS) 100 MG tablet        DOXYCYCLINE HYCLATE PO Take 100 mg by mouth 2 times daily       Fexofenadine HCl (MUCINEX ALLERGY PO) Take 800 mg by mouth 2 times daily (2 x 400 mg = 800 mg dose)       hydrochlorothiazide (HYDRODIURIL) 25 MG tablet Take 1 tablet (25 mg) by mouth daily 90 tablet 3     isosorbide mononitrate (IMDUR) 60 MG 24 hr tablet Take 1 tablet (60 mg) by mouth daily 90 tablet 3     metoprolol succinate (TOPROL-XL) 50 MG 24 hr tablet Take 1 tablet (50 mg) by mouth two times daily 180 tablet 3     nitroglycerin (NITROSTAT) 0.4 MG SL tablet Place 1 tablet (0.4 mg) under the tongue every 5 minutes as needed for chest pain 25 tablet 4     traZODone (DESYREL) 50 MG tablet 50 mg At Bedtime       acetaminophen (TYLENOL) 500 MG tablet Take 1,000 mg by mouth (Patient not taking: Reported on " 11/1/2022)       guaiFENesin 400 MG TABS Take 800 mg by mouth (Patient not taking: Reported on 11/1/2022)       guaiFENesin-dextromethorphan (ROBITUSSIN DM) 100-10 MG/5ML syrup Take 5 mLs by mouth every 4 hours as needed for cough (Patient not taking: Reported on 11/1/2022)       HYDROmorphone (DILAUDID) 2 MG tablet Take 1-2 mg by mouth (Patient not taking: Reported on 11/1/2022)       magnesium hydroxide (MILK OF MAGNESIA) 400 MG/5ML suspension Take 30 mLs by mouth daily as needed for constipation or heartburn (Patient not taking: Reported on 11/1/2022)       Multiple Vitamins-Minerals (PRESERVISION AREDS 2) CAPS Use per 's recommendations. (Patient not taking: Reported on 11/1/2022) 60 capsule 11     Nutritional Supplements (HI-ARELIS) LIQD Take by mouth 3 times daily (Patient not taking: Reported on 11/1/2022)       potassium citrate (UROCIT-K) 10 MEQ (1080 MG) CR tablet  (Patient not taking: Reported on 11/1/2022)       sodium chloride (OCEAN) 0.65 % nasal spray Spray 1 spray into both nostrils daily as needed for congestion (Patient not taking: Reported on 11/1/2022)                  Data:   All laboratory data reviewed  No results found for this or any previous visit (from the past 24 hour(s)).    All laboratory data reviewed  Lab Results   Component Value Date    CHOL 120 08/09/2021     Lab Results   Component Value Date    HDL 38 08/09/2021     Lab Results   Component Value Date    LDL 48 08/09/2021     Lab Results   Component Value Date    TRIG 170 08/09/2021     No results found for: CHOLHDLRATIO  TSH   Date Value Ref Range Status   08/09/2021 2.02 0.30 - 5.00 uIU/mL Final   07/07/2014 2.07 0.4 - 5.0 mU/L Final     Last Basic Metabolic Panel:  Lab Results   Component Value Date     05/27/2022     05/17/2018      Lab Results   Component Value Date    POTASSIUM 4.1 05/27/2022    POTASSIUM 3.7 05/18/2018     Lab Results   Component Value Date    CHLORIDE 109 05/27/2022    CHLORIDE 106  05/17/2018     Lab Results   Component Value Date    ARELIS 9.3 05/27/2022    ARELIS 9.7 05/17/2018     Lab Results   Component Value Date    CO2 29 05/27/2022    CO2 24 05/17/2018     Lab Results   Component Value Date    BUN 30 05/27/2022    BUN 23 05/17/2018     Lab Results   Component Value Date    CR 0.91 05/27/2022    CR 1.01 05/17/2018     Lab Results   Component Value Date     05/27/2022     05/17/2018     Lab Results   Component Value Date    WBC 7.2 05/27/2022    WBC 9.2 05/02/2018     Lab Results   Component Value Date    RBC 4.01 05/27/2022    RBC 4.13 05/02/2018     Lab Results   Component Value Date    HGB 13.1 05/27/2022    HGB 12.7 05/02/2018     Lab Results   Component Value Date    HCT 40.4 05/27/2022    HCT 39.5 05/02/2018     Lab Results   Component Value Date     05/27/2022    MCV 96 05/02/2018     Lab Results   Component Value Date    MCH 32.7 05/27/2022    MCH 30.8 05/02/2018     Lab Results   Component Value Date    MCHC 32.4 05/27/2022    MCHC 32.2 05/02/2018     Lab Results   Component Value Date    RDW 13.8 05/27/2022    RDW 15.3 05/02/2018     Lab Results   Component Value Date     05/27/2022     05/02/2018

## 2022-11-02 NOTE — TELEPHONE ENCOUNTER
[facility] Call Center    Phone Message    May a detailed message be left on voicemail: yes     Reason for Call: Other: Lorna called back and stated they are not able to do the Echo afterall so we can disregard the new fax. Scheduled patient for the Echo through our [facility] office.      Action Taken: Other: Cardiology    Travel Screening: Not Applicable     Thank you!  [facility]

## 2022-11-02 NOTE — TELEPHONE ENCOUNTER
Health Call Center    Phone Message    May a detailed message be left on voicemail: yes     Reason for Call: Other: Lorna calling to report that they received the echo order but it states it should be done at [facility] so we need to have that re faxed without that in the order. She is requesting that orders be faxed to them at [phone].  Please call her with any questions. Thank you!     Action Taken: Other: Cardiology    Travel Screening: Not Applicable     Thank you!  [facility]

## 2022-11-02 NOTE — TELEPHONE ENCOUNTER
[facility] Call Center    Phone Message    May a detailed message be left on voicemail: yes     Reason for Call: Order(s): Other:   Reason for requested: Omela calling to report that they can do the Echo ordered by Dr. Llanes at their facility.  She is requesting that orders be faxed to them at [phone].  Please call her with any questions. Thank you!  Date needed: 11/2/2022  Provider name: Dr. Llanes      Action Taken: Message routed to:  Other: Cardiology    Travel Screening: Not Applicable

## 2022-12-09 NOTE — TELEPHONE ENCOUNTER
RN attempted to call patient at both numbers listed. Home phone number was wrong number and person who answered disconnected line, there was no answer at the second line. RN subsequently called patient's son phone and left  requesting callback to discuss test results and inquire how patient is feeling on amiodarone.          ----- Message from Carlo Llanes MD sent at 12/8/2022  4:20 PM CST -----  At some point, can we touch base and see how the patient is feeling on the amiodarone?  His ejection fraction was fairly reduced.  If he wants to be aggressive we could consider cardiac catheterization and chronic total occlusion revascularization.  He is Turkmen-speaking only.  If he feels better on the amiodarone we do not have to pursue revascularization.            Interpretation Summary     Left ventricular systolic function is severely reduced.  The visual ejection fraction is 25-30%.  Left ventricular hypertrophy: asymmetric with no LVOT obstruction  The right ventricular systolic function is mildly reduced.  Technically difficult, suboptimal study. Compared to prior study, changes are  noted.

## 2023-01-01 ENCOUNTER — OFFICE VISIT (OUTPATIENT)
Dept: CARDIOLOGY | Facility: CLINIC | Age: 88
End: 2023-01-01
Attending: INTERNAL MEDICINE
Payer: COMMERCIAL

## 2023-01-01 ENCOUNTER — HOSPITAL ENCOUNTER (OUTPATIENT)
Facility: CLINIC | Age: 88
End: 2023-01-01
Attending: INTERNAL MEDICINE | Admitting: INTERNAL MEDICINE
Payer: COMMERCIAL

## 2023-01-01 ENCOUNTER — TRANSFERRED RECORDS (OUTPATIENT)
Dept: HEALTH INFORMATION MANAGEMENT | Facility: CLINIC | Age: 88
End: 2023-01-01
Payer: COMMERCIAL

## 2023-01-01 ENCOUNTER — PATIENT OUTREACH (OUTPATIENT)
Dept: ONCOLOGY | Facility: CLINIC | Age: 88
End: 2023-01-01
Payer: COMMERCIAL

## 2023-01-01 ENCOUNTER — TELEPHONE (OUTPATIENT)
Dept: CARDIOLOGY | Facility: CLINIC | Age: 88
End: 2023-01-01

## 2023-01-01 ENCOUNTER — PREP FOR PROCEDURE (OUTPATIENT)
Dept: PULMONOLOGY | Facility: CLINIC | Age: 88
End: 2023-01-01
Payer: COMMERCIAL

## 2023-01-01 ENCOUNTER — TRANSCRIBE ORDERS (OUTPATIENT)
Dept: OTHER | Age: 88
End: 2023-01-01

## 2023-01-01 ENCOUNTER — PRE VISIT (OUTPATIENT)
Dept: PULMONOLOGY | Facility: CLINIC | Age: 88
End: 2023-01-01

## 2023-01-01 ENCOUNTER — MEDICAL CORRESPONDENCE (OUTPATIENT)
Dept: HEALTH INFORMATION MANAGEMENT | Facility: CLINIC | Age: 88
End: 2023-01-01
Payer: COMMERCIAL

## 2023-01-01 ENCOUNTER — HOSPITAL ENCOUNTER (OUTPATIENT)
Dept: PET IMAGING | Facility: CLINIC | Age: 88
Discharge: HOME | End: 2023-02-13
Attending: NURSE PRACTITIONER | Admitting: NURSE PRACTITIONER
Payer: COMMERCIAL

## 2023-01-01 ENCOUNTER — TRANSCRIBE ORDERS (OUTPATIENT)
Dept: ONCOLOGY | Facility: CLINIC | Age: 88
End: 2023-01-01
Payer: COMMERCIAL

## 2023-01-01 ENCOUNTER — ONCOLOGY VISIT (OUTPATIENT)
Dept: PULMONOLOGY | Facility: CLINIC | Age: 88
End: 2023-01-01
Attending: FAMILY MEDICINE
Payer: COMMERCIAL

## 2023-01-01 ENCOUNTER — TELEPHONE (OUTPATIENT)
Dept: PULMONOLOGY | Facility: CLINIC | Age: 88
End: 2023-01-01
Payer: COMMERCIAL

## 2023-01-01 ENCOUNTER — ANCILLARY PROCEDURE (OUTPATIENT)
Dept: CT IMAGING | Facility: CLINIC | Age: 88
End: 2023-01-01
Attending: FAMILY MEDICINE
Payer: COMMERCIAL

## 2023-01-01 VITALS — HEART RATE: 69 BPM | DIASTOLIC BLOOD PRESSURE: 67 MMHG | OXYGEN SATURATION: 92 % | SYSTOLIC BLOOD PRESSURE: 145 MMHG

## 2023-01-01 VITALS
DIASTOLIC BLOOD PRESSURE: 64 MMHG | SYSTOLIC BLOOD PRESSURE: 108 MMHG | TEMPERATURE: 97.9 F | OXYGEN SATURATION: 90 % | HEART RATE: 66 BPM | RESPIRATION RATE: 16 BRPM

## 2023-01-01 DIAGNOSIS — R91.8 OTHER NONSPECIFIC ABNORMAL FINDING OF LUNG FIELD: Primary | ICD-10-CM

## 2023-01-01 DIAGNOSIS — R91.8 OTHER NONSPECIFIC ABNORMAL FINDING OF LUNG FIELD: ICD-10-CM

## 2023-01-01 DIAGNOSIS — I10 ESSENTIAL HYPERTENSION, BENIGN: ICD-10-CM

## 2023-01-01 DIAGNOSIS — R91.8 PULMONARY NODULES: Primary | ICD-10-CM

## 2023-01-01 DIAGNOSIS — Z79.899 LONG TERM CURRENT USE OF AMIODARONE: ICD-10-CM

## 2023-01-01 DIAGNOSIS — Z87.891 HISTORY OF TOBACCO USE: ICD-10-CM

## 2023-01-01 DIAGNOSIS — I48.0 PAF (PAROXYSMAL ATRIAL FIBRILLATION) (H): Primary | ICD-10-CM

## 2023-01-01 DIAGNOSIS — Z95.0 CARDIAC PACEMAKER IN SITU: ICD-10-CM

## 2023-01-01 DIAGNOSIS — I25.10 CORONARY ARTERY DISEASE INVOLVING NATIVE CORONARY ARTERY OF NATIVE HEART WITHOUT ANGINA PECTORIS: ICD-10-CM

## 2023-01-01 DIAGNOSIS — Z98.890 S/P BRONCHOSCOPY WITH BIOPSY: Primary | ICD-10-CM

## 2023-01-01 DIAGNOSIS — R91.8 LUNG MASS: Primary | ICD-10-CM

## 2023-01-01 DIAGNOSIS — R91.8 LUNG MASS: ICD-10-CM

## 2023-01-01 DIAGNOSIS — Z87.09: ICD-10-CM

## 2023-01-01 DIAGNOSIS — I44.2 ATRIOVENTRICULAR BLOCK, COMPLETE (H): ICD-10-CM

## 2023-01-01 DIAGNOSIS — I48.0 PAF (PAROXYSMAL ATRIAL FIBRILLATION) (H): ICD-10-CM

## 2023-01-01 LAB
CREAT BLD-MCNC: 1 MG/DL (ref 0.7–1.3)
EXPTIME-PRE: 3.42 SEC
FEF2575-%PRED-PRE: 33 %
FEF2575-PRE: 0.61 L/SEC
FEF2575-PRED: 1.79 L/SEC
FEFMAX-%PRED-PRE: 24 %
FEFMAX-PRE: 1.64 L/SEC
FEFMAX-PRED: 6.75 L/SEC
FEV1-%PRED-PRE: 32 %
FEV1-PRE: 0.87 L
FEV1FEV6-PRE: 57 %
FEV1FEV6-PRED: 75 %
FEV1FVC-PRE: 57 %
FEV1FVC-PRED: 74 %
FIFMAX-PRE: 1.78 L/SEC
FVC-%PRED-PRE: 41 %
FVC-PRE: 1.51 L
FVC-PRED: 3.62 L
GFR SERPL CREATININE-BSD FRML MDRD: >60 ML/MIN/1.73M2
MDC_IDC_EPISODE_DTM: NORMAL
MDC_IDC_EPISODE_ID: NORMAL
MDC_IDC_EPISODE_TYPE: NORMAL
MDC_IDC_LEAD_IMPLANT_DT: NORMAL
MDC_IDC_LEAD_IMPLANT_DT: NORMAL
MDC_IDC_LEAD_LOCATION: NORMAL
MDC_IDC_LEAD_LOCATION: NORMAL
MDC_IDC_LEAD_MFG: NORMAL
MDC_IDC_LEAD_MFG: NORMAL
MDC_IDC_LEAD_MODEL: NORMAL
MDC_IDC_LEAD_MODEL: NORMAL
MDC_IDC_LEAD_POLARITY_TYPE: NORMAL
MDC_IDC_LEAD_POLARITY_TYPE: NORMAL
MDC_IDC_LEAD_SERIAL: NORMAL
MDC_IDC_LEAD_SERIAL: NORMAL
MDC_IDC_MSMT_BATTERY_DTM: NORMAL
MDC_IDC_MSMT_BATTERY_REMAINING_LONGEVITY: 108 MO
MDC_IDC_MSMT_BATTERY_REMAINING_PERCENTAGE: 100 %
MDC_IDC_MSMT_BATTERY_STATUS: NORMAL
MDC_IDC_MSMT_LEADCHNL_RA_IMPEDANCE_VALUE: 492 OHM
MDC_IDC_MSMT_LEADCHNL_RA_PACING_THRESHOLD_AMPLITUDE: 1.4 V
MDC_IDC_MSMT_LEADCHNL_RA_PACING_THRESHOLD_PULSEWIDTH: 0.5 MS
MDC_IDC_MSMT_LEADCHNL_RV_IMPEDANCE_VALUE: 463 OHM
MDC_IDC_MSMT_LEADCHNL_RV_PACING_THRESHOLD_AMPLITUDE: 1.3 V
MDC_IDC_MSMT_LEADCHNL_RV_PACING_THRESHOLD_PULSEWIDTH: 0.4 MS
MDC_IDC_PG_IMPLANT_DTM: NORMAL
MDC_IDC_PG_MFG: NORMAL
MDC_IDC_PG_MODEL: NORMAL
MDC_IDC_PG_SERIAL: NORMAL
MDC_IDC_PG_TYPE: NORMAL
MDC_IDC_SESS_CLINIC_NAME: NORMAL
MDC_IDC_SESS_DTM: NORMAL
MDC_IDC_SESS_TYPE: NORMAL
MDC_IDC_SET_BRADY_AT_MODE_SWITCH_MODE: NORMAL
MDC_IDC_SET_BRADY_AT_MODE_SWITCH_RATE: 170 {BEATS}/MIN
MDC_IDC_SET_BRADY_LOWRATE: 60 {BEATS}/MIN
MDC_IDC_SET_BRADY_MAX_SENSOR_RATE: 130 {BEATS}/MIN
MDC_IDC_SET_BRADY_MAX_TRACKING_RATE: 130 {BEATS}/MIN
MDC_IDC_SET_BRADY_MODE: NORMAL
MDC_IDC_SET_BRADY_PAV_DELAY_HIGH: 80 MS
MDC_IDC_SET_BRADY_PAV_DELAY_LOW: 180 MS
MDC_IDC_SET_BRADY_SAV_DELAY_HIGH: 65 MS
MDC_IDC_SET_BRADY_SAV_DELAY_LOW: 150 MS
MDC_IDC_SET_LEADCHNL_RA_PACING_AMPLITUDE: 3.8 V
MDC_IDC_SET_LEADCHNL_RA_PACING_CAPTURE_MODE: NORMAL
MDC_IDC_SET_LEADCHNL_RA_PACING_POLARITY: NORMAL
MDC_IDC_SET_LEADCHNL_RA_PACING_PULSEWIDTH: 0.4 MS
MDC_IDC_SET_LEADCHNL_RA_SENSING_ADAPTATION_MODE: NORMAL
MDC_IDC_SET_LEADCHNL_RA_SENSING_POLARITY: NORMAL
MDC_IDC_SET_LEADCHNL_RA_SENSING_SENSITIVITY: 0.25 MV
MDC_IDC_SET_LEADCHNL_RV_PACING_AMPLITUDE: 2.4 V
MDC_IDC_SET_LEADCHNL_RV_PACING_CAPTURE_MODE: NORMAL
MDC_IDC_SET_LEADCHNL_RV_PACING_POLARITY: NORMAL
MDC_IDC_SET_LEADCHNL_RV_PACING_PULSEWIDTH: 0.4 MS
MDC_IDC_SET_LEADCHNL_RV_SENSING_ADAPTATION_MODE: NORMAL
MDC_IDC_SET_LEADCHNL_RV_SENSING_POLARITY: NORMAL
MDC_IDC_SET_LEADCHNL_RV_SENSING_SENSITIVITY: 1.5 MV
MDC_IDC_SET_ZONE_DETECTION_INTERVAL: 375 MS
MDC_IDC_SET_ZONE_TYPE: NORMAL
MDC_IDC_SET_ZONE_VENDOR_TYPE: NORMAL
MDC_IDC_STAT_AT_BURDEN_PERCENT: 0 %
MDC_IDC_STAT_AT_DTM_END: NORMAL
MDC_IDC_STAT_AT_DTM_START: NORMAL
MDC_IDC_STAT_BRADY_DTM_END: NORMAL
MDC_IDC_STAT_BRADY_DTM_START: NORMAL
MDC_IDC_STAT_BRADY_RA_PERCENT_PACED: 64 %
MDC_IDC_STAT_BRADY_RV_PERCENT_PACED: 99 %
MDC_IDC_STAT_EPISODE_RECENT_COUNT: 0
MDC_IDC_STAT_EPISODE_RECENT_COUNT_DTM_END: NORMAL
MDC_IDC_STAT_EPISODE_RECENT_COUNT_DTM_START: NORMAL
MDC_IDC_STAT_EPISODE_TYPE: NORMAL
MDC_IDC_STAT_EPISODE_VENDOR_TYPE: NORMAL

## 2023-01-01 PROCEDURE — 250N000011 HC RX IP 250 OP 636

## 2023-01-01 PROCEDURE — G0463 HOSPITAL OUTPT CLINIC VISIT: HCPCS | Performed by: STUDENT IN AN ORGANIZED HEALTH CARE EDUCATION/TRAINING PROGRAM

## 2023-01-01 PROCEDURE — 82565 ASSAY OF CREATININE: CPT

## 2023-01-01 PROCEDURE — 343N000001 HC RX 343

## 2023-01-01 PROCEDURE — A9552 F18 FDG: HCPCS

## 2023-01-01 PROCEDURE — 78815 PET IMAGE W/CT SKULL-THIGH: CPT | Mod: PI

## 2023-01-01 PROCEDURE — 99204 OFFICE O/P NEW MOD 45 MIN: CPT | Mod: 25 | Performed by: STUDENT IN AN ORGANIZED HEALTH CARE EDUCATION/TRAINING PROGRAM

## 2023-01-01 PROCEDURE — 93280 PM DEVICE PROGR EVAL DUAL: CPT | Performed by: INTERNAL MEDICINE

## 2023-01-01 PROCEDURE — 71250 CT THORAX DX C-: CPT

## 2023-01-01 PROCEDURE — 74177 CT ABD & PELVIS W/CONTRAST: CPT

## 2023-01-01 PROCEDURE — 99214 OFFICE O/P EST MOD 30 MIN: CPT | Mod: 25 | Performed by: NURSE PRACTITIONER

## 2023-01-01 PROCEDURE — 94375 RESPIRATORY FLOW VOLUME LOOP: CPT | Performed by: INTERNAL MEDICINE

## 2023-01-01 RX ORDER — IOPAMIDOL 755 MG/ML
10-135 INJECTION, SOLUTION INTRAVASCULAR ONCE
Status: COMPLETED | OUTPATIENT
Start: 2023-01-01 | End: 2023-01-01

## 2023-01-01 RX ORDER — LIDOCAINE 4 G/G
1 PATCH TOPICAL EVERY 24 HOURS
COMMUNITY

## 2023-01-01 RX ORDER — ATORVASTATIN CALCIUM 20 MG/1
20 TABLET, FILM COATED ORAL DAILY
Qty: 90 TABLET | Refills: 3 | Status: SHIPPED | OUTPATIENT
Start: 2023-01-01

## 2023-01-01 RX ORDER — ISOSORBIDE MONONITRATE 30 MG/1
90 TABLET, EXTENDED RELEASE ORAL DAILY
Qty: 90 TABLET | Refills: 11 | Status: SHIPPED | OUTPATIENT
Start: 2023-01-01

## 2023-01-01 RX ORDER — ALBUTEROL SULFATE 90 UG/1
AEROSOL, METERED RESPIRATORY (INHALATION)
COMMUNITY
Start: 2022-01-01

## 2023-01-01 RX ADMIN — FLUDEOXYGLUCOSE F-18 12.52 MILLICURIE: 500 INJECTION, SOLUTION INTRAVENOUS at 11:37

## 2023-01-01 RX ADMIN — IOPAMIDOL 70 ML: 755 INJECTION, SOLUTION INTRAVENOUS at 11:35

## 2023-01-01 ASSESSMENT — PAIN SCALES - GENERAL: PAINLEVEL: NO PAIN (0)

## 2023-02-02 NOTE — PROGRESS NOTES
New Patient: Interventional Pulmonary (Lung nodule) Nurse Navigator Note    Referring provider: Radha Hsieh DO at [facility]     Referred to (specialty): Interventional Pulmonary (Lung nodule)    Requested provider (if applicable): n/a    Date Referral Received: 2/2/2023    Evaluation for :  Lung nodule--Other nonspecific abnormal finding of lung field       Clinical History (per Nurse review of records provided):    **BOOK MARKED**  CT CHEST W/O CONTRAST  1/12/2023  IMPRESSION:   1.  4.8 cm spiculated mass in the right upper lobe likely represents primary lung cancer.  2.  Separate right lower lobe malignancy with direct invasion into the adjacent T7 and T8 vertebral bodies and ribs.  3.  Indeterminate soft tissue nodule in the upper esophagus. Suggest endoscopy for further evaluation.  4.  Chronic appearing atelectasis in both lower lobes. 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  5.  PET/CT recommended for further evaluation, staging, and biopsy planning purposes.      Records Location (Care Everywhere, Media, etc.): [facility]     Records Needed: none    Additional testing needed prior to consult: PFT's

## 2023-02-10 NOTE — TELEPHONE ENCOUNTER
RECORDS STATUS - ALL OTHER DIAGNOSIS      RECORDS RECEIVED FROM: [facility]   DATE RECEIVED: 02/10/23   NOTES STATUS DETAILS   OFFICE NOTE from referring provider MAL Hsieh   DISCHARGE SUMMARY from hospital Epic, [facility] 04/15/20: [facility]  04/19/18: [facility]   MEDICATION LIST [facility]    LABS     ANYTHING RELATED TO DIAGNOSIS [facility] Most recent 10/05/22   IMAGING (NEED IMAGES & REPORT)     CT SCANS PACS 01/12/23 CT Chest   XRAYS PACS 07/08/14, 07/07/14: XR Chest

## 2023-02-10 NOTE — PROGRESS NOTES
~Cardiology Clinic Visit~    Primary Cardiologist: Dr. Llanes  Last Office Visit: 11/1/2022  Reason for visit: follow up for testing results    History of Present Illness    Nacho Joseph is a very pleasant 87 year old male with a past medical history notable for CAD, HTN, HLD, AAA, AV block status post PPM implantation, depression, COPD.    This patient has been followed by Dr. Llanes for coronary artery disease, with his last angiogram in 2018 showing  of the RCA and LCx.  At that point, he was felt that he was not a good surgical candidate due to advanced age.  He does have progressive dyspnea on exertion, but has denied orthopnea or PND, or chest discomfort.    He has a history of [employer] dual-chamber pacemaker implantation for syncope and complete AV block back in 2014.  On device checks, he remains in complete AV block requires almost persistent ventricular pacing.    At his last clinic visit, the device report was reviewed.  This summary was from April 2022, and showed AP 44%,  94%.  AT/AF arrhythmias with the longest episode lasting 27 minutes.  There are also episodes of NSVT lasting approximately 4-13 beats, with rates 135 to 175 bpm.  There was also less than 3 months of battery life remaining at that time.  He has since had his battery exchanged.      Overall, this device report (04/2022)showed an increase in PAF burden, and amiodarone was added 11/2022.    His most recent device check was completed on 12/19/2022.  Reports showed AP 52%,  98%.  There were no atrial or ventricular arrhythmias noted.  The device is functioning within normal device parameters.  Battery life estimated at 8 years.    Unfortunately, the report of the most recent device check was unavailable at the time of our visit today.  I reviewed the results of his recent echocardiogram.    Interval 02/10/23    He does not manage his medications, takes what is provided to him at his facility.  He has  difficulty quantifying his current symptoms.  Somewhat of a difficult historian.  He mentions that when he stands or is getting dressed, he gets short of breath and has to take very deep breaths.  This improves his symptoms, and resolves in a matter of seconds.  No pain or symptoms when laying down.  Denies SOB at rest.  Denies chest pain with these symptoms.  Does have a light cough he mentions.    Impression    Dyspnea on exertion  Coronary artery disease with known  of RCA and circumflex  Ischemic cardiomyopathy, EF 25-30%  -11/25/2022 echocardiogram demonstrated EF 25-30% with severe global hypokinesis, and apical dyskinesis.  There is left ventricular hypertrophy, asymmetric with no evidence of LVOT obstruction.  There is mildly reduced RV systolic function.  -On aspirin, atorvastatin, Imdur, Toprol.  -No acute chest pain symptoms.    Paroxysmal atrial fibrillation, no recurrence on last device check, on amiodarone.  -Not a suitable candidate for anticoagulation therapy secondary to gait instability and high risk for falls.  -Updated device check done today not resulted yet.  -Patient continues on amiodarone therapy.  We discussed protocol and monitoring while on this medication.  All of the orders have been placed at this time.  However, patient does not want to do any additional testing at this time.  -We had a long discussion about his medications and additional testing.    Right upper lobe mass on chest CT  -1/12/2023 4.8 cm spiculated mass noted during imaging that likely represents primary lung cancer.  There are indeterminate soft tissue nodules in the upper esophagus also noted.  -Recommended PET/CT for further evaluation, staging, and biopsy planning purposes.  -Also recommended endoscopy.  -Ongoing management per PCP.  __________________________________________________________________    Plan:  1. We will increase Imdur to 90 mg daily to see if it helps with his dyspnea.  2. Continue other cardiac  medications without change.  3. Orders placed for routine testing while on amiodarone therapy -we will follow-up with patient at next visit to review completing these tests.  4. We will follow-up with Dr. Llanes in approximately 3 months after next device check.  __________________________________________________________________    Thank you for the opportunity to participate in this pleasant patient's care.    We would be happy to see this patient sooner for any concerns in the meantime.    NICHOLAS Meehan, CNP   Nurse Practitioner  [facility]    Today's clinic visit entailed:  Review of the result(s) of each unique test - labs, ekg, device checks, echo  Ordering of each unique test  Prescription drug management    The level of medical decision making during this visit was of high complexity.    Orders this Visit:  Orders Placed This Encounter   Procedures     X-ray Chest 2 vws*     TSH with free T4 reflex     ALT     Follow-Up with Cardiology     EKG 12-lead complete w/read - Clinics (future- to be scheduled)     General PFT Lab (Please always keep checked)     Pulmonary Function Test     Orders Placed This Encounter   Medications     Lidocaine (LIDOCARE) 4 % Patch     Sig: Place 1 patch onto the skin every 24 hours To prevent lidocaine toxicity, patient should be patch free for 12 hrs daily.     VENTOLIN  (90 Base) MCG/ACT inhaler     atorvastatin (LIPITOR) 20 MG tablet     Sig: Take 1 tablet (20 mg) by mouth daily     Dispense:  90 tablet     Refill:  3     isosorbide mononitrate (IMDUR) 30 MG 24 hr tablet     Sig: Take 3 tablets (90 mg) by mouth daily     Dispense:  90 tablet     Refill:  11     Medications Discontinued During This Encounter   Medication Reason     Multiple Vitamins-Minerals (PRESERVISION AREDS 2) CAPS      acetaminophen (TYLENOL) 500 MG tablet      Fexofenadine HCl (MUCINEX ALLERGY PO)      guaiFENesin-dextromethorphan (ROBITUSSIN DM) 100-10 MG/5ML syrup       HYDROmorphone (DILAUDID) 2 MG tablet      magnesium hydroxide (MILK OF MAGNESIA) 400 MG/5ML suspension      Nutritional Supplements (HI-ARELIS) LIQD      potassium citrate (UROCIT-K) 10 MEQ (1080 MG) CR tablet      atorvastatin (LIPITOR) 40 MG tablet      isosorbide mononitrate (IMDUR) 60 MG 24 hr tablet      Encounter Diagnoses   Name Primary?     Cardiac pacemaker in situ      Coronary artery disease involving native coronary artery of native heart without angina pectoris      PAF (paroxysmal atrial fibrillation) (H) Yes     Essential hypertension, benign      Long term current use of amiodarone      CURRENT MEDICATIONS:  Current Outpatient Medications   Medication Sig Dispense Refill     Acetaminophen (TYLENOL PO) Take 1,000 mg by mouth 3 times daily       amiodarone (PACERONE) 200 MG tablet Take 1 tablet (200 mg) by mouth daily 90 tablet 3     aspirin 81 MG tablet Take 1 tablet (81 mg) by mouth daily       atorvastatin (LIPITOR) 20 MG tablet Take 1 tablet (20 mg) by mouth daily 90 tablet 3     doxycycline hyclate (VIBRA-TABS) 100 MG tablet        DOXYCYCLINE HYCLATE PO Take 100 mg by mouth 2 times daily       guaiFENesin (ROBITUSSIN) 20 mg/mL liquid 10 ml TID       hydrochlorothiazide (HYDRODIURIL) 25 MG tablet Take 1 tablet (25 mg) by mouth daily 90 tablet 3     isosorbide mononitrate (IMDUR) 30 MG 24 hr tablet Take 3 tablets (90 mg) by mouth daily 90 tablet 11     Lidocaine (LIDOCARE) 4 % Patch Place 1 patch onto the skin every 24 hours To prevent lidocaine toxicity, patient should be patch free for 12 hrs daily.       metoprolol succinate (TOPROL-XL) 50 MG 24 hr tablet Take 1 tablet (50 mg) by mouth two times daily 180 tablet 3     nitroglycerin (NITROSTAT) 0.4 MG SL tablet Place 1 tablet (0.4 mg) under the tongue every 5 minutes as needed for chest pain 25 tablet 4     sodium chloride (OCEAN) 0.65 % nasal spray Spray 1 spray into both nostrils daily as needed for congestion       traZODone (DESYREL) 50 MG  tablet 50 mg At Bedtime       VENTOLIN  (90 Base) MCG/ACT inhaler        ALLERGIES   No Known Allergies  PAST MEDICAL, SURGICAL, FAMILY, SOCIAL HISTORY:  History was reviewed and updated as needed, see medical record.    Review of Systems:  Review Of Systems  Skin: negative  Eyes: negative  Ears/Nose/Throat: negative  Respiratory: Dyspnea on exertion- ongoing, no SOB at rest.  Cardiovascular: negative, chest pain, exertional chest pain or pressure, lower extremity edema and syncope or near-syncope  Gastrointestinal: negative  Genitourinary: negative  Musculoskeletal: positive for generalized weakness.  Neurologic: negative  Psychiatric: negative  Hematologic/Lymphatic/Immunologic: negative  Endocrine: negative     Physical Exam:    Vitals: BP (!) 145/67   Pulse 69   SpO2 92%   Constitutional: Appears stated age, well nourished, NAD.  Eyes: Pupils equal, round. Sclerae anicteric.   HEENT: Normocephalic, atraumatic.   Neck: Supple. Carotid pulses full and equal.   Respiratory: Non-labored. Lungs CTAB.  Cardiovascular: RRR, normal S1 and S2. No M/G/R.  No edema.  GI: Soft, non-distended, non-tender.  Skin: Warm and dry. No rashes, cyanosis, edema.  Musculoskeletal/Extremities: Symmetrical movement to all extremities.  Neurologic: No gross focal deficits. Alert, awake, and oriented to all spheres.  Psychiatric: Affect appropriate. Mentation normal.    Recent Lab Results:  LIPID RESULTS:  Lab Results   Component Value Date    CHOL 120 08/09/2021    HDL 38 (L) 08/09/2021    LDL 48 08/09/2021    TRIG 170 (H) 08/09/2021     LIVER ENZYME RESULTS:  Lab Results   Component Value Date    AST 15 08/09/2021    AST 12 04/20/2018    ALT 10 08/09/2021    ALT 11 04/20/2018     CBC RESULTS:  Lab Results   Component Value Date    WBC 7.2 05/27/2022    WBC 9.2 05/02/2018    RBC 4.01 (L) 05/27/2022    RBC 4.13 (L) 05/02/2018    HGB 13.1 (L) 05/27/2022    HGB 12.7 (L) 05/02/2018    HCT 40.4 05/27/2022    HCT 39.5 (L) 05/02/2018      (H) 05/27/2022    MCV 96 05/02/2018    MCH 32.7 05/27/2022    MCH 30.8 05/02/2018    MCHC 32.4 05/27/2022    MCHC 32.2 05/02/2018    RDW 13.8 05/27/2022    RDW 15.3 (H) 05/02/2018     05/27/2022     05/02/2018     BMP RESULTS:  Lab Results   Component Value Date     (H) 05/27/2022     05/17/2018    POTASSIUM 4.1 05/27/2022    POTASSIUM 3.7 05/18/2018    CHLORIDE 109 05/27/2022    CHLORIDE 106 05/17/2018    CO2 29 05/27/2022    CO2 24 05/17/2018    ANIONGAP 7 05/27/2022    ANIONGAP 15.1 05/17/2018     (H) 05/27/2022     (H) 05/17/2018    BUN 30 05/27/2022    BUN 23 05/17/2018    CR 0.91 05/27/2022    CR 1.01 05/17/2018    GFRESTIMATED 82 05/27/2022    GFRESTIMATED >60 10/12/2020    GFRESTIMATED 71 05/17/2018    GFRESTBLACK >60 10/12/2020    GFRESTBLACK 86 05/17/2018    ARELIS 9.3 05/27/2022    ARELIS 9.7 05/17/2018      A1C RESULTS:  Lab Results   Component Value Date    A1C 5.8 (H) 08/09/2021     INR RESULTS:  Lab Results   Component Value Date    INR 1.09 05/02/2018    INR 0.93 02/20/2015

## 2023-02-10 NOTE — PATIENT INSTRUCTIONS
Thank you for your visit with the [facility] today.    Today's Summary     We will schedule you for routine testing while on amiodarone therapy.  This includes lab work, lung testing, chest x-ray, EKG.  Increase Imdur to 90 mg daily    Follow up plan: See Dr. Llanes in 3-months at next device check.    If you have questions or concerns, please do not hesitate to call my nursing support team at [phone].    Scheduling phone number: [phone]  [facility] Clinic Number: [phone]    It was a pleasure seeing you today.     NICHOLAS Meehan, CNP  Nurse Practitioner  [facility]  February 10, 2023  ________________________________________________________

## 2023-02-10 NOTE — LETTER
2/10/2023    Radha Hsieh DO  [facility] [address]  [facility] [address]    RE: Nacho A Jake       Dear Colleague,     I had the pleasure of seeing Nacho Joseph in the [facility].              ~Cardiology Clinic Visit~    Primary Cardiologist: Dr. Llanes  Last Office Visit: 11/1/2022  Reason for visit: follow up for testing results    History of Present Illness    Nacho Joseph is a very pleasant 87 year old male with a past medical history notable for CAD, HTN, HLD, AAA, AV block status post PPM implantation, depression, COPD.    This patient has been followed by Dr. Llanes for coronary artery disease, with his last angiogram in 2018 showing  of the RCA and LCx.  At that point, he was felt that he was not a good surgical candidate due to advanced age.  He does have progressive dyspnea on exertion, but has denied orthopnea or PND, or chest discomfort.    He has a history of [employer] dual-chamber pacemaker implantation for syncope and complete AV block back in 2014.  On device checks, he remains in complete AV block requires almost persistent ventricular pacing.    At his last clinic visit, the device report was reviewed.  This summary was from April 2022, and showed AP 44%,  94%.  AT/AF arrhythmias with the longest episode lasting 27 minutes.  There are also episodes of NSVT lasting approximately 4-13 beats, with rates 135 to 175 bpm.  There was also less than 3 months of battery life remaining at that time.  He has since had his battery exchanged.      Overall, this device report (04/2022)showed an increase in PAF burden, and amiodarone was added 11/2022.    His most recent device check was completed on 12/19/2022.  Reports showed AP 52%,  98%.  There were no atrial or ventricular arrhythmias noted.  The device is functioning within normal device parameters.  Battery life estimated at 8 years.    Unfortunately, the report of the  most recent device check was unavailable at the time of our visit today.  I reviewed the results of his recent echocardiogram.    Interval 02/10/23    He does not manage his medications, takes what is provided to him at his facility.  He has difficulty quantifying his current symptoms.  Somewhat of a difficult historian.  He mentions that when he stands or is getting dressed, he gets short of breath and has to take very deep breaths.  This improves his symptoms, and resolves in a matter of seconds.  No pain or symptoms when laying down.  Denies SOB at rest.  Denies chest pain with these symptoms.  Does have a light cough he mentions.    Impression    Dyspnea on exertion  Coronary artery disease with known  of RCA and circumflex  Ischemic cardiomyopathy, EF 25-30%  -11/25/2022 echocardiogram demonstrated EF 25-30% with severe global hypokinesis, and apical dyskinesis.  There is left ventricular hypertrophy, asymmetric with no evidence of LVOT obstruction.  There is mildly reduced RV systolic function.  -On aspirin, atorvastatin, Imdur, Toprol.  -No acute chest pain symptoms.    Paroxysmal atrial fibrillation, no recurrence on last device check, on amiodarone.  -Not a suitable candidate for anticoagulation therapy secondary to gait instability and high risk for falls.  -Updated device check done today not resulted yet.  -Patient continues on amiodarone therapy.  We discussed protocol and monitoring while on this medication.  All of the orders have been placed at this time.  However, patient does not want to do any additional testing at this time.  -We had a long discussion about his medications and additional testing.    Right upper lobe mass on chest CT  -1/12/2023 4.8 cm spiculated mass noted during imaging that likely represents primary lung cancer.  There are indeterminate soft tissue nodules in the upper esophagus also noted.  -Recommended PET/CT for further evaluation, staging, and biopsy planning  purposes.  -Also recommended endoscopy.  -Ongoing management per PCP.  __________________________________________________________________    Plan:  1. We will increase Imdur to 90 mg daily to see if it helps with his dyspnea.  2. Continue other cardiac medications without change.  3. Orders placed for routine testing while on amiodarone therapy -we will follow-up with patient at next visit to review completing these tests.  4. We will follow-up with Dr. Llanes in approximately 3 months after next device check.  __________________________________________________________________    Thank you for the opportunity to participate in this pleasant patient's care.    We would be happy to see this patient sooner for any concerns in the meantime.    NICHOLAS Meehan, CNP   Nurse Practitioner  [facility]    Today's clinic visit entailed:  Review of the result(s) of each unique test - labs, ekg, device checks, echo  Ordering of each unique test  Prescription drug management    The level of medical decision making during this visit was of high complexity.    Orders this Visit:  Orders Placed This Encounter   Procedures     X-ray Chest 2 vws*     TSH with free T4 reflex     ALT     Follow-Up with Cardiology     EKG 12-lead complete w/read - Clinics (future- to be scheduled)     General PFT Lab (Please always keep checked)     Pulmonary Function Test     Orders Placed This Encounter   Medications     Lidocaine (LIDOCARE) 4 % Patch     Sig: Place 1 patch onto the skin every 24 hours To prevent lidocaine toxicity, patient should be patch free for 12 hrs daily.     VENTOLIN  (90 Base) MCG/ACT inhaler     atorvastatin (LIPITOR) 20 MG tablet     Sig: Take 1 tablet (20 mg) by mouth daily     Dispense:  90 tablet     Refill:  3     isosorbide mononitrate (IMDUR) 30 MG 24 hr tablet     Sig: Take 3 tablets (90 mg) by mouth daily     Dispense:  90 tablet     Refill:  11     Medications Discontinued During  This Encounter   Medication Reason     Multiple Vitamins-Minerals (PRESERVISION AREDS 2) CAPS      acetaminophen (TYLENOL) 500 MG tablet      Fexofenadine HCl (MUCINEX ALLERGY PO)      guaiFENesin-dextromethorphan (ROBITUSSIN DM) 100-10 MG/5ML syrup      HYDROmorphone (DILAUDID) 2 MG tablet      magnesium hydroxide (MILK OF MAGNESIA) 400 MG/5ML suspension      Nutritional Supplements (HI-ARELIS) LIQD      potassium citrate (UROCIT-K) 10 MEQ (1080 MG) CR tablet      atorvastatin (LIPITOR) 40 MG tablet      isosorbide mononitrate (IMDUR) 60 MG 24 hr tablet      Encounter Diagnoses   Name Primary?     Cardiac pacemaker in situ      Coronary artery disease involving native coronary artery of native heart without angina pectoris      PAF (paroxysmal atrial fibrillation) (H) Yes     Essential hypertension, benign      Long term current use of amiodarone      CURRENT MEDICATIONS:  Current Outpatient Medications   Medication Sig Dispense Refill     Acetaminophen (TYLENOL PO) Take 1,000 mg by mouth 3 times daily       amiodarone (PACERONE) 200 MG tablet Take 1 tablet (200 mg) by mouth daily 90 tablet 3     aspirin 81 MG tablet Take 1 tablet (81 mg) by mouth daily       atorvastatin (LIPITOR) 20 MG tablet Take 1 tablet (20 mg) by mouth daily 90 tablet 3     doxycycline hyclate (VIBRA-TABS) 100 MG tablet        DOXYCYCLINE HYCLATE PO Take 100 mg by mouth 2 times daily       guaiFENesin (ROBITUSSIN) 20 mg/mL liquid 10 ml TID       hydrochlorothiazide (HYDRODIURIL) 25 MG tablet Take 1 tablet (25 mg) by mouth daily 90 tablet 3     isosorbide mononitrate (IMDUR) 30 MG 24 hr tablet Take 3 tablets (90 mg) by mouth daily 90 tablet 11     Lidocaine (LIDOCARE) 4 % Patch Place 1 patch onto the skin every 24 hours To prevent lidocaine toxicity, patient should be patch free for 12 hrs daily.       metoprolol succinate (TOPROL-XL) 50 MG 24 hr tablet Take 1 tablet (50 mg) by mouth two times daily 180 tablet 3     nitroglycerin (NITROSTAT) 0.4  MG SL tablet Place 1 tablet (0.4 mg) under the tongue every 5 minutes as needed for chest pain 25 tablet 4     sodium chloride (OCEAN) 0.65 % nasal spray Spray 1 spray into both nostrils daily as needed for congestion       traZODone (DESYREL) 50 MG tablet 50 mg At Bedtime       VENTOLIN  (90 Base) MCG/ACT inhaler        ALLERGIES   No Known Allergies  PAST MEDICAL, SURGICAL, FAMILY, SOCIAL HISTORY:  History was reviewed and updated as needed, see medical record.    Review of Systems:  Review Of Systems  Skin: negative  Eyes: negative  Ears/Nose/Throat: negative  Respiratory: Dyspnea on exertion- ongoing, no SOB at rest.  Cardiovascular: negative, chest pain, exertional chest pain or pressure, lower extremity edema and syncope or near-syncope  Gastrointestinal: negative  Genitourinary: negative  Musculoskeletal: positive for generalized weakness.  Neurologic: negative  Psychiatric: negative  Hematologic/Lymphatic/Immunologic: negative  Endocrine: negative     Physical Exam:    Vitals: BP (!) 145/67   Pulse 69   SpO2 92%   Constitutional: Appears stated age, well nourished, NAD.  Eyes: Pupils equal, round. Sclerae anicteric.   HEENT: Normocephalic, atraumatic.   Neck: Supple. Carotid pulses full and equal.   Respiratory: Non-labored. Lungs CTAB.  Cardiovascular: RRR, normal S1 and S2. No M/G/R.  No edema.  GI: Soft, non-distended, non-tender.  Skin: Warm and dry. No rashes, cyanosis, edema.  Musculoskeletal/Extremities: Symmetrical movement to all extremities.  Neurologic: No gross focal deficits. Alert, awake, and oriented to all spheres.  Psychiatric: Affect appropriate. Mentation normal.    Recent Lab Results:  LIPID RESULTS:  Lab Results   Component Value Date    CHOL 120 08/09/2021    HDL 38 (L) 08/09/2021    LDL 48 08/09/2021    TRIG 170 (H) 08/09/2021     LIVER ENZYME RESULTS:  Lab Results   Component Value Date    AST 15 08/09/2021    AST 12 04/20/2018    ALT 10 08/09/2021    ALT 11 04/20/2018      CBC RESULTS:  Lab Results   Component Value Date    WBC 7.2 05/27/2022    WBC 9.2 05/02/2018    RBC 4.01 (L) 05/27/2022    RBC 4.13 (L) 05/02/2018    HGB 13.1 (L) 05/27/2022    HGB 12.7 (L) 05/02/2018    HCT 40.4 05/27/2022    HCT 39.5 (L) 05/02/2018     (H) 05/27/2022    MCV 96 05/02/2018    MCH 32.7 05/27/2022    MCH 30.8 05/02/2018    MCHC 32.4 05/27/2022    MCHC 32.2 05/02/2018    RDW 13.8 05/27/2022    RDW 15.3 (H) 05/02/2018     05/27/2022     05/02/2018     BMP RESULTS:  Lab Results   Component Value Date     (H) 05/27/2022     05/17/2018    POTASSIUM 4.1 05/27/2022    POTASSIUM 3.7 05/18/2018    CHLORIDE 109 05/27/2022    CHLORIDE 106 05/17/2018    CO2 29 05/27/2022    CO2 24 05/17/2018    ANIONGAP 7 05/27/2022    ANIONGAP 15.1 05/17/2018     (H) 05/27/2022     (H) 05/17/2018    BUN 30 05/27/2022    BUN 23 05/17/2018    CR 0.91 05/27/2022    CR 1.01 05/17/2018    GFRESTIMATED 82 05/27/2022    GFRESTIMATED >60 10/12/2020    GFRESTIMATED 71 05/17/2018    GFRESTBLACK >60 10/12/2020    GFRESTBLACK 86 05/17/2018    ARELIS 9.3 05/27/2022    ARELIS 9.7 05/17/2018      A1C RESULTS:  Lab Results   Component Value Date    A1C 5.8 (H) 08/09/2021     INR RESULTS:  Lab Results   Component Value Date    INR 1.09 05/02/2018    INR 0.93 02/20/2015     Thank you for allowing me to participate in the care of your patient.      Sincerely,     NICHOLAS Meehan [facility]  cc:   Carlo Llanes MD  [address]  [place] [address]

## 2023-02-13 NOTE — TELEPHONE ENCOUNTER
[facility] Call Center    Phone Message    May a detailed message be left on voicemail: yes     Reason for Call: Other: Please fax lab[facility]xray/EKG [facility] ATTN: [address] [place] [address]   [phone]    Action Taken: Message routed to:  Other: Cardiology    Travel Screening: Not Applicable       Thank you!  [facility]

## 2023-02-20 NOTE — NURSING NOTE
"Oncology Rooming Note    February 20, 2023 1:48 PM   Nacho Joseph is a 87 year old male who presents for:    Chief Complaint   Patient presents with     New Patient     LUNG MASS     Initial Vitals: /64 (BP Location: Right arm, Patient Position: Sitting, Cuff Size: Adult Regular)   Pulse 66   Temp 97.9  F (36.6  C) (Tympanic)   Resp 16   SpO2 90%  Estimated body mass index is 16.89 kg/m  as calculated from the following:    Height as of 11/1/22: 1.854 m (6' 1\").    Weight as of 11/1/22: 58.1 kg (128 lb). There is no height or weight on file to calculate BSA.  No Pain (0) Comment: Data Unavailable   No LMP for male patient.  Allergies reviewed: Yes  Medications reviewed: Yes    Medications: Medication refills not needed today.  Pharmacy name entered into EPIC:    WRITTEN PRESCRIPTION REQUESTED  [facility] [address] [facility] [address]     Aware of O2 reading.     Cherri [facility]              "

## 2023-02-20 NOTE — PROGRESS NOTES
LUNG NODULE & INTERVENTIONAL PULMONARY CLINIC  [facility]     Nacho Joseph MRN# [id number]   Age: 87 year old YOB: 1935     Reason for Consultation: Lung nodule    Requesting Physician: Radha Hsieh DO  [facility]  [address]  [place] [address]       Assessment and Plan:    Nacho Joseph is a 87 year old male who presents with large RUL lung mass concerning for malignancy. History limited due to Ethiopian speaking. History obtained by  and daughter in law over the phone.  His [facility] risk is very high > 90%. Given the size of the mass, would recommend EBUS TBNA and navigational bronchoscopy with biopsy. He also has a left PLEF, which is not PET positive. Considered pleuroscopy with biopsy, but this is not PET positive. Does not seem like a good surgical candidate based off the fact he is weak and in a wheel chair. His FEV1 is also 0.87L    Will place CR for bronchoscopy with biopsy, EBUS TBNA. Also needs left thoracentesis.   Discussed with patient and daughter in law.     David House DO  Interventional Pulmonology  Department of Pulmonary, Allergy, Critical Care and Sleep Medicine   [facility]           History:   History obtained by Ethiopian :     Nacho Joseph is a 87 year old male with sig h/o for CAD, HTN, pacemaker place. Patient is currently coming from a nursing home. Patient moved to the  in 2009. At that time he had a lung infection which was treated, unclear what type of infection it was.     - No new resp sx or complaints. Denies dyspnea or cough.   - Personal hx of cancer: patient says yes, but does not know what type.   - Family hx of cancer: Patient does not know.   - Exposure hx: None per patient.   - Tobacco hx: Never smoked.          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    S/p PPM 7/2014     CAD 02/09/2015    moderate stenosis per angiography     Carotid stenosis      COPD (chronic obstructive pulmonary disease) (H)      Depression 07/21/2014     Hypertension      Lung abscess (H)      Syncope            Past Surgical History:      Past Surgical History:   Procedure Laterality Date     COMBINED CYSTOSCOPY, INSERT STENT URETER(S) Right 4/20/2018    Procedure: COMBINED CYSTOSCOPY, INSERT STENT URETER(S);  CYSTOSCOPY,RIGHT URETERAL STENT PLACEMENT, RIGHT RETROGRADES(LANGUAGE:Uruguayan);  Surgeon: Gurvinder Sanz MD;  Location:  OR     CORONARY ANGIOGRAPHY ADULT ORDER  2/20/15    medical managed     CYSTOSCOPY, REMOVE STENT(S), COMBINED Right 5/18/2018    Procedure: COMBINED CYSTOSCOPY, REMOVE STENT(S);;  Surgeon: Gurvinder Sanz MD;  Location:  OR     CYSTOSCOPY, RETROGRADES, COMBINED  4/20/2018    Procedure: COMBINED CYSTOSCOPY, RETROGRADES;;  Surgeon: Gurvinder Sanz MD;  Location:  OR     CYSTOSCOPY, URETEROSCOPY, INSERT STENT Right 5/18/2018    Procedure: CYSTOSCOPY, URETEROSCOPY, INSERT STENT;  CYSTOSCOPY, RIGHT URETEROSCOPY, STONE REMOVAL AND SRENT REMOVAL (LANGUAGE: Uruguayan);  Surgeon: Gurvinder Sanz MD;  Location:  OR     EP PACEMAKER GENERATOR REPLACEMENT- DUAL N/A 5/27/2022    Procedure: Pacemaker Generator Replacement Dual;  Surgeon: Talia Dailey MD;  Location:  HEART CARDIAC CATH LAB     IMPLANT PACEMAKER  7/7/14    dual chamber          Social History:     Social History     Tobacco Use     Smoking status: Former     Types: Cigarettes     Start date: 1/10/1949     Quit date: 7/7/2012     Years since quitting: 10.6     Smokeless tobacco: Never   Substance Use Topics     Alcohol use: No          Family History:     Family History   Problem Relation Age of Onset     Other - See Comments Mother         old age     Heart Disease Father         unknown            Allergies:    No Known Allergies       Medications:     Current Outpatient Medications   Medication Sig     Acetaminophen (TYLENOL PO) Take 1,000 mg by mouth 3 times daily     amiodarone (PACERONE) 200 MG tablet Take 1 tablet (200 mg) by mouth daily     aspirin 81 MG tablet Take 1 tablet (81 mg) by mouth daily     atorvastatin (LIPITOR) 20 MG tablet Take 1 tablet (20 mg) by mouth daily     doxycycline hyclate (VIBRA-TABS) 100 MG tablet      guaiFENesin (ROBITUSSIN) 20 mg/mL liquid 10 ml TID     hydrochlorothiazide (HYDRODIURIL) 25 MG tablet Take 1 tablet (25 mg) by mouth daily     isosorbide mononitrate (IMDUR) 30 MG 24 hr tablet Take 3 tablets (90 mg) by mouth daily     Lidocaine (LIDOCARE) 4 % Patch Place 1 patch onto the skin every 24 hours To prevent lidocaine toxicity, patient should be patch free for 12 hrs daily.     metoprolol succinate (TOPROL-XL) 50 MG 24 hr tablet Take 1 tablet (50 mg) by mouth two times daily     nitroglycerin (NITROSTAT) 0.4 MG SL tablet Place 1 tablet (0.4 mg) under the tongue every 5 minutes as needed for chest pain     traZODone (DESYREL) 50 MG tablet 50 mg At Bedtime     VENTOLIN  (90 Base) MCG/ACT inhaler      DOXYCYCLINE HYCLATE PO Take 100 mg by mouth 2 times daily     sodium chloride (OCEAN) 0.65 % nasal spray Spray 1 spray into both nostrils daily as needed for congestion     No current facility-administered medications for this visit.            Physical Exam:   /64 (BP Location: Right arm, Patient Position: Sitting, Cuff Size: Adult Regular)   Pulse 66   Temp 97.9  F (36.6  C) (Tympanic)   Resp 16   SpO2 90%   Wt Readings from Last 4 Encounters:   11/01/22 58.1 kg (128 lb)   05/27/22 62.1 kg (137 lb)   05/26/22 65.3 kg (144 lb)   10/30/20 69.9 kg (154 lb 3.2 oz)     General: Well appearing, non labored breathing.In a wheel chair.   Heart: Did not assess.   Lungs: Did not assess.   Neuro: Answering questions appropriately  Psych: Normal affect  Skin: Warm and  dry     Imaging/Lab Data   All laboratory and imaging data reviewed.    Results for orders placed or performed in visit on 02/20/23 (from the past 24 hour(s))   General PFT Lab (Please always keep checked)   Result Value Ref Range    FVC-Pred 3.62 L    FVC-Pre 1.51 L    FVC-%Pred-Pre 41 %    FEV1-Pre 0.87 L    FEV1-%Pred-Pre 32 %    FEV1FVC-Pred 74 %    FEV1FVC-Pre 57 %    FEFMax-Pred 6.75 L/sec    FEFMax-Pre 1.64 L/sec    FEFMax-%Pred-Pre 24 %    FEF2575-Pred 1.79 L/sec    FEF2575-Pre 0.61 L/sec    OST1254-%Pred-Pre 33 %    ExpTime-Pre 3.42 sec    FIFMax-Pre 1.78 L/sec    FEV1FEV6-Pred 75 %    FEV1FEV6-Pre 57 %

## 2023-03-02 NOTE — TELEPHONE ENCOUNTER
Writer received request from [facility] to call the patient's nursing home to schedule his ION procedure. Left message for JAY García with callback [phone].    Carlos Manuel Andres on 3/2/2023 at 11:05 AM

## 2023-03-03 NOTE — TELEPHONE ENCOUNTER
Writer received message from JAY García, attempted to contact. Left message with callback number [phone].    Carlos Manuel Andres on 3/3/2023 at 10:45 AM

## 2023-03-03 NOTE — TELEPHONE ENCOUNTER
Writer received call from Raquel with patient's assisted living facility. Scheduled ION procedure for 03/17. Caller is aware that the patient will need an H&P. Caller also requested that writer fax surgery packet information to [phone].     Carlos Manuel Andres on 3/3/2023 at 11:45 AM

## 2023-03-06 NOTE — TELEPHONE ENCOUNTER
Writer attempted to reach Raquel to provide arrival time for CT scan on 03/17. Left detailed message with callback number [phone].    Carlos Manuel Andres on 3/6/2023 at 2:30 PM

## 2023-03-15 NOTE — TELEPHONE ENCOUNTER
received call from Raquel to cancel the patient's procedure on 03/17 because he's passed away.    Carlos Manuel Andres on 3/15/2023 at 12:00 PM

## (undated) DEVICE — CATH URETERAL OPEN END 6FR AXXCESS

## (undated) DEVICE — PAD CHUX UNDERPAD 23X24" 7136

## (undated) DEVICE — BAG CYSTO TABLE DRAIN

## (undated) DEVICE — LINEN TOWEL PACK X5 5464

## (undated) DEVICE — PACK CYSTOSCOPY SBA15CYFSI

## (undated) DEVICE — Device

## (undated) DEVICE — PACK PCMKR PERM SRG PROC LF SAN32PC573

## (undated) DEVICE — DEFIB PRO-PADZ LVP LQD GEL ADULT 8900-2105-01

## (undated) DEVICE — RAD RX ISOVUE 300 (50ML) 61% IOPAMIDOL CHARGE PER ML

## (undated) DEVICE — SOL WATER IRRIG 3000ML BAG 2B7117

## (undated) DEVICE — SOL WATER IRRIG 1000ML BOTTLE 2F7114

## (undated) DEVICE — CABLE PACING ALLIGATOR CLIP 301-CG

## (undated) DEVICE — WIRE GUIDE 0.035"X145CM BENTSON TSFB-35-145-BH

## (undated) DEVICE — DRAPE SLEEVE MEDT STERILE 10FT 6177

## (undated) DEVICE — GLOVE PROTEXIS W/NEU-THERA 8.0  2D73TE80

## (undated) RX ORDER — CEFAZOLIN SODIUM 2 G/100ML
INJECTION, SOLUTION INTRAVENOUS
Status: DISPENSED
Start: 2018-04-20

## (undated) RX ORDER — CEFAZOLIN SODIUM 2 G/100ML
INJECTION, SOLUTION INTRAVENOUS
Status: DISPENSED
Start: 2022-01-01

## (undated) RX ORDER — LIDOCAINE HYDROCHLORIDE 10 MG/ML
INJECTION, SOLUTION EPIDURAL; INFILTRATION; INTRACAUDAL; PERINEURAL
Status: DISPENSED
Start: 2018-05-02

## (undated) RX ORDER — FENTANYL CITRATE 50 UG/ML
INJECTION, SOLUTION INTRAMUSCULAR; INTRAVENOUS
Status: DISPENSED
Start: 2018-05-18

## (undated) RX ORDER — LIDOCAINE HYDROCHLORIDE 20 MG/ML
INJECTION, SOLUTION EPIDURAL; INFILTRATION; INTRACAUDAL; PERINEURAL
Status: DISPENSED
Start: 2018-04-20

## (undated) RX ORDER — DEXAMETHASONE SODIUM PHOSPHATE 4 MG/ML
INJECTION, SOLUTION INTRA-ARTICULAR; INTRALESIONAL; INTRAMUSCULAR; INTRAVENOUS; SOFT TISSUE
Status: DISPENSED
Start: 2018-04-20

## (undated) RX ORDER — HEPARIN SODIUM 1000 [USP'U]/ML
INJECTION, SOLUTION INTRAVENOUS; SUBCUTANEOUS
Status: DISPENSED
Start: 2018-05-02

## (undated) RX ORDER — FENTANYL CITRATE 50 UG/ML
INJECTION, SOLUTION INTRAMUSCULAR; INTRAVENOUS
Status: DISPENSED
Start: 2018-05-02

## (undated) RX ORDER — CEFAZOLIN SODIUM 2 G/100ML
INJECTION, SOLUTION INTRAVENOUS
Status: DISPENSED
Start: 2018-05-18

## (undated) RX ORDER — OXYCODONE HYDROCHLORIDE 5 MG/1
TABLET ORAL
Status: DISPENSED
Start: 2018-05-18

## (undated) RX ORDER — LIDOCAINE HYDROCHLORIDE 20 MG/ML
INJECTION, SOLUTION EPIDURAL; INFILTRATION; INTRACAUDAL; PERINEURAL
Status: DISPENSED
Start: 2018-05-18

## (undated) RX ORDER — FENTANYL CITRATE 50 UG/ML
INJECTION, SOLUTION INTRAMUSCULAR; INTRAVENOUS
Status: DISPENSED
Start: 2018-04-20

## (undated) RX ORDER — ONDANSETRON 2 MG/ML
INJECTION INTRAMUSCULAR; INTRAVENOUS
Status: DISPENSED
Start: 2018-05-18

## (undated) RX ORDER — FENTANYL CITRATE 50 UG/ML
INJECTION, SOLUTION INTRAMUSCULAR; INTRAVENOUS
Status: DISPENSED
Start: 2022-01-01

## (undated) RX ORDER — LIDOCAINE HYDROCHLORIDE 10 MG/ML
INJECTION, SOLUTION EPIDURAL; INFILTRATION; INTRACAUDAL; PERINEURAL
Status: DISPENSED
Start: 2022-01-01

## (undated) RX ORDER — ONDANSETRON 2 MG/ML
INJECTION INTRAMUSCULAR; INTRAVENOUS
Status: DISPENSED
Start: 2018-04-20

## (undated) RX ORDER — BUPIVACAINE HYDROCHLORIDE 2.5 MG/ML
INJECTION, SOLUTION EPIDURAL; INFILTRATION; INTRACAUDAL
Status: DISPENSED
Start: 2022-01-01

## (undated) RX ORDER — DEXAMETHASONE SODIUM PHOSPHATE 4 MG/ML
INJECTION, SOLUTION INTRA-ARTICULAR; INTRALESIONAL; INTRAMUSCULAR; INTRAVENOUS; SOFT TISSUE
Status: DISPENSED
Start: 2018-05-18

## (undated) RX ORDER — PROPOFOL 10 MG/ML
INJECTION, EMULSION INTRAVENOUS
Status: DISPENSED
Start: 2018-04-20